# Patient Record
Sex: FEMALE | Race: WHITE | NOT HISPANIC OR LATINO | URBAN - METROPOLITAN AREA
[De-identification: names, ages, dates, MRNs, and addresses within clinical notes are randomized per-mention and may not be internally consistent; named-entity substitution may affect disease eponyms.]

---

## 2019-04-22 ENCOUNTER — EMERGENCY (EMERGENCY)
Facility: HOSPITAL | Age: 84
LOS: 1 days | Discharge: HOME | End: 2019-04-22
Attending: EMERGENCY MEDICINE
Payer: MEDICARE

## 2019-04-22 VITALS
TEMPERATURE: 98 F | HEART RATE: 70 BPM | RESPIRATION RATE: 18 BRPM | HEIGHT: 64 IN | SYSTOLIC BLOOD PRESSURE: 175 MMHG | OXYGEN SATURATION: 96 % | DIASTOLIC BLOOD PRESSURE: 74 MMHG | WEIGHT: 154.1 LBS

## 2019-04-22 VITALS
SYSTOLIC BLOOD PRESSURE: 189 MMHG | HEART RATE: 68 BPM | DIASTOLIC BLOOD PRESSURE: 75 MMHG | RESPIRATION RATE: 19 BRPM | TEMPERATURE: 98 F | OXYGEN SATURATION: 98 %

## 2019-04-22 LAB
ALBUMIN SERPL ELPH-MCNC: 4.1 G/DL — SIGNIFICANT CHANGE UP (ref 3.3–5)
ALP SERPL-CCNC: 171 U/L — HIGH (ref 40–120)
ALT FLD-CCNC: 25 U/L — SIGNIFICANT CHANGE UP (ref 10–45)
ANION GAP SERPL CALC-SCNC: 14 MMOL/L — SIGNIFICANT CHANGE UP (ref 5–17)
APTT BLD: 36.5 SEC — HIGH (ref 27.5–36.3)
AST SERPL-CCNC: 19 U/L — SIGNIFICANT CHANGE UP (ref 10–40)
BASOPHILS # BLD AUTO: 0 K/UL — SIGNIFICANT CHANGE UP (ref 0–0.2)
BASOPHILS NFR BLD AUTO: 0.5 % — SIGNIFICANT CHANGE UP (ref 0–2)
BILIRUB SERPL-MCNC: 0.3 MG/DL — SIGNIFICANT CHANGE UP (ref 0.2–1.2)
BUN SERPL-MCNC: 30 MG/DL — HIGH (ref 7–23)
CALCIUM SERPL-MCNC: 10.1 MG/DL — SIGNIFICANT CHANGE UP (ref 8.4–10.5)
CHLORIDE SERPL-SCNC: 102 MMOL/L — SIGNIFICANT CHANGE UP (ref 96–108)
CO2 SERPL-SCNC: 22 MMOL/L — SIGNIFICANT CHANGE UP (ref 22–31)
CREAT SERPL-MCNC: 1.17 MG/DL — SIGNIFICANT CHANGE UP (ref 0.5–1.3)
EOSINOPHIL # BLD AUTO: 0.4 K/UL — SIGNIFICANT CHANGE UP (ref 0–0.5)
EOSINOPHIL NFR BLD AUTO: 3.4 % — SIGNIFICANT CHANGE UP (ref 0–6)
FLU A RESULT: SIGNIFICANT CHANGE UP
FLU A RESULT: SIGNIFICANT CHANGE UP
FLUAV AG NPH QL: SIGNIFICANT CHANGE UP
FLUBV AG NPH QL: SIGNIFICANT CHANGE UP
GAS PNL BLDV: SIGNIFICANT CHANGE UP
GLUCOSE SERPL-MCNC: 111 MG/DL — HIGH (ref 70–99)
HCT VFR BLD CALC: 35.1 % — SIGNIFICANT CHANGE UP (ref 34.5–45)
HGB BLD-MCNC: 11.7 G/DL — SIGNIFICANT CHANGE UP (ref 11.5–15.5)
INR BLD: 1.19 RATIO — HIGH (ref 0.88–1.16)
LYMPHOCYTES # BLD AUTO: 1.2 K/UL — SIGNIFICANT CHANGE UP (ref 1–3.3)
LYMPHOCYTES # BLD AUTO: 10.9 % — LOW (ref 13–44)
MCHC RBC-ENTMCNC: 30.1 PG — SIGNIFICANT CHANGE UP (ref 27–34)
MCHC RBC-ENTMCNC: 33.2 GM/DL — SIGNIFICANT CHANGE UP (ref 32–36)
MCV RBC AUTO: 90.7 FL — SIGNIFICANT CHANGE UP (ref 80–100)
MONOCYTES # BLD AUTO: 1 K/UL — HIGH (ref 0–0.9)
MONOCYTES NFR BLD AUTO: 9.9 % — SIGNIFICANT CHANGE UP (ref 2–14)
NEUTROPHILS # BLD AUTO: 8 K/UL — HIGH (ref 1.8–7.4)
NEUTROPHILS NFR BLD AUTO: 75.4 % — SIGNIFICANT CHANGE UP (ref 43–77)
PLATELET # BLD AUTO: 293 K/UL — SIGNIFICANT CHANGE UP (ref 150–400)
POTASSIUM SERPL-MCNC: 4.8 MMOL/L — SIGNIFICANT CHANGE UP (ref 3.5–5.3)
POTASSIUM SERPL-SCNC: 4.8 MMOL/L — SIGNIFICANT CHANGE UP (ref 3.5–5.3)
PROT SERPL-MCNC: 7.6 G/DL — SIGNIFICANT CHANGE UP (ref 6–8.3)
PROTHROM AB SERPL-ACNC: 13.8 SEC — HIGH (ref 10–12.9)
RBC # BLD: 3.87 M/UL — SIGNIFICANT CHANGE UP (ref 3.8–5.2)
RBC # FLD: 13.2 % — SIGNIFICANT CHANGE UP (ref 10.3–14.5)
RSV RESULT: SIGNIFICANT CHANGE UP
RSV RNA RESP QL NAA+PROBE: SIGNIFICANT CHANGE UP
SODIUM SERPL-SCNC: 138 MMOL/L — SIGNIFICANT CHANGE UP (ref 135–145)
WBC # BLD: 10.6 K/UL — HIGH (ref 3.8–10.5)
WBC # FLD AUTO: 10.6 K/UL — HIGH (ref 3.8–10.5)

## 2019-04-22 PROCEDURE — 85014 HEMATOCRIT: CPT

## 2019-04-22 PROCEDURE — 93005 ELECTROCARDIOGRAM TRACING: CPT

## 2019-04-22 PROCEDURE — 85730 THROMBOPLASTIN TIME PARTIAL: CPT

## 2019-04-22 PROCEDURE — 82330 ASSAY OF CALCIUM: CPT

## 2019-04-22 PROCEDURE — 71046 X-RAY EXAM CHEST 2 VIEWS: CPT | Mod: 26

## 2019-04-22 PROCEDURE — 84132 ASSAY OF SERUM POTASSIUM: CPT

## 2019-04-22 PROCEDURE — 99283 EMERGENCY DEPT VISIT LOW MDM: CPT | Mod: 25

## 2019-04-22 PROCEDURE — 82947 ASSAY GLUCOSE BLOOD QUANT: CPT

## 2019-04-22 PROCEDURE — 84295 ASSAY OF SERUM SODIUM: CPT

## 2019-04-22 PROCEDURE — 83605 ASSAY OF LACTIC ACID: CPT

## 2019-04-22 PROCEDURE — 99284 EMERGENCY DEPT VISIT MOD MDM: CPT | Mod: 25

## 2019-04-22 PROCEDURE — 80053 COMPREHEN METABOLIC PANEL: CPT

## 2019-04-22 PROCEDURE — 87631 RESP VIRUS 3-5 TARGETS: CPT

## 2019-04-22 PROCEDURE — 93010 ELECTROCARDIOGRAM REPORT: CPT

## 2019-04-22 PROCEDURE — 71046 X-RAY EXAM CHEST 2 VIEWS: CPT

## 2019-04-22 PROCEDURE — 82435 ASSAY OF BLOOD CHLORIDE: CPT

## 2019-04-22 PROCEDURE — 82803 BLOOD GASES ANY COMBINATION: CPT

## 2019-04-22 PROCEDURE — 85027 COMPLETE CBC AUTOMATED: CPT

## 2019-04-22 PROCEDURE — 85610 PROTHROMBIN TIME: CPT

## 2019-04-22 NOTE — ED ADULT NURSE NOTE - PMH
CAD (coronary artery disease)    Diabetes    Essential hypertension, benign    Hypercholesteremia    Peripheral vascular disease

## 2019-04-22 NOTE — ED PROVIDER NOTE - PROGRESS NOTE DETAILS
labs, cxr non-actionable, without signs of pneumonia. most c/w viral URI. CHIKIS Maldonado discussed findings with Dr. Contreras, family friend. To d/c home with PMD f/u and strict return precautions. Pt and son comfortable with this plan. F/u instructions discussed with pt and son who express understanding, along with return precautions. An opportunity to ask questions was provided and all answered. Pt hypertensive, but per son pt always has elevated SBP and this is not unusual. - Singh Glover MD

## 2019-04-22 NOTE — ED ADULT NURSE NOTE - PSH
History of cholecystectomy    S/P carotid endarterectomy    S/P coronary artery by pass    S/P vascular surgery

## 2019-04-22 NOTE — ED PROVIDER NOTE - NSFOLLOWUPINSTRUCTIONS_ED_ALL_ED_FT
You were seen for a cough. Your labs were reassuring, and your X-ray did not show evidence of a pneumonia. Please follow-up closely with your primary care doctor within the next couple of days for re-evaluation, bring a copy of your results. Use cough drops for cough. Return immediately for any worsening or concerning symptoms including but not limited to worsening cough, fever >100.4 degrees F, shortness of breath, chest pain, swelling in legs.

## 2019-04-22 NOTE — ED PROVIDER NOTE - OBJECTIVE STATEMENT
89 yo female PMHx diabetes, HTN, HLD, CAD s/p quadruple bypass, PVD presents to the ED c/o productive cough, myalgias, chest and nasal congestion, and headache x 1 week. States symptoms initially started w/ productive cough w/ yellow sputum and chills, then progressed to include more body aches and headache. Symptoms have progressively worsened. Reports has been coughing so much that now has diffuse chest wall pain only when she coughs, none at rest. Denies hemoptysis, calf pain/swelling, LE swelling, shortness of breath, dyspnea on exertion, abdominal pain, n/v/d, numbness/tingling.

## 2019-04-22 NOTE — ED PROVIDER NOTE - PMH
CAD (coronary artery disease)    Essential hypertension, benign    Hypercholesteremia    Peripheral vascular disease

## 2019-04-22 NOTE — ED ADULT NURSE NOTE - OBJECTIVE STATEMENT
89 year old female c/o cough for one week with yellow sputum production.  Patient said cough is improving, but when she coughs she has left sided chest pain radiating to the left upper back.  Patient denies; fever, sick contacts, N/V, recent travel, swelling in the legs. 89 year old female c/o cough for one week with yellow sputum production.  Patient said cough is improving, but when she coughs she has left sided chest pain radiating to the left upper back.  Patient denies; fever, sick contacts, N/V, recent travel, swelling in the legs.  L/s diminished on right side.

## 2019-04-22 NOTE — ED PROVIDER NOTE - ATTENDING CONTRIBUTION TO CARE
89F, pmh DM, HTN, HLD, CAD s/p CABG, PVD, presents with productive cough, nasal congestion, headache x 1 week, sx improving over last few days. Pt initially developed rhinorrhea, sore throat, then productive cough, myalgias, headache. Cough productive yellowish sputum. Sx initially worsened, now improving. +Chest wall, back, and L-sided abd discomfort while coughing, pt states developed after coughing forcefully. Denies any pain or discomfort when not coughing. Denies SOB. Denies LE pain, swelling. Denies WEEKS, n/v/d, or other complaints. No recnet travel. No known sick contacts.    PE: Well appearing female in NAD, NCAT, MMM, Trachea midline, Normal conjunctiva, lungs CTAB, S1/S2 RRR, Normal perfusion, 2+ radial pulses bilat, Abdomen Soft, NTND, No rebound/guarding, No LE edema, No deformity of extremities, No rashes,  No focal motor or sensory deficits.     Pt appears well, hypertensive but not tachycardic, hypoxic, febrile. Ddx includes but not limited to viral URI, pneumonia. EKG unchanged from previous, carmine wall pain reproducible, only with cough, extremely low suspicion ACS. No calf pain/swelling, no hx PE/DVT, no sob, no pleuritic chest pain, extremely low suspicion of PE. Check CBC eval for anemia, leukocytosis, cmp eval for metabolic derangement. Flu swab. VBG. Re-eval. - Singh Glover MD

## 2020-01-20 ENCOUNTER — INPATIENT (INPATIENT)
Facility: HOSPITAL | Age: 85
LOS: 4 days | Discharge: HOME | DRG: 291 | End: 2020-01-25
Attending: INTERNAL MEDICINE | Admitting: HOSPITALIST
Payer: MEDICARE

## 2020-01-20 VITALS
OXYGEN SATURATION: 97 % | DIASTOLIC BLOOD PRESSURE: 66 MMHG | RESPIRATION RATE: 18 BRPM | HEART RATE: 73 BPM | SYSTOLIC BLOOD PRESSURE: 179 MMHG | HEIGHT: 62 IN | WEIGHT: 158.95 LBS | TEMPERATURE: 98 F

## 2020-01-20 DIAGNOSIS — I50.9 HEART FAILURE, UNSPECIFIED: ICD-10-CM

## 2020-01-20 LAB
ALBUMIN SERPL ELPH-MCNC: 4.2 G/DL — SIGNIFICANT CHANGE UP (ref 3.3–5)
ALP SERPL-CCNC: 188 U/L — HIGH (ref 40–120)
ALT FLD-CCNC: 19 U/L — SIGNIFICANT CHANGE UP (ref 10–45)
ANION GAP SERPL CALC-SCNC: 13 MMOL/L — SIGNIFICANT CHANGE UP (ref 5–17)
AST SERPL-CCNC: 15 U/L — SIGNIFICANT CHANGE UP (ref 10–40)
BASOPHILS # BLD AUTO: 0.05 K/UL — SIGNIFICANT CHANGE UP (ref 0–0.2)
BASOPHILS NFR BLD AUTO: 0.6 % — SIGNIFICANT CHANGE UP (ref 0–2)
BILIRUB SERPL-MCNC: 0.3 MG/DL — SIGNIFICANT CHANGE UP (ref 0.2–1.2)
BUN SERPL-MCNC: 28 MG/DL — HIGH (ref 7–23)
CALCIUM SERPL-MCNC: 9.5 MG/DL — SIGNIFICANT CHANGE UP (ref 8.4–10.5)
CHLORIDE SERPL-SCNC: 103 MMOL/L — SIGNIFICANT CHANGE UP (ref 96–108)
CO2 SERPL-SCNC: 22 MMOL/L — SIGNIFICANT CHANGE UP (ref 22–31)
CREAT SERPL-MCNC: 1.52 MG/DL — HIGH (ref 0.5–1.3)
EOSINOPHIL # BLD AUTO: 0.16 K/UL — SIGNIFICANT CHANGE UP (ref 0–0.5)
EOSINOPHIL NFR BLD AUTO: 1.9 % — SIGNIFICANT CHANGE UP (ref 0–6)
GLUCOSE SERPL-MCNC: 247 MG/DL — HIGH (ref 70–99)
HCT VFR BLD CALC: 34.4 % — LOW (ref 34.5–45)
HGB BLD-MCNC: 10.7 G/DL — LOW (ref 11.5–15.5)
IMM GRANULOCYTES NFR BLD AUTO: 0.5 % — SIGNIFICANT CHANGE UP (ref 0–1.5)
LYMPHOCYTES # BLD AUTO: 0.76 K/UL — LOW (ref 1–3.3)
LYMPHOCYTES # BLD AUTO: 8.8 % — LOW (ref 13–44)
MCHC RBC-ENTMCNC: 28.3 PG — SIGNIFICANT CHANGE UP (ref 27–34)
MCHC RBC-ENTMCNC: 31.1 GM/DL — LOW (ref 32–36)
MCV RBC AUTO: 91 FL — SIGNIFICANT CHANGE UP (ref 80–100)
MONOCYTES # BLD AUTO: 0.75 K/UL — SIGNIFICANT CHANGE UP (ref 0–0.9)
MONOCYTES NFR BLD AUTO: 8.7 % — SIGNIFICANT CHANGE UP (ref 2–14)
NEUTROPHILS # BLD AUTO: 6.85 K/UL — SIGNIFICANT CHANGE UP (ref 1.8–7.4)
NEUTROPHILS NFR BLD AUTO: 79.5 % — HIGH (ref 43–77)
NRBC # BLD: 0 /100 WBCS — SIGNIFICANT CHANGE UP (ref 0–0)
PLATELET # BLD AUTO: 272 K/UL — SIGNIFICANT CHANGE UP (ref 150–400)
POTASSIUM SERPL-MCNC: 4.3 MMOL/L — SIGNIFICANT CHANGE UP (ref 3.5–5.3)
POTASSIUM SERPL-SCNC: 4.3 MMOL/L — SIGNIFICANT CHANGE UP (ref 3.5–5.3)
PROT SERPL-MCNC: 7.3 G/DL — SIGNIFICANT CHANGE UP (ref 6–8.3)
RBC # BLD: 3.78 M/UL — LOW (ref 3.8–5.2)
RBC # FLD: 13.8 % — SIGNIFICANT CHANGE UP (ref 10.3–14.5)
SODIUM SERPL-SCNC: 138 MMOL/L — SIGNIFICANT CHANGE UP (ref 135–145)
WBC # BLD: 8.61 K/UL — SIGNIFICANT CHANGE UP (ref 3.8–10.5)
WBC # FLD AUTO: 8.61 K/UL — SIGNIFICANT CHANGE UP (ref 3.8–10.5)

## 2020-01-20 PROCEDURE — 99285 EMERGENCY DEPT VISIT HI MDM: CPT | Mod: GC

## 2020-01-20 PROCEDURE — 93970 EXTREMITY STUDY: CPT | Mod: 26

## 2020-01-20 NOTE — ED PROVIDER NOTE - ATTENDING CONTRIBUTION TO CARE
89yo female pmh DM, CAD s/p CABG, PVD with stents, HTN, HLD p/w bilateral LE swelling x 1 week, started after eating salty foods. No diuretics. Denies dyspnea, n/v/d, abd pain, chest pain, cough, fevers. LE swelling worsening, causing pain, limiting ambulation. Pt with 1+ bilateral pitting edema LE. Lungs clear, regular rhythm with occasional pause. Abd soft. Concenr for renal vs cardiogenic cause. Labs with BNP. DVT study ordered by qPA negative.

## 2020-01-20 NOTE — ED PROVIDER NOTE - PHYSICAL EXAMINATION
PHYSICAL EXAM:  GENERAL: non-toxic appearing; in no respiratory distress  HEAD: Atraumatic, Normocephalic;  NECK: No JVD; FROM  EYES: PERRL, EOMs intact b/l w/out deficits  CHEST/LUNG: CTAB no wheezes/rhonchi/rales  HEART: RRR no murmur/gallops/rubs  ABDOMEN: +BS, soft, NT, ND  EXTREMITIES: 1+ pitting edema; +2 radial, DP/PT pulses b/l  MUSCULOSKELETAL: FROM of all 4 extremities; tender to touch to b/l legs without focal tenderness;   NERVOUS SYSTEM:  A&Ox3, No motor deficits or sensory deficits; CNII-XII intact; no focal neurologic deficits  SKIN:  No new rashes

## 2020-01-20 NOTE — ED PROVIDER NOTE - CLINICAL SUMMARY MEDICAL DECISION MAKING FREE TEXT BOX
89 yo F with PAD, CAD, s/p cabg, carotid endarterectomy, on asa, plavix, not on diuretics, last echo about 6 months ago was told was normal, presents to ED c/o b/l leg swelling x1 week with pain yesterday and increased swelling. not sob, no cp. no orthopnea, pnd, or jaimes. 1+pitting edema w/ no rales heard. will check labs, bnp, cxr, ekg, reassess.

## 2020-01-20 NOTE — ED PROVIDER NOTE - NS ED ROS FT
Constitutional: no fevers or chills  HEENT: no visual changes, no sore throat, no rhinorrhea  CV: no cp or palpitations  Resp: no sob or cough  GI: no abd pain, no nausea, no vomiting, no diarrhea, no constipation  : no dysuria, no hematuria  MSK: b/l leg swelling; b/l leg pain;   skin: no rashes  neuro: no HA, no numbness; no weakness, no tingling  ROS statement: all other ROS negative except as per HPI

## 2020-01-20 NOTE — ED PROVIDER NOTE - CARE PLAN
Principal Discharge DX:	Congestive heart failure  Secondary Diagnosis:	COLETTE (acute kidney injury)

## 2020-01-20 NOTE — ED PROVIDER NOTE - OBJECTIVE STATEMENT
89 yo F PMHx PAD s/p stents, CAD, on ASA and plavix, HLD, HTN, s/p CABG, carotid endartectomy, cholecystectomy, presents to ED c/o b/l LE swelling x1 week. yesterday, pt develoepd b/l leg pain and noted that swelling was worse yesterday and today and thus presents to ED. pt denies CP, SOB, n/v, abd pain, urinary sx, fall, trauma, or injury to region, HA.

## 2020-01-20 NOTE — ED PROVIDER NOTE - PROGRESS NOTE DETAILS
+COLETTE and elevated BNP. Concern for combined etiology of edema. Will admit for further evaluation, echocardiogram. No immediate need for diuresis as pt is stable, breathing comfortably and there may be detrimental renal impact of diuresis at the moment. Jose Dean MD. abdifatah tiwari/ hospitalist, who accepts pt for admission.

## 2020-01-20 NOTE — ED PROVIDER NOTE - RAPID ASSESSMENT
Dax Wright rapid assessment documentation for Umm Muñoz (PA-C):    89 year old female with pmhx DM, peripheral vascular disease, CAD, HLD, essential HTN and pshx vascular surgery, cholecystectomy, CABG, carotid endarterectomy presents to the ED c/o LE swelling x1 week. Swelling began on 1/12 after eating salty foods during a party. Symptoms progressively worsened but yesterday, pt was unable to walk at all. Per pt, her lower extremities were extremely swollen yesterday and it was accompanied by pain. Symptoms have since resolved. Currently on 81mg and Plavix s/p bilateral femur stent placement. Fmhx blood clots but denies pmhx blood clots. Denies CP, SOB, abdominal distension. Denies smoking. Dax Wright rapid assessment documentation for Umm Muñoz (PA-C):    89 year old female with pmhx DM, peripheral vascular disease s/p stents (on asa/plavix), CAD, HLD, HTN and pshx vascular surgery, cholecystectomy, CABG, carotid endarterectomy presents to the ED c/o b/l LE swelling x1 week. Swelling began on 1/12 after eating salty foods during a party. Symptoms progressively worsened but yesterday, pt was unable to walk at all due to b/l calf pain. Per pt, her lower extremities were extremely swollen yesterday and it was accompanied by pain. Symptoms have since resolved. +Fmhx blood clots but denies pmhx blood clots. Denies CP, SOB, abdominal distension. Denies smoking, recent travel, hormone use.     CHIKIS Muñoz: Patient seen by myself in triage area with scribe for rapid assessment only. Full H&P to be performed in main emergency room by ER providers.   Patient denies any CP, SOB, orthopnea. Reports swelling began after family party on feet and eating saltier foods. Lungs clear b/l.

## 2020-01-21 DIAGNOSIS — Z29.9 ENCOUNTER FOR PROPHYLACTIC MEASURES, UNSPECIFIED: ICD-10-CM

## 2020-01-21 DIAGNOSIS — I25.10 ATHEROSCLEROTIC HEART DISEASE OF NATIVE CORONARY ARTERY WITHOUT ANGINA PECTORIS: ICD-10-CM

## 2020-01-21 DIAGNOSIS — I50.9 HEART FAILURE, UNSPECIFIED: ICD-10-CM

## 2020-01-21 DIAGNOSIS — E78.00 PURE HYPERCHOLESTEROLEMIA, UNSPECIFIED: ICD-10-CM

## 2020-01-21 DIAGNOSIS — N17.9 ACUTE KIDNEY FAILURE, UNSPECIFIED: ICD-10-CM

## 2020-01-21 DIAGNOSIS — E11.69 TYPE 2 DIABETES MELLITUS WITH OTHER SPECIFIED COMPLICATION: ICD-10-CM

## 2020-01-21 DIAGNOSIS — Z02.9 ENCOUNTER FOR ADMINISTRATIVE EXAMINATIONS, UNSPECIFIED: ICD-10-CM

## 2020-01-21 PROBLEM — E11.9 TYPE 2 DIABETES MELLITUS WITHOUT COMPLICATIONS: Chronic | Status: ACTIVE | Noted: 2019-04-22

## 2020-01-21 LAB
ANION GAP SERPL CALC-SCNC: 15 MMOL/L — SIGNIFICANT CHANGE UP (ref 5–17)
BASOPHILS # BLD AUTO: 0.05 K/UL — SIGNIFICANT CHANGE UP (ref 0–0.2)
BASOPHILS NFR BLD AUTO: 0.6 % — SIGNIFICANT CHANGE UP (ref 0–2)
BUN SERPL-MCNC: 26 MG/DL — HIGH (ref 7–23)
CALCIUM SERPL-MCNC: 9.7 MG/DL — SIGNIFICANT CHANGE UP (ref 8.4–10.5)
CHLORIDE SERPL-SCNC: 103 MMOL/L — SIGNIFICANT CHANGE UP (ref 96–108)
CO2 SERPL-SCNC: 23 MMOL/L — SIGNIFICANT CHANGE UP (ref 22–31)
CREAT SERPL-MCNC: 1.41 MG/DL — HIGH (ref 0.5–1.3)
EOSINOPHIL # BLD AUTO: 0.25 K/UL — SIGNIFICANT CHANGE UP (ref 0–0.5)
EOSINOPHIL NFR BLD AUTO: 3.1 % — SIGNIFICANT CHANGE UP (ref 0–6)
GLUCOSE BLDC GLUCOMTR-MCNC: 129 MG/DL — HIGH (ref 70–99)
GLUCOSE BLDC GLUCOMTR-MCNC: 163 MG/DL — HIGH (ref 70–99)
GLUCOSE BLDC GLUCOMTR-MCNC: 190 MG/DL — HIGH (ref 70–99)
GLUCOSE BLDC GLUCOMTR-MCNC: 237 MG/DL — HIGH (ref 70–99)
GLUCOSE BLDC GLUCOMTR-MCNC: 83 MG/DL — SIGNIFICANT CHANGE UP (ref 70–99)
GLUCOSE SERPL-MCNC: 124 MG/DL — HIGH (ref 70–99)
HBA1C BLD-MCNC: 5.9 % — HIGH (ref 4–5.6)
HCT VFR BLD CALC: 34 % — LOW (ref 34.5–45)
HGB BLD-MCNC: 10.6 G/DL — LOW (ref 11.5–15.5)
IMM GRANULOCYTES NFR BLD AUTO: 0.5 % — SIGNIFICANT CHANGE UP (ref 0–1.5)
LYMPHOCYTES # BLD AUTO: 0.98 K/UL — LOW (ref 1–3.3)
LYMPHOCYTES # BLD AUTO: 12.1 % — LOW (ref 13–44)
MAGNESIUM SERPL-MCNC: 1.7 MG/DL — SIGNIFICANT CHANGE UP (ref 1.6–2.6)
MCHC RBC-ENTMCNC: 27.7 PG — SIGNIFICANT CHANGE UP (ref 27–34)
MCHC RBC-ENTMCNC: 31.2 GM/DL — LOW (ref 32–36)
MCV RBC AUTO: 89 FL — SIGNIFICANT CHANGE UP (ref 80–100)
MONOCYTES # BLD AUTO: 0.87 K/UL — SIGNIFICANT CHANGE UP (ref 0–0.9)
MONOCYTES NFR BLD AUTO: 10.8 % — SIGNIFICANT CHANGE UP (ref 2–14)
NEUTROPHILS # BLD AUTO: 5.88 K/UL — SIGNIFICANT CHANGE UP (ref 1.8–7.4)
NEUTROPHILS NFR BLD AUTO: 72.9 % — SIGNIFICANT CHANGE UP (ref 43–77)
PHOSPHATE SERPL-MCNC: 3.7 MG/DL — SIGNIFICANT CHANGE UP (ref 2.5–4.5)
PLATELET # BLD AUTO: 287 K/UL — SIGNIFICANT CHANGE UP (ref 150–400)
POTASSIUM SERPL-MCNC: 4.1 MMOL/L — SIGNIFICANT CHANGE UP (ref 3.5–5.3)
POTASSIUM SERPL-SCNC: 4.1 MMOL/L — SIGNIFICANT CHANGE UP (ref 3.5–5.3)
RBC # BLD: 3.82 M/UL — SIGNIFICANT CHANGE UP (ref 3.8–5.2)
RBC # FLD: 13.9 % — SIGNIFICANT CHANGE UP (ref 10.3–14.5)
SODIUM SERPL-SCNC: 141 MMOL/L — SIGNIFICANT CHANGE UP (ref 135–145)
TROPONIN T, HIGH SENSITIVITY RESULT: 22 NG/L — SIGNIFICANT CHANGE UP (ref 0–51)
TSH SERPL-MCNC: 3.94 UIU/ML — SIGNIFICANT CHANGE UP (ref 0.27–4.2)
WBC # BLD: 8.07 K/UL — SIGNIFICANT CHANGE UP (ref 3.8–10.5)
WBC # FLD AUTO: 8.07 K/UL — SIGNIFICANT CHANGE UP (ref 3.8–10.5)

## 2020-01-21 PROCEDURE — 71046 X-RAY EXAM CHEST 2 VIEWS: CPT | Mod: 26

## 2020-01-21 PROCEDURE — 99223 1ST HOSP IP/OBS HIGH 75: CPT

## 2020-01-21 RX ORDER — ATORVASTATIN CALCIUM 80 MG/1
0 TABLET, FILM COATED ORAL
Qty: 90 | Refills: 0 | DISCHARGE

## 2020-01-21 RX ORDER — METOPROLOL TARTRATE 50 MG
0 TABLET ORAL
Qty: 90 | Refills: 0 | DISCHARGE

## 2020-01-21 RX ORDER — METFORMIN HYDROCHLORIDE 850 MG/1
0 TABLET ORAL
Qty: 180 | Refills: 0 | DISCHARGE

## 2020-01-21 RX ORDER — METOPROLOL TARTRATE 50 MG
100 TABLET ORAL DAILY
Refills: 0 | Status: DISCONTINUED | OUTPATIENT
Start: 2020-01-21 | End: 2020-01-25

## 2020-01-21 RX ORDER — SODIUM CHLORIDE 9 MG/ML
1000 INJECTION, SOLUTION INTRAVENOUS
Refills: 0 | Status: DISCONTINUED | OUTPATIENT
Start: 2020-01-21 | End: 2020-01-25

## 2020-01-21 RX ORDER — MELOXICAM 15 MG/1
0 TABLET ORAL
Qty: 30 | Refills: 0 | DISCHARGE

## 2020-01-21 RX ORDER — CLOPIDOGREL BISULFATE 75 MG/1
75 TABLET, FILM COATED ORAL DAILY
Refills: 0 | Status: DISCONTINUED | OUTPATIENT
Start: 2020-01-21 | End: 2020-01-25

## 2020-01-21 RX ORDER — AMLODIPINE BESYLATE 2.5 MG/1
10 TABLET ORAL DAILY
Refills: 0 | Status: DISCONTINUED | OUTPATIENT
Start: 2020-01-21 | End: 2020-01-25

## 2020-01-21 RX ORDER — PANTOPRAZOLE SODIUM 20 MG/1
0 TABLET, DELAYED RELEASE ORAL
Qty: 90 | Refills: 0 | DISCHARGE

## 2020-01-21 RX ORDER — LINAGLIPTIN 5 MG/1
0 TABLET, FILM COATED ORAL
Qty: 90 | Refills: 0 | DISCHARGE

## 2020-01-21 RX ORDER — INSULIN LISPRO 100/ML
VIAL (ML) SUBCUTANEOUS
Refills: 0 | Status: DISCONTINUED | OUTPATIENT
Start: 2020-01-21 | End: 2020-01-25

## 2020-01-21 RX ORDER — GLUCAGON INJECTION, SOLUTION 0.5 MG/.1ML
1 INJECTION, SOLUTION SUBCUTANEOUS ONCE
Refills: 0 | Status: DISCONTINUED | OUTPATIENT
Start: 2020-01-21 | End: 2020-01-25

## 2020-01-21 RX ORDER — DEXTROSE 50 % IN WATER 50 %
25 SYRINGE (ML) INTRAVENOUS ONCE
Refills: 0 | Status: DISCONTINUED | OUTPATIENT
Start: 2020-01-21 | End: 2020-01-25

## 2020-01-21 RX ORDER — TRAZODONE HCL 50 MG
0 TABLET ORAL
Qty: 90 | Refills: 0 | DISCHARGE

## 2020-01-21 RX ORDER — TRAZODONE HCL 50 MG
50 TABLET ORAL ONCE
Refills: 0 | Status: COMPLETED | OUTPATIENT
Start: 2020-01-21 | End: 2020-01-21

## 2020-01-21 RX ORDER — TRAZODONE HCL 50 MG
50 TABLET ORAL AT BEDTIME
Refills: 0 | Status: DISCONTINUED | OUTPATIENT
Start: 2020-01-21 | End: 2020-01-25

## 2020-01-21 RX ORDER — HEPARIN SODIUM 5000 [USP'U]/ML
5000 INJECTION INTRAVENOUS; SUBCUTANEOUS EVERY 8 HOURS
Refills: 0 | Status: DISCONTINUED | OUTPATIENT
Start: 2020-01-21 | End: 2020-01-25

## 2020-01-21 RX ORDER — FUROSEMIDE 40 MG
20 TABLET ORAL
Refills: 0 | Status: DISCONTINUED | OUTPATIENT
Start: 2020-01-21 | End: 2020-01-24

## 2020-01-21 RX ORDER — INSULIN GLARGINE 100 [IU]/ML
8 INJECTION, SOLUTION SUBCUTANEOUS AT BEDTIME
Refills: 0 | Status: DISCONTINUED | OUTPATIENT
Start: 2020-01-21 | End: 2020-01-25

## 2020-01-21 RX ORDER — INSULIN GLARGINE 100 [IU]/ML
7 INJECTION, SOLUTION SUBCUTANEOUS ONCE
Refills: 0 | Status: DISCONTINUED | OUTPATIENT
Start: 2020-01-21 | End: 2020-01-21

## 2020-01-21 RX ORDER — FUROSEMIDE 40 MG
40 TABLET ORAL ONCE
Refills: 0 | Status: COMPLETED | OUTPATIENT
Start: 2020-01-21 | End: 2020-01-21

## 2020-01-21 RX ORDER — AMLODIPINE BESYLATE 2.5 MG/1
0 TABLET ORAL
Qty: 90 | Refills: 0 | DISCHARGE

## 2020-01-21 RX ORDER — DEXTROSE 50 % IN WATER 50 %
12.5 SYRINGE (ML) INTRAVENOUS ONCE
Refills: 0 | Status: DISCONTINUED | OUTPATIENT
Start: 2020-01-21 | End: 2020-01-25

## 2020-01-21 RX ORDER — INSULIN LISPRO 100/ML
VIAL (ML) SUBCUTANEOUS AT BEDTIME
Refills: 0 | Status: DISCONTINUED | OUTPATIENT
Start: 2020-01-21 | End: 2020-01-25

## 2020-01-21 RX ORDER — INSULIN GLARGINE 100 [IU]/ML
12 INJECTION, SOLUTION SUBCUTANEOUS AT BEDTIME
Refills: 0 | Status: DISCONTINUED | OUTPATIENT
Start: 2020-01-21 | End: 2020-01-21

## 2020-01-21 RX ORDER — ASPIRIN/CALCIUM CARB/MAGNESIUM 324 MG
81 TABLET ORAL DAILY
Refills: 0 | Status: DISCONTINUED | OUTPATIENT
Start: 2020-01-21 | End: 2020-01-25

## 2020-01-21 RX ORDER — ATORVASTATIN CALCIUM 80 MG/1
10 TABLET, FILM COATED ORAL AT BEDTIME
Refills: 0 | Status: DISCONTINUED | OUTPATIENT
Start: 2020-01-21 | End: 2020-01-25

## 2020-01-21 RX ORDER — VALSARTAN 80 MG/1
0 TABLET ORAL
Qty: 90 | Refills: 0 | DISCHARGE

## 2020-01-21 RX ORDER — INSULIN DETEMIR 100/ML (3)
0 INSULIN PEN (ML) SUBCUTANEOUS
Qty: 15 | Refills: 0 | DISCHARGE

## 2020-01-21 RX ORDER — DEXTROSE 50 % IN WATER 50 %
15 SYRINGE (ML) INTRAVENOUS ONCE
Refills: 0 | Status: DISCONTINUED | OUTPATIENT
Start: 2020-01-21 | End: 2020-01-25

## 2020-01-21 RX ORDER — PANTOPRAZOLE SODIUM 20 MG/1
40 TABLET, DELAYED RELEASE ORAL
Refills: 0 | Status: DISCONTINUED | OUTPATIENT
Start: 2020-01-21 | End: 2020-01-25

## 2020-01-21 RX ADMIN — Medication 1: at 13:26

## 2020-01-21 RX ADMIN — CLOPIDOGREL BISULFATE 75 MILLIGRAM(S): 75 TABLET, FILM COATED ORAL at 12:23

## 2020-01-21 RX ADMIN — ATORVASTATIN CALCIUM 10 MILLIGRAM(S): 80 TABLET, FILM COATED ORAL at 21:57

## 2020-01-21 RX ADMIN — HEPARIN SODIUM 5000 UNIT(S): 5000 INJECTION INTRAVENOUS; SUBCUTANEOUS at 21:56

## 2020-01-21 RX ADMIN — INSULIN GLARGINE 8 UNIT(S): 100 INJECTION, SOLUTION SUBCUTANEOUS at 21:57

## 2020-01-21 RX ADMIN — Medication 100 MILLIGRAM(S): at 05:54

## 2020-01-21 RX ADMIN — HEPARIN SODIUM 5000 UNIT(S): 5000 INJECTION INTRAVENOUS; SUBCUTANEOUS at 13:28

## 2020-01-21 RX ADMIN — AMLODIPINE BESYLATE 10 MILLIGRAM(S): 2.5 TABLET ORAL at 05:54

## 2020-01-21 RX ADMIN — Medication 50 MILLIGRAM(S): at 21:57

## 2020-01-21 RX ADMIN — Medication 81 MILLIGRAM(S): at 12:23

## 2020-01-21 RX ADMIN — Medication 1: at 18:32

## 2020-01-21 RX ADMIN — Medication 40 MILLIGRAM(S): at 03:14

## 2020-01-21 RX ADMIN — Medication 50 MILLIGRAM(S): at 01:00

## 2020-01-21 RX ADMIN — Medication 20 MILLIGRAM(S): at 16:27

## 2020-01-21 RX ADMIN — PANTOPRAZOLE SODIUM 40 MILLIGRAM(S): 20 TABLET, DELAYED RELEASE ORAL at 12:23

## 2020-01-21 NOTE — H&P ADULT - PROBLEM SELECTOR PLAN 2
Per son baseline Cr ~1.2 Uptrend of Cr 1.5 (may be 2/2 to overload state vs worsening CKD)  Hold Valsartan

## 2020-01-21 NOTE — H&P ADULT - NSHPLABSRESULTS_GEN_ALL_CORE
Personally reviewed labs and noted in detail below: Elevated Creatinine 1.52 HsTrop 22 Elevated BNP    Reviewed imaging  < from: [facility] Duplex Lower Ext Vein Scan, Bilat (01.20.20 @ 21:52) >  IMPRESSION:   No evidence of deep venous thrombosis in either lower extremity.  < end of copied text >    < from: Xray Chest 2 Views PA/Lat (01.21.20 @ 00:10) >  ******PRELIMINARY REPORT******     INTERPRETATION:  Trace bilateral pleural effusions.  ******PRELIMINARY REPORT******    < end of copied text >

## 2020-01-21 NOTE — CONSULT NOTE ADULT - SUBJECTIVE AND OBJECTIVE BOX
CHIEF COMPLAINT:Patient is a 89y old  Female who presents with a chief complaint of LE edema (21 Jan 2020 08:53)      HPI:  89 yo woman with PMH of CAD s/p CABG, HTN, HLD, PVD s/p stent, CKD, (Baseline Cr reportedly 1.2), DMT2, presents from home in setting of worsening LE edema over the course of 1 week. Per son and patient she has not been compliant with her diet: Shes been having salty food lately. Symptoms progressively worsened and has been increasingly difficult to ambulate. Denies orthopnea, PND, CP, palpitations, SOB. Unclear if patient has a history of CHF. Patient has not been on a diuretic as an outpatient.  pt with gradual increasing sob and LE edema.  denies of any hx of chf last echo months ago does not knoew the results  pt with known hx of cad, s/p CABG.      PAST MEDICAL & SURGICAL HISTORY:  Diabetes  Peripheral vascular disease  CAD (coronary artery disease)  Hypercholesteremia  Essential hypertension, benign  S/P vascular surgery  History of cholecystectomy  S/P coronary artery by pass  S/P carotid endarterectomy      MEDICATIONS  (STANDING):  amLODIPine   Tablet 10 milliGRAM(s) Oral daily  aspirin enteric coated 81 milliGRAM(s) Oral daily  atorvastatin 10 milliGRAM(s) Oral at bedtime  clopidogrel Tablet 75 milliGRAM(s) Oral daily  dextrose 5%. 1000 milliLiter(s) (50 mL/Hr) IV Continuous <Continuous>  dextrose 50% Injectable 12.5 Gram(s) IV Push once  dextrose 50% Injectable 25 Gram(s) IV Push once  dextrose 50% Injectable 25 Gram(s) IV Push once  furosemide   Injectable 20 milliGRAM(s) IV Push two times a day  heparin  Injectable 5000 Unit(s) SubCutaneous every 8 hours  insulin glargine Injectable (LANTUS) 8 Unit(s) SubCutaneous at bedtime  insulin lispro (HumaLOG) corrective regimen sliding scale   SubCutaneous three times a day before meals  insulin lispro (HumaLOG) corrective regimen sliding scale   SubCutaneous at bedtime  metoprolol succinate  milliGRAM(s) Oral daily  pantoprazole    Tablet 40 milliGRAM(s) Oral before breakfast  traZODone 50 milliGRAM(s) Oral at bedtime    MEDICATIONS  (PRN):  dextrose 40% Gel 15 Gram(s) Oral once PRN Blood Glucose LESS THAN 70 milliGRAM(s)/deciliter  glucagon  Injectable 1 milliGRAM(s) IntraMuscular once PRN Glucose LESS THAN 70 milligrams/deciliter      FAMILY HISTORY:  No pertinent family history in first degree relatives      SOCIAL HISTORY:    [ ] Non-smoker  [ ] Smoker  [ ] Alcohol    Allergies    No Known Allergies    Intolerances    	    REVIEW OF SYSTEMS:  CONSTITUTIONAL: No fever, weight loss, or fatigue  EYES: No eye pain, visual disturbances, or discharge  ENT:  No difficulty hearing, tinnitus, vertigo; No sinus or throat pain  NECK: No pain or stiffness  RESPIRATORY: No cough, wheezing, chills or hemoptysis;+ Shortness of Breath  CARDIOVASCULAR: No chest pain, palpitations, passing out, dizziness, +leg swelling  GASTROINTESTINAL: No abdominal or epigastric pain. No nausea, vomiting, or hematemesis; No diarrhea or constipation. No melena or hematochezia.  GENITOURINARY: No dysuria, frequency, hematuria, or incontinence  NEUROLOGICAL: No headaches, memory loss, loss of strength, numbness, or tremors  SKIN: No itching, burning, rashes, or lesions   LYMPH Nodes: No enlarged glands  ENDOCRINE: No heat or cold intolerance; No hair loss  MUSCULOSKELETAL: No joint pain or swelling; No muscle, back, or extremity pain  PSYCHIATRIC: No depression, anxiety, mood swings, or difficulty sleeping  HEME/LYMPH: No easy bruising, or bleeding gums  ALLERGY AND IMMUNOLOGIC: No hives or eczema	    [ ] All others negative	  [ ] Unable to obtain    PHYSICAL EXAM:  T(C): 36.5 (01-21-20 @ 08:50), Max: 36.9 (01-20-20 @ 18:19)  HR: 71 (01-21-20 @ 08:50) (64 - 73)  BP: 178/70 (01-21-20 @ 08:50) (158/71 - 179/66)  RR: 16 (01-21-20 @ 08:50) (16 - 18)  SpO2: 96% (01-21-20 @ 08:50) (95% - 99%)  Wt(kg): --  I&O's Summary      Appearance: Normal	  HEENT:   Normal oral mucosa, PERRL, EOMI	  Lymphatic: No lymphadenopathy  Cardiovascular: Normal S1 S2, + JVD, + murmurs, + edema  Respiratory: rales  Psychiatry: A & O x 3, Mood & affect appropriate  Gastrointestinal:  Soft, Non-tender, + BS	  Skin: No rashes, No ecchymoses, No cyanosis	  Neurologic: Non-focal  Extremities: Normal range of motion, No clubbing, cyanosis . + edema  Vascular: Peripheral pulses palpable 2+ bilaterally    TELEMETRY: 	    ECG:  	  RADIOLOGY:  OTHER: 	  	  LABS:	 	    CARDIAC MARKERS:                              10.6   8.07  )-----------( 287      ( 21 Jan 2020 07:27 )             34.0     01-21    141  |  103  |  26<H>  ----------------------------<  124<H>  4.1   |  23  |  1.41<H>    Ca    9.7      21 Jan 2020 05:46  Phos  3.7     01-21  Mg     1.7     01-21    TPro  7.3  /  Alb  4.2  /  TBili  0.3  /  DBili  x   /  AST  15  /  ALT  19  /  AlkPhos  188<H>  01-20    proBNP: Serum Pro-Brain Natriuretic Peptide: 1183 pg/mL (01-20 @ 19:46)    Lipid Profile:   HgA1c: Hemoglobin A1C, Whole Blood: 5.9 % (01-21 @ 07:27)    TSH:       PREVIOUS DIAGNOSTIC TESTING:    < from: 12 Lead ECG (04.22.19 @ 15:15) >  Diagnosis Line SINUS RHYTHM WITH 1ST DEGREE A-V BLOCK  RIGHT BUNDLE BRANCH BLOCK  ABNORMAL ECG    < from: Xray Chest 2 Views PA/Lat (01.21.20 @ 00:10) >  INTERPRETATION:  Trace bilateral pleural effusions.    < end of copied text >

## 2020-01-21 NOTE — H&P ADULT - NSICDXPASTSURGICALHX_GEN_ALL_CORE_FT
PAST SURGICAL HISTORY:  History of cholecystectomy     S/P carotid endarterectomy     S/P coronary artery by pass     S/P vascular surgery

## 2020-01-21 NOTE — PROGRESS NOTE ADULT - ASSESSMENT
89 yo woman        h/o  CAD s/p CABG,   HTN,   HLD,  PVD s/p stent,   CKD, (Baseline Cr reportedly 1.2),  DMT2,     presents from home in setting of worsening LE edema over the course of 1 week.  acute  diastolic chf   iv lasix  COLETTE/    DM  2/ htn/ hld/ CAD/ PAD  anemia/  gi eval   on home meds   dvt ppx   echo pending   card eval 89 yo woman        h/o  CAD s/p CABG,   HTN,   HLD,  PVD s/p stent,   CKD, (Baseline Cr reportedly 1.2),  DMT2,     presents from home in setting of worsening LE edema over the course of 1 week.  acute  diastolic chf   iv lasix    COLETTE/    DM  2/ htn/ hld/ CAD/ PAD  anemia/  gi eval   on home meds/ asa/ plavix/ norvasc/ toprol    dvt ppx   echo pending   card eval

## 2020-01-21 NOTE — H&P ADULT - ASSESSMENT
89 yo woman with PMH of CAD s/p CABG, HTN, HLD, PVD s/p stent, CKD, (Baseline Cr reportedly 1.2), DMT2, presents from home in setting of worsening LE edema over the course of 1 week.

## 2020-01-21 NOTE — ED ADULT NURSE NOTE - OBJECTIVE STATEMENT
89 y/o female A&Ox3 with hx of CAD, HTN, peripheral vascular disease and DM presents to ED for b/l leg swelling x 1 week. As per pt, c/o "tightness" sensation and swelling in b/l legs. Upon assessment, skin intact, no noted swelling, wounds or bruising. Pt states swelling has gone down over the past week but experienced difficulty walking yesterday which prompted ED visit. No recent flights or falls. Pt denies SOB and CP, non labored respirations, no use of accessory muscles or cough. Denies dysuria, burning with urination, n/v/d, fevers or chills. Safety measures maintained with bed in low position and side rails up, family at bedside. 89 y/o female A&Ox3 with hx of CAD, HTN, peripheral vascular disease and DM presents to ED for b/l leg swelling x 1 week. As per pt, c/o "tightness" sensation and swelling in b/l legs. Upon assessment, skin intact with redness, no noted swelling, wounds or bruising. Pt states swelling has gone down over the past week but experienced difficulty walking yesterday which prompted ED visit. No recent flights or falls. Pt denies SOB and CP, non labored respirations, no use of accessory muscles or cough. Denies dysuria, burning with urination, n/v/d, fevers or chills. Safety measures maintained with bed in low position and side rails up, family at bedside.

## 2020-01-21 NOTE — H&P ADULT - PROBLEM/PLAN-6
[] : The components of the vaccine(s) to be administered today are listed in the plan of care. The disease(s) for which the vaccine(s) are intended to prevent and the risks have been discussed with the caretaker.  The risks are also included in the appropriate vaccination information statements which have been provided to the patient's caregiver.  The caregiver has given consent to vaccinate. [FreeTextEntry1] : Call for fever or problems\par  DISPLAY PLAN FREE TEXT

## 2020-01-21 NOTE — H&P ADULT - PROBLEM SELECTOR PLAN 3
Hold Metformin and other PO agents  Lantus 7 units Tonight. Resume 12 units on following night  Corrective SSI  Monitor FS  Hypoglycemia protocol

## 2020-01-21 NOTE — H&P ADULT - ATTENDING COMMENTS
Patient was previously unknown to me. Patient was assigned to me by hospitalist in charge. My involvement in this case consisted only of the initial history, physical and management plan. Primary medicine day team to assume care in AM and thereafter. Case discussed in detail with overnight medicine NP/PA Patient was previously unknown to me. Patient was assigned to me by hospitalist in charge. My involvement in this case consisted only of the initial history, physical and management plan. Primary medicine day team to assume care in AM and thereafter. Case discussed in detail with overnight medicine NP/PA Josefina [phone]

## 2020-01-21 NOTE — H&P ADULT - NSHPPHYSICALEXAM_GEN_ALL_CORE
Vital Signs Last 24 Hrs  T(C): 36.3 (20 Jan 2020 22:37), Max: 36.9 (20 Jan 2020 18:19)  T(F): 97.4 (20 Jan 2020 22:37), Max: 98.4 (20 Jan 2020 18:19)  HR: 72 (20 Jan 2020 22:37) (72 - 73)  BP: 164/63 (20 Jan 2020 22:37) (164/63 - 179/66)  BP(mean): --  RR: 16 (20 Jan 2020 22:37) (16 - 18)  SpO2: 99% (20 Jan 2020 22:37) (97% - 99%)    TELE: Sinus      PHYSICAL EXAM:  GENERAL: NAD, well-groomed, well-developed  HEAD:  Atraumatic, Normocephalic  EYES: EOMI, PERRLA, conjunctiva and sclera clear  ENMT: No tonsillar erythema, exudates, or enlargement; Moist mucous membranes, Good dentition, No lesions  NERVOUS SYSTEM:  Alert & Oriented X3, Good concentration; Motor Strength 5/5 B/L upper and lower extremities  CHEST/LUNG: Clear to percussion bilaterally; No rales, rhonchi, or wheezing  HEART: Regular rate and rhythm; No murmurs, rubs, or gallops  ABDOMEN: Soft, Nontender, Nondistended; Bowel sounds present  EXTREMITIES:  2+ Peripheral Pulses, No clubbing, cyanosis, or +1-2 pitting edema up to thighs  SKIN: Warm and dry. No rashes or lesions

## 2020-01-21 NOTE — CONSULT NOTE ADULT - ASSESSMENT
89 yo woman with PMH of CAD s/p CABG, HTN, HLD, PVD s/p stent, CKD, (Baseline Cr reportedly 1.2), DMT2, presents from home in setting of worsening LE edema over the course of 1 week. Per son and patient she has not been compliant with her diet: Shes been having salty food lately. Symptoms progressively worsened and has been increasingly difficult to ambulate. Denies orthopnea, PND, CP, palpitations, SOB. Unclear if patient has a history of CHF. Patient has not been on a diuretic as an outpatient.  pt with gradual increasing sob and LE edema.  denies of any hx of chf last echo months ago does not know the results  pt with known hx of cad, s/p CABG.  chf  new onset   awaiting echo  will add hydralazine and nitrate if increase bp and low ef  continue with lasix  echo today  needs ischemia work up /persantine nuclear stress test  dvt prophylaxis  continue beta blocker and Norvasc  lipid

## 2020-01-21 NOTE — H&P ADULT - NSHPREVIEWOFSYSTEMS_GEN_ALL_CORE
REVIEW OF SYSTEMS:  CONSTITUTIONAL: No fever, chills, sweats  EYES: No eye pain, no visual disturbances  ENMT:  No sinus or throat pain  RESPIRATORY: No cough, wheezing, or shortness of breath  CARDIOVASCULAR: No chest pain or palpitations. +LE swelling  GASTROINTESTINAL: No abdominal pain. No nausea, vomiting, diarrhea or constipation. No melena or hematochezia.  GENITOURINARY: No dysuria, or change of frequency  NEUROLOGICAL: No headaches, loss of strength, numbness, or tremors  SKIN: No rashes, or lesions   LYMPH NODES: No enlarged glands  MUSCULOSKELETAL: No joint pain or swelling; No muscle, back, or extremity pain  HEME/LYMPH: No easy bruising, or bleeding gums  ALLERGY AND IMMUNOLOGIC: No hives or eczema

## 2020-01-21 NOTE — PROGRESS NOTE ADULT - SUBJECTIVE AND OBJECTIVE BOX
less  sob    REVIEW OF SYSTEMS:  GEN: no fever,    no chills  RESP: no SOB,   no cough  CVS: no chest pain,   no palpitations  GI: no abdominal pain,   no nausea,   no vomiting,   no constipation,   no diarrhea  : no dysuria,   no frequency  NEURO: no headache,   no dizziness  PSYCH: no depression,   not anxious  Derm : no rash    MEDICATIONS  (STANDING):  amLODIPine   Tablet 10 milliGRAM(s) Oral daily  aspirin enteric coated 81 milliGRAM(s) Oral daily  atorvastatin 10 milliGRAM(s) Oral at bedtime  clopidogrel Tablet 75 milliGRAM(s) Oral daily  dextrose 5%. 1000 milliLiter(s) (50 mL/Hr) IV Continuous <Continuous>  dextrose 50% Injectable 12.5 Gram(s) IV Push once  dextrose 50% Injectable 25 Gram(s) IV Push once  dextrose 50% Injectable 25 Gram(s) IV Push once  heparin  Injectable 5000 Unit(s) SubCutaneous every 8 hours  insulin glargine Injectable (LANTUS) 12 Unit(s) SubCutaneous at bedtime  insulin lispro (HumaLOG) corrective regimen sliding scale   SubCutaneous three times a day before meals  insulin lispro (HumaLOG) corrective regimen sliding scale   SubCutaneous at bedtime  metoprolol succinate  milliGRAM(s) Oral daily  pantoprazole    Tablet 40 milliGRAM(s) Oral before breakfast  traZODone 50 milliGRAM(s) Oral at bedtime    MEDICATIONS  (PRN):  dextrose 40% Gel 15 Gram(s) Oral once PRN Blood Glucose LESS THAN 70 milliGRAM(s)/deciliter  glucagon  Injectable 1 milliGRAM(s) IntraMuscular once PRN Glucose LESS THAN 70 milligrams/deciliter      Vital Signs Last 24 Hrs  T(C): 36.7 (21 Jan 2020 05:05), Max: 36.9 (20 Jan 2020 18:19)  T(F): 98 (21 Jan 2020 05:05), Max: 98.4 (20 Jan 2020 18:19)  HR: 64 (21 Jan 2020 05:05) (64 - 73)  BP: 158/71 (21 Jan 2020 05:05) (158/71 - 179/66)  BP(mean): --  RR: 16 (21 Jan 2020 05:05) (16 - 18)  SpO2: 95% (21 Jan 2020 05:05) (95% - 99%)  CAPILLARY BLOOD GLUCOSE      POCT Blood Glucose.: 83 mg/dL (21 Jan 2020 02:42)    I&O's Summary      PHYSICAL EXAM:  HEAD:  Atraumatic, Normocephalic  NECK: Supple, No   JVD  CHEST/LUNG:   no     rales,     no,    rhonchi  HEART: Regular rate and rhythm;         murmur  ABDOMEN: Soft, Nontender, ;   EXTREMITIES:  pitting    edema  NEUROLOGY:  alert    LABS:                        10.6   8.07  )-----------( 287      ( 21 Jan 2020 07:27 )             34.0     01-21    141  |  103  |  26<H>  ----------------------------<  124<H>  4.1   |  23  |  1.41<H>    Ca    9.7      21 Jan 2020 05:46  Phos  3.7     01-21  Mg     1.7     01-21    TPro  7.3  /  Alb  4.2  /  TBili  0.3  /  DBili  x   /  AST  15  /  ALT  19  /  AlkPhos  188<H>  01-20                  Hemoglobin A1C, Whole Blood: 5.9 % (01-21 @ 07:27)            Consultant(s) Notes Reviewed:      Care Discussed with Consultants/Other Providers:

## 2020-01-21 NOTE — PROVIDER CONTACT NOTE (OTHER) - REASON
Bp 185/66 Spoke with patient she had vision changes (seeing black spots) eye doctor suggests she see cardiology.  They thought she may have had a huge floater

## 2020-01-21 NOTE — H&P ADULT - PROBLEM/PLAN-7
DISPLAY PLAN FREE TEXT full recovery F/U in office at end of week for staple removal, reg diet, pelvic rest

## 2020-01-21 NOTE — H&P ADULT - HISTORY OF PRESENT ILLNESS
89 yo woman with PMH of CAD s/p CABG, HTN, HLD, PVD s/p stent, CKD, (Baseline Cr reportedly 1.2), DMT2, presents from home in setting of worsening LE edema over the course of 1 week. Per son and patient she has not been compliant with her diet: Shes been having salty food lately. Symptoms progressively worsened and has been increasingly difficult to ambulate. Denies orthopnea, PND, CP, palpitations SOB. Unclear if patient has a history of CHF. Per son last TTE outpatient ~2 months ago and reported to be normal. Patient has not been on a diuretic 89 yo woman with PMH of CAD s/p CABG, HTN, HLD, PVD s/p stent, CKD, (Baseline Cr reportedly 1.2), DMT2, presents from home in setting of worsening LE edema over the course of 1 week. Per son and patient she has not been compliant with her diet: Shes been having salty food lately. Symptoms progressively worsened and has been increasingly difficult to ambulate. Denies orthopnea, PND, CP, palpitations, SOB. Unclear if patient has a history of CHF. Patient has not been on a diuretic as an outpatient.  Per son last TTE outpatient ~2 months ago and reported to be normal. No new additions or changes to medications.

## 2020-01-21 NOTE — H&P ADULT - NSICDXPASTMEDICALHX_GEN_ALL_CORE_FT
PAST MEDICAL HISTORY:  CAD (coronary artery disease)     Diabetes     Essential hypertension, benign     Hypercholesteremia     Peripheral vascular disease

## 2020-01-21 NOTE — H&P ADULT - PROBLEM SELECTOR PLAN 1
Patient with elevated BNP, Bilateral pitting edema and Trace pleural effusion on CXR. On exam no audible rales nor symptoms of orthopnea or PND to suggest pulmonary edema.  Diuresis with Lasix 40 IV x1 in setting of underlying CKD  Monitor I/O  Daily weights  TTE

## 2020-01-22 DIAGNOSIS — I50.9 HEART FAILURE, UNSPECIFIED: ICD-10-CM

## 2020-01-22 DIAGNOSIS — D64.9 ANEMIA, UNSPECIFIED: ICD-10-CM

## 2020-01-22 DIAGNOSIS — Z71.89 OTHER SPECIFIED COUNSELING: ICD-10-CM

## 2020-01-22 LAB
ANION GAP SERPL CALC-SCNC: 17 MMOL/L — SIGNIFICANT CHANGE UP (ref 5–17)
B PERT DNA SPEC QL NAA+PROBE: SIGNIFICANT CHANGE UP
BUN SERPL-MCNC: 40 MG/DL — HIGH (ref 7–23)
C PNEUM DNA SPEC QL NAA+PROBE: SIGNIFICANT CHANGE UP
CALCIUM SERPL-MCNC: 9.7 MG/DL — SIGNIFICANT CHANGE UP (ref 8.4–10.5)
CHLORIDE SERPL-SCNC: 99 MMOL/L — SIGNIFICANT CHANGE UP (ref 96–108)
CHOLEST SERPL-MCNC: 125 MG/DL — SIGNIFICANT CHANGE UP (ref 10–199)
CO2 SERPL-SCNC: 25 MMOL/L — SIGNIFICANT CHANGE UP (ref 22–31)
CREAT SERPL-MCNC: 1.81 MG/DL — HIGH (ref 0.5–1.3)
FLUAV H1 2009 PAND RNA SPEC QL NAA+PROBE: SIGNIFICANT CHANGE UP
FLUAV H1 RNA SPEC QL NAA+PROBE: SIGNIFICANT CHANGE UP
FLUAV H3 RNA SPEC QL NAA+PROBE: SIGNIFICANT CHANGE UP
FLUAV SUBTYP SPEC NAA+PROBE: SIGNIFICANT CHANGE UP
FLUBV RNA SPEC QL NAA+PROBE: SIGNIFICANT CHANGE UP
GLUCOSE BLDC GLUCOMTR-MCNC: 108 MG/DL — HIGH (ref 70–99)
GLUCOSE BLDC GLUCOMTR-MCNC: 142 MG/DL — HIGH (ref 70–99)
GLUCOSE BLDC GLUCOMTR-MCNC: 146 MG/DL — HIGH (ref 70–99)
GLUCOSE BLDC GLUCOMTR-MCNC: 165 MG/DL — HIGH (ref 70–99)
GLUCOSE SERPL-MCNC: 179 MG/DL — HIGH (ref 70–99)
HADV DNA SPEC QL NAA+PROBE: SIGNIFICANT CHANGE UP
HCOV PNL SPEC NAA+PROBE: SIGNIFICANT CHANGE UP
HCT VFR BLD CALC: 38.9 % — SIGNIFICANT CHANGE UP (ref 34.5–45)
HDLC SERPL-MCNC: 33 MG/DL — LOW
HGB BLD-MCNC: 12.1 G/DL — SIGNIFICANT CHANGE UP (ref 11.5–15.5)
HMPV RNA SPEC QL NAA+PROBE: SIGNIFICANT CHANGE UP
HPIV1 RNA SPEC QL NAA+PROBE: SIGNIFICANT CHANGE UP
HPIV2 RNA SPEC QL NAA+PROBE: SIGNIFICANT CHANGE UP
HPIV3 RNA SPEC QL NAA+PROBE: SIGNIFICANT CHANGE UP
HPIV4 RNA SPEC QL NAA+PROBE: SIGNIFICANT CHANGE UP
LIPID PNL WITH DIRECT LDL SERPL: 64 MG/DL — SIGNIFICANT CHANGE UP
MAGNESIUM SERPL-MCNC: 1.7 MG/DL — SIGNIFICANT CHANGE UP (ref 1.6–2.6)
MCHC RBC-ENTMCNC: 28.1 PG — SIGNIFICANT CHANGE UP (ref 27–34)
MCHC RBC-ENTMCNC: 31.1 GM/DL — LOW (ref 32–36)
MCV RBC AUTO: 90.5 FL — SIGNIFICANT CHANGE UP (ref 80–100)
NRBC # BLD: 0 /100 WBCS — SIGNIFICANT CHANGE UP (ref 0–0)
PLATELET # BLD AUTO: 331 K/UL — SIGNIFICANT CHANGE UP (ref 150–400)
POTASSIUM SERPL-MCNC: 4.9 MMOL/L — SIGNIFICANT CHANGE UP (ref 3.5–5.3)
POTASSIUM SERPL-SCNC: 4.9 MMOL/L — SIGNIFICANT CHANGE UP (ref 3.5–5.3)
RAPID RVP RESULT: SIGNIFICANT CHANGE UP
RBC # BLD: 4.3 M/UL — SIGNIFICANT CHANGE UP (ref 3.8–5.2)
RBC # FLD: 13.8 % — SIGNIFICANT CHANGE UP (ref 10.3–14.5)
RSV RNA SPEC QL NAA+PROBE: SIGNIFICANT CHANGE UP
RV+EV RNA SPEC QL NAA+PROBE: SIGNIFICANT CHANGE UP
SODIUM SERPL-SCNC: 141 MMOL/L — SIGNIFICANT CHANGE UP (ref 135–145)
TOTAL CHOLESTEROL/HDL RATIO MEASUREMENT: 3.8 RATIO — SIGNIFICANT CHANGE UP (ref 3.3–7.1)
TRIGL SERPL-MCNC: 141 MG/DL — SIGNIFICANT CHANGE UP (ref 10–149)
WBC # BLD: 9.11 K/UL — SIGNIFICANT CHANGE UP (ref 3.8–10.5)
WBC # FLD AUTO: 9.11 K/UL — SIGNIFICANT CHANGE UP (ref 3.8–10.5)

## 2020-01-22 PROCEDURE — 93010 ELECTROCARDIOGRAM REPORT: CPT

## 2020-01-22 PROCEDURE — 71250 CT THORAX DX C-: CPT | Mod: 26

## 2020-01-22 PROCEDURE — 99232 SBSQ HOSP IP/OBS MODERATE 35: CPT

## 2020-01-22 RX ORDER — POLYETHYLENE GLYCOL 3350 17 G/17G
17 POWDER, FOR SOLUTION ORAL DAILY
Refills: 0 | Status: DISCONTINUED | OUTPATIENT
Start: 2020-01-22 | End: 2020-01-25

## 2020-01-22 RX ORDER — SENNA PLUS 8.6 MG/1
2 TABLET ORAL AT BEDTIME
Refills: 0 | Status: DISCONTINUED | OUTPATIENT
Start: 2020-01-22 | End: 2020-01-25

## 2020-01-22 RX ADMIN — HEPARIN SODIUM 5000 UNIT(S): 5000 INJECTION INTRAVENOUS; SUBCUTANEOUS at 05:08

## 2020-01-22 RX ADMIN — AMLODIPINE BESYLATE 10 MILLIGRAM(S): 2.5 TABLET ORAL at 05:09

## 2020-01-22 RX ADMIN — HEPARIN SODIUM 5000 UNIT(S): 5000 INJECTION INTRAVENOUS; SUBCUTANEOUS at 14:07

## 2020-01-22 RX ADMIN — CLOPIDOGREL BISULFATE 75 MILLIGRAM(S): 75 TABLET, FILM COATED ORAL at 12:45

## 2020-01-22 RX ADMIN — ATORVASTATIN CALCIUM 10 MILLIGRAM(S): 80 TABLET, FILM COATED ORAL at 21:25

## 2020-01-22 RX ADMIN — INSULIN GLARGINE 8 UNIT(S): 100 INJECTION, SOLUTION SUBCUTANEOUS at 21:25

## 2020-01-22 RX ADMIN — Medication 100 MILLIGRAM(S): at 05:08

## 2020-01-22 RX ADMIN — PANTOPRAZOLE SODIUM 40 MILLIGRAM(S): 20 TABLET, DELAYED RELEASE ORAL at 05:08

## 2020-01-22 RX ADMIN — SENNA PLUS 2 TABLET(S): 8.6 TABLET ORAL at 21:25

## 2020-01-22 RX ADMIN — Medication 0: at 21:25

## 2020-01-22 RX ADMIN — Medication 81 MILLIGRAM(S): at 12:44

## 2020-01-22 RX ADMIN — HEPARIN SODIUM 5000 UNIT(S): 5000 INJECTION INTRAVENOUS; SUBCUTANEOUS at 21:25

## 2020-01-22 RX ADMIN — Medication 20 MILLIGRAM(S): at 05:08

## 2020-01-22 RX ADMIN — Medication 50 MILLIGRAM(S): at 21:25

## 2020-01-22 RX ADMIN — Medication 20 MILLIGRAM(S): at 17:51

## 2020-01-22 NOTE — PROGRESS NOTE ADULT - SUBJECTIVE AND OBJECTIVE BOX
CARDIOLOGY     PROGRESS  NOTE   ________________________________________________    CHIEF COMPLAINT:Patient is a 89y old  Female who presents with a chief complaint of LE edema (21 Jan 2020 10:12)  doing better.  	  REVIEW OF SYSTEMS:  CONSTITUTIONAL: No fever, weight loss, or fatigue  EYES: No eye pain, visual disturbances, or discharge  ENT:  No difficulty hearing, tinnitus, vertigo; No sinus or throat pain  NECK: No pain or stiffness  RESPIRATORY: No cough, wheezing, chills or hemoptysis; decrease  Shortness of Breath  CARDIOVASCULAR: No chest pain, palpitations, passing out, dizziness, or leg swelling  GASTROINTESTINAL: No abdominal or epigastric pain. No nausea, vomiting, or hematemesis; No diarrhea or constipation. No melena or hematochezia.  GENITOURINARY: No dysuria, frequency, hematuria, or incontinence  NEUROLOGICAL: No headaches, memory loss, loss of strength, numbness, or tremors  SKIN: No itching, burning, rashes, or lesions   LYMPH Nodes: No enlarged glands  ENDOCRINE: No heat or cold intolerance; No hair loss  MUSCULOSKELETAL: No joint pain or swelling; No muscle, back, or extremity pain  PSYCHIATRIC: No depression, anxiety, mood swings, or difficulty sleeping  HEME/LYMPH: No easy bruising, or bleeding gums  ALLERGY AND IMMUNOLOGIC: No hives or eczema	    [ ] All others negative	  [ ] Unable to obtain    PHYSICAL EXAM:  T(C): 36.4 (01-22-20 @ 04:29), Max: 36.9 (01-21-20 @ 21:20)  HR: 70 (01-22-20 @ 04:29) (70 - 76)  BP: 170/75 (01-22-20 @ 04:29) (161/73 - 186/78)  RR: 19 (01-22-20 @ 04:29) (16 - 19)  SpO2: 96% (01-22-20 @ 04:29) (94% - 98%)  Wt(kg): --  I&O's Summary      Appearance: Normal	  HEENT:   Normal oral mucosa, PERRL, EOMI	  Lymphatic: No lymphadenopathy  Cardiovascular: Normal S1 S2, No JVD, No murmurs, No edema  Respiratory: Lungs clear to auscultation	  Psychiatry: A & O x 3, Mood & affect appropriate  Gastrointestinal:  Soft, Non-tender, + BS	  Skin: No rashes, No ecchymoses, No cyanosis	  Neurologic: Non-focal  Extremities: Normal range of motion, No clubbing, cyanosis or edema  Vascular: Peripheral pulses palpable 2+ bilaterally    MEDICATIONS  (STANDING):  amLODIPine   Tablet 10 milliGRAM(s) Oral daily  aspirin enteric coated 81 milliGRAM(s) Oral daily  atorvastatin 10 milliGRAM(s) Oral at bedtime  clopidogrel Tablet 75 milliGRAM(s) Oral daily  dextrose 5%. 1000 milliLiter(s) (50 mL/Hr) IV Continuous <Continuous>  dextrose 50% Injectable 12.5 Gram(s) IV Push once  dextrose 50% Injectable 25 Gram(s) IV Push once  dextrose 50% Injectable 25 Gram(s) IV Push once  furosemide   Injectable 20 milliGRAM(s) IV Push two times a day  heparin  Injectable 5000 Unit(s) SubCutaneous every 8 hours  insulin glargine Injectable (LANTUS) 8 Unit(s) SubCutaneous at bedtime  insulin lispro (HumaLOG) corrective regimen sliding scale   SubCutaneous three times a day before meals  insulin lispro (HumaLOG) corrective regimen sliding scale   SubCutaneous at bedtime  metoprolol succinate  milliGRAM(s) Oral daily  pantoprazole    Tablet 40 milliGRAM(s) Oral before breakfast  traZODone 50 milliGRAM(s) Oral at bedtime      TELEMETRY: 	    ECG:  	  RADIOLOGY:  OTHER: 	  	  LABS:	 	    CARDIAC MARKERS:                                10.6   8.07  )-----------( 287      ( 21 Jan 2020 07:27 )             34.0     01-21    141  |  103  |  26<H>  ----------------------------<  124<H>  4.1   |  23  |  1.41<H>    Ca    9.7      21 Jan 2020 05:46  Phos  3.7     01-21  Mg     1.7     01-21    TPro  7.3  /  Alb  4.2  /  TBili  0.3  /  DBili  x   /  AST  15  /  ALT  19  /  AlkPhos  188<H>  01-20    proBNP: Serum Pro-Brain Natriuretic Peptide: 1183 pg/mL (01-20 @ 19:46)    Lipid Profile:   HgA1c: Hemoglobin A1C, Whole Blood: 5.9 % (01-21 @ 07:27)    TSH: Thyroid Stimulating Hormone, Serum: 3.94 uIU/mL (01-21 @ 17:05)  < from: Xray Chest 2 Views PA/Lat (01.21.20 @ 00:10) >  Trace bilateral pleural effusions.    < end of copied text >          Assessment and plan  ---------------------------  89 yo woman with PMH of CAD s/p CABG, HTN, HLD, PVD s/p stent, CKD, (Baseline Cr reportedly 1.2), DMT2, presents from home in setting of worsening LE edema over the course of 1 week. Per son and patient she has not been compliant with her diet: Shes been having salty food lately. Symptoms progressively worsened and has been increasingly difficult to ambulate. Denies orthopnea, PND, CP, palpitations, SOB. Unclear if patient has a history of CHF. Patient has not been on a diuretic as an outpatient.  pt with gradual increasing sob and LE edema.  denies of any hx of chf last echo months ago does not know the results  pt with known hx of cad, s/p CABG.  chf  new onset   awaiting echo  will add hydralazine and nitrate if increase bp and low ef  continue with lasix  needs ischemia work up /persantine nuclear stress test  dvt prophylaxis  continue beta blocker and Norvasc  lipid  ? ct chest no contrast

## 2020-01-22 NOTE — CONSULT NOTE ADULT - SUBJECTIVE AND OBJECTIVE BOX
Chief Complaint:  Patient is a 89y old  Female who presents with a chief complaint of LE edema (2020 09:25)      HPI:89 yo woman with PMH of CAD s/p CABG, HTN, HLD, PVD s/p stent, CKD, (Baseline Cr reportedly 1.2), DMT2, presents from home in setting of worsening LE edema over the course of 1 week. Per son and patient she has not been compliant with her diet: Shes been having salty food lately. Symptoms progressively worsened and has been increasingly difficult to ambulate. Denies orthopnea, PND, CP, palpitations, SOB. Unclear if patient has a history of CHF. Patient has not been on a diuretic as an outpatient.  Per son last TTE outpatient ~2 months ago and reported to be normal. No new additions or changes to medications.    GI consulted for anemia. At time of examination, pt denies abdominal pain, n/v/d/c, brbpr/melena. Last colonoscopy was >10 years ago which was reportedly unremarkable. Of note, pt was found to have an ugib requiring prbc 2 years ago, she was told she had a bleeding ulcer.     Allergies:  No Known Allergies      Medications:  amLODIPine   Tablet 10 milliGRAM(s) Oral daily  aspirin enteric coated 81 milliGRAM(s) Oral daily  atorvastatin 10 milliGRAM(s) Oral at bedtime  clopidogrel Tablet 75 milliGRAM(s) Oral daily  dextrose 40% Gel 15 Gram(s) Oral once PRN  dextrose 5%. 1000 milliLiter(s) IV Continuous <Continuous>  dextrose 50% Injectable 12.5 Gram(s) IV Push once  dextrose 50% Injectable 25 Gram(s) IV Push once  dextrose 50% Injectable 25 Gram(s) IV Push once  furosemide   Injectable 20 milliGRAM(s) IV Push two times a day  glucagon  Injectable 1 milliGRAM(s) IntraMuscular once PRN  heparin  Injectable 5000 Unit(s) SubCutaneous every 8 hours  insulin glargine Injectable (LANTUS) 8 Unit(s) SubCutaneous at bedtime  insulin lispro (HumaLOG) corrective regimen sliding scale   SubCutaneous three times a day before meals  insulin lispro (HumaLOG) corrective regimen sliding scale   SubCutaneous at bedtime  metoprolol succinate  milliGRAM(s) Oral daily  pantoprazole    Tablet 40 milliGRAM(s) Oral before breakfast  traZODone 50 milliGRAM(s) Oral at bedtime      PMHX/PSHX:  Diabetes  Peripheral vascular disease  CAD (coronary artery disease)  Hypercholesteremia  Essential hypertension, benign  S/P vascular surgery  History of cholecystectomy  S/P coronary artery by pass  S/P carotid endarterectomy      Family history:  No pertinent family history in first degree relatives      Social History: non- toxic habits     ROS:     General:  No wt loss, fevers, chills, night sweats, fatigue,   Eyes:  Good vision, no reported pain  ENT:  No sore throat, pain, runny nose, dysphagia  CV:  No pain, palpitations, hypo/hypertension  Resp:  No dyspnea, cough, tachypnea, wheezing  GI:  No pain, No nausea, No vomiting, No diarrhea, No constipation, No weight loss, No fever, No pruritis, No rectal bleeding, No tarry stools, No dysphagia,  :  No pain, bleeding, incontinence, nocturia  Muscle:  No pain, weakness  Neuro:  No weakness, tingling, memory problems  Psych:  No fatigue, insomnia, mood problems, depression  Endocrine:  No polyuria, polydipsia, cold/heat intolerance  Heme:  No petechiae, ecchymosis, easy bruisability  Skin:  No rash, tattoos, scars, edema      PHYSICAL EXAM:   Vital Signs:  Vital Signs Last 24 Hrs  T(C): 36.8 (2020 14:35), Max: 36.9 (2020 21:20)  T(F): 98.2 (2020 14:35), Max: 98.5 (2020 21:20)  HR: 73 (2020 14:35) (58 - 76)  BP: 172/67 (2020 14:35) (138/55 - 186/78)  BP(mean): --  RR: 18 (2020 14:35) (18 - 19)  SpO2: 99% (2020 14:35) (94% - 99%)  Daily Height in cm: 165.1 (2020 23:28)    Daily Weight in k (2020 21:20)    GENERAL:  Appears stated age, well-groomed, well-nourished, no distress  HEENT:  NC/AT,  conjunctivae clear and pink, no thyromegaly, nodules, adenopathy, no JVD, sclera -anicteric  CHEST:  breathing comfortably on RA   HEART:  Regular rhythm, S1, S2, no murmur/rub/S3/S4, no abdominal bruit, no edema  ABDOMEN:  Soft, non-tender, non-distended, normoactive bowel sounds  EXTEREMITIES:  no cyanosis,clubbing or edema  SKIN:  No rash/erythema/ecchymoses/petechiae/wounds/abscess/warm/dry  NEURO:  Alert, oriented, no asterixis, no tremor, no encephalopathy    LABS:                        12.1   9.11  )-----------( 331      ( 2020 14:32 )             38.9     -    141  |  103  |  26<H>  ----------------------------<  124<H>  4.1   |  23  |  1.41<H>    Ca    9.7      2020 05:46  Phos  3.7       Mg     1.7         TPro  7.3  /  Alb  4.2  /  TBili  0.3  /  DBili  x   /  AST  15  /  ALT  19  /  AlkPhos  188<H>  -20    LIVER FUNCTIONS - ( 2020 19:46 )  Alb: 4.2 g/dL / Pro: 7.3 g/dL / ALK PHOS: 188 U/L / ALT: 19 U/L / AST: 15 U/L / GGT: x                   Imaging:

## 2020-01-22 NOTE — PROVIDER CONTACT NOTE (OTHER) - ASSESSMENT
pt was asleep. When awaken asymptomatic, denies CP, Dizziness, SOB, Palpitations. T98.0, P76, B/P 186/78, R 18, SpO2 97% RA.

## 2020-01-22 NOTE — CONSULT NOTE ADULT - PROBLEM SELECTOR RECOMMENDATION 9
- hgb 10 on admission, now 12  - no overt GIB noted  - no further inpatient work up necessary  - ppi daily for gi ppx  - no GI contraindication to anti- platelet agents   - no plans for endoscopic evaluation unless emergent given CHF exacerbation

## 2020-01-22 NOTE — CHART NOTE - NSCHARTNOTEFT_GEN_A_CORE
RD CHF education chart note    Patient was visited by RD for CHF education. Heart failure education provided to the patient and patient's son in detail. Discussed heart failure nutrition therapy, sodium and fluid intake, importance of diet adherence, daily weights monitoring with the patient. Reinforced importance of weight gain parameters and importance of contacting MD’s about weight changes. Provided handouts on heart failure nutrition therapy, reading heart healthy nutrition labels, heart healthy shopping tips and low sodium food list. Patient verbalized understanding demonstrated by teach back method. RD contact information left with patient and patient's son for any future questioning.    RD remains available. Suzi Spears RD, #[phone]

## 2020-01-22 NOTE — PROGRESS NOTE ADULT - SUBJECTIVE AND OBJECTIVE BOX
less  sob/ no  cp    REVIEW OF SYSTEMS:  GEN: no fever,    no chills  RESP: no SOB,   no cough  CVS: no chest pain,   no palpitations  GI: no abdominal pain,   no nausea,   no vomiting,   no constipation,   no diarrhea  : no dysuria,   no frequency  NEURO: no headache,   no dizziness  PSYCH: no depression,   not anxious  Derm : no rash    MEDICATIONS  (STANDING):  amLODIPine   Tablet 10 milliGRAM(s) Oral daily  aspirin enteric coated 81 milliGRAM(s) Oral daily  atorvastatin 10 milliGRAM(s) Oral at bedtime  clopidogrel Tablet 75 milliGRAM(s) Oral daily  dextrose 5%. 1000 milliLiter(s) (50 mL/Hr) IV Continuous <Continuous>  dextrose 50% Injectable 12.5 Gram(s) IV Push once  dextrose 50% Injectable 25 Gram(s) IV Push once  dextrose 50% Injectable 25 Gram(s) IV Push once  furosemide   Injectable 20 milliGRAM(s) IV Push two times a day  heparin  Injectable 5000 Unit(s) SubCutaneous every 8 hours  insulin glargine Injectable (LANTUS) 8 Unit(s) SubCutaneous at bedtime  insulin lispro (HumaLOG) corrective regimen sliding scale   SubCutaneous three times a day before meals  insulin lispro (HumaLOG) corrective regimen sliding scale   SubCutaneous at bedtime  metoprolol succinate  milliGRAM(s) Oral daily  pantoprazole    Tablet 40 milliGRAM(s) Oral before breakfast  traZODone 50 milliGRAM(s) Oral at bedtime    MEDICATIONS  (PRN):  dextrose 40% Gel 15 Gram(s) Oral once PRN Blood Glucose LESS THAN 70 milliGRAM(s)/deciliter  glucagon  Injectable 1 milliGRAM(s) IntraMuscular once PRN Glucose LESS THAN 70 milligrams/deciliter      Vital Signs Last 24 Hrs  T(C): 36.4 (22 Jan 2020 04:29), Max: 36.9 (21 Jan 2020 21:20)  T(F): 97.5 (22 Jan 2020 04:29), Max: 98.5 (21 Jan 2020 21:20)  HR: 70 (22 Jan 2020 04:29) (70 - 76)  BP: 170/75 (22 Jan 2020 04:29) (161/73 - 186/78)  BP(mean): --  RR: 19 (22 Jan 2020 04:29) (17 - 19)  SpO2: 96% (22 Jan 2020 04:29) (94% - 98%)  CAPILLARY BLOOD GLUCOSE      POCT Blood Glucose.: 108 mg/dL (22 Jan 2020 08:05)  POCT Blood Glucose.: 237 mg/dL (21 Jan 2020 21:28)  POCT Blood Glucose.: 163 mg/dL (21 Jan 2020 18:25)  POCT Blood Glucose.: 190 mg/dL (21 Jan 2020 13:14)  POCT Blood Glucose.: 129 mg/dL (21 Jan 2020 09:28)    I&O's Summary      PHYSICAL EXAM:  HEAD:  Atraumatic, Normocephalic  NECK: Supple, No   JVD  CHEST/LUNG:   no     rales,     no,    rhonchi  HEART: Regular rate and rhythm;         murmur  ABDOMEN: Soft, Nontender, ;   EXTREMITIES:     no   edema  NEUROLOGY:  alert    LABS:                        10.6   8.07  )-----------( 287      ( 21 Jan 2020 07:27 )             34.0     01-21    141  |  103  |  26<H>  ----------------------------<  124<H>  4.1   |  23  |  1.41<H>    Ca    9.7      21 Jan 2020 05:46  Phos  3.7     01-21  Mg     1.7     01-21    TPro  7.3  /  Alb  4.2  /  TBili  0.3  /  DBili  x   /  AST  15  /  ALT  19  /  AlkPhos  188<H>  01-20                  Hemoglobin A1C, Whole Blood: 5.9 % (01-21 @ 07:27)    Thyroid Stimulating Hormone, Serum: 3.94 uIU/mL (01-21 @ 17:05)          Consultant(s) Notes Reviewed:      Care Discussed with Consultants/Other Providers:

## 2020-01-22 NOTE — PROGRESS NOTE ADULT - ASSESSMENT
89 yo woman        h/o  CAD s/p CABG,   HTN,   HLD,  PVD s/p stent,   CKD, (Baseline Cr reportedly 1.2),  DMT2,     presents from home in setting of worsening LE edema over the course of 1 week.  acute  diastolic chf, on   iv lasix  COLETTE/    DM  2, on lantus ,   HTN, HLD,  CAD/ PAD  anemia/  gi eval   on home meds/ asa/ plavix/ norvasc/ toprol    echo pending   ct chest, pending  pt has improved

## 2020-01-23 LAB
ANION GAP SERPL CALC-SCNC: 16 MMOL/L — SIGNIFICANT CHANGE UP (ref 5–17)
BUN SERPL-MCNC: 45 MG/DL — HIGH (ref 7–23)
CALCIUM SERPL-MCNC: 9.1 MG/DL — SIGNIFICANT CHANGE UP (ref 8.4–10.5)
CHLORIDE SERPL-SCNC: 96 MMOL/L — SIGNIFICANT CHANGE UP (ref 96–108)
CO2 SERPL-SCNC: 24 MMOL/L — SIGNIFICANT CHANGE UP (ref 22–31)
CREAT SERPL-MCNC: 1.67 MG/DL — HIGH (ref 0.5–1.3)
GLUCOSE BLDC GLUCOMTR-MCNC: 114 MG/DL — HIGH (ref 70–99)
GLUCOSE BLDC GLUCOMTR-MCNC: 128 MG/DL — HIGH (ref 70–99)
GLUCOSE BLDC GLUCOMTR-MCNC: 179 MG/DL — HIGH (ref 70–99)
GLUCOSE BLDC GLUCOMTR-MCNC: 201 MG/DL — HIGH (ref 70–99)
GLUCOSE SERPL-MCNC: 166 MG/DL — HIGH (ref 70–99)
HCT VFR BLD CALC: 33.6 % — LOW (ref 34.5–45)
HGB BLD-MCNC: 10.7 G/DL — LOW (ref 11.5–15.5)
MAGNESIUM SERPL-MCNC: 1.8 MG/DL — SIGNIFICANT CHANGE UP (ref 1.6–2.6)
MCHC RBC-ENTMCNC: 28.4 PG — SIGNIFICANT CHANGE UP (ref 27–34)
MCHC RBC-ENTMCNC: 31.8 GM/DL — LOW (ref 32–36)
MCV RBC AUTO: 89.1 FL — SIGNIFICANT CHANGE UP (ref 80–100)
NRBC # BLD: 0 /100 WBCS — SIGNIFICANT CHANGE UP (ref 0–0)
PLATELET # BLD AUTO: 277 K/UL — SIGNIFICANT CHANGE UP (ref 150–400)
POTASSIUM SERPL-MCNC: 4.3 MMOL/L — SIGNIFICANT CHANGE UP (ref 3.5–5.3)
POTASSIUM SERPL-SCNC: 4.3 MMOL/L — SIGNIFICANT CHANGE UP (ref 3.5–5.3)
RBC # BLD: 3.77 M/UL — LOW (ref 3.8–5.2)
RBC # FLD: 13.7 % — SIGNIFICANT CHANGE UP (ref 10.3–14.5)
SODIUM SERPL-SCNC: 136 MMOL/L — SIGNIFICANT CHANGE UP (ref 135–145)
WBC # BLD: 8.96 K/UL — SIGNIFICANT CHANGE UP (ref 3.8–10.5)
WBC # FLD AUTO: 8.96 K/UL — SIGNIFICANT CHANGE UP (ref 3.8–10.5)

## 2020-01-23 PROCEDURE — 93306 TTE W/DOPPLER COMPLETE: CPT | Mod: 26

## 2020-01-23 PROCEDURE — 99232 SBSQ HOSP IP/OBS MODERATE 35: CPT

## 2020-01-23 RX ADMIN — PANTOPRAZOLE SODIUM 40 MILLIGRAM(S): 20 TABLET, DELAYED RELEASE ORAL at 05:15

## 2020-01-23 RX ADMIN — Medication 2: at 18:36

## 2020-01-23 RX ADMIN — CLOPIDOGREL BISULFATE 75 MILLIGRAM(S): 75 TABLET, FILM COATED ORAL at 13:13

## 2020-01-23 RX ADMIN — POLYETHYLENE GLYCOL 3350 17 GRAM(S): 17 POWDER, FOR SOLUTION ORAL at 13:14

## 2020-01-23 RX ADMIN — SENNA PLUS 2 TABLET(S): 8.6 TABLET ORAL at 21:59

## 2020-01-23 RX ADMIN — Medication 20 MILLIGRAM(S): at 05:15

## 2020-01-23 RX ADMIN — HEPARIN SODIUM 5000 UNIT(S): 5000 INJECTION INTRAVENOUS; SUBCUTANEOUS at 05:15

## 2020-01-23 RX ADMIN — INSULIN GLARGINE 8 UNIT(S): 100 INJECTION, SOLUTION SUBCUTANEOUS at 21:59

## 2020-01-23 RX ADMIN — HEPARIN SODIUM 5000 UNIT(S): 5000 INJECTION INTRAVENOUS; SUBCUTANEOUS at 13:50

## 2020-01-23 RX ADMIN — Medication 50 MILLIGRAM(S): at 21:59

## 2020-01-23 RX ADMIN — ATORVASTATIN CALCIUM 10 MILLIGRAM(S): 80 TABLET, FILM COATED ORAL at 21:59

## 2020-01-23 RX ADMIN — Medication 81 MILLIGRAM(S): at 13:13

## 2020-01-23 RX ADMIN — Medication 20 MILLIGRAM(S): at 18:36

## 2020-01-23 RX ADMIN — Medication 100 MILLIGRAM(S): at 05:15

## 2020-01-23 RX ADMIN — AMLODIPINE BESYLATE 10 MILLIGRAM(S): 2.5 TABLET ORAL at 05:15

## 2020-01-23 RX ADMIN — HEPARIN SODIUM 5000 UNIT(S): 5000 INJECTION INTRAVENOUS; SUBCUTANEOUS at 21:59

## 2020-01-23 NOTE — PROGRESS NOTE ADULT - PROBLEM SELECTOR PLAN 1
- monitor h/h daily, transfuse prn   - no overt GIB noted  - no further inpatient work up necessary  - ppi daily for gi ppx  - no GI contraindication to anti- platelet agents   - no plans for endoscopic evaluation unless emergent given CHF exacerbation

## 2020-01-23 NOTE — PHYSICAL THERAPY INITIAL EVALUATION ADULT - ADDITIONAL COMMENTS
Pt lives alone in a house with 10-12 steps to enter, +HR. Pt was independent with all ADLs and ambulation PTA. Pt did not use a AD for indoors and used a cane outdoors for ambulation PTA. Pt owns RW. Pt's family is available to assist pt when needed. +reading eyeglasses.

## 2020-01-23 NOTE — PHYSICAL THERAPY INITIAL EVALUATION ADULT - PLANNED THERAPY INTERVENTIONS, PT EVAL
gait training/GOAL: Stair Negotiation Training: Patient will be able to negotiate up & down 1 flight of stairs with unilateral rail, step to gait pattern, in 4 weeks./balance training/bed mobility training/strengthening/transfer training

## 2020-01-23 NOTE — PHYSICAL THERAPY INITIAL EVALUATION ADULT - PERTINENT HX OF CURRENT PROBLEM, REHAB EVAL
Pt is a 89 y.o. female with PMH of CAD s/p CABG, HTN, HLD, PVD s/p stent, CKD, (Baseline Cr reportedly 1.2), DMT2, presents from home in setting of worsening LE edema over the course of 1 week. Per son and patient she has not been compliant with her diet: Shes been having salty food lately. Symptoms progressively worsened and has been increasingly difficult to ambulate. Pt admitted with CHF exacerbation. (-) Chest CT 1/22/2020. (-) [facility] Duplex BLE Vein Scan 1/20/2020. Continued below.

## 2020-01-23 NOTE — PROGRESS NOTE ADULT - SUBJECTIVE AND OBJECTIVE BOX
less  sob  REVIEW OF SYSTEMS:  GEN: no fever,    no chills  RESP: no SOB,   no cough  CVS: no chest pain,   no palpitations  GI: no abdominal pain,   no nausea,   no vomiting,   no constipation,   no diarrhea  : no dysuria,   no frequency  NEURO: no headache,   no dizziness  PSYCH: no depression,   not anxious  Derm : no rash    MEDICATIONS  (STANDING):  amLODIPine   Tablet 10 milliGRAM(s) Oral daily  aspirin enteric coated 81 milliGRAM(s) Oral daily  atorvastatin 10 milliGRAM(s) Oral at bedtime  clopidogrel Tablet 75 milliGRAM(s) Oral daily  dextrose 5%. 1000 milliLiter(s) (50 mL/Hr) IV Continuous <Continuous>  dextrose 50% Injectable 12.5 Gram(s) IV Push once  dextrose 50% Injectable 25 Gram(s) IV Push once  dextrose 50% Injectable 25 Gram(s) IV Push once  furosemide   Injectable 20 milliGRAM(s) IV Push two times a day  heparin  Injectable 5000 Unit(s) SubCutaneous every 8 hours  insulin glargine Injectable (LANTUS) 8 Unit(s) SubCutaneous at bedtime  insulin lispro (HumaLOG) corrective regimen sliding scale   SubCutaneous three times a day before meals  insulin lispro (HumaLOG) corrective regimen sliding scale   SubCutaneous at bedtime  metoprolol succinate  milliGRAM(s) Oral daily  pantoprazole    Tablet 40 milliGRAM(s) Oral before breakfast  polyethylene glycol 3350 17 Gram(s) Oral daily  senna 2 Tablet(s) Oral at bedtime  traZODone 50 milliGRAM(s) Oral at bedtime    MEDICATIONS  (PRN):  dextrose 40% Gel 15 Gram(s) Oral once PRN Blood Glucose LESS THAN 70 milliGRAM(s)/deciliter  glucagon  Injectable 1 milliGRAM(s) IntraMuscular once PRN Glucose LESS THAN 70 milligrams/deciliter      Vital Signs Last 24 Hrs  T(C): 36.7 (23 Jan 2020 08:18), Max: 36.8 (22 Jan 2020 14:35)  T(F): 98.1 (23 Jan 2020 08:18), Max: 98.2 (22 Jan 2020 14:35)  HR: 72 (23 Jan 2020 08:18) (58 - 78)  BP: 149/70 (23 Jan 2020 08:18) (138/55 - 172/67)  BP(mean): --  RR: 18 (23 Jan 2020 08:18) (18 - 18)  SpO2: 94% (23 Jan 2020 08:18) (94% - 99%)  CAPILLARY BLOOD GLUCOSE      POCT Blood Glucose.: 128 mg/dL (23 Jan 2020 07:45)  POCT Blood Glucose.: 165 mg/dL (22 Jan 2020 21:22)  POCT Blood Glucose.: 146 mg/dL (22 Jan 2020 16:56)  POCT Blood Glucose.: 142 mg/dL (22 Jan 2020 12:26)    I&O's Summary    22 Jan 2020 07:01  -  23 Jan 2020 07:00  --------------------------------------------------------  IN: 240 mL / OUT: 0 mL / NET: 240 mL    23 Jan 2020 07:01  -  23 Jan 2020 09:24  --------------------------------------------------------  IN: 240 mL / OUT: 0 mL / NET: 240 mL        PHYSICAL EXAM:  HEAD:  Atraumatic, Normocephalic  NECK: Supple, No   JVD  CHEST/LUNG:   no     rales,     no,    rhonchi  HEART: Regular rate and rhythm;         murmur  ABDOMEN: Soft, Nontender, ;   EXTREMITIES:  no      edema  NEUROLOGY:  alert    LABS:                        10.7   8.96  )-----------( 277      ( 23 Jan 2020 06:06 )             33.6     01-23    136  |  96  |  45<H>  ----------------------------<  166<H>  4.3   |  24  |  1.67<H>    Ca    9.1      23 Jan 2020 06:06  Mg     1.8     01-23                    Hemoglobin A1C, Whole Blood: 5.9 % (01-21 @ 07:27)    Thyroid Stimulating Hormone, Serum: 3.94 uIU/mL (01-21 @ 17:05)          Consultant(s) Notes Reviewed:      Care Discussed with Consultants/Other Providers:

## 2020-01-23 NOTE — PROGRESS NOTE ADULT - ASSESSMENT
89 yo woman        h/o  CAD s/p CABG,   HTN,   HLD,  PVD s/p stent,     CKD, (Baseline Cr reportedly 1.2),  DMT2,     presents from home in setting of worsening LE edema over the course of 1 week.  acute  diastolic chf, on   iv lasix  COLETTE/    DM  2, on lantus ,   HTN, HLD,  CAD/ PAD   chronic  anemia,  hb stable   on home meds/ asa/ plavix/ norvasc/ toprol    ct chest,  no pna  pt has improved/  echo  still pending

## 2020-01-23 NOTE — PROGRESS NOTE ADULT - SUBJECTIVE AND OBJECTIVE BOX
INTERVAL HPI/OVERNIGHT EVENTS:    No new overnight event.  No N/V/D.  Tolerating diet.     MEDICATIONS  (STANDING):  amLODIPine   Tablet 10 milliGRAM(s) Oral daily  aspirin enteric coated 81 milliGRAM(s) Oral daily  atorvastatin 10 milliGRAM(s) Oral at bedtime  clopidogrel Tablet 75 milliGRAM(s) Oral daily  dextrose 5%. 1000 milliLiter(s) (50 mL/Hr) IV Continuous <Continuous>  dextrose 50% Injectable 12.5 Gram(s) IV Push once  dextrose 50% Injectable 25 Gram(s) IV Push once  dextrose 50% Injectable 25 Gram(s) IV Push once  furosemide   Injectable 20 milliGRAM(s) IV Push two times a day  heparin  Injectable 5000 Unit(s) SubCutaneous every 8 hours  insulin glargine Injectable (LANTUS) 8 Unit(s) SubCutaneous at bedtime  insulin lispro (HumaLOG) corrective regimen sliding scale   SubCutaneous three times a day before meals  insulin lispro (HumaLOG) corrective regimen sliding scale   SubCutaneous at bedtime  metoprolol succinate  milliGRAM(s) Oral daily  pantoprazole    Tablet 40 milliGRAM(s) Oral before breakfast  polyethylene glycol 3350 17 Gram(s) Oral daily  senna 2 Tablet(s) Oral at bedtime  traZODone 50 milliGRAM(s) Oral at bedtime    MEDICATIONS  (PRN):  dextrose 40% Gel 15 Gram(s) Oral once PRN Blood Glucose LESS THAN 70 milliGRAM(s)/deciliter  glucagon  Injectable 1 milliGRAM(s) IntraMuscular once PRN Glucose LESS THAN 70 milligrams/deciliter      Allergies    No Known Allergies    Intolerances        Review of Systems:    General:  No wt loss, fevers, chills, night sweats,fatigue,   Eyes:  Good vision, no reported pain  ENT:  No sore throat, pain, runny nose, dysphagia  CV:  No pain, palpitations, hypo/hypertension  Resp:  No dyspnea, cough, tachypnea, wheezing  GI:  No pain, No nausea, No vomiting, No diarrhea, No constipation, No weight loss, No fever, No pruritis, No rectal bleeding, No melena, No dysphagia  :  No pain, bleeding, incontinence, nocturia  Muscle:  No pain, weakness  Neuro:  No weakness, tingling, memory problems  Psych:  No fatigue, insomnia, mood problems, depression  Endocrine:  No polyuria, polydypsia, cold/heat intolerance  Heme:  No petechiae, ecchymosis, easy bruisability  Skin:  No rash, tattoos, scars, edema      Vital Signs Last 24 Hrs  T(C): 36.8 (23 Jan 2020 11:52), Max: 36.8 (22 Jan 2020 14:35)  T(F): 98.2 (23 Jan 2020 11:52), Max: 98.2 (22 Jan 2020 14:35)  HR: 67 (23 Jan 2020 11:52) (65 - 78)  BP: 177/60 (23 Jan 2020 11:52) (140/54 - 177/60)  BP(mean): --  RR: 18 (23 Jan 2020 11:52) (18 - 18)  SpO2: 99% (23 Jan 2020 11:52) (94% - 99%)    PHYSICAL EXAM:    Constitutional: NAD, well-developed  HEENT: EOMI, throat clear  Neck: No LAD, supple  Respiratory: CTA and P  Cardiovascular: S1 and S2, RRR, no M  Gastrointestinal: BS+, soft, NT/ND, neg HSM,  Extremities: No peripheral edema, neg clubing, cyanosis  Vascular: 2+ peripheral pulses  Neurological: A/O x 3, no focal deficits  Psychiatric: Normal mood, normal affect  Skin: No rashes      LABS:                        10.7   8.96  )-----------( 277      ( 23 Jan 2020 06:06 )             33.6     01-23    136  |  96  |  45<H>  ----------------------------<  166<H>  4.3   |  24  |  1.67<H>    Ca    9.1      23 Jan 2020 06:06  Mg     1.8     01-23            RADIOLOGY & ADDITIONAL TESTS:

## 2020-01-24 LAB
ANION GAP SERPL CALC-SCNC: 15 MMOL/L — SIGNIFICANT CHANGE UP (ref 5–17)
BUN SERPL-MCNC: 56 MG/DL — HIGH (ref 7–23)
CALCIUM SERPL-MCNC: 9.5 MG/DL — SIGNIFICANT CHANGE UP (ref 8.4–10.5)
CHLORIDE SERPL-SCNC: 95 MMOL/L — LOW (ref 96–108)
CO2 SERPL-SCNC: 27 MMOL/L — SIGNIFICANT CHANGE UP (ref 22–31)
CREAT SERPL-MCNC: 2.04 MG/DL — HIGH (ref 0.5–1.3)
GLUCOSE BLDC GLUCOMTR-MCNC: 143 MG/DL — HIGH (ref 70–99)
GLUCOSE BLDC GLUCOMTR-MCNC: 170 MG/DL — HIGH (ref 70–99)
GLUCOSE BLDC GLUCOMTR-MCNC: 204 MG/DL — HIGH (ref 70–99)
GLUCOSE BLDC GLUCOMTR-MCNC: 232 MG/DL — HIGH (ref 70–99)
GLUCOSE SERPL-MCNC: 128 MG/DL — HIGH (ref 70–99)
HCT VFR BLD CALC: 36.4 % — SIGNIFICANT CHANGE UP (ref 34.5–45)
HGB BLD-MCNC: 11.4 G/DL — LOW (ref 11.5–15.5)
MCHC RBC-ENTMCNC: 27.7 PG — SIGNIFICANT CHANGE UP (ref 27–34)
MCHC RBC-ENTMCNC: 31.3 GM/DL — LOW (ref 32–36)
MCV RBC AUTO: 88.3 FL — SIGNIFICANT CHANGE UP (ref 80–100)
NRBC # BLD: 0 /100 WBCS — SIGNIFICANT CHANGE UP (ref 0–0)
PLATELET # BLD AUTO: 300 K/UL — SIGNIFICANT CHANGE UP (ref 150–400)
POTASSIUM SERPL-MCNC: 4.4 MMOL/L — SIGNIFICANT CHANGE UP (ref 3.5–5.3)
POTASSIUM SERPL-SCNC: 4.4 MMOL/L — SIGNIFICANT CHANGE UP (ref 3.5–5.3)
RBC # BLD: 4.12 M/UL — SIGNIFICANT CHANGE UP (ref 3.8–5.2)
RBC # FLD: 13.7 % — SIGNIFICANT CHANGE UP (ref 10.3–14.5)
SODIUM SERPL-SCNC: 137 MMOL/L — SIGNIFICANT CHANGE UP (ref 135–145)
WBC # BLD: 7.52 K/UL — SIGNIFICANT CHANGE UP (ref 3.8–10.5)
WBC # FLD AUTO: 7.52 K/UL — SIGNIFICANT CHANGE UP (ref 3.8–10.5)

## 2020-01-24 PROCEDURE — 99232 SBSQ HOSP IP/OBS MODERATE 35: CPT

## 2020-01-24 RX ORDER — FUROSEMIDE 40 MG
20 TABLET ORAL DAILY
Refills: 0 | Status: DISCONTINUED | OUTPATIENT
Start: 2020-01-24 | End: 2020-01-25

## 2020-01-24 RX ADMIN — INSULIN GLARGINE 8 UNIT(S): 100 INJECTION, SOLUTION SUBCUTANEOUS at 21:42

## 2020-01-24 RX ADMIN — Medication 2: at 12:35

## 2020-01-24 RX ADMIN — Medication 20 MILLIGRAM(S): at 12:35

## 2020-01-24 RX ADMIN — HEPARIN SODIUM 5000 UNIT(S): 5000 INJECTION INTRAVENOUS; SUBCUTANEOUS at 06:12

## 2020-01-24 RX ADMIN — AMLODIPINE BESYLATE 10 MILLIGRAM(S): 2.5 TABLET ORAL at 06:11

## 2020-01-24 RX ADMIN — HEPARIN SODIUM 5000 UNIT(S): 5000 INJECTION INTRAVENOUS; SUBCUTANEOUS at 21:42

## 2020-01-24 RX ADMIN — ATORVASTATIN CALCIUM 10 MILLIGRAM(S): 80 TABLET, FILM COATED ORAL at 21:41

## 2020-01-24 RX ADMIN — CLOPIDOGREL BISULFATE 75 MILLIGRAM(S): 75 TABLET, FILM COATED ORAL at 12:36

## 2020-01-24 RX ADMIN — HEPARIN SODIUM 5000 UNIT(S): 5000 INJECTION INTRAVENOUS; SUBCUTANEOUS at 14:24

## 2020-01-24 RX ADMIN — Medication 81 MILLIGRAM(S): at 12:36

## 2020-01-24 RX ADMIN — PANTOPRAZOLE SODIUM 40 MILLIGRAM(S): 20 TABLET, DELAYED RELEASE ORAL at 06:11

## 2020-01-24 RX ADMIN — Medication 100 MILLIGRAM(S): at 06:11

## 2020-01-24 RX ADMIN — Medication 20 MILLIGRAM(S): at 06:11

## 2020-01-24 RX ADMIN — POLYETHYLENE GLYCOL 3350 17 GRAM(S): 17 POWDER, FOR SOLUTION ORAL at 12:36

## 2020-01-24 RX ADMIN — Medication 50 MILLIGRAM(S): at 21:42

## 2020-01-24 RX ADMIN — Medication 2: at 18:24

## 2020-01-24 NOTE — PROGRESS NOTE ADULT - SUBJECTIVE AND OBJECTIVE BOX
INTERVAL HPI/OVERNIGHT EVENTS:    No new overnight event.  No N/V/D.  Tolerating diet.     MEDICATIONS  (STANDING):  amLODIPine   Tablet 10 milliGRAM(s) Oral daily  aspirin enteric coated 81 milliGRAM(s) Oral daily  atorvastatin 10 milliGRAM(s) Oral at bedtime  clopidogrel Tablet 75 milliGRAM(s) Oral daily  dextrose 5%. 1000 milliLiter(s) (50 mL/Hr) IV Continuous <Continuous>  dextrose 50% Injectable 12.5 Gram(s) IV Push once  dextrose 50% Injectable 25 Gram(s) IV Push once  dextrose 50% Injectable 25 Gram(s) IV Push once  furosemide   Injectable 20 milliGRAM(s) IV Push daily  heparin  Injectable 5000 Unit(s) SubCutaneous every 8 hours  insulin glargine Injectable (LANTUS) 8 Unit(s) SubCutaneous at bedtime  insulin lispro (HumaLOG) corrective regimen sliding scale   SubCutaneous three times a day before meals  insulin lispro (HumaLOG) corrective regimen sliding scale   SubCutaneous at bedtime  metoprolol succinate  milliGRAM(s) Oral daily  pantoprazole    Tablet 40 milliGRAM(s) Oral before breakfast  polyethylene glycol 3350 17 Gram(s) Oral daily  senna 2 Tablet(s) Oral at bedtime  traZODone 50 milliGRAM(s) Oral at bedtime    MEDICATIONS  (PRN):  dextrose 40% Gel 15 Gram(s) Oral once PRN Blood Glucose LESS THAN 70 milliGRAM(s)/deciliter  glucagon  Injectable 1 milliGRAM(s) IntraMuscular once PRN Glucose LESS THAN 70 milligrams/deciliter      Allergies    No Known Allergies    Intolerances        Review of Systems:    General:  No wt loss, fevers, chills, night sweats,fatigue,   Eyes:  Good vision, no reported pain  ENT:  No sore throat, pain, runny nose, dysphagia  CV:  No pain, palpitations, hypo/hypertension  Resp:  No dyspnea, cough, tachypnea, wheezing  GI:  No pain, No nausea, No vomiting, No diarrhea, No constipation, No weight loss, No fever, No pruritis, No rectal bleeding, No melena, No dysphagia  :  No pain, bleeding, incontinence, nocturia  Muscle:  No pain, weakness  Neuro:  No weakness, tingling, memory problems  Psych:  No fatigue, insomnia, mood problems, depression  Endocrine:  No polyuria, polydypsia, cold/heat intolerance  Heme:  No petechiae, ecchymosis, easy bruisability  Skin:  No rash, tattoos, scars, edema      Vital Signs Last 24 Hrs  T(C): 36.3 (24 Jan 2020 12:27), Max: 36.6 (23 Jan 2020 20:30)  T(F): 97.4 (24 Jan 2020 12:27), Max: 97.9 (24 Jan 2020 02:51)  HR: 65 (24 Jan 2020 12:27) (65 - 73)  BP: 153/71 (24 Jan 2020 12:27) (128/56 - 157/80)  BP(mean): --  RR: 18 (24 Jan 2020 12:27) (18 - 18)  SpO2: 99% (24 Jan 2020 12:27) (94% - 99%)    PHYSICAL EXAM:    Constitutional: NAD, well-developed  HEENT: EOMI, throat clear  Neck: No LAD, supple  Respiratory: CTA and P  Cardiovascular: S1 and S2, RRR, no M  Gastrointestinal: BS+, soft, NT/ND, neg HSM,  Extremities: No peripheral edema, neg clubing, cyanosis  Vascular: 2+ peripheral pulses  Neurological: A/O x 3, no focal deficits  Psychiatric: Normal mood, normal affect  Skin: No rashes      LABS:                        11.4   7.52  )-----------( 300      ( 24 Jan 2020 06:46 )             36.4     01-24    137  |  95<L>  |  56<H>  ----------------------------<  128<H>  4.4   |  27  |  2.04<H>    Ca    9.5      24 Jan 2020 06:46  Mg     1.8     01-23            RADIOLOGY & ADDITIONAL TESTS:

## 2020-01-24 NOTE — PROGRESS NOTE ADULT - ASSESSMENT
89 yo woman        h/o  CAD s/p CABG,   HTN,   HLD,  PVD s/p stent,     CKD, (Baseline Cr reportedly 1.2),  DMT2,     presents from home in setting of worsening LE edema over the course of 1 week.  acute  diastolic chf, on   iv lasix  COLETTE/    DM  2, on lantus ,   HTN, HLD,  CAD/ PAD   chronic  anemia,  hb stable   on home meds/ asa/ plavix/ norvasc/ toprol    ct chest,  no pna  pt has improved/   echo,  normal ef/  COLETTE   bmp in am 89 yo woman        h/o  CAD s/p CABG,   HTN,   HLD,  PVD s/p stent,     CKD, (Baseline Cr reportedly 1.2),  DMT2,     presents from home in setting of worsening LE edema over the course of 1 week.  acute  diastolic chf, on   iv lasix  COLETTE/    DM  2, on lantus ,   HTN, HLD,  CAD/ PAD   chronic  anemia,  hb stable   on home meds/ asa/ plavix/ norvasc/ toprol    ct chest,  no pna  pt has improved/   echo,  normal ef/  COLETTE/  lasix lowered   bmp in am        < from: Transthoracic Echocardiogram (01.23.20 @ 07:47) >  1. Mitral annular calcification, otherwise normal mitral  valve. Moderate mitral regurgitation.  2. Increasedrelative wall thickness with normal left  ventricular mass index, consistent with concentric left  ventricular remodeling.  3. Normal left ventricular systolic function. No segmental  wall motion abnormalities.  4. Normal right ventricular size with mildly decreased  right ventricular systolic function.  5. Estimated right ventricular systolic pressure equals 34  mm Hg, assuming right atrial pressure equals 8 mm Hg,  consistent with normal pulmonary pressures.  6. Estimated pulmonary artery systolic pressure equals 34  mm Hg, assuming right atrial pressure equals 8  mm Hg,  consistent with normal pulmonary pressures.  *** No previous Echo exam.  ------------------------------------------------------------------------    < end of copied text >

## 2020-01-24 NOTE — PROGRESS NOTE ADULT - SUBJECTIVE AND OBJECTIVE BOX
no cp/sob    REVIEW OF SYSTEMS:  GEN: no fever,    no chills  RESP: no SOB,   no cough  CVS: no chest pain,   no palpitations  GI: no abdominal pain,   no nausea,   no vomiting,   no constipation,   no diarrhea  : no dysuria,   no frequency  NEURO: no headache,   no dizziness  PSYCH: no depression,   not anxious  Derm : no rash    MEDICATIONS  (STANDING):  amLODIPine   Tablet 10 milliGRAM(s) Oral daily  aspirin enteric coated 81 milliGRAM(s) Oral daily  atorvastatin 10 milliGRAM(s) Oral at bedtime  clopidogrel Tablet 75 milliGRAM(s) Oral daily  dextrose 5%. 1000 milliLiter(s) (50 mL/Hr) IV Continuous <Continuous>  dextrose 50% Injectable 12.5 Gram(s) IV Push once  dextrose 50% Injectable 25 Gram(s) IV Push once  dextrose 50% Injectable 25 Gram(s) IV Push once  furosemide   Injectable 20 milliGRAM(s) IV Push two times a day  heparin  Injectable 5000 Unit(s) SubCutaneous every 8 hours  insulin glargine Injectable (LANTUS) 8 Unit(s) SubCutaneous at bedtime  insulin lispro (HumaLOG) corrective regimen sliding scale   SubCutaneous three times a day before meals  insulin lispro (HumaLOG) corrective regimen sliding scale   SubCutaneous at bedtime  metoprolol succinate  milliGRAM(s) Oral daily  pantoprazole    Tablet 40 milliGRAM(s) Oral before breakfast  polyethylene glycol 3350 17 Gram(s) Oral daily  senna 2 Tablet(s) Oral at bedtime  traZODone 50 milliGRAM(s) Oral at bedtime    MEDICATIONS  (PRN):  dextrose 40% Gel 15 Gram(s) Oral once PRN Blood Glucose LESS THAN 70 milliGRAM(s)/deciliter  glucagon  Injectable 1 milliGRAM(s) IntraMuscular once PRN Glucose LESS THAN 70 milligrams/deciliter      Vital Signs Last 24 Hrs  T(C): 36.6 (24 Jan 2020 02:51), Max: 36.8 (23 Jan 2020 11:52)  T(F): 97.9 (24 Jan 2020 02:51), Max: 98.2 (23 Jan 2020 11:52)  HR: 73 (24 Jan 2020 06:09) (65 - 73)  BP: 157/80 (24 Jan 2020 06:09) (128/56 - 177/60)  BP(mean): --  RR: 18 (24 Jan 2020 02:51) (18 - 18)  SpO2: 94% (24 Jan 2020 02:51) (94% - 99%)  CAPILLARY BLOOD GLUCOSE      POCT Blood Glucose.: 179 mg/dL (23 Jan 2020 21:45)  POCT Blood Glucose.: 201 mg/dL (23 Jan 2020 18:10)  POCT Blood Glucose.: 114 mg/dL (23 Jan 2020 13:49)    I&O's Summary    23 Jan 2020 07:01  -  24 Jan 2020 07:00  --------------------------------------------------------  IN: 480 mL / OUT: 0 mL / NET: 480 mL        PHYSICAL EXAM:  HEAD:  Atraumatic, Normocephalic  NECK: Supple, No   JVD  CHEST/LUNG:   no     rales,     no,    rhonchi  HEART: Regular rate and rhythm;         murmur  ABDOMEN: Soft, Nontender, ;   EXTREMITIES:   no    edema  NEUROLOGY:  alert    LABS:                        11.4   7.52  )-----------( 300      ( 24 Jan 2020 06:46 )             36.4     01-24    137  |  95<L>  |  56<H>  ----------------------------<  128<H>  4.4   |  27  |  2.04<H>    Ca    9.5      24 Jan 2020 06:46  Mg     1.8     01-23                    Hemoglobin A1C, Whole Blood: 5.9 % (01-21 @ 07:27)    Thyroid Stimulating Hormone, Serum: 3.94 uIU/mL (01-21 @ 17:05)          Consultant(s) Notes Reviewed:      Care Discussed with Consultants/Other Providers:

## 2020-01-25 ENCOUNTER — TRANSCRIPTION ENCOUNTER (OUTPATIENT)
Age: 85
End: 2020-01-25

## 2020-01-25 VITALS
HEART RATE: 67 BPM | RESPIRATION RATE: 18 BRPM | DIASTOLIC BLOOD PRESSURE: 74 MMHG | SYSTOLIC BLOOD PRESSURE: 157 MMHG | OXYGEN SATURATION: 98 % | TEMPERATURE: 98 F

## 2020-01-25 LAB
ANION GAP SERPL CALC-SCNC: 15 MMOL/L — SIGNIFICANT CHANGE UP (ref 5–17)
BUN SERPL-MCNC: 58 MG/DL — HIGH (ref 7–23)
CALCIUM SERPL-MCNC: 9.4 MG/DL — SIGNIFICANT CHANGE UP (ref 8.4–10.5)
CHLORIDE SERPL-SCNC: 92 MMOL/L — LOW (ref 96–108)
CO2 SERPL-SCNC: 26 MMOL/L — SIGNIFICANT CHANGE UP (ref 22–31)
CREAT SERPL-MCNC: 1.9 MG/DL — HIGH (ref 0.5–1.3)
GLUCOSE BLDC GLUCOMTR-MCNC: 151 MG/DL — HIGH (ref 70–99)
GLUCOSE SERPL-MCNC: 141 MG/DL — HIGH (ref 70–99)
POTASSIUM SERPL-MCNC: 4.2 MMOL/L — SIGNIFICANT CHANGE UP (ref 3.5–5.3)
POTASSIUM SERPL-SCNC: 4.2 MMOL/L — SIGNIFICANT CHANGE UP (ref 3.5–5.3)
SODIUM SERPL-SCNC: 133 MMOL/L — LOW (ref 135–145)

## 2020-01-25 PROCEDURE — 83735 ASSAY OF MAGNESIUM: CPT

## 2020-01-25 PROCEDURE — 80061 LIPID PANEL: CPT

## 2020-01-25 PROCEDURE — 71250 CT THORAX DX C-: CPT

## 2020-01-25 PROCEDURE — 71046 X-RAY EXAM CHEST 2 VIEWS: CPT

## 2020-01-25 PROCEDURE — 87486 CHLMYD PNEUM DNA AMP PROBE: CPT

## 2020-01-25 PROCEDURE — 83036 HEMOGLOBIN GLYCOSYLATED A1C: CPT

## 2020-01-25 PROCEDURE — 87633 RESP VIRUS 12-25 TARGETS: CPT

## 2020-01-25 PROCEDURE — 93970 EXTREMITY STUDY: CPT

## 2020-01-25 PROCEDURE — 83880 ASSAY OF NATRIURETIC PEPTIDE: CPT

## 2020-01-25 PROCEDURE — 84484 ASSAY OF TROPONIN QUANT: CPT

## 2020-01-25 PROCEDURE — 87798 DETECT AGENT NOS DNA AMP: CPT

## 2020-01-25 PROCEDURE — 80053 COMPREHEN METABOLIC PANEL: CPT

## 2020-01-25 PROCEDURE — 84443 ASSAY THYROID STIM HORMONE: CPT

## 2020-01-25 PROCEDURE — 80048 BASIC METABOLIC PNL TOTAL CA: CPT

## 2020-01-25 PROCEDURE — 99285 EMERGENCY DEPT VISIT HI MDM: CPT | Mod: 25

## 2020-01-25 PROCEDURE — 93005 ELECTROCARDIOGRAM TRACING: CPT

## 2020-01-25 PROCEDURE — 99232 SBSQ HOSP IP/OBS MODERATE 35: CPT

## 2020-01-25 PROCEDURE — 93306 TTE W/DOPPLER COMPLETE: CPT

## 2020-01-25 PROCEDURE — 82962 GLUCOSE BLOOD TEST: CPT

## 2020-01-25 PROCEDURE — 97161 PT EVAL LOW COMPLEX 20 MIN: CPT

## 2020-01-25 PROCEDURE — 87581 M.PNEUMON DNA AMP PROBE: CPT

## 2020-01-25 PROCEDURE — 84100 ASSAY OF PHOSPHORUS: CPT

## 2020-01-25 PROCEDURE — 85027 COMPLETE CBC AUTOMATED: CPT

## 2020-01-25 RX ORDER — FUROSEMIDE 40 MG
1 TABLET ORAL
Qty: 30 | Refills: 0
Start: 2020-01-25 | End: 2020-02-23

## 2020-01-25 RX ORDER — INSULIN GLARGINE 100 [IU]/ML
12 INJECTION, SOLUTION SUBCUTANEOUS AT BEDTIME
Refills: 0 | Status: DISCONTINUED | OUTPATIENT
Start: 2020-01-25 | End: 2020-01-25

## 2020-01-25 RX ORDER — METFORMIN HYDROCHLORIDE 850 MG/1
1 TABLET ORAL
Qty: 0 | Refills: 0 | DISCHARGE

## 2020-01-25 RX ORDER — MELOXICAM 15 MG/1
1 TABLET ORAL
Qty: 0 | Refills: 0 | DISCHARGE

## 2020-01-25 RX ORDER — VALSARTAN 80 MG/1
1 TABLET ORAL
Qty: 0 | Refills: 0 | DISCHARGE

## 2020-01-25 RX ORDER — POLYETHYLENE GLYCOL 3350 17 G/17G
17 POWDER, FOR SOLUTION ORAL
Qty: 0 | Refills: 0 | DISCHARGE
Start: 2020-01-25

## 2020-01-25 RX ORDER — INSULIN GLARGINE 100 [IU]/ML
10 INJECTION, SOLUTION SUBCUTANEOUS AT BEDTIME
Refills: 0 | Status: DISCONTINUED | OUTPATIENT
Start: 2020-01-25 | End: 2020-01-25

## 2020-01-25 RX ORDER — SENNA PLUS 8.6 MG/1
2 TABLET ORAL
Qty: 0 | Refills: 0 | DISCHARGE
Start: 2020-01-25

## 2020-01-25 RX ADMIN — HEPARIN SODIUM 5000 UNIT(S): 5000 INJECTION INTRAVENOUS; SUBCUTANEOUS at 05:37

## 2020-01-25 RX ADMIN — AMLODIPINE BESYLATE 10 MILLIGRAM(S): 2.5 TABLET ORAL at 05:37

## 2020-01-25 RX ADMIN — Medication 81 MILLIGRAM(S): at 12:16

## 2020-01-25 RX ADMIN — CLOPIDOGREL BISULFATE 75 MILLIGRAM(S): 75 TABLET, FILM COATED ORAL at 12:16

## 2020-01-25 RX ADMIN — Medication 100 MILLIGRAM(S): at 05:37

## 2020-01-25 RX ADMIN — Medication 20 MILLIGRAM(S): at 05:37

## 2020-01-25 RX ADMIN — PANTOPRAZOLE SODIUM 40 MILLIGRAM(S): 20 TABLET, DELAYED RELEASE ORAL at 05:37

## 2020-01-25 NOTE — DISCHARGE NOTE PROVIDER - HOSPITAL COURSE
89 yo woman h/o  CAD s/p CABG,   HTN,   HLD,  PVD s/p stent, CKD, (Baseline Cr reportedly 1.2),  DMT2, presents from home in setting of worsening LE edema over the course of 1 week.    acute  diastolic chf, on  iv lasix, improved d/c on po lasix    chronic  anemia, hb stable, seen by GI, no endoscopic w/u    ct chest,  no pna    echo, normal     COLETTE/  lasix lowered and  creatinine  is  decreasing/ crt is  1.9    doing well     f/p  pmd, next week

## 2020-01-25 NOTE — PROGRESS NOTE ADULT - ASSESSMENT
89 yo woman        h/o  CAD s/p CABG,   HTN,   HLD,  PVD s/p stent,     CKD, (Baseline Cr reportedly 1.2),  DMT2,     presents from home in setting of worsening LE edema over the course of 1 week.  acute  diastolic chf, on   iv lasix  COLETTE/    DM  2, on lantus ,   HTN, HLD,  CAD/ PAD   chronic  anemia,  hb stable   on home meds/ asa/ plavix/ norvasc/ toprol    ct chest,  no pna  pt has improved/   echo,  normal ef/  COLETTE/  lasix lowered     and  creatinine  is  decreasing/ crt is  1.9    doing well    f/p  pmd, next week        < from: Transthoracic Echocardiogram (01.23.20 @ 07:47) >  1. Mitral annular calcification, otherwise normal mitral  valve. Moderate mitral regurgitation.  2. Increasedrelative wall thickness with normal left  ventricular mass index, consistent with concentric left  ventricular remodeling.  3. Normal left ventricular systolic function. No segmental  wall motion abnormalities.  4. Normal right ventricular size with mildly decreased  right ventricular systolic function.  5. Estimated right ventricular systolic pressure equals 34  mm Hg, assuming right atrial pressure equals 8 mm Hg,  consistent with normal pulmonary pressures.  6. Estimated pulmonary artery systolic pressure equals 34  mm Hg, assuming right atrial pressure equals 8  mm Hg,  consistent with normal pulmonary pressures.  *** No previous Echo exam.  ------------------------------------------------------------------------    < end of copied text > 89 yo woman        h/o  CAD s/p CABG,   HTN,   HLD,  PVD s/p stent,     CKD, (Baseline Cr reportedly 1.2),  DMT2,     presents from home in setting of worsening LE edema over the course of 1 week.  acute  diastolic chf, on   iv lasix  COLETTE/    DM  2, on lantus ,   HTN, HLD,  CAD/ PAD   chronic  anemia,  hb stable   on home meds/ asa/ plavix/ norvasc/ toprol    ct chest,  no pna  pt has improved/   echo,  normal ef/  COLETTE/  lasix lowered     and  creatinine  is  decreasing/ crt is  1.9    doing well/  ambulating  well     f/p  pmd, next week        < from: Transthoracic Echocardiogram (01.23.20 @ 07:47) >  1. Mitral annular calcification, otherwise normal mitral  valve. Moderate mitral regurgitation.  2. Increasedrelative wall thickness with normal left  ventricular mass index, consistent with concentric left  ventricular remodeling.  3. Normal left ventricular systolic function. No segmental  wall motion abnormalities.  4. Normal right ventricular size with mildly decreased  right ventricular systolic function.  5. Estimated right ventricular systolic pressure equals 34  mm Hg, assuming right atrial pressure equals 8 mm Hg,  consistent with normal pulmonary pressures.  6. Estimated pulmonary artery systolic pressure equals 34  mm Hg, assuming right atrial pressure equals 8  mm Hg,  consistent with normal pulmonary pressures.  *** No previous Echo exam.  ------------------------------------------------------------------------    < end of copied text >

## 2020-01-25 NOTE — DISCHARGE NOTE NURSING/CASE MANAGEMENT/SOCIAL WORK - NSDCPETBCESMAN_GEN_ALL_CORE
declines If you are a smoker, it is important for your health to stop smoking. Please be aware that second hand smoke is also harmful.

## 2020-01-25 NOTE — DISCHARGE NOTE PROVIDER - PROVIDER TOKENS
FREE:[LAST:[Dory],FIRST:[Tai],PHONE:[phone]:[(   )    -],FOLLOWUP:[1 week],ESTABLISHEDPATIENT:[T]]

## 2020-01-25 NOTE — DISCHARGE NOTE PROVIDER - NSDCCPCAREPLAN_GEN_ALL_CORE_FT
PRINCIPAL DISCHARGE DIAGNOSIS  Diagnosis: Congestive heart failure  Assessment and Plan of Treatment: Weigh yourself daily.  If you gain 3lbs in 3 days, or 5lbs in a week call your Health Care Provider.  Do not eat or drink foods containing more than 2000mg of salt (sodium) in your diet every day.  Call your Health Care Provider if you have any swelling or increased swelling in your feet, ankles, and/or stomach.  Take all of your medication as directed.  If you become dizzy call your Health Care Provider.      SECONDARY DISCHARGE DIAGNOSES  Diagnosis: Anemia  Assessment and Plan of Treatment: Follow up with PMD for repeat blood work to monitor    Diagnosis: HTN (hypertension)  Assessment and Plan of Treatment: Take medications for your blood pressure as recommended.  Eat a heart healthy diet that is low in saturated fats and salt, and includes whole grains, fruits, vegetables and lean protein   Exercise regularly (consult with your physician or cardiologist first); maintain a heart healthy weight.   If you smoke - quit (A resource to help you stop smoking is the [facility] – phone number [phone].). Continue to follow with your primary physician or cardiologist.   Seek medical help for dizziness, Lightheadedness, Blurry vision, Headache, Chest pain, Shortness of breath  Follow up with your medical doctor to establish long term blood pressure treatment goals.    Diagnosis: COLETTE (acute kidney injury)  Assessment and Plan of Treatment: Improving   Follow up with PMD for repeat blood work to monitor kidney function    Diagnosis: Type 2 diabetes mellitus with other specified complication, unspecified whether long term insulin use  Assessment and Plan of Treatment: Make sure you get your HgA1c checked every three months.  If you take oral diabetes medications, check your blood glucose two times a day.  If you take insulin, check your blood glucose before meals and at bedtime.  It's important not to skip any meals.  Keep a log of your blood glucose results and always take it with you to your doctor appointments.  Keep a list of your current medications including injectables and over the counter medications and bring this medication list with you to all your doctor appointments.  If you have not seen your ophthalmologist this year call for appointment.  Check your feet daily for redness, sores, or openings. Do not self treat. If no improvement in two days call your primary care physician for an appointment.  Low blood sugar (hypoglycemia) is a blood sugar below 70mg/dl. Check your blood sugar if you feel signs/symptoms of hypoglycemia. If your blood sugar is below 70 take 15 grams of carbohydrates (ex 4 oz of apple juice, 3-4 glucose tablets, or 4-6 oz of regular soda) wait 15 minutes and repeat blood sugar to make sure it comes up above 70.  If your blood sugar is above 70 and you are due for a meal, have a meal.  If you are not due for a meal have a snack.  This snack helps keeps your blood sugar at a safe range.

## 2020-01-25 NOTE — PROGRESS NOTE ADULT - SUBJECTIVE AND OBJECTIVE BOX
no  cp/sob    REVIEW OF SYSTEMS:  GEN: no fever,    no chills  RESP: no SOB,   no cough  CVS: no chest pain,   no palpitations  GI: no abdominal pain,   no nausea,   no vomiting,   no constipation,   no diarrhea  : no dysuria,   no frequency  NEURO: no headache,   no dizziness  PSYCH: no depression,   not anxious  Derm : no rash    MEDICATIONS  (STANDING):  amLODIPine   Tablet 10 milliGRAM(s) Oral daily  aspirin enteric coated 81 milliGRAM(s) Oral daily  atorvastatin 10 milliGRAM(s) Oral at bedtime  clopidogrel Tablet 75 milliGRAM(s) Oral daily  dextrose 5%. 1000 milliLiter(s) (50 mL/Hr) IV Continuous <Continuous>  dextrose 50% Injectable 12.5 Gram(s) IV Push once  dextrose 50% Injectable 25 Gram(s) IV Push once  dextrose 50% Injectable 25 Gram(s) IV Push once  furosemide   Injectable 20 milliGRAM(s) IV Push daily  heparin  Injectable 5000 Unit(s) SubCutaneous every 8 hours  insulin glargine Injectable (LANTUS) 8 Unit(s) SubCutaneous at bedtime  insulin lispro (HumaLOG) corrective regimen sliding scale   SubCutaneous three times a day before meals  insulin lispro (HumaLOG) corrective regimen sliding scale   SubCutaneous at bedtime  metoprolol succinate  milliGRAM(s) Oral daily  pantoprazole    Tablet 40 milliGRAM(s) Oral before breakfast  polyethylene glycol 3350 17 Gram(s) Oral daily  senna 2 Tablet(s) Oral at bedtime  traZODone 50 milliGRAM(s) Oral at bedtime    MEDICATIONS  (PRN):  dextrose 40% Gel 15 Gram(s) Oral once PRN Blood Glucose LESS THAN 70 milliGRAM(s)/deciliter  glucagon  Injectable 1 milliGRAM(s) IntraMuscular once PRN Glucose LESS THAN 70 milligrams/deciliter      Vital Signs Last 24 Hrs  T(C): 36.6 (25 Jan 2020 05:28), Max: 36.6 (25 Jan 2020 05:28)  T(F): 97.8 (25 Jan 2020 05:28), Max: 97.8 (25 Jan 2020 05:28)  HR: 67 (25 Jan 2020 05:28) (65 - 69)  BP: 157/74 (25 Jan 2020 05:28) (138/70 - 157/74)  BP(mean): --  RR: 18 (25 Jan 2020 05:28) (18 - 18)  SpO2: 98% (25 Jan 2020 05:28) (97% - 99%)  CAPILLARY BLOOD GLUCOSE      POCT Blood Glucose.: 170 mg/dL (24 Jan 2020 21:36)  POCT Blood Glucose.: 204 mg/dL (24 Jan 2020 18:04)  POCT Blood Glucose.: 232 mg/dL (24 Jan 2020 12:25)  POCT Blood Glucose.: 143 mg/dL (24 Jan 2020 09:11)    I&O's Summary    24 Jan 2020 07:01  -  25 Jan 2020 07:00  --------------------------------------------------------  IN: 480 mL / OUT: 0 mL / NET: 480 mL        PHYSICAL EXAM:  HEAD:  Atraumatic, Normocephalic  NECK: Supple, No   JVD  CHEST/LUNG:   no     rales,     no,    rhonchi  HEART: Regular rate and rhythm;         murmur  ABDOMEN: Soft, Nontender, ;   EXTREMITIES:   no     edema  NEUROLOGY:  alert    LABS:                        11.4   7.52  )-----------( 300      ( 24 Jan 2020 06:46 )             36.4     01-25    133<L>  |  92<L>  |  58<H>  ----------------------------<  141<H>  4.2   |  26  |  1.90<H>    Ca    9.4      25 Jan 2020 07:01                    Hemoglobin A1C, Whole Blood: 5.9 % (01-21 @ 07:27)    Thyroid Stimulating Hormone, Serum: 3.94 uIU/mL (01-21 @ 17:05)          Consultant(s) Notes Reviewed:      Care Discussed with Consultants/Other Providers:

## 2020-01-25 NOTE — DISCHARGE NOTE PROVIDER - NSDCMRMEDTOKEN_GEN_ALL_CORE_FT
AMLODIPINE BESYLATE 10 MG TAB: 1 tab(s) orally once a day  aspirin 81 mg oral tablet: 1 tab(s) orally once a day  ATORVASTATIN 10 MG TABLET: 1 tab(s) orally once a day  GLIPIZIDE ER 5 MG TABLET: 1 tab(s) orally once a day  LEVEMIR FLEXTOUCH 100 UNIT/ML: 12 unit(s) subcutaneous once a day (at bedtime)  MELOXICAM 7.5 MG TABLET: 1 tab(s) orally once a day, As Needed  METFORMIN HCL  MG TABLET: 1 tab(s) orally 2 times a day  METOPROLOL SUCC  MG TAB: 1 tab(s) orally once a day  PANTOPRAZOLE SOD DR 40 MG TAB: 1 tab(s) orally once a day  Plavix 75 mg oral tablet: 1 tab(s) orally once a day  TRADJENTA 5 MG TABLET: 1 tab(s) orally once a day  TRAZODONE 50 MG TABLET: 1 tab(s) orally once a day (at bedtime)  VALSARTAN 160 MG TABLET: 1 tab(s) orally once a day AMLODIPINE BESYLATE 10 MG TAB: 1 tab(s) orally once a day  aspirin 81 mg oral tablet: 1 tab(s) orally once a day  ATORVASTATIN 10 MG TABLET: 1 tab(s) orally once a day  Lasix 20 mg oral tablet: 1 tab(s) orally once a day   LEVEMIR FLEXTOUCH 100 UNIT/ML: 12 unit(s) subcutaneous once a day (at bedtime)  METOPROLOL SUCC  MG TAB: 1 tab(s) orally once a day  PANTOPRAZOLE SOD DR 40 MG TAB: 1 tab(s) orally once a day  Plavix 75 mg oral tablet: 1 tab(s) orally once a day  polyethylene glycol 3350 oral powder for reconstitution: 17 gram(s) orally once a day  senna oral tablet: 2 tab(s) orally once a day (at bedtime)  TRADJENTA 5 MG TABLET: 1 tab(s) orally once a day  TRAZODONE 50 MG TABLET: 1 tab(s) orally once a day (at bedtime)

## 2020-01-25 NOTE — DISCHARGE NOTE NURSING/CASE MANAGEMENT/SOCIAL WORK - PATIENT PORTAL LINK FT
You can access the FollowMyHealth Patient Portal offered by [facility] by registering at the following website: [email or web address]. By joining [employer]’s FollowMyHealth portal, you will also be able to view your health information using other applications (apps) compatible with our system.

## 2020-08-20 ENCOUNTER — APPOINTMENT (OUTPATIENT)
Dept: ULTRASOUND IMAGING | Facility: CLINIC | Age: 85
End: 2020-08-20

## 2020-08-20 ENCOUNTER — APPOINTMENT (OUTPATIENT)
Dept: ULTRASOUND IMAGING | Facility: CLINIC | Age: 85
End: 2020-08-20
Payer: MEDICARE

## 2020-08-20 ENCOUNTER — NON-APPOINTMENT (OUTPATIENT)
Age: 85
End: 2020-08-20

## 2020-08-20 ENCOUNTER — OUTPATIENT (OUTPATIENT)
Dept: OUTPATIENT SERVICES | Facility: HOSPITAL | Age: 85
LOS: 1 days | End: 2020-08-20
Payer: MEDICARE

## 2020-08-20 ENCOUNTER — APPOINTMENT (OUTPATIENT)
Dept: CARDIOLOGY | Facility: CLINIC | Age: 85
End: 2020-08-20
Payer: MEDICARE

## 2020-08-20 VITALS
WEIGHT: 151 LBS | HEART RATE: 59 BPM | BODY MASS INDEX: 25.16 KG/M2 | TEMPERATURE: 97.8 F | RESPIRATION RATE: 12 BRPM | HEIGHT: 65 IN | DIASTOLIC BLOOD PRESSURE: 65 MMHG | SYSTOLIC BLOOD PRESSURE: 166 MMHG | OXYGEN SATURATION: 96 %

## 2020-08-20 VITALS — SYSTOLIC BLOOD PRESSURE: 170 MMHG | DIASTOLIC BLOOD PRESSURE: 70 MMHG

## 2020-08-20 DIAGNOSIS — M25.50 PAIN IN UNSPECIFIED JOINT: ICD-10-CM

## 2020-08-20 DIAGNOSIS — E78.49 OTHER HYPERLIPIDEMIA: ICD-10-CM

## 2020-08-20 DIAGNOSIS — Z00.8 ENCOUNTER FOR OTHER GENERAL EXAMINATION: ICD-10-CM

## 2020-08-20 DIAGNOSIS — Z87.448 PERSONAL HISTORY OF OTHER DISEASES OF URINARY SYSTEM: ICD-10-CM

## 2020-08-20 DIAGNOSIS — Z86.79 PERSONAL HISTORY OF OTHER DISEASES OF THE CIRCULATORY SYSTEM: ICD-10-CM

## 2020-08-20 PROCEDURE — 93000 ELECTROCARDIOGRAM COMPLETE: CPT

## 2020-08-20 PROCEDURE — 93925 LOWER EXTREMITY STUDY: CPT | Mod: 26

## 2020-08-20 PROCEDURE — 93880 EXTRACRANIAL BILAT STUDY: CPT | Mod: 26

## 2020-08-20 PROCEDURE — 93925 LOWER EXTREMITY STUDY: CPT

## 2020-08-20 PROCEDURE — 99205 OFFICE O/P NEW HI 60 MIN: CPT | Mod: 25

## 2020-08-20 PROCEDURE — 93880 EXTRACRANIAL BILAT STUDY: CPT

## 2020-12-15 ENCOUNTER — RX RENEWAL (OUTPATIENT)
Age: 85
End: 2020-12-15

## 2021-03-08 ENCOUNTER — RX RENEWAL (OUTPATIENT)
Age: 86
End: 2021-03-08

## 2021-05-10 ENCOUNTER — APPOINTMENT (OUTPATIENT)
Dept: CARDIOLOGY | Facility: CLINIC | Age: 86
End: 2021-05-10
Payer: MEDICARE

## 2021-05-10 ENCOUNTER — NON-APPOINTMENT (OUTPATIENT)
Age: 86
End: 2021-05-10

## 2021-05-10 VITALS
HEIGHT: 65 IN | SYSTOLIC BLOOD PRESSURE: 180 MMHG | OXYGEN SATURATION: 96 % | DIASTOLIC BLOOD PRESSURE: 65 MMHG | TEMPERATURE: 98.2 F | BODY MASS INDEX: 23.49 KG/M2 | HEART RATE: 66 BPM | RESPIRATION RATE: 12 BRPM | WEIGHT: 141 LBS

## 2021-05-10 VITALS — SYSTOLIC BLOOD PRESSURE: 136 MMHG | DIASTOLIC BLOOD PRESSURE: 74 MMHG

## 2021-05-10 DIAGNOSIS — I34.0 NONRHEUMATIC MITRAL (VALVE) INSUFFICIENCY: ICD-10-CM

## 2021-05-10 PROCEDURE — 99215 OFFICE O/P EST HI 40 MIN: CPT | Mod: 25

## 2021-05-10 PROCEDURE — 93000 ELECTROCARDIOGRAM COMPLETE: CPT

## 2021-05-11 LAB
ALBUMIN SERPL ELPH-MCNC: 4.6 G/DL
ALP BLD-CCNC: 219 U/L
ALT SERPL-CCNC: 15 U/L
ANION GAP SERPL CALC-SCNC: 13 MMOL/L
AST SERPL-CCNC: 20 U/L
BASOPHILS # BLD AUTO: 0.06 K/UL
BASOPHILS NFR BLD AUTO: 0.7 %
BILIRUB SERPL-MCNC: 0.2 MG/DL
BUN SERPL-MCNC: 46 MG/DL
CALCIUM SERPL-MCNC: 10.1 MG/DL
CHLORIDE SERPL-SCNC: 101 MMOL/L
CO2 SERPL-SCNC: 23 MMOL/L
CREAT SERPL-MCNC: 1.43 MG/DL
EOSINOPHIL # BLD AUTO: 0.23 K/UL
EOSINOPHIL NFR BLD AUTO: 2.6 %
GLUCOSE SERPL-MCNC: 141 MG/DL
HCT VFR BLD CALC: 38.8 %
HGB BLD-MCNC: 12.4 G/DL
IMM GRANULOCYTES NFR BLD AUTO: 0.4 %
LYMPHOCYTES # BLD AUTO: 1.38 K/UL
LYMPHOCYTES NFR BLD AUTO: 15.4 %
MAN DIFF?: NORMAL
MCHC RBC-ENTMCNC: 29.4 PG
MCHC RBC-ENTMCNC: 32 GM/DL
MCV RBC AUTO: 91.9 FL
MONOCYTES # BLD AUTO: 0.71 K/UL
MONOCYTES NFR BLD AUTO: 7.9 %
NEUTROPHILS # BLD AUTO: 6.53 K/UL
NEUTROPHILS NFR BLD AUTO: 73 %
PLATELET # BLD AUTO: 329 K/UL
POTASSIUM SERPL-SCNC: 4.8 MMOL/L
PROT SERPL-MCNC: 7.6 G/DL
RBC # BLD: 4.22 M/UL
RBC # FLD: 14.1 %
SODIUM SERPL-SCNC: 137 MMOL/L
WBC # FLD AUTO: 8.95 K/UL

## 2021-05-16 ENCOUNTER — NON-APPOINTMENT (OUTPATIENT)
Age: 86
End: 2021-05-16

## 2021-05-16 NOTE — DISCUSSION/SUMMARY
[FreeTextEntry1] : IMPRESSION: Ms. MUHAMMAD is a 89 year old woman with a history of coronary artery disease status post CABG 5/9/2002 (LIMA to LAD, SVG to the OM, and SVG to the PDA, peripheral vascular disease status post stents to the left leg complicated by compartment syndrome in 2010, carotid artery stenosis status post left carotid endarterectomy, HTN, chronic renal insufficiency, diabetes mellitus, and dyslipidemia who presents today for follow up of coronary artery disease and abnormal ECG. \par \par PLAN:\par 1. She will continue on ASA 81 mg daily and Plavix 75 mg daily given her coronary artery disease, peripheral vascular disease and carotid disease. She never had a nuclear stress test given her rhythm disorder.\par 2. Her ECG today reveals sinus rhythm with a first degree AV block and a right bundle branch block. Her ZIO patch revealed sinus bradycardia with episodes of second degree AV block (Type 1). This is one reason why she did not have a pharmacologic nuclear stress test\par 3. I have asked her to schedule an echocardiogram to follow up her mitral regurgitation. \par 4. Her blood pressure is acceptable at this time, thus she will continue on Amlodipine 10 mg daily, Lasix 20 mg daily, and Toprol  mg daily.  \par 5. Her son is concerned about his mom's pruritus. I have referred her to Dermatology for her itchyness. If her pain persists, she will consider starting on Gabapentin 100 mg twice daily, which she tolerated before. \par 6. She will follow up with me in 3 months or sooner should she experience any new or worsening symptoms in the interim. I do not think that her rash is secondary to Metoprolol.\par 7. I spent approximately 45 minutes with the patient and her son discussing her abnormal ECG, including documentation.

## 2021-05-16 NOTE — HISTORY OF PRESENT ILLNESS
[FreeTextEntry1] : Patient is a 89 year old woman with a history of coronary artery disease status post CABG 5/9/2002 (LIMA to LAD, SVG to the OM, and SVG to the PDA, peripheral vascular disease status post stents to the left leg complicated by compartment syndrome in 2010, carotid artery stenosis status post left carotid endarterectomy, HTN, chronic renal insufficiency, diabetes mellitus, and dyslipidemia who presents today for follow up of coronary artery disease and her abnormal ECG. She never had hip surgery given her abnormal ECG. She states that she reacted to Cymbalta as she started to not feel well. She was started on Cymbalta given her hip pain. She states that she experienced shortness of breath, abdominal pain, constipation, and diaphoresis shortly after starting on Cymbalta. She states that has also been itchy since starting on Cymbalta with associated chills and shakes. She states that she stopped taking all of her pain medications on 4/26 and was told to follow up with Cardiology. She otherwise denies any chest pain, dyspnea at rest, palpitations, headaches, dizziness, and syncope. She continues to experience hip pain.

## 2021-05-16 NOTE — CARDIOLOGY SUMMARY
[LVEF ___%] : LVEF [unfilled]% [___] : [unfilled] [None] : no pulmonary hypertension [Moderate] : moderate mitral regurgitation [___] : [unfilled] [de-identified] : 5/10/2021: Sinus Rhythm at 67 beats per minute with a first degree AV block, right bundle branch block, and nonspecific T wave abnormality.

## 2021-05-16 NOTE — PHYSICAL EXAM
[Normal Rate] : normal [Rhythm Regular] : regular [Normal S1] : normal S1 [Normal S2] : normal S2 [II] : a grade 2 [No Pitting Edema] : no pitting edema present [Well Developed] : well developed [Well Nourished] : well nourished [No Acute Distress] : no acute distress [Normal Conjunctiva] : normal conjunctiva [No Carotid Bruit] : no carotid bruit [Clear Lung Fields] : clear lung fields [Good Air Entry] : good air entry [No Respiratory Distress] : no respiratory distress  [Soft] : abdomen soft [Non Tender] : non-tender [Normal Bowel Sounds] : normal bowel sounds [No Edema] : no edema [Moves all extremities] : moves all extremities [Normal Speech] : normal speech [Alert and Oriented] : alert and oriented [Normal memory] : normal memory [S3] : no S3 [Right Carotid Bruit] : no bruit heard over the right carotid [Left Carotid Bruit] : no bruit heard over the left carotid [Bruit] : no bruit heard [de-identified] : Extraocular muscles intact. Anicteric sclerae. [de-identified] : She was wearing a face mask during the examination.  [de-identified] : Her gait was slow and she was in a wheelchair during the examination. [de-identified] : No visible ulcers. No significant rash was appreciated on exam.

## 2021-05-16 NOTE — CARDIOLOGY SUMMARY
[LVEF ___%] : LVEF [unfilled]% [___] : [unfilled] [None] : no pulmonary hypertension [Moderate] : moderate mitral regurgitation

## 2021-05-16 NOTE — DISCUSSION/SUMMARY
[FreeTextEntry1] : IMPRESSION: Ms. MUHAMMAD is a 89 year old woman with a history of coronary artery disease status post CABG 5/9/2002 (LIMA to LAD, SVG to the OM, and SVG to the PDA, peripheral vascular disease status post stents to the left leg complicated by compartment syndrome in 2010, carotid artery stenosis status post left carotid endarterectomy, HTN, chronic renal insufficiency, diabetes mellitus, and dyslipidemia who presents today for evaluation of coronary artery disease and risk stratification prior to possible hip surgery. \par \par PLAN:\par 1. She will continue on ASA 81 mg daily and Plavix 75 mg daily given her coronary artery disease, peripheral vascular disease and carotid disease.\par 2. Her ECG is abnormal and her heart rhythm appears to be junctional. I have placed a 2 day ZIO patch to evaluate her rhythm disorder and to evaluate for any episodes of profound bradycardia, paroxysmal atrial fibrillation, and for recurrent junctional rhythm. She will need a nuclear stress test given her coronary artery disease and since she is contemplating hip surgery. She will not be able to exercise, however, I am hesitant to schedule her for a pharmacologic nuclear stress test given her rhythm disorder. Will re-evaluate after I have the results of her ZIO patch.\par 3. She should have a repeat echocardiogram in about 6 months to follow up her mitral regurgitation.\par 4. Her blood pressure is elevated, however, she has not taken all of her medications yet today and she is nervous. I have asked her son to follow her blood pressure at home and to notify me should her blood pressure be elevated at home. For now, she will continue on Amlodipine 10 mg daily, Lasix 20 mg daily, and Toprol  mg daily. Depending on the results of her ZIO patch, we may need to adjust the dose of Metoprolol. \par 5. She will follow up with me after her cardiac tests have been performed so that I can further risk stratify her prior to hip surgery.\par 6. I spent approximately 65 minutes with the patient and her son and more than 50% of the time was spent discussing her coronary artery disease and rhythm disorder.

## 2021-05-16 NOTE — HISTORY OF PRESENT ILLNESS
[FreeTextEntry1] : Patient is a 89 year old woman with a history of coronary artery disease status post CABG 5/9/2002 (LIMA to LAD, SVG to the OM, and SVG to the PDA, peripheral vascular disease status post stents to the left leg complicated by compartment syndrome in 2010, carotid artery stenosis status post left carotid endarterectomy, HTN, chronic renal insufficiency, diabetes mellitus, and dyslipidemia who presents today for evaluation of coronary artery disease and risk stratification prior to possible hip surgery. She states that she had swelling of her legs in January with associated dyspnea and was diagnosed with diastolic heart failure. She denies any chest pain, dyspnea at rest, palpitations, headaches, dizziness, and syncope. She states that her major limitation at this time is right hip pain which is why she is planning possible surgery.

## 2021-05-16 NOTE — PHYSICAL EXAM
[General Appearance - Well Developed] : well developed [Normal Appearance] : normal appearance [Well Groomed] : well groomed [General Appearance - Well Nourished] : well nourished [No Deformities] : no deformities [General Appearance - In No Acute Distress] : no acute distress [Normal Conjunctiva] : the conjunctiva exhibited no abnormalities [Normal Oral Mucosa] : normal oral mucosa [No Oral Pallor] : no oral pallor [No Oral Cyanosis] : no oral cyanosis [Normal Jugular Venous A Waves Present] : normal jugular venous A waves present [Normal Jugular Venous V Waves Present] : normal jugular venous V waves present [No Jugular Venous John A Waves] : no jugular venous john A waves [Bradycardia] : bradycardic [Premature Beats] : regular with premature beats [Normal S1] : normal S1 [Normal S2] : normal S2 [I] : a grade 1 [No Pitting Edema] : no pitting edema present [Respiration, Rhythm And Depth] : normal respiratory rhythm and effort [Exaggerated Use Of Accessory Muscles For Inspiration] : no accessory muscle use [Auscultation Breath Sounds / Voice Sounds] : lungs were clear to auscultation bilaterally [Bowel Sounds] : normal bowel sounds [Abdomen Soft] : soft [Abdomen Tenderness] : non-tender [Nail Clubbing] : no clubbing of the fingernails [Cyanosis, Localized] : no localized cyanosis [Petechial Hemorrhages (___cm)] : no petechial hemorrhages [Skin Color & Pigmentation] : normal skin color and pigmentation [] : no rash [No Skin Ulcers] : no skin ulcer [Oriented To Time, Place, And Person] : oriented to person, place, and time [Affect] : the affect was normal [Mood] : the mood was normal [No Anxiety] : not feeling anxious [Bruit] : no bruit heard [FreeTextEntry1] : Her gait is slow and hindered by her hip pain.

## 2021-06-07 ENCOUNTER — RX RENEWAL (OUTPATIENT)
Age: 86
End: 2021-06-07

## 2021-10-15 NOTE — PHYSICAL THERAPY INITIAL EVALUATION ADULT - BED MOBILITY TRAINING, PT EVAL
-- DO NOT REPLY / DO NOT REPLY ALL --  -- Message is from the [facility]--    General Patient Message      Reason for Call: Patient called acc because she injured her knee that she is scheduled to have surgery on in three weeks. Patient has reached out to heard surgeon and is waiting to hear back. Patient is unable to ambulate because of the pain and is wondering if Dr. Hays can write her a note excusing her from work. Please call the patient to advise further. Thanks [facility] RN VITOR    Caller Information       Type Contact Phone    10/11/2021 08:29 AM CDT Phone (Incoming) Katherine Duran (Self) [phone] (M)          Alternative phone number: [phone]     Turnaround time given to caller:   \"This message will be sent to [state Provider's name]. The clinical team will fulfill your request as soon as they review your message.\"    
-- DO NOT REPLY / DO NOT REPLY ALL --  -- Message is from the [facility]--    Transfer to RN      Chief Complaint:  Patient thinks she pulled a muscle on her left leg, made appt for 10/13/21, however she wants to speak with a RN    Caller Information       Type Contact Phone    10/11/2021 08:29 AM CDT Phone (Incoming) Katherine Duran (Self) [phone] (T)          Provider Name:  Dr. Juan ALMANZAR Practice Site Name:  [facility]    Alternative Phone Number:      
Patient seen by Dr. Gabriel 10/13/21.  
GOAL: Patient will perform bed mobility independently, in 4 weeks.

## 2021-11-24 ENCOUNTER — RX RENEWAL (OUTPATIENT)
Age: 86
End: 2021-11-24

## 2021-12-07 ENCOUNTER — INPATIENT (INPATIENT)
Facility: HOSPITAL | Age: 86
LOS: 5 days | Discharge: HOME HEALTH | DRG: 378 | End: 2021-12-13
Attending: INTERNAL MEDICINE | Admitting: INTERNAL MEDICINE
Payer: MEDICARE

## 2021-12-07 VITALS
RESPIRATION RATE: 16 BRPM | OXYGEN SATURATION: 97 % | HEIGHT: 65 IN | DIASTOLIC BLOOD PRESSURE: 61 MMHG | HEART RATE: 69 BPM | WEIGHT: 145.06 LBS | SYSTOLIC BLOOD PRESSURE: 124 MMHG | TEMPERATURE: 98 F

## 2021-12-07 DIAGNOSIS — K92.2 GASTROINTESTINAL HEMORRHAGE, UNSPECIFIED: ICD-10-CM

## 2021-12-07 LAB
ALBUMIN SERPL ELPH-MCNC: 3.3 G/DL — SIGNIFICANT CHANGE UP (ref 3.3–5)
ALP SERPL-CCNC: 195 U/L — HIGH (ref 40–120)
ALT FLD-CCNC: 10 U/L — SIGNIFICANT CHANGE UP (ref 10–45)
ANION GAP SERPL CALC-SCNC: 13 MMOL/L — SIGNIFICANT CHANGE UP (ref 5–17)
APTT BLD: 34.3 SEC — SIGNIFICANT CHANGE UP (ref 27.5–35.5)
AST SERPL-CCNC: 15 U/L — SIGNIFICANT CHANGE UP (ref 10–40)
BASOPHILS # BLD AUTO: 0.07 K/UL — SIGNIFICANT CHANGE UP (ref 0–0.2)
BASOPHILS NFR BLD AUTO: 0.6 % — SIGNIFICANT CHANGE UP (ref 0–2)
BILIRUB SERPL-MCNC: 0.2 MG/DL — SIGNIFICANT CHANGE UP (ref 0.2–1.2)
BLD GP AB SCN SERPL QL: NEGATIVE — SIGNIFICANT CHANGE UP
BUN SERPL-MCNC: 40 MG/DL — HIGH (ref 7–23)
CALCIUM SERPL-MCNC: 8.6 MG/DL — SIGNIFICANT CHANGE UP (ref 8.4–10.5)
CHLORIDE SERPL-SCNC: 105 MMOL/L — SIGNIFICANT CHANGE UP (ref 96–108)
CO2 SERPL-SCNC: 18 MMOL/L — LOW (ref 22–31)
CREAT SERPL-MCNC: 1.64 MG/DL — HIGH (ref 0.5–1.3)
EOSINOPHIL # BLD AUTO: 0.17 K/UL — SIGNIFICANT CHANGE UP (ref 0–0.5)
EOSINOPHIL NFR BLD AUTO: 1.5 % — SIGNIFICANT CHANGE UP (ref 0–6)
GLUCOSE BLDC GLUCOMTR-MCNC: 266 MG/DL — HIGH (ref 70–99)
GLUCOSE SERPL-MCNC: 304 MG/DL — HIGH (ref 70–99)
HCT VFR BLD CALC: 24.7 % — LOW (ref 34.5–45)
HGB BLD-MCNC: 7.4 G/DL — LOW (ref 11.5–15.5)
IMM GRANULOCYTES NFR BLD AUTO: 0.5 % — SIGNIFICANT CHANGE UP (ref 0–1.5)
INR BLD: 1.37 RATIO — HIGH (ref 0.88–1.16)
LYMPHOCYTES # BLD AUTO: 1.44 K/UL — SIGNIFICANT CHANGE UP (ref 1–3.3)
LYMPHOCYTES # BLD AUTO: 12.7 % — LOW (ref 13–44)
MCHC RBC-ENTMCNC: 26.6 PG — LOW (ref 27–34)
MCHC RBC-ENTMCNC: 30 GM/DL — LOW (ref 32–36)
MCV RBC AUTO: 88.8 FL — SIGNIFICANT CHANGE UP (ref 80–100)
MONOCYTES # BLD AUTO: 0.88 K/UL — SIGNIFICANT CHANGE UP (ref 0–0.9)
MONOCYTES NFR BLD AUTO: 7.8 % — SIGNIFICANT CHANGE UP (ref 2–14)
NEUTROPHILS # BLD AUTO: 8.71 K/UL — HIGH (ref 1.8–7.4)
NEUTROPHILS NFR BLD AUTO: 76.9 % — SIGNIFICANT CHANGE UP (ref 43–77)
NRBC # BLD: 0 /100 WBCS — SIGNIFICANT CHANGE UP (ref 0–0)
PLATELET # BLD AUTO: 308 K/UL — SIGNIFICANT CHANGE UP (ref 150–400)
POTASSIUM SERPL-MCNC: 4.3 MMOL/L — SIGNIFICANT CHANGE UP (ref 3.5–5.3)
POTASSIUM SERPL-SCNC: 4.3 MMOL/L — SIGNIFICANT CHANGE UP (ref 3.5–5.3)
PROT SERPL-MCNC: 6 G/DL — SIGNIFICANT CHANGE UP (ref 6–8.3)
PROTHROM AB SERPL-ACNC: 16.2 SEC — HIGH (ref 10.6–13.6)
RBC # BLD: 2.78 M/UL — LOW (ref 3.8–5.2)
RBC # FLD: 15.3 % — HIGH (ref 10.3–14.5)
RH IG SCN BLD-IMP: POSITIVE — SIGNIFICANT CHANGE UP
SODIUM SERPL-SCNC: 136 MMOL/L — SIGNIFICANT CHANGE UP (ref 135–145)
WBC # BLD: 11.33 K/UL — HIGH (ref 3.8–10.5)
WBC # FLD AUTO: 11.33 K/UL — HIGH (ref 3.8–10.5)

## 2021-12-07 PROCEDURE — G1004: CPT

## 2021-12-07 PROCEDURE — 74176 CT ABD & PELVIS W/O CONTRAST: CPT | Mod: 26,MG

## 2021-12-07 PROCEDURE — 71045 X-RAY EXAM CHEST 1 VIEW: CPT | Mod: 26

## 2021-12-07 PROCEDURE — 93010 ELECTROCARDIOGRAM REPORT: CPT | Mod: GC

## 2021-12-07 PROCEDURE — 99291 CRITICAL CARE FIRST HOUR: CPT | Mod: 25,GC

## 2021-12-07 RX ORDER — PANTOPRAZOLE SODIUM 20 MG/1
80 TABLET, DELAYED RELEASE ORAL ONCE
Refills: 0 | Status: COMPLETED | OUTPATIENT
Start: 2021-12-07 | End: 2021-12-07

## 2021-12-07 RX ADMIN — PANTOPRAZOLE SODIUM 80 MILLIGRAM(S): 20 TABLET, DELAYED RELEASE ORAL at 22:07

## 2021-12-07 NOTE — ED PROVIDER NOTE - ATTENDING CONTRIBUTION TO CARE
89 F w/ pmh of htn, hld, DM, PVD on plavix and ASA, presents to the   hx of transfusion at [facility] at 2018, hx of h pylori, gastroenterologist DR Rosales   pts son states that he was having  [phone]    rectal exam w/ BRBPR 89 F w/ pmh of htn, hld, DM, PVD on plavix and ASA, presents to the er w/ GI bleed, pt had normal brown stool on monday, today she had bright red blood per rectum x4 times. 5th bowel movement today was witnessed by us in the ER w/ multiple golfball sized clots. Pt eating and drinking, she has mild lightheadedness, denies falls, no fevers, no chills, no nausea no vomiting. She reports that she has had anemia in the past. Pt is accompanied by her son eugene who reports pt w/ a hx of transfusion at [facility] at 2018, hx of h pylori, gastroenterologist DR Rosales. Dr was called today by the son when pt had multiple bloody bowel movements.  referred pt to the ER. On exam, Const: no acute distress, Well-developed, Eyes: no conjunctival injection and no scleral icterus pale conjunctiva ENMT: dry mucus membranes, CVS: +S1/S2, radial/DP pulse 2+ bilaterally RESP: Unlabored respiratory effort, Clear to auscultation bilaterally GI: Nontender/Nondistended soft abdomen, no CVA tenderness MSK: Extremities w/o deformity or ttp, Psych: Awake, Alert, & Orientedx3;  Appropriate mood and affect, cooperative  PLan for labs offered a CTA to the pt and son, however, they declined given reduced GFR and concern that pt could be at greater risk for dialysis. Pt to get noncontrast abdomen given pain in the abdomen. Plan for admission to medicine, consented for 2U of prbc, pt is not hypotensive nor tachycardic, baseline hgb in the 12s, pt is currently 7s. last plavix was this AM will hold medication, ppi given     EF 1/2020- normal LV funciton,

## 2021-12-07 NOTE — ED PROVIDER NOTE - NSICDXPASTMEDICALHX_GEN_ALL_CORE_FT
PAST MEDICAL HISTORY:  Diabetes     Essential hypertension, benign     Hypercholesteremia     Peripheral vascular disease

## 2021-12-07 NOTE — ED ADULT NURSE NOTE - NSIMPLEMENTINTERV_GEN_ALL_ED
Implemented All Fall with Harm Risk Interventions:  [place] to call system. Call bell, personal items and telephone within reach. Instruct patient to call for assistance. Room bathroom lighting operational. Non-slip footwear when patient is off stretcher. Physically safe environment: no spills, clutter or unnecessary equipment. Stretcher in lowest position, wheels locked, appropriate side rails in place. Provide visual cue, wrist band, yellow gown, etc. Monitor gait and stability. Monitor for mental status changes and reorient to person, place, and time. Review medications for side effects contributing to fall risk. Reinforce activity limits and safety measures with patient and family. Provide visual clues: red socks.

## 2021-12-07 NOTE — ED PROVIDER NOTE - PHYSICAL EXAMINATION
Gen: AAOx3, non-toxic  Head: NCAT  HEENT: EOMI, oral mucosa moist, normal conjunctiva  Lung: CTAB, no respiratory distress, no wheezes/rhonchi/rales B/L, speaking in full sentences  CV: RRR, no murmurs, rubs or gallops  Abd: LLQ ttp,  no guarding, no CVA tenderness  Rectal: Blood clots and christopher blood in rectum.   MSK: no visible deformities  Neuro: No focal sensory or motor deficits, normal CN exam   Skin: Warm, well perfused, no rash  Psych: normal affect.

## 2021-12-07 NOTE — CONSULT NOTE ADULT - ASSESSMENT
Patient is a 89 year old female with h/o CAD s/p CABG, HTN, HLD, PVD s/p stents in bilateral legs, CKD (Baseline Cr reportedly 1.2), DMT2 (A1c 5.9) presenting with 4 episodes of BRBPR this morning likely 2/2 diverticular bleed.    # GIB likely 2/2 diverticulitis  - f/u official read of CT a/p  - recommend obtaining CT angiogram and GI consult for colonoscopy/endoscopy  - monitor H/H and keep active T&S  - Transfuse if actively bleeding and Hgb < 8    # Infection  - Patient is hypothermic requiring bear hugger  - Monitor fever curve  - Consider antibiotics  - f/u blood cultures Patient is a 89 year old female with h/o CAD s/p CABG, HTN, HLD, PVD s/p stents in bilateral legs, CKD (Baseline Cr reportedly 1.2), DMT2 (A1c 5.9) presenting with 4 episodes of BRBPR this morning likely 2/2 diverticular bleed.    # GIB likely 2/2 diverticulitis  - f/u official read of CT a/p  - recommend obtaining CT angiogram and GI consult for colonoscopy/endoscopy  - monitor H/H and keep active T&S  - pantoprazole 40 mg BID IV  - Transfuse if actively bleeding and Hgb < 8      Patient is not a MICU candidate at this time. Please feel free to reconsult as needed.    ***************************************************************  Paige Pelaez, PGY2  Internal Medicine   pager: NS: [phone] [facility]: [phone]  ***************************************************************   Patient is a 89 year old female with h/o CAD s/p CABG, HTN, HLD, PVD s/p stents in bilateral legs, CKD (Baseline Cr reportedly 1.2), DMT2 (A1c 5.9) presenting with 4 episodes of BRBPR this morning likely 2/2 diverticular bleed.    # GIB likely 2/2 diverticulitis  - s/p 1U pRBC in the ED  - f/u official read of CT a/p  - recommend obtaining CT angiogram and GI consult for colonoscopy/endoscopy  - monitor H/H and keep active T&S  - pantoprazole 40 mg BID IV  - Transfuse if actively bleeding and Hgb < 8      Patient is not a MICU candidate at this time. Please feel free to reconsult as needed.    ***************************************************************  Paige Pelaez, PGY2  Internal Medicine   pager: NS: [phone] [facility]: [phone]  ***************************************************************

## 2021-12-07 NOTE — ED PROVIDER NOTE - NOTES
spoke w/ icu regarding pt near syncopal episode s/p CT scan, blood currently at bedside, pt to get 2 U on repeat eval pt w/ small bloody bowel movement.

## 2021-12-07 NOTE — CONSULT NOTE ADULT - SUBJECTIVE AND OBJECTIVE BOX
***************************************************************  Paige Pelaez, PGY2  Internal Medicine   pager: NS: [phone] [facility]: [phone]  ***************************************************************    CHIEF COMPLAINT:    HPI:  Patient is a 89 year old female with h/o CAD s/p CABG, HTN, HLD, PVD s/p stents in bilateral legs, CKD (Baseline Cr reportedly 1.2), DMT2 (A1c 5.9) presenting with 4 episodes of BRBPR this morning before coming to the hospital, per her son. Her last blood transfusion was in 2018 when she had a gastric ulcer 2/2 H pylori. She also has known diverticulosis. Since arriving in the hospital, she has had minimal bleeding. Patient felt dizzy and weak, and denied abdominal pain, chest pain, SOB, urinary symptoms.    In the ED, patient was hypothermic with temp 96.4F and was placed on a bear hugger. She became hypotensive with MAP 60s, and was in atrial fibrillation HR 60s. She also required 2L NC and is on no home O2. Hgb 7.4 with baseline 13s. She was admitted to Dr. Andrade, who requested a MICU consult, and was getting 2 U pRBC with improvement in her dizziness and weakness and had no active signs of bleeding. CT a/p was taken pending official read.     PAST MEDICAL & SURGICAL HISTORY:  Essential hypertension, benign    Hypercholesteremia    Peripheral vascular disease    Diabetes    S/P carotid endarterectomy    S/P coronary artery by pass    History of cholecystectomy    S/P vascular surgery        FAMILY HISTORY:  No pertinent family history in first degree relatives        SOCIAL HISTORY:  Smoking: __ packs x ___ years  EtOH Use:  Marital Status:  Occupation:  Recent Travel:  Country of Birth:  Advance Directives:    Allergies    No Known Allergies    Intolerances        HOME MEDICATIONS:    REVIEW OF SYSTEMS:  CONSTITUTIONAL: No fever, weight loss, or fatigue  EYES: No eye pain, visual disturbances, or discharge  ENT:  No difficulty hearing, tinnitus, vertigo; No sinus or throat pain  NECK: No pain or stiffness  BREASTS: No pain, masses, or nipple discharge  RESPIRATORY: No cough, wheezing, chills or hemoptysis; No shortness of breath  CARDIOVASCULAR: No chest pain, palpitations, dizziness, or leg swelling  GASTROINTESTINAL: No abdominal or epigastric pain. No nausea, vomiting, or hematemesis; No diarrhea or constipation. No melena or hematochezia.  GENITOURINARY: No dysuria, frequency, hematuria, or incontinence  NEUROLOGICAL: No headaches, memory loss, loss of strength, numbness, or tremors  SKIN: No itching, burning, rashes, or lesions   LYMPH NODES: No enlarged glands  ENDOCRINE: No heat or cold intolerance; No hair loss  MUSCULOSKELETAL: No joint pain or swelling; No muscle, back, or extremity pain  PSYCHIATRIC: No depression, anxiety, mood swings, or difficulty sleeping  HEME/LYMPH: No easy bruising, or bleeding gums  ALLERGY AND IMMUNOLOGIC: No hives or eczema    OBJECTIVE:  ICU Vital Signs Last 24 Hrs  T(C): 35.8 (07 Dec 2021 23:40), Max: 36.7 (07 Dec 2021 20:51)  T(F): 96.4 (07 Dec 2021 23:40), Max: 98 (07 Dec 2021 20:51)  HR: 57 (07 Dec 2021 23:40) (56 - 70)  BP: 151/57 (07 Dec 2021 23:40) (124/61 - 155/49)  BP(mean): 83 (07 Dec 2021 23:40) (73 - 83)  ABP: --  ABP(mean): --  RR: 20 (07 Dec 2021 23:40) (15 - 22)  SpO2: 100% (07 Dec 2021 23:40) (92% - 100%)        CAPILLARY BLOOD GLUCOSE      POCT Blood Glucose.: 266 mg/dL (07 Dec 2021 23:28)      PHYSICAL EXAM:  GENERAL: NAD, well-groomed, well-developed  HEAD:  Atraumatic, Normocephalic  EYES: EOMI, PERRLA, conjunctiva and sclera clear  ENMT: No tonsillar erythema, exudates, or enlargement; Moist mucous membranes, Good dentition, No lesions  NECK: Supple, No JVD, Normal thyroid  NERVOUS SYSTEM:  Alert & Oriented X3, Good concentration; Motor Strength 5/5 B/L upper and lower extremities; DTRs 2+ intact and symmetric  CHEST/LUNG: Clear to percussion bilaterally; No rales, rhonchi, wheezing, or rubs  HEART: Regular rate and rhythm; No murmurs, rubs, or gallops  ABDOMEN: Soft, Nontender, Nondistended; Bowel sounds present  EXTREMITIES:  2+ Peripheral Pulses, No clubbing, cyanosis, or edema  LYMPH: No lymphadenopathy noted  SKIN: No rashes or lesions    HOSPITAL MEDICATIONS:  MEDICATIONS  (STANDING):    MEDICATIONS  (PRN):      LABS:                        7.4    11.33 )-----------( 308      ( 07 Dec 2021 21:19 )             24.7     12-07    136  |  105  |  40<H>  ----------------------------<  304<H>  4.3   |  18<L>  |  1.64<H>    Ca    8.6      07 Dec 2021 21:19    TPro  6.0  /  Alb  3.3  /  TBili  0.2  /  DBili  x   /  AST  15  /  ALT  10  /  AlkPhos  195<H>  12-07    PT/INR - ( 07 Dec 2021 21:19 )   PT: 16.2 sec;   INR: 1.37 ratio         PTT - ( 07 Dec 2021 21:19 )  PTT:34.3 sec          MICROBIOLOGY:     RADIOLOGY:  [ ] Reviewed and interpreted by me    EKG:       ***************************************************************  Paige Pelaez, PGY2  Internal Medicine   pager: NS: [phone] [facility]: [phone]  ***************************************************************    CHIEF COMPLAINT:    HPI:  Patient is a 89 year old female with h/o CAD s/p CABG, HTN, HLD, PVD s/p stents in bilateral legs, CKD (Baseline Cr reportedly 1.2), DMT2 (A1c 5.9) presenting with 4 episodes of BRBPR this morning before coming to the hospital, per her son. Her last blood transfusion was in 2018 when she had a gastric ulcer 2/2 H pylori. She also has known diverticulosis. Since arriving in the hospital, she has had minimal bleeding. Patient felt dizzy and weak, and denied abdominal pain, chest pain, SOB, urinary symptoms.    In the ED, patient was hypothermic with temp 96.4F and was placed on a bear hugger. She became hypotensive with MAP 60s, and was in atrial fibrillation HR 60s. She also required 2L NC and is on no home O2. Hgb 7.4 with baseline 13s. She was admitted to Dr. Andrade, who requested a MICU consult, and was getting 2 U pRBC with improvement in her dizziness and weakness and had no active signs of bleeding. CT a/p was taken pending official read.     PAST MEDICAL & SURGICAL HISTORY:  Essential hypertension, benign    Hypercholesteremia    Peripheral vascular disease    Diabetes    S/P carotid endarterectomy    S/P coronary artery by pass    History of cholecystectomy    S/P vascular surgery        FAMILY HISTORY:  No pertinent family history in first degree relatives        SOCIAL HISTORY:  Smoking: __ packs x ___ years  EtOH Use:  Marital Status:  Occupation:  Recent Travel:  Country of Birth:  Advance Directives:    Allergies    No Known Allergies    Intolerances        HOME MEDICATIONS:    REVIEW OF SYSTEMS:  CONSTITUTIONAL: No fever, weight loss, (+) fatigue, (+) weakness, (+) dizziness  EYES: No eye pain, visual disturbances, or discharge  ENT:  No difficulty hearing, tinnitus, vertigo; No sinus or throat pain  NECK: No pain or stiffness  BREASTS: No pain, masses, or nipple discharge  RESPIRATORY: No cough, wheezing, chills or hemoptysis; No shortness of breath  CARDIOVASCULAR: No chest pain, palpitations, dizziness, or leg swelling  GASTROINTESTINAL: No abdominal or epigastric pain. No nausea, vomiting, or hematemesis; No diarrhea or constipation. No melena (+) hematochezia.  GENITOURINARY: No dysuria, frequency, hematuria, or incontinence  NEUROLOGICAL: No headaches, memory loss, loss of strength, numbness, or tremors  SKIN: No itching, burning, rashes, or lesions   LYMPH NODES: No enlarged glands  ENDOCRINE: No heat or cold intolerance; No hair loss  MUSCULOSKELETAL: No joint pain or swelling; No muscle, back, or extremity pain  PSYCHIATRIC: No depression, anxiety, mood swings, or difficulty sleeping  HEME/LYMPH: No easy bruising, or bleeding gums  ALLERGY AND IMMUNOLOGIC: No hives or eczema    OBJECTIVE:  ICU Vital Signs Last 24 Hrs  T(C): 35.8 (07 Dec 2021 23:40), Max: 36.7 (07 Dec 2021 20:51)  T(F): 96.4 (07 Dec 2021 23:40), Max: 98 (07 Dec 2021 20:51)  HR: 57 (07 Dec 2021 23:40) (56 - 70)  BP: 151/57 (07 Dec 2021 23:40) (124/61 - 155/49)  BP(mean): 83 (07 Dec 2021 23:40) (73 - 83)  ABP: --  ABP(mean): --  RR: 20 (07 Dec 2021 23:40) (15 - 22)  SpO2: 100% (07 Dec 2021 23:40) (92% - 100%)        CAPILLARY BLOOD GLUCOSE      POCT Blood Glucose.: 266 mg/dL (07 Dec 2021 23:28)      PHYSICAL EXAM:  GENERAL: NAD, well-groomed, well-developed  HEAD:  Atraumatic, Normocephalic  EYES: EOMI, PERRLA, conjunctiva and sclera clear  ENMT: No tonsillar erythema, exudates, or enlargement; Moist mucous membranes, Good dentition, No lesions  NECK: Supple, No JVD, Normal thyroid  NERVOUS SYSTEM:  Alert & Oriented X3, Good concentration; Motor Strength 5/5 B/L upper and lower extremities; DTRs 2+ intact and symmetric  CHEST/LUNG: Clear to percussion bilaterally; No rales, rhonchi, wheezing, or rubs  HEART: Regular rate and rhythm; No murmurs, rubs, or gallops  ABDOMEN: Soft, (+) distended, not TTP; Bowel sounds present  EXTREMITIES:  2+ Peripheral Pulses, No clubbing, cyanosis, or edema  LYMPH: No lymphadenopathy noted  SKIN: No rashes or lesions    HOSPITAL MEDICATIONS:  MEDICATIONS  (STANDING):    MEDICATIONS  (PRN):      LABS:                        7.4    11.33 )-----------( 308      ( 07 Dec 2021 21:19 )             24.7     12-07    136  |  105  |  40<H>  ----------------------------<  304<H>  4.3   |  18<L>  |  1.64<H>    Ca    8.6      07 Dec 2021 21:19    TPro  6.0  /  Alb  3.3  /  TBili  0.2  /  DBili  x   /  AST  15  /  ALT  10  /  AlkPhos  195<H>  12-07    PT/INR - ( 07 Dec 2021 21:19 )   PT: 16.2 sec;   INR: 1.37 ratio         PTT - ( 07 Dec 2021 21:19 )  PTT:34.3 sec          MICROBIOLOGY:     RADIOLOGY:  [ ] Reviewed and interpreted by me    EKG:       ***************************************************************  Paige Pelaez, PGY2  Internal Medicine   pager: NS: [phone] [facility]: [phone]  ***************************************************************    CHIEF COMPLAINT:    HPI:  Patient is a 89 year old female with h/o CAD s/p CABG, HTN, HLD, PVD s/p stents in bilateral legs, CKD (Baseline Cr reportedly 1.2), DMT2 (A1c 5.9) presenting with 4 episodes of BRBPR this morning before coming to the hospital, per her son. Her last blood transfusion was in 2018 when she had a gastric ulcer 2/2 H pylori. She also has known diverticulosis. Since arriving in the hospital, she has had minimal bleeding. Patient felt dizzy and weak, and denied abdominal pain, chest pain, SOB, urinary symptoms.    In the ED, patient was hypothermic with temp 96.4F and was placed on a bear hugger. She became hypotensive with MAP 60s, and was in atrial fibrillation HR 60s. She also required 2L NC and is on no home O2. Hgb 7.4 with baseline 13s. She was admitted to Dr. Andrade, who requested a MICU consult, and was getting 2 U pRBC with improvement in her dizziness and weakness and had no active signs of bleeding. CT a/p was taken and is pending the official read.     PAST MEDICAL & SURGICAL HISTORY:  Essential hypertension, benign    Hypercholesteremia    Peripheral vascular disease    Diabetes    S/P carotid endarterectomy    S/P coronary artery by pass    History of cholecystectomy    S/P vascular surgery        FAMILY HISTORY:  No pertinent family history in first degree relatives        SOCIAL HISTORY:  Smoking: __ packs x ___ years  EtOH Use:  Marital Status:  Occupation:  Recent Travel:  Country of Birth:  Advance Directives:    Allergies    No Known Allergies    Intolerances        HOME MEDICATIONS:    REVIEW OF SYSTEMS:  CONSTITUTIONAL: No fever, weight loss, (+) fatigue, (+) weakness, (+) dizziness  EYES: No eye pain, visual disturbances, or discharge  ENT:  No difficulty hearing, tinnitus, vertigo; No sinus or throat pain  NECK: No pain or stiffness  BREASTS: No pain, masses, or nipple discharge  RESPIRATORY: No cough, wheezing, chills or hemoptysis; No shortness of breath  CARDIOVASCULAR: No chest pain, palpitations, dizziness, or leg swelling  GASTROINTESTINAL: No abdominal or epigastric pain. No nausea, vomiting, or hematemesis; No diarrhea or constipation. No melena (+) hematochezia.  GENITOURINARY: No dysuria, frequency, hematuria, or incontinence  NEUROLOGICAL: No headaches, memory loss, loss of strength, numbness, or tremors  SKIN: No itching, burning, rashes, or lesions   LYMPH NODES: No enlarged glands  ENDOCRINE: No heat or cold intolerance; No hair loss  MUSCULOSKELETAL: No joint pain or swelling; No muscle, back, or extremity pain  PSYCHIATRIC: No depression, anxiety, mood swings, or difficulty sleeping  HEME/LYMPH: No easy bruising, or bleeding gums  ALLERGY AND IMMUNOLOGIC: No hives or eczema    OBJECTIVE:  ICU Vital Signs Last 24 Hrs  T(C): 35.8 (07 Dec 2021 23:40), Max: 36.7 (07 Dec 2021 20:51)  T(F): 96.4 (07 Dec 2021 23:40), Max: 98 (07 Dec 2021 20:51)  HR: 57 (07 Dec 2021 23:40) (56 - 70)  BP: 151/57 (07 Dec 2021 23:40) (124/61 - 155/49)  BP(mean): 83 (07 Dec 2021 23:40) (73 - 83)  ABP: --  ABP(mean): --  RR: 20 (07 Dec 2021 23:40) (15 - 22)  SpO2: 100% (07 Dec 2021 23:40) (92% - 100%)        CAPILLARY BLOOD GLUCOSE      POCT Blood Glucose.: 266 mg/dL (07 Dec 2021 23:28)      PHYSICAL EXAM:  GENERAL: NAD, well-groomed, well-developed  HEAD:  Atraumatic, Normocephalic  EYES: EOMI, PERRLA, conjunctiva and sclera clear  ENMT: No tonsillar erythema, exudates, or enlargement; Moist mucous membranes, Good dentition, No lesions  NECK: Supple, No JVD, Normal thyroid  NERVOUS SYSTEM:  Alert & Oriented X3, Good concentration; Motor Strength 5/5 B/L upper and lower extremities; DTRs 2+ intact and symmetric  CHEST/LUNG: Clear to percussion bilaterally; No rales, rhonchi, wheezing, or rubs  HEART: Regular rate and rhythm; No murmurs, rubs, or gallops  ABDOMEN: Soft, (+) distended, not TTP; Bowel sounds present  EXTREMITIES:  2+ Peripheral Pulses, No clubbing, cyanosis, or edema  LYMPH: No lymphadenopathy noted  SKIN: No rashes or lesions    HOSPITAL MEDICATIONS:  MEDICATIONS  (STANDING):    MEDICATIONS  (PRN):      LABS:                        7.4    11.33 )-----------( 308      ( 07 Dec 2021 21:19 )             24.7     12-07    136  |  105  |  40<H>  ----------------------------<  304<H>  4.3   |  18<L>  |  1.64<H>    Ca    8.6      07 Dec 2021 21:19    TPro  6.0  /  Alb  3.3  /  TBili  0.2  /  DBili  x   /  AST  15  /  ALT  10  /  AlkPhos  195<H>  12-07    PT/INR - ( 07 Dec 2021 21:19 )   PT: 16.2 sec;   INR: 1.37 ratio         PTT - ( 07 Dec 2021 21:19 )  PTT:34.3 sec          MICROBIOLOGY:     RADIOLOGY:  [ ] Reviewed and interpreted by me    EKG:

## 2021-12-07 NOTE — ED ADULT NURSE REASSESSMENT NOTE - NS ED NURSE REASSESS COMMENT FT1
Unable to obtain an oral temperature at this time. Obtained a rectal temperature of 96.4F. MD Patel aware. Patient placed on a isidro hugger. Other VSS. Second unit of PRBC's initiated.

## 2021-12-07 NOTE — ED ADULT NURSE NOTE - OBJECTIVE STATEMENT
89 y/o female presents to ED c/o 4 episodes bright red bloody stools, on aspirin and Plavix. +dizziness, fatigue/lethargy, paleness. Has never had GI bleed like this in past but has had blood transfusion before for anemia with no transfusion reactions. PMH of HTN, PVD, DM, HLD. Denies chest pain, SOB, falls or trauma, n/v/d, fever, chills, sweats, palpitations, confusion, urinary symptoms. A&Ox4, lethargic, skin pale but dry and intact, abd soft and tender to LLQ, bright red blood and clot noted in diaper. Diaper changed and clean chucks placed under pt.

## 2021-12-07 NOTE — ED PROVIDER NOTE - PROGRESS NOTE DETAILS
case endorsed to Dr. Andrade pt anemic had 4-5 golfball sized blood clots w/ bright red blood from rectum, on repeat exam approx 1 hour later, no active bleeding discussion w/ Dr. Rosales, recommending possible CTA- informed family refused given known renal insufficiency, plan for admission to Dr. Andrade pt PCP w/ [facility]. Patient with near syncopal episode. Now, hypotensive in the 90s/60s. Blood transfusion started. Patient with small amount of blood per rectum. Dr. Andrade informed of this and requesting MICU consult.

## 2021-12-07 NOTE — ED ADULT TRIAGE NOTE - CHIEF COMPLAINT QUOTE
4 bright red bloody bowel movements today. patient is on Plavix. patient has been feeling lightheaded and dizzy.

## 2021-12-07 NOTE — ED PROVIDER NOTE - OBJECTIVE STATEMENT
89y F with pmhx of CAD s/p CABG, HTN, HLD, DM, PVD and pshx of cholecystectomy presents with BRBPR x4 times today. Reports weakness and lightheadedness. No chest pain or sob. Takes aspirin and plavix. Denies abdominal or vomiting. Unknown last colonoscopy. No hx of GI bleed..

## 2021-12-07 NOTE — ED PROVIDER NOTE - CLINICAL SUMMARY MEDICAL DECISION MAKING FREE TEXT BOX
89y F with pmhx of CAD s/p CABG, HTN, HLD, DM, PVD and pshx of cholecystectomy presents with BRBPR x4 times today. VS WNL. LLQ ttp. Red blood in rectum. Blood clots. Concerning for LGIB, likely diverticular bleed vs less likely massive upper GI bleed. Basics, T and S, transfuse as needed, and admit.

## 2021-12-07 NOTE — ED PROVIDER NOTE - NS ED ROS FT
GENERAL: No fever or chills  EYES: no change in vision  HEENT: no trouble swallowing or speaking  CARDIAC: no chest pain or palpitations   PULMONARY: no cough or SOB  GI: no abdominal pain, nausea, vomiting, diarrhea, or constipation. See hpi  : No changes in urination  SKIN: no rashes  NEURO: no headache, numbness, or weakness.  MSK: No joint pain

## 2021-12-07 NOTE — ED PROVIDER NOTE - RAPID ASSESSMENT
89y F with pmhx of HTN, HLD, DM, PVD and pshx of cholecystectomy p/w rectal bleeding. Pt reports x4 episodes BRBPR not a/w BMs.  Pt also endorses lethargy and generalized weakness. Pt on Plavix.    Patient was seen as a tele QDOC patient. The patient will be seen and further worked up in the main emergency department and their care will be completed by the main emergency department team along with a thorough physical exam. Receiving team will follow up on labs, analgesia, any clinical imaging, reassess and disposition as clinically indicated, all decisions regarding the progression of care will be made at their discretion.    Scribe Statement: Luis Enrique DUFFY, attest that this documentation has been prepared under the direction and in the presence of Jesse Suarez) 89y F with pmhx of HTN, HLD, DM, PVD and pshx of cholecystectomy p/w rectal bleeding. Pt reports x4 episodes BRBPR not a/w BMs.  Pt also endorses lethargy and generalized weakness. Pt on Plavix.    Patient was seen as a tele QDOC patient. The patient will be seen and further worked up in the main emergency department and their care will be completed by the main emergency department team along with a thorough physical exam. Receiving team will follow up on labs, analgesia, any clinical imaging, reassess and disposition as clinically indicated, all decisions regarding the progression of care will be made at their discretion.    Scribe Statement: I, Luis Enrique Byers, attest that this documentation has been prepared under the direction and in the presence of Jesse Suarez)    I, Dr. Suarez, personally performed the service described in the documentation recorded by the scribe in my presence, and it accurately and completely records my words and actions.

## 2021-12-07 NOTE — CONSULT NOTE ADULT - ATTENDING COMMENTS
89F Hx T2DM, CABG, PVD s/p Bilateral L.Ext Stents, CEA, CKD p/w BRBPR x 4 episodes with stable hemodynamic and Hb level   - A&O x 4 and answering questions appropriately   - NCO2 2Li  SpO2 99%   - Stable Vitals 88HR, /79mmHg   - Hb 7.1 with minimal symptom   - T&C s/p 1 PRBC empiric transfusion   - NPO and CTA pending GI Consult     Patient seen and examined with ICU Resident/Fellow at bedside after lab data, medical records and radiology reports reviewed. I have read and agreeable in general with resident's Documented Note, Assessment and Management Plan which reflected my opinions from bedside round and discussion.

## 2021-12-08 DIAGNOSIS — N18.9 CHRONIC KIDNEY DISEASE, UNSPECIFIED: ICD-10-CM

## 2021-12-08 DIAGNOSIS — K92.2 GASTROINTESTINAL HEMORRHAGE, UNSPECIFIED: ICD-10-CM

## 2021-12-08 DIAGNOSIS — I25.10 ATHEROSCLEROTIC HEART DISEASE OF NATIVE CORONARY ARTERY WITHOUT ANGINA PECTORIS: ICD-10-CM

## 2021-12-08 DIAGNOSIS — E11.9 TYPE 2 DIABETES MELLITUS WITHOUT COMPLICATIONS: ICD-10-CM

## 2021-12-08 DIAGNOSIS — I48.91 UNSPECIFIED ATRIAL FIBRILLATION: ICD-10-CM

## 2021-12-08 DIAGNOSIS — I10 ESSENTIAL (PRIMARY) HYPERTENSION: ICD-10-CM

## 2021-12-08 LAB
A1C WITH ESTIMATED AVERAGE GLUCOSE RESULT: 6.4 % — HIGH (ref 4–5.6)
BASOPHILS # BLD AUTO: 0.05 K/UL — SIGNIFICANT CHANGE UP (ref 0–0.2)
BASOPHILS NFR BLD AUTO: 0.6 % — SIGNIFICANT CHANGE UP (ref 0–2)
EOSINOPHIL # BLD AUTO: 0.05 K/UL — SIGNIFICANT CHANGE UP (ref 0–0.5)
EOSINOPHIL NFR BLD AUTO: 0.6 % — SIGNIFICANT CHANGE UP (ref 0–6)
ESTIMATED AVERAGE GLUCOSE: 137 MG/DL — HIGH (ref 68–114)
GLUCOSE BLDC GLUCOMTR-MCNC: 167 MG/DL — HIGH (ref 70–99)
GLUCOSE BLDC GLUCOMTR-MCNC: 249 MG/DL — HIGH (ref 70–99)
GLUCOSE BLDC GLUCOMTR-MCNC: 288 MG/DL — HIGH (ref 70–99)
HCT VFR BLD CALC: 26.4 % — LOW (ref 34.5–45)
HCT VFR BLD CALC: 28.5 % — LOW (ref 34.5–45)
HGB BLD-MCNC: 8.3 G/DL — LOW (ref 11.5–15.5)
HGB BLD-MCNC: 9.1 G/DL — LOW (ref 11.5–15.5)
IMM GRANULOCYTES NFR BLD AUTO: 0.7 % — SIGNIFICANT CHANGE UP (ref 0–1.5)
LYMPHOCYTES # BLD AUTO: 1.2 K/UL — SIGNIFICANT CHANGE UP (ref 1–3.3)
LYMPHOCYTES # BLD AUTO: 13.2 % — SIGNIFICANT CHANGE UP (ref 13–44)
MCHC RBC-ENTMCNC: 27.6 PG — SIGNIFICANT CHANGE UP (ref 27–34)
MCHC RBC-ENTMCNC: 27.7 PG — SIGNIFICANT CHANGE UP (ref 27–34)
MCHC RBC-ENTMCNC: 31.4 GM/DL — LOW (ref 32–36)
MCHC RBC-ENTMCNC: 31.9 GM/DL — LOW (ref 32–36)
MCV RBC AUTO: 86.6 FL — SIGNIFICANT CHANGE UP (ref 80–100)
MCV RBC AUTO: 87.7 FL — SIGNIFICANT CHANGE UP (ref 80–100)
MONOCYTES # BLD AUTO: 0.7 K/UL — SIGNIFICANT CHANGE UP (ref 0–0.9)
MONOCYTES NFR BLD AUTO: 7.7 % — SIGNIFICANT CHANGE UP (ref 2–14)
NEUTROPHILS # BLD AUTO: 7.03 K/UL — SIGNIFICANT CHANGE UP (ref 1.8–7.4)
NEUTROPHILS NFR BLD AUTO: 77.2 % — HIGH (ref 43–77)
NRBC # BLD: 0 /100 WBCS — SIGNIFICANT CHANGE UP (ref 0–0)
NRBC # BLD: 0 /100 WBCS — SIGNIFICANT CHANGE UP (ref 0–0)
PLATELET # BLD AUTO: 220 K/UL — SIGNIFICANT CHANGE UP (ref 150–400)
PLATELET # BLD AUTO: 228 K/UL — SIGNIFICANT CHANGE UP (ref 150–400)
RBC # BLD: 3.01 M/UL — LOW (ref 3.8–5.2)
RBC # BLD: 3.29 M/UL — LOW (ref 3.8–5.2)
RBC # FLD: 15.5 % — HIGH (ref 10.3–14.5)
RBC # FLD: 15.7 % — HIGH (ref 10.3–14.5)
SARS-COV-2 RNA SPEC QL NAA+PROBE: SIGNIFICANT CHANGE UP
WBC # BLD: 11.56 K/UL — HIGH (ref 3.8–10.5)
WBC # BLD: 9.09 K/UL — SIGNIFICANT CHANGE UP (ref 3.8–10.5)
WBC # FLD AUTO: 11.56 K/UL — HIGH (ref 3.8–10.5)
WBC # FLD AUTO: 9.09 K/UL — SIGNIFICANT CHANGE UP (ref 3.8–10.5)

## 2021-12-08 PROCEDURE — 78278 ACUTE GI BLOOD LOSS IMAGING: CPT | Mod: 26

## 2021-12-08 PROCEDURE — 99223 1ST HOSP IP/OBS HIGH 75: CPT

## 2021-12-08 PROCEDURE — 99221 1ST HOSP IP/OBS SF/LOW 40: CPT | Mod: GC

## 2021-12-08 RX ORDER — LANOLIN ALCOHOL/MO/W.PET/CERES
3 CREAM (GRAM) TOPICAL AT BEDTIME
Refills: 0 | Status: DISCONTINUED | OUTPATIENT
Start: 2021-12-08 | End: 2021-12-13

## 2021-12-08 RX ORDER — TRAZODONE HCL 50 MG
50 TABLET ORAL AT BEDTIME
Refills: 0 | Status: DISCONTINUED | OUTPATIENT
Start: 2021-12-08 | End: 2021-12-13

## 2021-12-08 RX ORDER — SODIUM CHLORIDE 9 MG/ML
1000 INJECTION, SOLUTION INTRAVENOUS
Refills: 0 | Status: DISCONTINUED | OUTPATIENT
Start: 2021-12-08 | End: 2021-12-13

## 2021-12-08 RX ORDER — LINAGLIPTIN 5 MG/1
1 TABLET, FILM COATED ORAL
Qty: 0 | Refills: 0 | DISCHARGE

## 2021-12-08 RX ORDER — GLUCAGON INJECTION, SOLUTION 0.5 MG/.1ML
1 INJECTION, SOLUTION SUBCUTANEOUS ONCE
Refills: 0 | Status: DISCONTINUED | OUTPATIENT
Start: 2021-12-08 | End: 2021-12-13

## 2021-12-08 RX ORDER — ASPIRIN/CALCIUM CARB/MAGNESIUM 324 MG
81 TABLET ORAL DAILY
Refills: 0 | Status: DISCONTINUED | OUTPATIENT
Start: 2021-12-08 | End: 2021-12-10

## 2021-12-08 RX ORDER — INSULIN LISPRO 100/ML
VIAL (ML) SUBCUTANEOUS
Refills: 0 | Status: DISCONTINUED | OUTPATIENT
Start: 2021-12-08 | End: 2021-12-13

## 2021-12-08 RX ORDER — SUCRALFATE 1 G
1 TABLET ORAL EVERY 6 HOURS
Refills: 0 | Status: DISCONTINUED | OUTPATIENT
Start: 2021-12-08 | End: 2021-12-09

## 2021-12-08 RX ORDER — DEXTROSE 50 % IN WATER 50 %
25 SYRINGE (ML) INTRAVENOUS ONCE
Refills: 0 | Status: DISCONTINUED | OUTPATIENT
Start: 2021-12-08 | End: 2021-12-13

## 2021-12-08 RX ORDER — ATORVASTATIN CALCIUM 80 MG/1
10 TABLET, FILM COATED ORAL AT BEDTIME
Refills: 0 | Status: DISCONTINUED | OUTPATIENT
Start: 2021-12-08 | End: 2021-12-13

## 2021-12-08 RX ORDER — INSULIN LISPRO 100/ML
VIAL (ML) SUBCUTANEOUS AT BEDTIME
Refills: 0 | Status: DISCONTINUED | OUTPATIENT
Start: 2021-12-08 | End: 2021-12-13

## 2021-12-08 RX ORDER — ACETAMINOPHEN 500 MG
650 TABLET ORAL EVERY 6 HOURS
Refills: 0 | Status: DISCONTINUED | OUTPATIENT
Start: 2021-12-08 | End: 2021-12-13

## 2021-12-08 RX ORDER — INSULIN GLARGINE 100 [IU]/ML
15 INJECTION, SOLUTION SUBCUTANEOUS AT BEDTIME
Refills: 0 | Status: DISCONTINUED | OUTPATIENT
Start: 2021-12-08 | End: 2021-12-13

## 2021-12-08 RX ORDER — PANTOPRAZOLE SODIUM 20 MG/1
8 TABLET, DELAYED RELEASE ORAL
Qty: 80 | Refills: 0 | Status: DISCONTINUED | OUTPATIENT
Start: 2021-12-08 | End: 2021-12-09

## 2021-12-08 RX ORDER — INSULIN DETEMIR 100/ML (3)
12 INSULIN PEN (ML) SUBCUTANEOUS
Qty: 0 | Refills: 0 | DISCHARGE

## 2021-12-08 RX ORDER — PANTOPRAZOLE SODIUM 20 MG/1
40 TABLET, DELAYED RELEASE ORAL EVERY 12 HOURS
Refills: 0 | Status: DISCONTINUED | OUTPATIENT
Start: 2021-12-08 | End: 2021-12-08

## 2021-12-08 RX ORDER — DEXTROSE 50 % IN WATER 50 %
15 SYRINGE (ML) INTRAVENOUS ONCE
Refills: 0 | Status: DISCONTINUED | OUTPATIENT
Start: 2021-12-08 | End: 2021-12-13

## 2021-12-08 RX ORDER — DEXTROSE 50 % IN WATER 50 %
12.5 SYRINGE (ML) INTRAVENOUS ONCE
Refills: 0 | Status: DISCONTINUED | OUTPATIENT
Start: 2021-12-08 | End: 2021-12-13

## 2021-12-08 RX ADMIN — Medication 1: at 09:28

## 2021-12-08 RX ADMIN — INSULIN GLARGINE 15 UNIT(S): 100 INJECTION, SOLUTION SUBCUTANEOUS at 22:01

## 2021-12-08 RX ADMIN — Medication 1 GRAM(S): at 23:22

## 2021-12-08 RX ADMIN — Medication 3: at 18:42

## 2021-12-08 RX ADMIN — PANTOPRAZOLE SODIUM 40 MILLIGRAM(S): 20 TABLET, DELAYED RELEASE ORAL at 05:04

## 2021-12-08 RX ADMIN — PANTOPRAZOLE SODIUM 40 MILLIGRAM(S): 20 TABLET, DELAYED RELEASE ORAL at 17:42

## 2021-12-08 RX ADMIN — ATORVASTATIN CALCIUM 10 MILLIGRAM(S): 80 TABLET, FILM COATED ORAL at 22:01

## 2021-12-08 RX ADMIN — Medication 50 MILLIGRAM(S): at 22:01

## 2021-12-08 RX ADMIN — PANTOPRAZOLE SODIUM 10 MG/HR: 20 TABLET, DELAYED RELEASE ORAL at 22:01

## 2021-12-08 RX ADMIN — Medication 81 MILLIGRAM(S): at 17:41

## 2021-12-08 NOTE — H&P ADULT - NSHPPHYSICALEXAM_GEN_ALL_CORE
Vital Signs Last 24 Hrs  T(C): 35.6 (08 Dec 2021 00:01), Max: 36.7 (07 Dec 2021 20:51)  T(F): 96 (08 Dec 2021 00:01), Max: 98 (07 Dec 2021 20:51)  HR: 59 (08 Dec 2021 00:51) (55 - 70)  BP: 117/47 (08 Dec 2021 00:51) (117/47 - 155/49)  BP(mean): 68 (08 Dec 2021 00:51) (68 - 96)  RR: 20 (08 Dec 2021 00:51) (15 - 22)  SpO2: 99% (08 Dec 2021 00:51) (92% - 100%)    GENERAL: No acute distress, well-developed , bear hugger on , breathing regular non labored   HEAD:  Atraumatic, Normocephalic  ENT: EOMI, PERRLA, conjunctiva and sclera clear,  moist mucosa no pharyngeal erythema or exudates   NECK: supple , no JVD   CHEST/LUNG: Clear to auscultation bilaterally; No wheeze, equal breath sounds bilaterally   BACK: No spinal tenderness,  No CVA tenderness   HEART: Regular rate and rhythm; No murmurs, rubs, or gallops  ABDOMEN: Soft, Nontender, Nondistended; Bowel sounds present  EXTREMITIES:  No clubbing, cyanosis, or edema  MSK: No joint swelling or effusions, ROM intact   PSYCH: Normal behavior/affect  NEUROLOGY: AAOx3, non-focal, cranial nerves intact  SKIN: Normal color, No rashes or lesions

## 2021-12-08 NOTE — H&P ADULT - ASSESSMENT
89F w/ h/o CAD s/p CABG, HTN, HLD, PVD s/p stents in bilateral legs, CKD (Baseline Cr reportedly 1.2), DMT2 (A1c 5.9) presenting  for BRBPR on day of admission.

## 2021-12-08 NOTE — H&P ADULT - HISTORY OF PRESENT ILLNESS
Patient is a 89 year old female with h/o CAD s/p CABG, HTN, HLD, PVD s/p stents in bilateral legs, CKD (Baseline Cr reportedly 1.2), DMT2 (A1c 5.9) presenting  for BRBPR on day of admission.     per her son, patient had four episodes of  bloody bowel movements with clots . Patient then complained of lightheaded and fatigued and became lethargic. She reported no chest pain or shortness of breath. She reported mild left lower abdominal pain, no epigastric pain , no melanotic stool. She takes aspirin and Plavix , but no other blood thinners , no NSaid use.  Patient has a history of gastric ulcer in 2018 which was  2/2 H pylori. She also has known diverticulosis.     ED course:  transiently hypotensive , hypothermic with temp 96.4F and was placed on a bear hugger. Patient transfused 2U prbcs , MICU consulted

## 2021-12-08 NOTE — H&P ADULT - PROBLEM SELECTOR PLAN 5
afib on EKG , no previously documented diagnosis , not a candidate for anticoagulation at this time  currently rate controlled

## 2021-12-08 NOTE — H&P ADULT - NSHPLABSRESULTS_GEN_ALL_CORE
Labs personally reviewed:                          7.4    11.33 )-----------( 308      ( 07 Dec 2021 21:19 )             24.7     12-07    136  |  105  |  40<H>  ----------------------------<  304<H>  4.3   |  18<L>  |  1.64<H>    Ca    8.6      07 Dec 2021 21:19    TPro  6.0  /  Alb  3.3  /  TBili  0.2  /  DBili  x   /  AST  15  /  ALT  10  /  AlkPhos  195<H>  12-07        LIVER FUNCTIONS - ( 07 Dec 2021 21:19 )  Alb: 3.3 g/dL / Pro: 6.0 g/dL / ALK PHOS: 195 U/L / ALT: 10 U/L / AST: 15 U/L / GGT: x           PT/INR - ( 07 Dec 2021 21:19 )   PT: 16.2 sec;   INR: 1.37 ratio         PTT - ( 07 Dec 2021 21:19 )  PTT:34.3 sec    CAPILLARY BLOOD GLUCOSE      POCT Blood Glucose.: 266 mg/dL (07 Dec 2021 23:28)      Imaging:  CT abd pelvis pending     EKG personally reviewed: Afib at 60 bpm  w/ pvc

## 2021-12-08 NOTE — CONSULT NOTE ADULT - SUBJECTIVE AND OBJECTIVE BOX
CHIEF COMPLAINT:Patient is a 89y old  Female who presents with a chief complaint of GI bleeding x 1 day (08 Dec 2021 12:22)      HISTORY OF PRESENT ILLNESS:    89 female with history as below, CAD s/p CABG, DM II, HTN, carotid disease s/p CEA, PAD presents with GIB , BRBPR  denies any chest pain, sob, palpitation, dizziness or syncope.        PAST MEDICAL & SURGICAL HISTORY:  Essential hypertension, benign    Hypercholesteremia    Peripheral vascular disease    Diabetes    S/P carotid endarterectomy    S/P coronary artery by pass    History of cholecystectomy    S/P vascular surgery            MEDICATIONS:  aspirin enteric coated 81 milliGRAM(s) Oral daily        acetaminophen     Tablet .. 650 milliGRAM(s) Oral every 6 hours PRN  melatonin 3 milliGRAM(s) Oral at bedtime PRN  traZODone 50 milliGRAM(s) Oral at bedtime    pantoprazole  Injectable 40 milliGRAM(s) IV Push every 12 hours    atorvastatin 10 milliGRAM(s) Oral at bedtime  dextrose 40% Gel 15 Gram(s) Oral once  dextrose 50% Injectable 25 Gram(s) IV Push once  dextrose 50% Injectable 12.5 Gram(s) IV Push once  glucagon  Injectable 1 milliGRAM(s) IntraMuscular once  insulin glargine Injectable (LANTUS) 15 Unit(s) SubCutaneous at bedtime  insulin lispro (ADMELOG) corrective regimen sliding scale   SubCutaneous three times a day before meals  insulin lispro (ADMELOG) corrective regimen sliding scale   SubCutaneous at bedtime    dextrose 5%. 1000 milliLiter(s) IV Continuous <Continuous>  dextrose 5%. 1000 milliLiter(s) IV Continuous <Continuous>      FAMILY HISTORY:  No pertinent family history in first degree relatives        Non-contributory    SOCIAL HISTORY:    No tobacco, drugs or etoh    Allergies    No Known Allergies    Intolerances    	    REVIEW OF SYSTEMS:  as above  The rest of the 14 points ROS reviewed and except above they are unremarkable.        PHYSICAL EXAM:  T(C): 37 (12-08-21 @ 08:30), Max: 37 (12-08-21 @ 04:47)  HR: 79 (12-08-21 @ 08:30) (55 - 79)  BP: 112/64 (12-08-21 @ 08:30) (109/67 - 155/49)  RR: 18 (12-08-21 @ 08:30) (15 - 22)  SpO2: 100% (12-08-21 @ 08:30) (92% - 100%)  Wt(kg): --  I&O's Summary      JVP: Normal  Neck: supple  Lung: clear   CV: S1 S2 , Murmur:  Abd: soft  Ext: No edema  neuro: Awake / alert  Psych: flat affect  Skin: normal      LABS/DATA:    TELEMETRY: 	    ECG:  	ATRIAL FIBRILLATION WITH PREMATURE VENTRICULAR OR ABERRANTLY CONDUCTED  COMPLEXES  RIGHT BUNDLE BRANCH BLOCK  T WAVE ABNORMALITY, CONSIDER INFERIOR ISCHEMIA  ABNORMAL ECG  WHEN COMPARED WITH ECG OF 22-JAN-2020 01:34,  SIGNIFICANT CHANGES HAVE OCCURRED RHYTHM HAS RAMIREZ   	  CARDIAC MARKERS:    < from: Transthoracic Echocardiogram (01.23.20 @ 07:47) >  Conclusions:  1. Mitral annular calcification, otherwise normal mitral  valve. Moderate mitral regurgitation.  2. Increasedrelative wall thickness with normal left  ventricular mass index, consistent with concentric left  ventricular remodeling.  3. Normal left ventricular systolic function. No segmental  wall motion abnormalities.  4. Normal right ventricular size with mildly decreased  right ventricular systolic function.  5. Estimated right ventricular systolic pressure equals 34  mm Hg, assuming right atrial pressure equals 8 mm Hg,  consistent with normal pulmonary pressures.  6. Estimated pulmonary artery systolic pressure equals 34  mm Hg, assuming right atrial pressure equals 8  mm Hg,  consistent with normal pulmonary pressures.  *** No previous Echo exam.  ------------------------------------------------------------------------  Confirmed on  1/23/2020 - 16:22:01 by Afia Mariscal M.D.  ------------------------------------------------------------------------    < end of copied text >                      36 <<== 12-07-21 @ 21:19                              8.3    9.09  )-----------( 228      ( 08 Dec 2021 05:48 )             26.4     12-07    136  |  105  |  40<H>  ----------------------------<  304<H>  4.3   |  18<L>  |  1.64<H>    Ca    8.6      07 Dec 2021 21:19    TPro  6.0  /  Alb  3.3  /  TBili  0.2  /  DBili  x   /  AST  15  /  ALT  10  /  AlkPhos  195<H>  12-07    proBNP:   Lipid Profile:   HgA1c:   TSH:

## 2021-12-08 NOTE — CONSULT NOTE ADULT - SUBJECTIVE AND OBJECTIVE BOX
[facility]  Rodriguez Goldberg PA-C  [place] [address]  [phone]      Chief Complaint:  Patient is a 89y old  Female who presents with a chief complaint of GI bleeding x 1 day (08 Dec 2021 00:51)      HPI: Patient is a 89 year old female with h/o CAD s/p CABG, HTN, HLD, PVD s/p stents in bilateral legs, CKD (Baseline Cr reportedly 1.2), DMT2 (A1c 5.9) presenting  for BRBPR on day of admission. Per her son, patient had four episodes of  bloody bowel movements with clots . Patient then complained of lightheaded and fatigued and became lethargic. She reported no chest pain or shortness of breath. She reported mild left lower abdominal pain, no epigastric pain , no melanotic stool. She takes aspirin and Plavix , but no other blood thinners , no NSaid use.  Patient has a history of gastric ulcer in 2018 which was  2/2 H pylori. She also has known diverticulosis.     ED course:  transiently hypotensive , hypothermic with temp 96.4F and was placed on a bear hugger. Patient transfused 2U prbcs , MICU consulted. Pt also had episode of melena in the ED.     Allergies:  No Known Allergies      Medications:  acetaminophen     Tablet .. 650 milliGRAM(s) Oral every 6 hours PRN  aspirin enteric coated 81 milliGRAM(s) Oral daily  atorvastatin 10 milliGRAM(s) Oral at bedtime  dextrose 40% Gel 15 Gram(s) Oral once  dextrose 5%. 1000 milliLiter(s) IV Continuous <Continuous>  dextrose 5%. 1000 milliLiter(s) IV Continuous <Continuous>  dextrose 50% Injectable 25 Gram(s) IV Push once  dextrose 50% Injectable 12.5 Gram(s) IV Push once  glucagon  Injectable 1 milliGRAM(s) IntraMuscular once  insulin glargine Injectable (LANTUS) 15 Unit(s) SubCutaneous at bedtime  insulin lispro (ADMELOG) corrective regimen sliding scale   SubCutaneous three times a day before meals  insulin lispro (ADMELOG) corrective regimen sliding scale   SubCutaneous at bedtime  melatonin 3 milliGRAM(s) Oral at bedtime PRN  pantoprazole  Injectable 40 milliGRAM(s) IV Push every 12 hours  traZODone 50 milliGRAM(s) Oral at bedtime      PMHX/PSHX:  Essential hypertension, benign    Hypercholesteremia    CAD (coronary artery disease)    Peripheral vascular disease    Diabetes    S/P carotid endarterectomy    S/P coronary artery by pass    History of cholecystectomy    S/P vascular surgery        Family history:  No pertinent family history in first degree relatives        Social History:     ROS:     General:  No wt loss, fevers, chills, night sweats, fatigue,   Eyes:  Good vision, no reported pain  ENT:  No sore throat, pain, runny nose, dysphagia  CV:  No pain, palpitations, hypo/hypertension  Resp:  No dyspnea, cough, tachypnea, wheezing  GI:  No pain, No nausea, No vomiting, No diarrhea, No constipation, No weight loss, No fever, No pruritis, + rectal bleeding, No tarry stools, No dysphagia,  :  No pain, bleeding, incontinence, nocturia  Muscle:  No pain, weakness  Neuro:  No weakness, tingling, memory problems  Psych:  No fatigue, insomnia, mood problems, depression  Endocrine:  No polyuria, polydipsia, cold/heat intolerance  Heme:  No petechiae, ecchymosis, easy bruisability  Skin:  No rash, tattoos, scars, edema      PHYSICAL EXAM:   Vital Signs:  Vital Signs Last 24 Hrs  T(C): 37 (08 Dec 2021 08:30), Max: 37 (08 Dec 2021 04:47)  T(F): 98.6 (08 Dec 2021 08:30), Max: 98.6 (08 Dec 2021 04:47)  HR: 79 (08 Dec 2021 08:30) (55 - 79)  BP: 112/64 (08 Dec 2021 08:30) (109/67 - 155/49)  BP(mean): 68 (08 Dec 2021 04:20) (67 - 96)  RR: 18 (08 Dec 2021 08:30) (15 - 22)  SpO2: 100% (08 Dec 2021 08:30) (92% - 100%)  Daily Height in cm: 165.1 (07 Dec 2021 20:51)    Daily     GENERAL:  Appears stated age,   HEENT:  NC/AT,    CHEST:  Full & symmetric excursion,   HEART:  Regular rhythm  ABDOMEN:  Soft, non-tender, non-distended,   EXTEREMITIES:  no cyanosis,clubbing or edema  SKIN:  No rash  NEURO:  Alert,    LABS:                        8.3    9.09  )-----------( 228      ( 08 Dec 2021 05:48 )             26.4     12-07    136  |  105  |  40<H>  ----------------------------<  304<H>  4.3   |  18<L>  |  1.64<H>    Ca    8.6      07 Dec 2021 21:19    TPro  6.0  /  Alb  3.3  /  TBili  0.2  /  DBili  x   /  AST  15  /  ALT  10  /  AlkPhos  195<H>  12-07    LIVER FUNCTIONS - ( 07 Dec 2021 21:19 )  Alb: 3.3 g/dL / Pro: 6.0 g/dL / ALK PHOS: 195 U/L / ALT: 10 U/L / AST: 15 U/L / GGT: x           PT/INR - ( 07 Dec 2021 21:19 )   PT: 16.2 sec;   INR: 1.37 ratio         PTT - ( 07 Dec 2021 21:19 )  PTT:34.3 sec        Imaging:

## 2021-12-08 NOTE — ED ADULT NURSE REASSESSMENT NOTE - NS ED NURSE REASSESS COMMENT FT1
Patient had a large melena BM. Patient mentating well. Vital signs maintaining. Patient endorses no dizziness, headache, n/v at this time. Bedding changed and patient placed in a clean diaper.

## 2021-12-08 NOTE — H&P ADULT - PROBLEM SELECTOR PLAN 1
bleeding subsided , family declined CT w/ contrast given poor renal function ; GI notified by ED , patient transfused 2U prbcs ; patient treated with IV pantoprazole ; MICU consulted and assessed to not require ICU level care at this time   - check post transfusion CBC   - transfuse PRBC as needed for goal > 7-8  OR symptomatic   - Hold plavix   - clear liquid diet   - continue IV PPI   - GI consult- to be followed up by day team

## 2021-12-08 NOTE — CONSULT NOTE ADULT - ASSESSMENT
Patient is a 89 year old female with h/o CAD s/p CABG, HTN, HLD, PVD s/p stents in bilateral legs, CKD (Baseline Cr reportedly 1.2), DMT2 (A1c 5.9) presenting  for BRBPR on day of admission.     rectal bleed  acute blood loss anemia  diverticulosis   likely diverticular bleed  family declined CT angio due to kidney function  will order bleeding scan  cbc daily  monitor hgb  monitor stool color  transfuse prn hgb>7  hold ac  CLD  attempted to call son twice with no answer   will follow   aleida Toscano   Patient is a 89 year old female with h/o CAD s/p CABG, HTN, HLD, PVD s/p stents in bilateral legs, CKD (Baseline Cr reportedly 1.2), DMT2 (A1c 5.9) presenting  for BRBPR on day of admission.     rectal bleed  acute blood loss anemia  diverticulosis   likely diverticular bleed  unknown last colonscopy  family declined CT angio due to kidney function  will order bleeding scan  cbc daily  monitor hgb  monitor stool color  transfuse prn hgb>7  hold ac  CLD  attempted to call son twice with no answer   will follow   aleida Toscano   Patient is a 89 year old female with h/o CAD s/p CABG, HTN, HLD, PVD s/p stents in bilateral legs, CKD (Baseline Cr reportedly 1.2), DMT2 (A1c 5.9) presenting  for BRBPR on day of admission.     rectal bleed  acute blood loss anemia  diverticulosis   likely diverticular bleed  unknown last colonscopy  family declined CT angio due to kidney function  will order bleeding scan  cbc daily  monitor hgb  monitor stool color  transfuse prn hgb>7  hold ac  PPI  CLD  attempted to call son twice with no answer   will follow   aleida Toscano

## 2021-12-08 NOTE — CONSULT NOTE ADULT - ASSESSMENT
GIB  Anemia    transfusion recommended  Bleeding scan pos for active distal gastric bleed   consider MICU eval   fur with GI    CAD history of cabg  can hold asa if needed  cont statin     PAD  plan as above    HTN   stable     Advanced care planning was discussed with patient and family.  Risks, benefits and alternatives of the cardiac treatments and medical therapy including procedures were discussed in detail and all questions were answered. Importance of compliance with medical therapy and lifestyle modification to improve cardiovascular health were addressed. Appropriate forms and patient educational materials were reviewed. 30 minutes face to face spent.

## 2021-12-08 NOTE — PROGRESS NOTE ADULT - ASSESSMENT
89F w/ h/o CAD s/p CABG, HTN, HLD, PVD s/p stents in bilateral legs, CKD (Baseline Cr reportedly 1.2), DMT2 (A1c 5.9) presenting  for BRBPR on day of admission.        Problem/Plan - 1:  ·  Problem: GI bleeding.    family declined CT w/ contrast given poor renal function ;   GI notified , patient transfused 2U prbcs ;   ; MICU consulted and assessed to not require ICU level care at this time   - transfuse PRBC as needed for goal > 8  - Hold plavix   - clear liquid diet   - continue IV PPI   bleeding scan today     Problem/Plan - 2:  ·  Problem: CAD (coronary artery disease).   ·  Plan: - continue asa , hold plavix   - continue statin  - holding metoprolol in setting of hypotension.     Problem/Plan - 3:  ·  Problem: HTN (hypertension).   ·  Plan: Holding antihypertensives given transient hypotension , once clinically improved can restart slowly.     Problem/Plan - 4:  ·  Problem: Diabetes.   - Lantus  hs   - insulin correction scale  - check fsg qac/qhs    - consistent carb diet.     Problem/Plan - 5:  ·  Problem: Unspecified atrial fibrillation.   ·  Plan: afib on EKG ,     Problem/Plan - 6:  ·  Problem: CKD (chronic kidney disease).   ·  Plan: at baseline   -renal dose medications  - monitor creatinine   - avoid nephrotoxins.

## 2021-12-08 NOTE — H&P ADULT - NSHPREVIEWOFSYSTEMS_GEN_ALL_CORE
CONSTITUTIONAL: + weakness, no fevers +chills  EYES/ENT: No visual changes;  No dysphagia  NECK: No pain or stiffness  RESPIRATORY: No cough, wheezing, hemoptysis; No shortness of breath  CARDIOVASCULAR: No chest pain or palpitations; No lower extremity edema  EXTREMITIES: no le edema, cyanosis, clubbing  GASTROINTESTINAL: No abdominal or epigastric pain. No nausea, vomiting, or hematemesis; No diarrhea or constipation. No melena + hematochezia.  BACK: No back pain  GENITOURINARY: No dysuria, frequency or hematuria  NEUROLOGICAL: No numbness or weakness  MSK: no joint swelling or pain  SKIN: No itching, burning, rashes, or lesions   PSYCH: no agitation  All other review of systems is negative unless indicated above.

## 2021-12-08 NOTE — CHART NOTE - NSCHARTNOTEFT_GEN_A_CORE
GI called after findings of active bleed in distal stomach on NM scan. Per GI, start Pt on protonix gtt, carafate q6 hours and to keep pt NPO at MN for endoscopy in the AM. If Pt becomes HD unstable, acute drop in Hgb will consider RRT/MICU c/s. 1 UPRBC transfusing now. Will f/u CBC and transfuse to Hgb >8. VS hemodynamically stable. Pt seen at bedside with no complaints.     Britta Hanson PA-C  Department of Medicine

## 2021-12-08 NOTE — H&P ADULT - PROBLEM SELECTOR PLAN 4
Reduced home dose insulin   - Lantus  hs   - insulin correction scale  - check fsg qac/qhs  - check a1c in am  - consistent carb diet

## 2021-12-09 LAB
ALBUMIN SERPL ELPH-MCNC: 3.5 G/DL — SIGNIFICANT CHANGE UP (ref 3.3–5)
ALP SERPL-CCNC: 170 U/L — HIGH (ref 40–120)
ALT FLD-CCNC: 14 U/L — SIGNIFICANT CHANGE UP (ref 10–45)
ANION GAP SERPL CALC-SCNC: 11 MMOL/L — SIGNIFICANT CHANGE UP (ref 5–17)
ANION GAP SERPL CALC-SCNC: 13 MMOL/L — SIGNIFICANT CHANGE UP (ref 5–17)
AST SERPL-CCNC: 19 U/L — SIGNIFICANT CHANGE UP (ref 10–40)
BASE EXCESS BLDV CALC-SCNC: -3.3 MMOL/L — LOW (ref -2–2)
BILIRUB SERPL-MCNC: 0.4 MG/DL — SIGNIFICANT CHANGE UP (ref 0.2–1.2)
BLD GP AB SCN SERPL QL: NEGATIVE — SIGNIFICANT CHANGE UP
BUN SERPL-MCNC: 28 MG/DL — HIGH (ref 7–23)
BUN SERPL-MCNC: 34 MG/DL — HIGH (ref 7–23)
CA-I SERPL-SCNC: 1.22 MMOL/L — SIGNIFICANT CHANGE UP (ref 1.15–1.33)
CALCIUM SERPL-MCNC: 8.2 MG/DL — LOW (ref 8.4–10.5)
CALCIUM SERPL-MCNC: 8.4 MG/DL — SIGNIFICANT CHANGE UP (ref 8.4–10.5)
CHLORIDE BLDV-SCNC: 113 MMOL/L — HIGH (ref 96–108)
CHLORIDE SERPL-SCNC: 106 MMOL/L — SIGNIFICANT CHANGE UP (ref 96–108)
CHLORIDE SERPL-SCNC: 113 MMOL/L — HIGH (ref 96–108)
CO2 BLDV-SCNC: 22 MMOL/L — SIGNIFICANT CHANGE UP (ref 22–26)
CO2 SERPL-SCNC: 18 MMOL/L — LOW (ref 22–31)
CO2 SERPL-SCNC: 20 MMOL/L — LOW (ref 22–31)
CREAT SERPL-MCNC: 1.21 MG/DL — SIGNIFICANT CHANGE UP (ref 0.5–1.3)
CREAT SERPL-MCNC: 1.42 MG/DL — HIGH (ref 0.5–1.3)
GAS PNL BLDV: 143 MMOL/L — SIGNIFICANT CHANGE UP (ref 136–145)
GAS PNL BLDV: SIGNIFICANT CHANGE UP
GAS PNL BLDV: SIGNIFICANT CHANGE UP
GLUCOSE BLDC GLUCOMTR-MCNC: 113 MG/DL — HIGH (ref 70–99)
GLUCOSE BLDC GLUCOMTR-MCNC: 115 MG/DL — HIGH (ref 70–99)
GLUCOSE BLDC GLUCOMTR-MCNC: 131 MG/DL — HIGH (ref 70–99)
GLUCOSE BLDC GLUCOMTR-MCNC: 138 MG/DL — HIGH (ref 70–99)
GLUCOSE BLDC GLUCOMTR-MCNC: 191 MG/DL — HIGH (ref 70–99)
GLUCOSE BLDV-MCNC: 116 MG/DL — HIGH (ref 70–99)
GLUCOSE SERPL-MCNC: 116 MG/DL — HIGH (ref 70–99)
GLUCOSE SERPL-MCNC: 220 MG/DL — HIGH (ref 70–99)
HCO3 BLDV-SCNC: 21 MMOL/L — LOW (ref 22–29)
HCT VFR BLD CALC: 25.3 % — LOW (ref 34.5–45)
HCT VFR BLD CALC: 30.3 % — LOW (ref 34.5–45)
HCT VFR BLD CALC: 30.8 % — LOW (ref 34.5–45)
HCT VFR BLD CALC: 32 % — LOW (ref 34.5–45)
HCT VFR BLDA CALC: 32 % — LOW (ref 34.5–46.5)
HGB BLD CALC-MCNC: 10.6 G/DL — LOW (ref 11.7–16.1)
HGB BLD-MCNC: 10.3 G/DL — LOW (ref 11.5–15.5)
HGB BLD-MCNC: 8.3 G/DL — LOW (ref 11.5–15.5)
HGB BLD-MCNC: 9.7 G/DL — LOW (ref 11.5–15.5)
HGB BLD-MCNC: 9.8 G/DL — LOW (ref 11.5–15.5)
LACTATE BLDV-MCNC: 1.5 MMOL/L — SIGNIFICANT CHANGE UP (ref 0.7–2)
MCHC RBC-ENTMCNC: 27.9 PG — SIGNIFICANT CHANGE UP (ref 27–34)
MCHC RBC-ENTMCNC: 27.9 PG — SIGNIFICANT CHANGE UP (ref 27–34)
MCHC RBC-ENTMCNC: 28.1 PG — SIGNIFICANT CHANGE UP (ref 27–34)
MCHC RBC-ENTMCNC: 28.4 PG — SIGNIFICANT CHANGE UP (ref 27–34)
MCHC RBC-ENTMCNC: 31.8 GM/DL — LOW (ref 32–36)
MCHC RBC-ENTMCNC: 32 GM/DL — SIGNIFICANT CHANGE UP (ref 32–36)
MCHC RBC-ENTMCNC: 32.2 GM/DL — SIGNIFICANT CHANGE UP (ref 32–36)
MCHC RBC-ENTMCNC: 32.8 GM/DL — SIGNIFICANT CHANGE UP (ref 32–36)
MCV RBC AUTO: 86.6 FL — SIGNIFICANT CHANGE UP (ref 80–100)
MCV RBC AUTO: 86.7 FL — SIGNIFICANT CHANGE UP (ref 80–100)
MCV RBC AUTO: 87.7 FL — SIGNIFICANT CHANGE UP (ref 80–100)
MCV RBC AUTO: 87.8 FL — SIGNIFICANT CHANGE UP (ref 80–100)
NRBC # BLD: 0 /100 WBCS — SIGNIFICANT CHANGE UP (ref 0–0)
PCO2 BLDV: 35 MMHG — LOW (ref 39–42)
PH BLDV: 7.39 — SIGNIFICANT CHANGE UP (ref 7.32–7.43)
PLATELET # BLD AUTO: 167 K/UL — SIGNIFICANT CHANGE UP (ref 150–400)
PLATELET # BLD AUTO: 173 K/UL — SIGNIFICANT CHANGE UP (ref 150–400)
PLATELET # BLD AUTO: 201 K/UL — SIGNIFICANT CHANGE UP (ref 150–400)
PLATELET # BLD AUTO: 206 K/UL — SIGNIFICANT CHANGE UP (ref 150–400)
PO2 BLDV: 46 MMHG — HIGH (ref 25–45)
POTASSIUM BLDV-SCNC: 3.9 MMOL/L — SIGNIFICANT CHANGE UP (ref 3.5–5.1)
POTASSIUM SERPL-MCNC: 4 MMOL/L — SIGNIFICANT CHANGE UP (ref 3.5–5.3)
POTASSIUM SERPL-MCNC: 4.1 MMOL/L — SIGNIFICANT CHANGE UP (ref 3.5–5.3)
POTASSIUM SERPL-SCNC: 4 MMOL/L — SIGNIFICANT CHANGE UP (ref 3.5–5.3)
POTASSIUM SERPL-SCNC: 4.1 MMOL/L — SIGNIFICANT CHANGE UP (ref 3.5–5.3)
PROT SERPL-MCNC: 6.2 G/DL — SIGNIFICANT CHANGE UP (ref 6–8.3)
RBC # BLD: 2.92 M/UL — LOW (ref 3.8–5.2)
RBC # BLD: 3.45 M/UL — LOW (ref 3.8–5.2)
RBC # BLD: 3.51 M/UL — LOW (ref 3.8–5.2)
RBC # BLD: 3.69 M/UL — LOW (ref 3.8–5.2)
RBC # FLD: 15.3 % — HIGH (ref 10.3–14.5)
RBC # FLD: 15.8 % — HIGH (ref 10.3–14.5)
RBC # FLD: 15.9 % — HIGH (ref 10.3–14.5)
RBC # FLD: 15.9 % — HIGH (ref 10.3–14.5)
RH IG SCN BLD-IMP: POSITIVE — SIGNIFICANT CHANGE UP
SAO2 % BLDV: 82.8 % — SIGNIFICANT CHANGE UP (ref 67–88)
SODIUM SERPL-SCNC: 137 MMOL/L — SIGNIFICANT CHANGE UP (ref 135–145)
SODIUM SERPL-SCNC: 144 MMOL/L — SIGNIFICANT CHANGE UP (ref 135–145)
WBC # BLD: 11.63 K/UL — HIGH (ref 3.8–10.5)
WBC # BLD: 13.61 K/UL — HIGH (ref 3.8–10.5)
WBC # BLD: 14.07 K/UL — HIGH (ref 3.8–10.5)
WBC # BLD: 14.82 K/UL — HIGH (ref 3.8–10.5)
WBC # FLD AUTO: 11.63 K/UL — HIGH (ref 3.8–10.5)
WBC # FLD AUTO: 13.61 K/UL — HIGH (ref 3.8–10.5)
WBC # FLD AUTO: 14.07 K/UL — HIGH (ref 3.8–10.5)
WBC # FLD AUTO: 14.82 K/UL — HIGH (ref 3.8–10.5)

## 2021-12-09 RX ORDER — PANTOPRAZOLE SODIUM 20 MG/1
40 TABLET, DELAYED RELEASE ORAL
Refills: 0 | Status: DISCONTINUED | OUTPATIENT
Start: 2021-12-09 | End: 2021-12-11

## 2021-12-09 RX ORDER — INFLUENZA VIRUS VACCINE 15; 15; 15; 15 UG/.5ML; UG/.5ML; UG/.5ML; UG/.5ML
0.7 SUSPENSION INTRAMUSCULAR ONCE
Refills: 0 | Status: DISCONTINUED | OUTPATIENT
Start: 2021-12-09 | End: 2021-12-13

## 2021-12-09 RX ORDER — SODIUM CHLORIDE 9 MG/ML
500 INJECTION INTRAMUSCULAR; INTRAVENOUS; SUBCUTANEOUS
Refills: 0 | Status: DISCONTINUED | OUTPATIENT
Start: 2021-12-09 | End: 2021-12-09

## 2021-12-09 RX ORDER — PANTOPRAZOLE SODIUM 20 MG/1
40 TABLET, DELAYED RELEASE ORAL
Refills: 0 | Status: DISCONTINUED | OUTPATIENT
Start: 2021-12-09 | End: 2021-12-09

## 2021-12-09 RX ORDER — SOD SULF/SODIUM/NAHCO3/KCL/PEG
1000 SOLUTION, RECONSTITUTED, ORAL ORAL EVERY 4 HOURS
Refills: 0 | Status: COMPLETED | OUTPATIENT
Start: 2021-12-09 | End: 2021-12-09

## 2021-12-09 RX ADMIN — Medication 1000 MILLILITER(S): at 18:24

## 2021-12-09 RX ADMIN — Medication 81 MILLIGRAM(S): at 11:45

## 2021-12-09 RX ADMIN — Medication 5 MILLIGRAM(S): at 21:31

## 2021-12-09 RX ADMIN — Medication 1 GRAM(S): at 06:39

## 2021-12-09 RX ADMIN — Medication 50 MILLIGRAM(S): at 21:31

## 2021-12-09 RX ADMIN — Medication 1000 MILLILITER(S): at 14:22

## 2021-12-09 RX ADMIN — ATORVASTATIN CALCIUM 10 MILLIGRAM(S): 80 TABLET, FILM COATED ORAL at 21:31

## 2021-12-09 RX ADMIN — Medication 1: at 16:26

## 2021-12-09 RX ADMIN — INSULIN GLARGINE 15 UNIT(S): 100 INJECTION, SOLUTION SUBCUTANEOUS at 21:31

## 2021-12-09 RX ADMIN — PANTOPRAZOLE SODIUM 40 MILLIGRAM(S): 20 TABLET, DELAYED RELEASE ORAL at 11:41

## 2021-12-09 NOTE — PROGRESS NOTE ADULT - ASSESSMENT
GIB  Anemia  Monitor hemoglobin, transfuse as needed.   Bleeding scan pos for active distal gastric bleed   fur with GI    CAD history of cabg  can hold asa if needed  cont statin     PAD  plan as above    HTN   stable     Advanced care planning was discussed with patient and family.  Risks, benefits and alternatives of the cardiac treatments and medical therapy including procedures were discussed in detail and all questions were answered. Importance of compliance with medical therapy and lifestyle modification to improve cardiovascular health were addressed. Appropriate forms and patient educational materials were reviewed. 30 minutes face to face spent.

## 2021-12-09 NOTE — RAPID RESPONSE TEAM SUMMARY - NSADDTLFINDINGSRRT_GEN_ALL_CORE
Upon arrival the patient noted to be hemodynamically stable 's - 180's Hrt rate 90's rr 12-18 spot  on %. 1 Unit of PRBC ordered due to potential acute GI Bleed MTP was called, however was cancelled due to Information that the patient has been being prepped for colonoscopy in the am. Patient denies feeling of being light headed or dizzy.

## 2021-12-09 NOTE — PROGRESS NOTE ADULT - ASSESSMENT
89F w/ h/o CAD s/p CABG, HTN, HLD, PVD s/p stents in bilateral legs, CKD (Baseline Cr reportedly 1.2), DMT2 (A1c 5.9) presenting  for BRBPR on day of admission.        Problem/Plan - 1:  ·  Problem: GI bleeding.     - Hold plavix   - continue IV PPI   bleeding scan results noted  egd done  no bleeding found  colon in am      Problem/Plan - 2:  ·  Problem: CAD (coronary artery disease).   ·  Plan: - continue asa , hold plavix   - continue statin  - holding metoprolol in setting of hypotension.     Problem/Plan - 3:  ·  Problem: HTN (hypertension).   ·  Plan: Holding antihypertensives given transient hypotension , once clinically improved can restart slowly.     Problem/Plan - 4:  ·  Problem: Diabetes.   - Lantus  hs   - insulin correction scale  - check fsg qac/qhs    - consistent carb diet.     Problem/Plan - 5:  ·  Problem: Unspecified atrial fibrillation.   ·  Plan: afib on EKG ,     Problem/Plan - 6:  ·  Problem: CKD (chronic kidney disease).   ·  Plan: at baseline   -renal dose medications  - monitor creatinine   - avoid nephrotoxins.

## 2021-12-09 NOTE — PATIENT PROFILE ADULT - PATIENT'S GENDER IDENTITY
Refill request received from [facility] pharmacy in [facility] for Tikosyn 500 MCG, one capsule twice a day. Patient was last seen in follow up by Dr. Pack on 3/31/16. Last EKG was on 3/31/16.  I phoned the patient and told him that he needs to make a follow up appointment before this can be filled. I also told him that it needs to be scheduled within the next 30 days. I told him that I can only fill it for # 30. He is currently in [place] and will be back to [place] in a couple of weeks.  The patient was in agreance to the plan. EVARISTO Chau, made him a follow up appointment. Refill was completed for 30 days to [facility] in [place], [place], per patient request.   Female

## 2021-12-09 NOTE — PHYSICAL THERAPY INITIAL EVALUATION ADULT - PERTINENT HX OF CURRENT PROBLEM, REHAB EVAL
89F w/ h/o CAD s/p CABG, HTN, HLD, PVD s/p stents in bilateral legs, CKD (Baseline Cr reportedly 1.2), DMT2 (A1c 5.9) presenting  for BRBPR on day of admission, s/p 3u PRBC

## 2021-12-09 NOTE — PHYSICAL THERAPY INITIAL EVALUATION ADULT - ADDITIONAL COMMENTS
PTA pt was independent with functional mobility with RW/SC, WC for longer distances, family assisted with ADLs as needed. Pt lives in a  alone however pt's family frequently comes to assist patient.

## 2021-12-09 NOTE — PATIENT PROFILE ADULT - FALL HARM RISK - HARM RISK INTERVENTIONS

## 2021-12-09 NOTE — RAPID RESPONSE TEAM SUMMARY - NSSITUATIONBACKGROUNDRRT_GEN_ALL_CORE
89 year old female with h/o CAD s/p CABG, HTN, HLD, PVD s/p stents in bilateral legs, CKD (Baseline Cr reportedly 1.2), DMT2 (A1c 5.9) presenting  for BRBPR on day of admission. Per her son, patient had four episodes of  bloody bowel movements with clots . Rhe patient also has significant Hx of history of gastric ulcer in 2018 which was  2/2 H pylor and is known to have  diverticulosis. The patient received a total of 3 units of PRBC during this hospitalization and is now S/P EGD which revealed chronic gastritis. Planned colonoscopy in the AM. Today, RRT was called for large Bloody BM.

## 2021-12-09 NOTE — PHYSICAL THERAPY INITIAL EVALUATION ADULT - DISCHARGE DISPOSITION, PT EVAL
subacute rehab, pt/family declining at this time stating family will be available to assist as needed upon D/C (son stating he can stay with patient), If pt d/c home would require home PT, assist with functional mobility/ADLs

## 2021-12-09 NOTE — PRE PROCEDURE NOTE - PRE PROCEDURE EVALUATION
Attending Physician:     Dr. Toscano                       Procedure:   EGD     Indication for Procedure:   BRBPR  ________________________________________________________  PAST MEDICAL & SURGICAL HISTORY:    Essential hypertension, benign  Hypercholesteremia  Peripheral vascular disease  Diabetes    S/P carotid endarterectomy  S/P coronary artery by pass  History of cholecystectomy  S/P vascular surgery      ALLERGIES:  No Known Allergies    HOME MEDICATIONS:  AMLODIPINE BESYLATE 10 MG TAB: 1 tab(s) orally once a day  aspirin 81 mg oral tablet: 1 tab(s) orally once a day  ATORVASTATIN 10 MG TABLET: 1 tab(s) orally once a day  Diovan:   LEVEMIR FLEXTOUCH 100 UNIT/ML: 32 unit(s) subcutaneous once a day (at bedtime)  METOPROLOL SUCC  MG TAB: 1 tab(s) orally once a day  PANTOPRAZOLE SOD DR 40 MG TAB: 1 tab(s) orally once a day  Plavix 75 mg oral tablet: 1 tab(s) orally once a day  polyethylene glycol 3350 oral powder for reconstitution: 17 gram(s) orally once a day  senna oral tablet: 2 tab(s) orally once a day (at bedtime)  TRAZODONE 50 MG TABLET: 1 tab(s) orally once a day (at bedtime)    AICD/PPM: [ ] yes   [ ] no    PERTINENT LAB DATA:                        8.3    11.63 )-----------( 201      ( 09 Dec 2021 02:34 )             25.3     12-07    136  |  105  |  40<H>  ----------------------------<  304<H>  4.3   |  18<L>  |  1.64<H>    Ca    8.6      07 Dec 2021 21:19  TPro  6.0  /  Alb  3.3  /  TBili  0.2  /  DBili  x   /  AST  15  /  ALT  10  /  AlkPhos  195<H>  12-07  PT/INR - ( 07 Dec 2021 21:19 )   PT: 16.2 sec;   INR: 1.37 ratio    PTT - ( 07 Dec 2021 21:19 )  PTT:34.3 sec    PHYSICAL EXAMINATION:    T(C): 36.6  HR: 81  BP: 172/68  RR: 18  SpO2: 95%    Constitutional: NAD    Neck:  No JVD  Respiratory: CTAB/L  Cardiovascular: S1 and S2  Gastrointestinal: BS+, soft, NT/ND  Extremities: No peripheral edema  Neurological: A/O x 4      COMMENTS:    The patient is a suitable candidate for the planned procedure unless box checked [ ]  No, explain:

## 2021-12-09 NOTE — RAPID RESPONSE TEAM SUMMARY - NSOTHERINTERVENTIONSRRT_GEN_ALL_CORE
MTP cancelled at this time appreciate sx team support   Unsure if this is an active bleed 1 unit PRBC initiated during the RRT   Please follow up with serial CBC q 4  and VS   Please maintain telemetry   Please continue with Bowel prep for Colonoscopy in the AM   If hemodynamic instability Please call RRT and notify attending/ GI/ Sx team

## 2021-12-10 LAB
ANION GAP SERPL CALC-SCNC: 12 MMOL/L — SIGNIFICANT CHANGE UP (ref 5–17)
BUN SERPL-MCNC: 25 MG/DL — HIGH (ref 7–23)
CALCIUM SERPL-MCNC: 8.3 MG/DL — LOW (ref 8.4–10.5)
CHLORIDE SERPL-SCNC: 110 MMOL/L — HIGH (ref 96–108)
CO2 SERPL-SCNC: 21 MMOL/L — LOW (ref 22–31)
CREAT SERPL-MCNC: 1.23 MG/DL — SIGNIFICANT CHANGE UP (ref 0.5–1.3)
GLUCOSE BLDC GLUCOMTR-MCNC: 143 MG/DL — HIGH (ref 70–99)
GLUCOSE BLDC GLUCOMTR-MCNC: 179 MG/DL — HIGH (ref 70–99)
GLUCOSE BLDC GLUCOMTR-MCNC: 86 MG/DL — SIGNIFICANT CHANGE UP (ref 70–99)
GLUCOSE BLDC GLUCOMTR-MCNC: 89 MG/DL — SIGNIFICANT CHANGE UP (ref 70–99)
GLUCOSE SERPL-MCNC: 69 MG/DL — LOW (ref 70–99)
HCT VFR BLD CALC: 31.1 % — LOW (ref 34.5–45)
HCT VFR BLD CALC: 31.5 % — LOW (ref 34.5–45)
HCT VFR BLD CALC: 34.8 % — SIGNIFICANT CHANGE UP (ref 34.5–45)
HGB BLD-MCNC: 10 G/DL — LOW (ref 11.5–15.5)
HGB BLD-MCNC: 10.3 G/DL — LOW (ref 11.5–15.5)
HGB BLD-MCNC: 11.1 G/DL — LOW (ref 11.5–15.5)
MCHC RBC-ENTMCNC: 28 PG — SIGNIFICANT CHANGE UP (ref 27–34)
MCHC RBC-ENTMCNC: 28.3 PG — SIGNIFICANT CHANGE UP (ref 27–34)
MCHC RBC-ENTMCNC: 28.5 PG — SIGNIFICANT CHANGE UP (ref 27–34)
MCHC RBC-ENTMCNC: 31.9 GM/DL — LOW (ref 32–36)
MCHC RBC-ENTMCNC: 32.2 GM/DL — SIGNIFICANT CHANGE UP (ref 32–36)
MCHC RBC-ENTMCNC: 32.7 GM/DL — SIGNIFICANT CHANGE UP (ref 32–36)
MCV RBC AUTO: 87.3 FL — SIGNIFICANT CHANGE UP (ref 80–100)
MCV RBC AUTO: 87.7 FL — SIGNIFICANT CHANGE UP (ref 80–100)
MCV RBC AUTO: 88.1 FL — SIGNIFICANT CHANGE UP (ref 80–100)
NRBC # BLD: 0 /100 WBCS — SIGNIFICANT CHANGE UP (ref 0–0)
PLATELET # BLD AUTO: 185 K/UL — SIGNIFICANT CHANGE UP (ref 150–400)
PLATELET # BLD AUTO: 191 K/UL — SIGNIFICANT CHANGE UP (ref 150–400)
PLATELET # BLD AUTO: 199 K/UL — SIGNIFICANT CHANGE UP (ref 150–400)
POTASSIUM SERPL-MCNC: 3.5 MMOL/L — SIGNIFICANT CHANGE UP (ref 3.5–5.3)
POTASSIUM SERPL-SCNC: 3.5 MMOL/L — SIGNIFICANT CHANGE UP (ref 3.5–5.3)
RBC # BLD: 3.53 M/UL — LOW (ref 3.8–5.2)
RBC # BLD: 3.61 M/UL — LOW (ref 3.8–5.2)
RBC # BLD: 3.97 M/UL — SIGNIFICANT CHANGE UP (ref 3.8–5.2)
RBC # FLD: 15.6 % — HIGH (ref 10.3–14.5)
RBC # FLD: 15.8 % — HIGH (ref 10.3–14.5)
RBC # FLD: 15.8 % — HIGH (ref 10.3–14.5)
SODIUM SERPL-SCNC: 143 MMOL/L — SIGNIFICANT CHANGE UP (ref 135–145)
WBC # BLD: 10.04 K/UL — SIGNIFICANT CHANGE UP (ref 3.8–10.5)
WBC # BLD: 11.04 K/UL — HIGH (ref 3.8–10.5)
WBC # BLD: 11.56 K/UL — HIGH (ref 3.8–10.5)
WBC # FLD AUTO: 10.04 K/UL — SIGNIFICANT CHANGE UP (ref 3.8–10.5)
WBC # FLD AUTO: 11.04 K/UL — HIGH (ref 3.8–10.5)
WBC # FLD AUTO: 11.56 K/UL — HIGH (ref 3.8–10.5)

## 2021-12-10 RX ADMIN — PANTOPRAZOLE SODIUM 40 MILLIGRAM(S): 20 TABLET, DELAYED RELEASE ORAL at 05:14

## 2021-12-10 RX ADMIN — PANTOPRAZOLE SODIUM 40 MILLIGRAM(S): 20 TABLET, DELAYED RELEASE ORAL at 17:22

## 2021-12-10 RX ADMIN — INSULIN GLARGINE 15 UNIT(S): 100 INJECTION, SOLUTION SUBCUTANEOUS at 21:25

## 2021-12-10 RX ADMIN — Medication 81 MILLIGRAM(S): at 13:21

## 2021-12-10 RX ADMIN — ATORVASTATIN CALCIUM 10 MILLIGRAM(S): 80 TABLET, FILM COATED ORAL at 21:25

## 2021-12-10 RX ADMIN — Medication 50 MILLIGRAM(S): at 21:25

## 2021-12-10 NOTE — CHART NOTE - NSCHARTNOTEFT_GEN_A_CORE
Medicine NP Episodic Note    CC: GIB    Vital Signs Last 24 Hrs  T(C): 36.8 (10 Dec 2021 00:16), Max: 36.8 (09 Dec 2021 19:54)  T(F): 98.3 (10 Dec 2021 00:16), Max: 98.3 (09 Dec 2021 19:54)  HR: 84 (10 Dec 2021 00:16) (73 - 84)  BP: 173/64 (10 Dec 2021 00:16) (169/73 - 207/56)  BP(mean): --  RR: 16 (10 Dec 2021 00:16) (15 - 22)  SpO2: 97% (10 Dec 2021 00:16) (95% - 100%)      Labs:                          10.3   11.56 )-----------( 185      ( 10 Dec 2021 03:29 )             31.5     12-09    144  |  113<H>  |  28<H>  ----------------------------<  116<H>  4.0   |  18<L>  |  1.21    Ca    8.4      09 Dec 2021 22:20    TPro  6.2  /  Alb  3.5  /  TBili  0.4  /  DBili  x   /  AST  19  /  ALT  14  /  AlkPhos  170<H>  12-09        Radiology:  < from: NM GI Bleed Localization (NM GI Bleed Localization .) (12.08.21 @ 13:59) >  IMPRESSION: Abnormal GI bleeding scan.  Findings suspicious for active bleeding in the distal stomach.  < end of copied text >      < from: Upper Endoscopy (12.09.21 @ 08:01) >  Impression:     - Z-line regular, 38 cm from the incisors.                       - 2 cm hiatal hernia.                       - Chronic gastritis.                       - Normal first portion of the duodenum, second portion of the duodenum, third                        portion of the duodenum and fourth portion of the duodenum.                       - Normal examined jejunum.                       - No specimens collected.  < end of copied text >      MEDICATIONS  (STANDING):  aspirin enteric coated 81 milliGRAM(s) Oral daily  atorvastatin 10 milliGRAM(s) Oral at bedtime  bisacodyl 5 milliGRAM(s) Oral at bedtime  dextrose 40% Gel 15 Gram(s) Oral once  dextrose 5%. 1000 milliLiter(s) (50 mL/Hr) IV Continuous <Continuous>  dextrose 5%. 1000 milliLiter(s) (100 mL/Hr) IV Continuous <Continuous>  dextrose 50% Injectable 25 Gram(s) IV Push once  dextrose 50% Injectable 12.5 Gram(s) IV Push once  glucagon  Injectable 1 milliGRAM(s) IntraMuscular once  influenza  Vaccine (HIGH DOSE) 0.7 milliLiter(s) IntraMuscular once  insulin glargine Injectable (LANTUS) 15 Unit(s) SubCutaneous at bedtime  insulin lispro (ADMELOG) corrective regimen sliding scale   SubCutaneous three times a day before meals  insulin lispro (ADMELOG) corrective regimen sliding scale   SubCutaneous at bedtime  pantoprazole  Injectable 40 milliGRAM(s) IV Push two times a day  traZODone 50 milliGRAM(s) Oral at bedtime    MEDICATIONS  (PRN):  acetaminophen     Tablet .. 650 milliGRAM(s) Oral every 6 hours PRN Temp greater or equal to 38C (100.4F), Mild Pain (1 - 3)  melatonin 3 milliGRAM(s) Oral at bedtime PRN Insomnia      Physical Exam:  General: WN/WD NAD  Neurology: A&Ox3, nonfocal, DAVID x 4  Head: Normocephalic, atraumatic  Respiratory: CTAB, resp. even and non-labored bilat   CV: RRR, S1S2  Abd: Soft, NT, ND no palpable mass  MSK: No edema, + peripheral pulses, FROM all 4 extremity    Assessment & Plan:  HPI:  89F w/ h/o CAD s/p CABG, HTN, HLD, PVD s/p stents in bilat legs, CKD (baseline Cr reportedly 1.2), DMT2 (A1c 5.9) presenting for BRBPR on day of admission.  Per her son, pt. had four episodes of bloody BMs w/ clots.  Pt. also has significant h/o gastric ulcer in 2018 which was  2/2 H pylor and is known to have diverticulosis.  Pt. received a total of 4U PRBC during this hospitalization; pt. s/p EGD on 12/9 which revealed chronic gastritis and hiatal hernia; scheduled for colonoscopy in AM.  Now s/p RRT for large BM in setting of bowel prep for procedure.      # GIB  > CBC stable at time of RRT - H/H 10.3/32; transfused 1U PRBC w/ post transfusion H/H 10.3/31.5  > Will continue monitoring CBC q 6hrs   > Monitor vital signs; pt. hemodynamically stable at present time  > Protonix 40 mg IV BID  > Continue bowel prep for scheduled colonoscopy   > Dr. Andrade attending notified of RRT status; agrees w/ above plan     Will endorse to primary team in am.  Attending to follow.    Paige Solis, [facility]-BC  [phone] Medicine NP Episodic Note    CC: GIB    Pt. s/p RRT for large bloody BM.  Please refer to RRT sheet for full details.  Pt. presently stable in bed, A+Ox3, in NAD.     Vital Signs Last 24 Hrs  T(C): 36.8 (10 Dec 2021 00:16), Max: 36.8 (09 Dec 2021 19:54)  T(F): 98.3 (10 Dec 2021 00:16), Max: 98.3 (09 Dec 2021 19:54)  HR: 84 (10 Dec 2021 00:16) (73 - 84)  BP: 173/64 (10 Dec 2021 00:16) (169/73 - 207/56)  BP(mean): --  RR: 16 (10 Dec 2021 00:16) (15 - 22)  SpO2: 97% (10 Dec 2021 00:16) (95% - 100%)      Labs:                          10.3   11.56 )-----------( 185      ( 10 Dec 2021 03:29 )             31.5     12-09    144  |  113<H>  |  28<H>  ----------------------------<  116<H>  4.0   |  18<L>  |  1.21    Ca    8.4      09 Dec 2021 22:20    TPro  6.2  /  Alb  3.5  /  TBili  0.4  /  DBili  x   /  AST  19  /  ALT  14  /  AlkPhos  170<H>  12-09        Radiology:  < from: NM GI Bleed Localization (NM GI Bleed Localization .) (12.08.21 @ 13:59) >  IMPRESSION: Abnormal GI bleeding scan.  Findings suspicious for active bleeding in the distal stomach.  < end of copied text >      < from: Upper Endoscopy (12.09.21 @ 08:01) >  Impression:     - Z-line regular, 38 cm from the incisors.                       - 2 cm hiatal hernia.                       - Chronic gastritis.                       - Normal first portion of the duodenum, second portion of the duodenum, third                        portion of the duodenum and fourth portion of the duodenum.                       - Normal examined jejunum.                       - No specimens collected.  < end of copied text >      MEDICATIONS  (STANDING):  aspirin enteric coated 81 milliGRAM(s) Oral daily  atorvastatin 10 milliGRAM(s) Oral at bedtime  bisacodyl 5 milliGRAM(s) Oral at bedtime  dextrose 40% Gel 15 Gram(s) Oral once  dextrose 5%. 1000 milliLiter(s) (50 mL/Hr) IV Continuous <Continuous>  dextrose 5%. 1000 milliLiter(s) (100 mL/Hr) IV Continuous <Continuous>  dextrose 50% Injectable 25 Gram(s) IV Push once  dextrose 50% Injectable 12.5 Gram(s) IV Push once  glucagon  Injectable 1 milliGRAM(s) IntraMuscular once  influenza  Vaccine (HIGH DOSE) 0.7 milliLiter(s) IntraMuscular once  insulin glargine Injectable (LANTUS) 15 Unit(s) SubCutaneous at bedtime  insulin lispro (ADMELOG) corrective regimen sliding scale   SubCutaneous three times a day before meals  insulin lispro (ADMELOG) corrective regimen sliding scale   SubCutaneous at bedtime  pantoprazole  Injectable 40 milliGRAM(s) IV Push two times a day  traZODone 50 milliGRAM(s) Oral at bedtime    MEDICATIONS  (PRN):  acetaminophen     Tablet .. 650 milliGRAM(s) Oral every 6 hours PRN Temp greater or equal to 38C (100.4F), Mild Pain (1 - 3)  melatonin 3 milliGRAM(s) Oral at bedtime PRN Insomnia      Physical Exam:  General: WN/WD NAD  Neurology: A&Ox3, nonfocal, DAVID x 4  Head: Normocephalic, atraumatic  Respiratory: CTAB, resp. even and non-labored bilat   CV: RRR, S1S2  Abd: Soft, NT, ND no palpable mass  MSK: No edema, + peripheral pulses, FROM all 4 extremity    Assessment & Plan:  HPI:  89F w/ h/o CAD s/p CABG, HTN, HLD, PVD s/p stents in bilat legs, CKD (baseline Cr reportedly 1.2), DMT2 (A1c 5.9) presenting for BRBPR on day of admission.  Per her son, pt. had four episodes of bloody BMs w/ clots.  Pt. also has significant h/o gastric ulcer in 2018 which was  2/2 H pylor and is known to have diverticulosis.  Pt. received a total of 4U PRBC during this hospitalization; pt. s/p EGD on 12/9 which revealed chronic gastritis and hiatal hernia; scheduled for colonoscopy in AM.  Now s/p RRT for large BM in setting of bowel prep for procedure.      # GIB  > CBC stable at time of RRT - H/H 10.3/32; transfused 1U PRBC w/ post transfusion H/H 10.3/31.5  > Will continue monitoring CBC q 6hrs   > Transfuse PRN as indicated   > Monitor vital signs; pt. hemodynamically stable at present time  > Protonix 40 mg IV BID  > Continue bowel prep for scheduled colonoscopy   > Dr. Andrade attending notified of RRT status; agrees w/ above plan     Will endorse to primary team in am.  Attending to follow.    Paige Solis, [facility]-BC  [phone]

## 2021-12-10 NOTE — PROVIDER CONTACT NOTE (OTHER) - ASSESSMENT
pt aox4, pt hypertensive 170s. no complaints of chest pain, sob, headache.
Patient was asleep at time of event  Patient in Afib
pt aox4. no complaints of chest pain, sob, or distress/discomfort.

## 2021-12-10 NOTE — PROGRESS NOTE ADULT - ASSESSMENT
89F w/ h/o CAD s/p CABG, HTN, HLD, PVD s/p stents in bilateral legs, CKD (Baseline Cr reportedly 1.2), DMT2 (A1c 5.9) presenting  for BRBPR on day of admission.        Problem/Plan - 1:  ·  Problem: GI bleeding.   s/p rrt  - Hold plavix   - continue IV PPI   bleeding scan results noted  egd done  no bleeding found  colon this am      Problem/Plan - 2:  ·  Problem: CAD (coronary artery disease).   ·  Plan: - continue asa , hold plavix   - continue statin  - holding metoprolol in setting of hypotension.     Problem/Plan - 3:  ·  Problem: HTN (hypertension).   ·  Plan: Holding antihypertensives given transient hypotension , once clinically improved can restart slowly.     Problem/Plan - 4:  ·  Problem: Diabetes.   - Lantus  hs   - insulin correction scale  - check fsg qac/qhs    - consistent carb diet.     Problem/Plan - 5:  ·  Problem: Unspecified atrial fibrillation.   ·  Plan: afib on EKG ,     Problem/Plan - 6:  ·  Problem: CKD (chronic kidney disease).   ·  Plan: at baseline   -renal dose medications  - monitor creatinine   - avoid nephrotoxins.    dr alicia lopez to cover me over weekend

## 2021-12-10 NOTE — PROVIDER CONTACT NOTE (OTHER) - BACKGROUND
pt is 89 y/o F admitted for GI bleed. pmh DM, HTN, PVD.
Patient admitted for GI bleed. Patient was hypotensive in ED
Patient admitted for GIB
pt 89 y/o F admitted for for GI bleed. pmh: DM, HTN.

## 2021-12-10 NOTE — PROGRESS NOTE ADULT - ASSESSMENT
GIB  Anemia  Monitor hemoglobin, transfuse as needed.   colonoscopy showed diverticulosis   fu with GI    CAD history of cabg  asa on hold   cont statin     PAD  plan as above    HTN   stable

## 2021-12-10 NOTE — PRE PROCEDURE NOTE - PRE PROCEDURE EVALUATION
Attending Physician:                            Procedure: colonoscopy    Indication for Procedure: gi bleed  ________________________________________________________  PAST MEDICAL & SURGICAL HISTORY:  Essential hypertension, benign    Hypercholesteremia    Peripheral vascular disease    Diabetes    S/P carotid endarterectomy    S/P coronary artery by pass    History of cholecystectomy    S/P vascular surgery      ALLERGIES:  No Known Allergies    HOME MEDICATIONS:  AMLODIPINE BESYLATE 10 MG TAB: 1 tab(s) orally once a day  aspirin 81 mg oral tablet: 1 tab(s) orally once a day  ATORVASTATIN 10 MG TABLET: 1 tab(s) orally once a day  Diovan:   LEVEMIR FLEXTOUCH 100 UNIT/ML: 32 unit(s) subcutaneous once a day (at bedtime)  METOPROLOL SUCC  MG TAB: 1 tab(s) orally once a day  PANTOPRAZOLE SOD DR 40 MG TAB: 1 tab(s) orally once a day  Plavix 75 mg oral tablet: 1 tab(s) orally once a day  polyethylene glycol 3350 oral powder for reconstitution: 17 gram(s) orally once a day  senna oral tablet: 2 tab(s) orally once a day (at bedtime)  TRAZODONE 50 MG TABLET: 1 tab(s) orally once a day (at bedtime)    AICD/PPM: [ ] yes   [x ] no    PERTINENT LAB DATA:                        10.3   11.56 )-----------( 185      ( 10 Dec 2021 03:29 )             31.5     12-10    143  |  110<H>  |  25<H>  ----------------------------<  69<L>  3.5   |  21<L>  |  1.23    Ca    8.3<L>      10 Dec 2021 07:03    TPro  6.2  /  Alb  3.5  /  TBili  0.4  /  DBili  x   /  AST  19  /  ALT  14  /  AlkPhos  170<H>  12-09                PHYSICAL EXAMINATION:    T(C): 36.3  HR: 76  BP: 197/64  RR: 16  SpO2: 98%    Constitutional: NAD  HEENT: PERRLA, EOMI,    Neck:  No JVD  Respiratory: CTAB/L  Cardiovascular: S1 and S2  Gastrointestinal: BS+, soft, NT/ND  Extremities: No peripheral edema  Neurological: A/O x 3, no focal deficits  Psychiatric: Normal mood, normal affect  Skin: No rashes    ASA Class: I [ ]  II [ ]  III [x]  IV [ ]    COMMENTS:    The patient is a suitable candidate for the planned procedure unless box checked [ ]  No, explain:

## 2021-12-10 NOTE — PRE-ANESTHESIA EVALUATION ADULT - NSANTHOSAYNRD_GEN_A_CORE
No. LORENZO screening performed.  STOP BANG Legend: 0-2 = LOW Risk; 3-4 = INTERMEDIATE Risk; 5-8 = HIGH Risk
No. LORENZO screening performed.  STOP BANG Legend: 0-2 = LOW Risk; 3-4 = INTERMEDIATE Risk; 5-8 = HIGH Risk

## 2021-12-10 NOTE — PROVIDER CONTACT NOTE (OTHER) - SITUATION
pt had 8 beats WCT.
pt hypertensive on arrival on unit. pressure on 178/70.
2.2 second pause .
Patient with elevated BP since this morning. No on any of her home medications

## 2021-12-10 NOTE — PROVIDER CONTACT NOTE (OTHER) - ACTION/TREATMENT ORDERED:
NP will assess need for BP medications
continue to monitor on tele.
continue to monitor.
continue to monitor tele

## 2021-12-11 LAB
ANION GAP SERPL CALC-SCNC: 13 MMOL/L — SIGNIFICANT CHANGE UP (ref 5–17)
BUN SERPL-MCNC: 24 MG/DL — HIGH (ref 7–23)
CALCIUM SERPL-MCNC: 8.6 MG/DL — SIGNIFICANT CHANGE UP (ref 8.4–10.5)
CHLORIDE SERPL-SCNC: 108 MMOL/L — SIGNIFICANT CHANGE UP (ref 96–108)
CO2 SERPL-SCNC: 21 MMOL/L — LOW (ref 22–31)
CREAT SERPL-MCNC: 1.37 MG/DL — HIGH (ref 0.5–1.3)
GLUCOSE BLDC GLUCOMTR-MCNC: 181 MG/DL — HIGH (ref 70–99)
GLUCOSE BLDC GLUCOMTR-MCNC: 197 MG/DL — HIGH (ref 70–99)
GLUCOSE BLDC GLUCOMTR-MCNC: 218 MG/DL — HIGH (ref 70–99)
GLUCOSE BLDC GLUCOMTR-MCNC: 97 MG/DL — SIGNIFICANT CHANGE UP (ref 70–99)
GLUCOSE SERPL-MCNC: 127 MG/DL — HIGH (ref 70–99)
HCT VFR BLD CALC: 31.9 % — LOW (ref 34.5–45)
HCT VFR BLD CALC: 32.4 % — LOW (ref 34.5–45)
HGB BLD-MCNC: 10.4 G/DL — LOW (ref 11.5–15.5)
HGB BLD-MCNC: 10.4 G/DL — LOW (ref 11.5–15.5)
MCHC RBC-ENTMCNC: 28.3 PG — SIGNIFICANT CHANGE UP (ref 27–34)
MCHC RBC-ENTMCNC: 28.5 PG — SIGNIFICANT CHANGE UP (ref 27–34)
MCHC RBC-ENTMCNC: 32.1 GM/DL — SIGNIFICANT CHANGE UP (ref 32–36)
MCHC RBC-ENTMCNC: 32.6 GM/DL — SIGNIFICANT CHANGE UP (ref 32–36)
MCV RBC AUTO: 87.4 FL — SIGNIFICANT CHANGE UP (ref 80–100)
MCV RBC AUTO: 88 FL — SIGNIFICANT CHANGE UP (ref 80–100)
NRBC # BLD: 0 /100 WBCS — SIGNIFICANT CHANGE UP (ref 0–0)
NRBC # BLD: 0 /100 WBCS — SIGNIFICANT CHANGE UP (ref 0–0)
PLATELET # BLD AUTO: 212 K/UL — SIGNIFICANT CHANGE UP (ref 150–400)
PLATELET # BLD AUTO: 218 K/UL — SIGNIFICANT CHANGE UP (ref 150–400)
POTASSIUM SERPL-MCNC: 3.7 MMOL/L — SIGNIFICANT CHANGE UP (ref 3.5–5.3)
POTASSIUM SERPL-SCNC: 3.7 MMOL/L — SIGNIFICANT CHANGE UP (ref 3.5–5.3)
RBC # BLD: 3.65 M/UL — LOW (ref 3.8–5.2)
RBC # BLD: 3.68 M/UL — LOW (ref 3.8–5.2)
RBC # FLD: 15.7 % — HIGH (ref 10.3–14.5)
RBC # FLD: 15.9 % — HIGH (ref 10.3–14.5)
SODIUM SERPL-SCNC: 142 MMOL/L — SIGNIFICANT CHANGE UP (ref 135–145)
WBC # BLD: 11.02 K/UL — HIGH (ref 3.8–10.5)
WBC # BLD: 11.36 K/UL — HIGH (ref 3.8–10.5)
WBC # FLD AUTO: 11.02 K/UL — HIGH (ref 3.8–10.5)
WBC # FLD AUTO: 11.36 K/UL — HIGH (ref 3.8–10.5)

## 2021-12-11 RX ORDER — METOPROLOL TARTRATE 50 MG
100 TABLET ORAL DAILY
Refills: 0 | Status: DISCONTINUED | OUTPATIENT
Start: 2021-12-11 | End: 2021-12-13

## 2021-12-11 RX ORDER — PANTOPRAZOLE SODIUM 20 MG/1
40 TABLET, DELAYED RELEASE ORAL
Refills: 0 | Status: DISCONTINUED | OUTPATIENT
Start: 2021-12-11 | End: 2021-12-13

## 2021-12-11 RX ADMIN — Medication 3 MILLIGRAM(S): at 21:13

## 2021-12-11 RX ADMIN — Medication 1: at 17:04

## 2021-12-11 RX ADMIN — PANTOPRAZOLE SODIUM 40 MILLIGRAM(S): 20 TABLET, DELAYED RELEASE ORAL at 05:30

## 2021-12-11 RX ADMIN — ATORVASTATIN CALCIUM 10 MILLIGRAM(S): 80 TABLET, FILM COATED ORAL at 21:13

## 2021-12-11 RX ADMIN — INSULIN GLARGINE 15 UNIT(S): 100 INJECTION, SOLUTION SUBCUTANEOUS at 21:15

## 2021-12-11 RX ADMIN — Medication 100 MILLIGRAM(S): at 17:04

## 2021-12-11 RX ADMIN — Medication 50 MILLIGRAM(S): at 21:13

## 2021-12-11 RX ADMIN — Medication 1: at 11:39

## 2021-12-11 NOTE — CHART NOTE - NSCHARTNOTEFT_GEN_A_CORE
Informed by RN that pt had blood pressure of 196/88. Blood pressure was repeated three times, including manually. Pt evaluated at bedside, in no acute distress. Discussed with pt's son her home medications for blood pressure management. Pt was on Metoprolol succinate 100 mg PO daily while at home. Denies CP, palpitations, visual changes, SOB, dizziness, weakness, HA, N/V, and abd pain.     Vital Signs Last 24 Hrs  T(C): 37 (11 Dec 2021 16:10), Max: 37.4 (11 Dec 2021 00:12)  T(F): 98.6 (11 Dec 2021 16:10), Max: 99.4 (11 Dec 2021 00:12)  HR: 87 (11 Dec 2021 16:10) (61 - 87)  BP: 159/57 (11 Dec 2021 19:01) (152/63 - 196/88)  BP(mean): --  RR: 18 (11 Dec 2021 16:10) (18 - 18)  SpO2: 96% (11 Dec 2021 16:10) (92% - 96%)    Physical Exam:  General: WN/WD NAD  Neurology: A&Ox4  Head:  Normocephalic, atraumatic  Respiratory: CTA B/L  CV: RRR, S1S2  Abdominal: Soft, NT, ND    Labs:                          10.4   11.02 )-----------( 218      ( 11 Dec 2021 20:18 )             31.9     12-11    142  |  108  |  24<H>  ----------------------------<  127<H>  3.7   |  21<L>  |  1.37<H>    Ca    8.6      11 Dec 2021 06:44    TPro  6.2  /  Alb  3.5  /  TBili  0.4  /  DBili  x   /  AST  19  /  ALT  14  /  AlkPhos  170<H>  12-09        Assessment & Plan:  89 year old female with h/o CAD s/p CABG, HTN, HLD, PVD s/p stents in bilateral legs, CKD (Baseline Cr reportedly 1.2), DMT2 (A1c 5.9) admitted with BRBPR. Now hypertensive to 196/88.   > Given dose of Metoprolol succinate 100 mg PO. Repeat blood pressure was 159/57.   > VSS at this time and pt is asymptomatic  > Discussed with medical attending  > Will continue to monitor    Alice Landa PA-C  [id number]

## 2021-12-11 NOTE — CHART NOTE - NSCHARTNOTEFT_GEN_A_CORE
Medicine NP note    patient Evaluated for the need to continue tele  Patient afib On tele with Hr 60's to 70's, no tele events   Hb/hct stable  S/P GIB work up  DCP pending  Will D/C tele for now  Please  reinstate tele if patient status changes  Will sign out to day team    Pilo Pierre P BC  [id number]

## 2021-12-11 NOTE — PROGRESS NOTE ADULT - ASSESSMENT
anemia  gi bleed  cad    plan  daily cbc   transfuse prn   consider holding a/c   ? when to restart as diverticular bleed will stop on its own  d/w son  to follow up clinical status  d/c planning  check iron studies   ppi once a day    further recommendations pending above    Advanced care planning was discussed with patient and family.  Advanced care planning forms were reviewed and discussed.  Risks, benefits and alternatives of gastroenterologic procedures were discussed in detail and all questions were answered.    30 minutes spent.

## 2021-12-11 NOTE — PROGRESS NOTE ADULT - ASSESSMENT
89F w/ h/o CAD s/p CABG, HTN, HLD, PVD s/p stents in bilateral legs, CKD (Baseline Cr reportedly 1.2), DMT2 (A1c 5.9) presenting  for BRBPR on day of admission.        Problem/Plan - 1:  ·  Problem: GI bleeding.   s/p rrt  - Hold plavix   - continue IV PPI   bleeding scan results noted  egd done  no bleeding found  colon this am      Problem/Plan - 2:  ·  Problem: CAD (coronary artery disease).   ·  Plan: - continue asa , hold plavix   - continue statin       Problem/Plan - 3:  ·  Problem: HTN (hypertension).   ·  Plan: Holding antihypertensives given transient hypotension , once clinically improved can restart slowly.     Problem/Plan - 4:  ·  Problem: Diabetes.   - Lantus  hs   - insulin correction scale  - check fsg qac/qhs    - consistent carb diet.     Problem/Plan - 5:  ·  Problem: Unspecified atrial fibrillation.   ·  Plan: afib on EKG ,     Problem/Plan - 6:  ·  Problem: CKD (chronic kidney disease).   ·  Plan: at baseline   -renal dose medications  - monitor creatinine   - avoid nephrotoxins.

## 2021-12-12 ENCOUNTER — TRANSCRIPTION ENCOUNTER (OUTPATIENT)
Age: 86
End: 2021-12-12

## 2021-12-12 LAB
ANION GAP SERPL CALC-SCNC: 14 MMOL/L — SIGNIFICANT CHANGE UP (ref 5–17)
BUN SERPL-MCNC: 29 MG/DL — HIGH (ref 7–23)
CALCIUM SERPL-MCNC: 8.6 MG/DL — SIGNIFICANT CHANGE UP (ref 8.4–10.5)
CHLORIDE SERPL-SCNC: 108 MMOL/L — SIGNIFICANT CHANGE UP (ref 96–108)
CO2 SERPL-SCNC: 20 MMOL/L — LOW (ref 22–31)
CREAT SERPL-MCNC: 1.47 MG/DL — HIGH (ref 0.5–1.3)
GLUCOSE BLDC GLUCOMTR-MCNC: 120 MG/DL — HIGH (ref 70–99)
GLUCOSE BLDC GLUCOMTR-MCNC: 147 MG/DL — HIGH (ref 70–99)
GLUCOSE BLDC GLUCOMTR-MCNC: 166 MG/DL — HIGH (ref 70–99)
GLUCOSE BLDC GLUCOMTR-MCNC: 208 MG/DL — HIGH (ref 70–99)
GLUCOSE SERPL-MCNC: 139 MG/DL — HIGH (ref 70–99)
HCT VFR BLD CALC: 31.3 % — LOW (ref 34.5–45)
HGB BLD-MCNC: 9.9 G/DL — LOW (ref 11.5–15.5)
MCHC RBC-ENTMCNC: 28.1 PG — SIGNIFICANT CHANGE UP (ref 27–34)
MCHC RBC-ENTMCNC: 31.6 GM/DL — LOW (ref 32–36)
MCV RBC AUTO: 88.9 FL — SIGNIFICANT CHANGE UP (ref 80–100)
NRBC # BLD: 0 /100 WBCS — SIGNIFICANT CHANGE UP (ref 0–0)
PLATELET # BLD AUTO: 204 K/UL — SIGNIFICANT CHANGE UP (ref 150–400)
POTASSIUM SERPL-MCNC: 3.8 MMOL/L — SIGNIFICANT CHANGE UP (ref 3.5–5.3)
POTASSIUM SERPL-SCNC: 3.8 MMOL/L — SIGNIFICANT CHANGE UP (ref 3.5–5.3)
RBC # BLD: 3.52 M/UL — LOW (ref 3.8–5.2)
RBC # FLD: 15.8 % — HIGH (ref 10.3–14.5)
SARS-COV-2 RNA SPEC QL NAA+PROBE: SIGNIFICANT CHANGE UP
SODIUM SERPL-SCNC: 142 MMOL/L — SIGNIFICANT CHANGE UP (ref 135–145)
WBC # BLD: 9.42 K/UL — SIGNIFICANT CHANGE UP (ref 3.8–10.5)
WBC # FLD AUTO: 9.42 K/UL — SIGNIFICANT CHANGE UP (ref 3.8–10.5)

## 2021-12-12 RX ORDER — AMLODIPINE BESYLATE 2.5 MG/1
5 TABLET ORAL DAILY
Refills: 0 | Status: DISCONTINUED | OUTPATIENT
Start: 2021-12-12 | End: 2021-12-12

## 2021-12-12 RX ORDER — HYDRALAZINE HCL 50 MG
10 TABLET ORAL ONCE
Refills: 0 | Status: COMPLETED | OUTPATIENT
Start: 2021-12-12 | End: 2021-12-12

## 2021-12-12 RX ORDER — AMLODIPINE BESYLATE 2.5 MG/1
5 TABLET ORAL ONCE
Refills: 0 | Status: COMPLETED | OUTPATIENT
Start: 2021-12-12 | End: 2021-12-12

## 2021-12-12 RX ORDER — HYDRALAZINE HCL 50 MG
5 TABLET ORAL ONCE
Refills: 0 | Status: DISCONTINUED | OUTPATIENT
Start: 2021-12-12 | End: 2021-12-12

## 2021-12-12 RX ORDER — AMLODIPINE BESYLATE 2.5 MG/1
10 TABLET ORAL DAILY
Refills: 0 | Status: DISCONTINUED | OUTPATIENT
Start: 2021-12-12 | End: 2021-12-13

## 2021-12-12 RX ORDER — CLOPIDOGREL BISULFATE 75 MG/1
1 TABLET, FILM COATED ORAL
Qty: 0 | Refills: 0 | DISCHARGE

## 2021-12-12 RX ADMIN — INSULIN GLARGINE 15 UNIT(S): 100 INJECTION, SOLUTION SUBCUTANEOUS at 21:23

## 2021-12-12 RX ADMIN — Medication 3 MILLIGRAM(S): at 21:23

## 2021-12-12 RX ADMIN — AMLODIPINE BESYLATE 5 MILLIGRAM(S): 2.5 TABLET ORAL at 11:48

## 2021-12-12 RX ADMIN — PANTOPRAZOLE SODIUM 40 MILLIGRAM(S): 20 TABLET, DELAYED RELEASE ORAL at 05:20

## 2021-12-12 RX ADMIN — ATORVASTATIN CALCIUM 10 MILLIGRAM(S): 80 TABLET, FILM COATED ORAL at 21:23

## 2021-12-12 RX ADMIN — Medication 2: at 11:48

## 2021-12-12 RX ADMIN — Medication 5 MILLIGRAM(S): at 21:23

## 2021-12-12 RX ADMIN — AMLODIPINE BESYLATE 5 MILLIGRAM(S): 2.5 TABLET ORAL at 15:34

## 2021-12-12 RX ADMIN — Medication 50 MILLIGRAM(S): at 21:23

## 2021-12-12 RX ADMIN — Medication 10 MILLIGRAM(S): at 17:20

## 2021-12-12 RX ADMIN — Medication 100 MILLIGRAM(S): at 05:19

## 2021-12-12 NOTE — DISCHARGE NOTE PROVIDER - CARE PROVIDER_API CALL
Ced Rodney)  Cardiovascular Disease; Nuclear Cardiology  [address]  [place] [address]  Phone: [phone]  Fax: [phone]  Follow Up Time: 1 week    Rony Trujillo  Primary care doctor  [address] [place] [address]  Phone: [phone]  Fax: (   )    -  Established Patient  Follow Up Time: 1 week    Dani Toscano ()  Internal Medicine  [address] [place] [address]  Phone: [phone]  Fax: [phone]  Follow Up Time: 1 week

## 2021-12-12 NOTE — PROGRESS NOTE ADULT - ASSESSMENT
GIB  Anemia  Monitor hemoglobin, transfuse as needed.   colonoscopy showed diverticulosis   fu with GI    CAD history of cabg  asa on hold   cont statin     PAD  plan as above    HTN   add amlodipine     "PAF"  Hr stable  at present time she is not a candidate for a/c given significant GIB  though no evidence of afib appreciated on tele

## 2021-12-12 NOTE — DISCHARGE NOTE PROVIDER - NSDCMRMEDTOKEN_GEN_ALL_CORE_FT
AMLODIPINE BESYLATE 10 MG TAB: 1 tab(s) orally once a day  aspirin 81 mg oral tablet: 1 tab(s) orally once a day  ATORVASTATIN 10 MG TABLET: 1 tab(s) orally once a day  Diovan:   Lasix 20 mg oral tablet: 1 tab(s) orally once a day   LEVEMIR FLEXTOUCH 100 UNIT/ML: 32 unit(s) subcutaneous once a day (at bedtime)  METOPROLOL SUCC  MG TAB: 1 tab(s) orally once a day  PANTOPRAZOLE SOD DR 40 MG TAB: 1 tab(s) orally once a day  Plavix 75 mg oral tablet: 1 tab(s) orally once a day  polyethylene glycol 3350 oral powder for reconstitution: 17 gram(s) orally once a day  senna oral tablet: 2 tab(s) orally once a day (at bedtime)  TRAZODONE 50 MG TABLET: 1 tab(s) orally once a day (at bedtime)   AMLODIPINE BESYLATE 10 MG TAB: 1 tab(s) orally once a day  aspirin 81 mg oral tablet: 1 tab(s) orally once a day  ATORVASTATIN 10 MG TABLET: 1 tab(s) orally once a day  Diovan:   Lasix 20 mg oral tablet: 1 tab(s) orally once a day   LEVEMIR FLEXTOUCH 100 UNIT/ML: 32 unit(s) subcutaneous once a day (at bedtime)  METOPROLOL SUCC  MG TAB: 1 tab(s) orally once a day  PANTOPRAZOLE SOD DR 40 MG TAB: 1 tab(s) orally once a day  polyethylene glycol 3350 oral powder for reconstitution: 17 gram(s) orally once a day  senna oral tablet: 2 tab(s) orally once a day (at bedtime)  TRAZODONE 50 MG TABLET: 1 tab(s) orally once a day (at bedtime)   AMLODIPINE BESYLATE 10 MG TAB: 1 tab(s) orally once a day  ATORVASTATIN 10 MG TABLET: 1 tab(s) orally once a day  Lasix 20 mg oral tablet: 1 tab(s) orally once a day   LEVEMIR FLEXTOUCH 100 UNIT/ML: 32 unit(s) subcutaneous once a day (at bedtime)  METOPROLOL SUCC  MG TAB: 1 tab(s) orally once a day  PANTOPRAZOLE SOD DR 40 MG TAB: 1 tab(s) orally once a day  polyethylene glycol 3350 oral powder for reconstitution: 17 gram(s) orally once a day  senna oral tablet: 2 tab(s) orally once a day (at bedtime)  TRAZODONE 50 MG TABLET: 1 tab(s) orally once a day (at bedtime)

## 2021-12-12 NOTE — DISCHARGE NOTE NURSING/CASE MANAGEMENT/SOCIAL WORK - NSDCPEFALRISK_GEN_ALL_CORE
For information on Fall & Injury Prevention, visit: [email or web address] OR  [email or web address] OR  https://www.cdc.gov/steadi/patient.html

## 2021-12-12 NOTE — DISCHARGE NOTE PROVIDER - CARE PROVIDERS DIRECT ADDRESSES
,[email or web address],DirectAddress_Unknown,DirectAddress_Unknown

## 2021-12-12 NOTE — DISCHARGE NOTE PROVIDER - HOSPITAL COURSE
Patient is a 89 year old female with h/o CAD s/p CABG, HTN, HLD, PVD s/p stents in bilateral legs, CKD (Baseline Cr reportedly 1.2), DMT2 (A1c 5.9) presenting  for BRBPR on day of admission.     per her son, patient had four episodes of  bloody bowel movements with clots . Patient then complained of lightheaded and fatigued and became lethargic. She reported no chest pain or shortness of breath. She reported mild left lower abdominal pain, no epigastric pain , no melanotic stool. She takes aspirin and Plavix , but no other blood thinners , no NSaid use.  Patient has a history of gastric ulcer in 2018 which was  2/2 H pylori. She also has known diverticulosis.     ED course:  transiently hypotensive , hypothermic with temp 96.4F and was placed on a bear hugger. Patient transfused 2U prbcs     89F w/ h/o CAD s/p CABG, HTN, HLD, PVD s/p stents in bilateral legs, CKD (Baseline Cr reportedly 1.2), DMT2 (A1c 5.9) presenting  for BRBPR on day of admission.        Problem/Plan - 1:  ·  Problem: GI bleeding.   - Hold plavix   - continue PO PPI   bleeding scan results noted  egd done  no bleeding found, diverticulosis       Problem/Plan - 2:  ·  Problem: CAD (coronary artery disease).   ·  Plan: - continue asa , hold plavix   - continue statin       Problem/Plan - 3:  ·  Problem: HTN (hypertension).   ·  Plan: Holding antihypertensives given transient hypotension , once clinically improved can restart slowly.     Problem/Plan - 4:  ·  Problem: Diabetes.   - Lantus  hs   - insulin correction scale  - check fsg qac/qhs    - consistent carb diet.     Problem/Plan - 5:  ·  Problem: Unspecified atrial fibrillation.   ·  Plan: afib on EKG ,     Problem/Plan - 6:  ·  Problem: CKD (chronic kidney disease).   ·  Plan: at baseline   -renal dose medications  - monitor creatinine   - avoid nephrotoxins.     Patient is a 89 year old female with h/o CAD s/p CABG, HTN, HLD, PVD s/p stents in bilateral legs, CKD (Baseline Cr reportedly 1.2), DMT2 (A1c 5.9) presenting  for BRBPR on day of admission.     per her son, patient had four episodes of  bloody bowel movements with clots . Patient then complained of lightheaded and fatigued and became lethargic. She reported no chest pain or shortness of breath. She reported mild left lower abdominal pain, no epigastric pain , no melanotic stool. She takes aspirin and Plavix , but no other blood thinners , no NSaid use.  Patient has a history of gastric ulcer in 2018 which was  2/2 H pylori. She also has known diverticulosis.     ED course:  transiently hypotensive , hypothermic with temp 96.4F and was placed on a bear hugger. Patient transfused 2U prbcs     89F w/ h/o CAD s/p CABG, HTN, HLD, PVD s/p stents in bilateral legs, CKD (Baseline Cr reportedly 1.2), DMT2 (A1c 5.9) presenting  for BRBPR on day of admission.        Problem/Plan - 1:  ·  Problem: GI bleeding.   - Hold plavix   - continue PO PPI   bleeding scan results noted  egd done  no bleeding found, diverticulosis       Problem/Plan - 2:  ·  Problem: CAD (coronary artery disease).   ·  Plan: - continue asa , hold plavix   - continue statin       Problem/Plan - 3:  ·  Problem: HTN (hypertension).   ·  Plan: metoprolol and amlodipine       Problem/Plan - 5:  ·  Problem: Unspecified atrial fibrillation.   ·  Plan: afib on EKG ,     Problem/Plan - 6:  ·  Problem: CKD (chronic kidney disease).   ·  Plan: at baseline   -renal dose medications   - avoid nephrotoxins.     Patient is a 89 year old female with h/o CAD s/p CABG, HTN, HLD, PVD s/p stents in bilateral legs, CKD (Baseline Cr reportedly 1.2), DMT2 (A1c 5.9) presenting  for BRBPR on day of admission.     per her son, patient had four episodes of  bloody bowel movements with clots . Patient then complained of lightheaded and fatigued and became lethargic. She reported no chest pain or shortness of breath. She reported mild left lower abdominal pain, no epigastric pain , no melanotic stool. She takes aspirin and Plavix , but no other blood thinners , no NSaid use.  Patient has a history of gastric ulcer in 2018 which was  2/2 H pylori. She also has known diverticulosis.     ED course:  transiently hypotensive , hypothermic with temp 96.4F and was placed on a bear hugger. Patient transfused 2U prbcs     89F w/ h/o CAD s/p CABG, HTN, HLD, PVD s/p stents in bilateral legs, CKD (Baseline Cr reportedly 1.2), DMT2 (A1c 5.9) presenting  for BRBPR on day of admission.        Problem/Plan - 1:  ·  Problem: GI bleeding.   - Hold plavix   - continue PO PPI   bleeding scan results noted  egd done  no bleeding found, diverticulosis       Problem/Plan - 2:  ·  Problem: CAD (coronary artery disease).   ·  Plan: - continue asa , hold plavix   - continue statin       Problem/Plan - 3:  ·  Problem: HTN (hypertension).   ·  Plan: metoprolol and amlodipine       Problem/Plan - 5:  ·  Problem: Unspecified atrial fibrillation.   ·  Plan: afib on EKG ,     Problem/Plan - 6:  ·  Problem: CKD (chronic kidney disease).   ·  Plan: at baseline   -renal dose medications   - avoid nephrotoxins.    Pt /family refusing PETAR, Discharge home.

## 2021-12-12 NOTE — DISCHARGE NOTE PROVIDER - NSDCCPCAREPLAN_GEN_ALL_CORE_FT
PRINCIPAL DISCHARGE DIAGNOSIS  Diagnosis: Lower GI bleeding  Assessment and Plan of Treatment: You were transfused 3 unnits of blood on this admission, your hemoglobin has been stable the last several days. Please make sure to follow up with your gastroenterologist, Dr. Toscano.   The Plavix is currently on hold due to risk of bleeding, follow up with gastroenterologist when to restart. Please continue to take the pantoprazole.   If you experience blood per rectum or by mouth, hypotension, dizziness, SOB or chest pain go to the emergency room      SECONDARY DISCHARGE DIAGNOSES  Diagnosis: HTN (hypertension)  Assessment and Plan of Treatment: Continue taking Metroprolol, Amlodipine, and Valsartan. Make sure to follow up wiht your cardiologist, Dr. wagner and your primary care docotor, Dr. Trujillo    Diagnosis: Diabetes  Assessment and Plan of Treatment: Continue your usual home regimen.    Diagnosis: CAD (coronary artery disease)  Assessment and Plan of Treatment:     Diagnosis: CKD (chronic kidney disease)  Assessment and Plan of Treatment:      PRINCIPAL DISCHARGE DIAGNOSIS  Diagnosis: Lower GI bleeding  Assessment and Plan of Treatment: You were transfused 3 units of blood on this admission, your hemoglobin has been stable the last several days. Please make sure to follow up with your gastroenterologist, Dr. Toscano.   The Plavix/ASA is currently on hold for at least one week due to risk of bleeding Follow up with gastroenterologist and cardiology next week to restart. Please continue to take the pantoprazole.   If you experience blood per rectum or by mouth, hypotension, dizziness, SOB or chest pain go to the emergency room      SECONDARY DISCHARGE DIAGNOSES  Diagnosis: CAD (coronary artery disease)  Assessment and Plan of Treatment: Coronary artery disease is a condition where the arteries the supply the heart muscle get clogged with fatty deposits & puts you at risk for a heart attack  Call your doctor if you have any new pain, pressure, or discomfort in the center of your chest, pain, tingling or discomfort in arms, back, neck, jaw, or stomach, shortness of breath, nausea, vomiting, burping or heartburn, sweating, cold and clammy skin, racing or abnormal heartbeat for more than 10 minutes or if they keep coming & going.  Call 911 and do not tr to get to hospital by care  You can help yourself with lefestyle changes (quitting smoking if you smoke), eat lots of fruits & vegetables & low fat dairy products, not a lot of meat & fatty foods, walk or some form of physical activity most days of the week, lose weight if you are overweight  Take your cardiac medication as prescribed to lower cholesterol, to lower blood pressure, aspirin to prevent blood clots, and diabetes control  Make sure to keep appointments with doctor for cardiac follow up care      Diagnosis: HTN (hypertension)  Assessment and Plan of Treatment: Continue taking Metroprolol and Amlodipine. Make sure to follow up wiht your cardiologist, Dr. wagner and your primary care docotor, Dr. Trujillo    Diagnosis: CKD (chronic kidney disease)  Assessment and Plan of Treatment: Avoid taking (NSAIDs) - (ex: Ibuprofen, Advil, Celebrex, Naprosyn)  Avoid taking any nephrotoxic agents (can harm kidneys) - Intravenous contrast for diagnostic testing, combination cold medications.  Have all medications adjusted for your renal function by your Health Care Provider.  Blood pressure control is important.  Take all medication as prescribed.      Diagnosis: Diabetes  Assessment and Plan of Treatment: HgA1C this admission was 6.4  Make sure you get your HgA1c checked every three months.  If you take oral diabetes medications, check your blood glucose two times a day.  If you take insulin, check your blood glucose before meals and at bedtime.  It's important not to skip any meals.  Keep a log of your blood glucose results and always take it with you to your doctor appointments.  Keep a list of your current medications including injectables and over the counter medications and bring this medication list with you to all your doctor appointments.  If you have not seen your ophthalmologist this year call for appointment.  Check your feet daily for redness, sores, or openings. Do not self treat. If no improvement in two days call your primary care physician for an appointment.  Low blood sugar (hypoglycemia) is a blood sugar below 70mg/dl. Check your blood sugar if you feel signs/symptoms of hypoglycemia. If your blood sugar is below 70 take 15 grams of carbohydrates (ex 4 oz of apple juice, 3-4 glucose tablets, or 4-6 oz of regular soda) wait 15 minutes and repeat blood sugar to make sure it comes up above 70.  If your blood sugar is above 70 and you are due for a meal, have a meal.  If you are not due for a meal have a snack.  This snack helps keeps your blood sugar at a safe range.       PRINCIPAL DISCHARGE DIAGNOSIS  Diagnosis: Lower GI bleeding  Assessment and Plan of Treatment: You were transfused 3 units of blood on this admission, your hemoglobin has been stable the last several days. Please make sure to follow up with your gastroenterologist, Dr. Toscano.   The Plavix/ASA is currently on hold for at least one week due to risk of bleeding Follow up with gastroenterologist and cardiology next week to restart. Please continue to take the pantoprazole.   If you experience blood per rectum or by mouth, hypotension, dizziness, SOB or chest pain go to the emergency room      SECONDARY DISCHARGE DIAGNOSES  Diagnosis: CAD (coronary artery disease)  Assessment and Plan of Treatment: Coronary artery disease is a condition where the arteries the supply the heart muscle get clogged with fatty deposits & puts you at risk for a heart attack  Call your doctor if you have any new pain, pressure, or discomfort in the center of your chest, pain, tingling or discomfort in arms, back, neck, jaw, or stomach, shortness of breath, nausea, vomiting, burping or heartburn, sweating, cold and clammy skin, racing or abnormal heartbeat for more than 10 minutes or if they keep coming & going.  Call 911 and do not tr to get to hospital by care  You can help yourself with lefestyle changes (quitting smoking if you smoke), eat lots of fruits & vegetables & low fat dairy products, not a lot of meat & fatty foods, walk or some form of physical activity most days of the week, lose weight if you are overweight  Take your cardiac medication as prescribed to lower cholesterol, to lower blood pressure, aspirin to prevent blood clots, and diabetes control  Make sure to keep appointments with doctor for cardiac follow up care      Diagnosis: HTN (hypertension)  Assessment and Plan of Treatment: Continue taking Metroprolol and Amlodipine. Make sure to follow up wiht your cardiologist, Dr. wagner and your primary care docotor, Dr. Trujillo    Diagnosis: CKD (chronic kidney disease)  Assessment and Plan of Treatment: Avoid taking (NSAIDs) - (ex: Ibuprofen, Advil, Celebrex, Naprosyn)  Avoid taking any nephrotoxic agents (can harm kidneys) - Intravenous contrast for diagnostic testing, combination cold medications.  Have all medications adjusted for your renal function by your Health Care Provider.  Blood pressure control is important.  Take all medication as prescribed.      Diagnosis: Diabetes  Assessment and Plan of Treatment: HgA1C this admission was 6.4  Make sure you get your HgA1c checked every three months.  If you take oral diabetes medications, check your blood glucose two times a day.  If you take insulin, check your blood glucose before meals and at bedtime.  It's important not to skip any meals.  Keep a log of your blood glucose results and always take it with you to your doctor appointments.  Keep a list of your current medications including injectables and over the counter medications and bring this medication list with you to all your doctor appointments.  If you have not seen your ophthalmologist this year call for appointment.  Check your feet daily for redness, sores, or openings. Do not self treat. If no improvement in two days call your primary care physician for an appointment.  Low blood sugar (hypoglycemia) is a blood sugar below 70mg/dl. Check your blood sugar if you feel signs/symptoms of hypoglycemia. If your blood sugar is below 70 take 15 grams of carbohydrates (ex 4 oz of apple juice, 3-4 glucose tablets, or 4-6 oz of regular soda) wait 15 minutes and repeat blood sugar to make sure it comes up above 70.  If your blood sugar is above 70 and you are due for a meal, have a meal.  If you are not due for a meal have a snack.  This snack helps keeps your blood sugar at a safe range.      Diagnosis: Diastolic CHF, chronic  Assessment and Plan of Treatment: Weigh yourself daily.  If you gain 3lbs in 3 days, or 5lbs in a week call your Health Care Provider.  Do not eat or drink foods containing more than 2000mg of salt (sodium) in your diet every day.  Call your Health Care Provider if you have any swelling or increased swelling in your feet, ankles, and/or stomach.  Take all of your medication as directed.  If you become dizzy call your Health Care Provider.

## 2021-12-12 NOTE — DISCHARGE NOTE NURSING/CASE MANAGEMENT/SOCIAL WORK - PATIENT PORTAL LINK FT
You can access the FollowMyHealth Patient Portal offered by [facility] by registering at the following website: [email or web address]. By joining [facility]’s FollowMyHealth portal, you will also be able to view your health information using other applications (apps) compatible with our system.

## 2021-12-12 NOTE — CHART NOTE - NSCHARTNOTEFT_GEN_A_CORE
Pt was due to be discharged today, prior to DC rechecked vitals. Called by nurse for a BP of 188/90 electronically, manually 180/90. Pt received blood pressure medications earlier today.   Pt seen at bedside, asymptomatic, denies chest pain or SOB. Given IV hydralazine 10 mg. Discussed with Dr. Hackett from cardiology.   We will observe overnight and f/u in the morning with cardiology regarding Blood pressure management

## 2021-12-12 NOTE — DISCHARGE NOTE PROVIDER - NSDCFUADDAPPT_GEN_ALL_CORE_FT
APPTS ARE READY TO BE MADE: [x ] YES    Best Family or Patient Contact (if needed): Marcial Javier [phone]    Additional Information about above appointments (if needed):    1:   2:   3:     Other comments or requests:    APPTS ARE READY TO BE MADE: [x ] YES    Best Family or Patient Contact (if needed): Marcial Javier [phone]    Additional Information about above appointments (if needed):    1: Repeat lab work, patient will need to restart ASA/plavix next week   2:   3:     Other comments or requests:    APPTS ARE READY TO BE MADE: [x ] YES    Best Family or Patient Contact (if needed): Marcial Javier [phone]    Additional Information about above appointments (if needed):    1: Repeat lab work, patient will need to restart ASA/plavix next week   2: 	Patient was provided with (doctors name and information) and advised to call to schedule follow up within specified time frame. At this time patient declined scheduling assistance.  3: Prior Appointment scheduled 	  	Patient was previously scheduled with Ced Null  on (12/21/21) (4:30pm) at ([phone])    Other comments or requests:

## 2021-12-12 NOTE — DISCHARGE NOTE PROVIDER - PROVIDER TOKENS
PROVIDER:[TOKEN:[[id number]],FOLLOWUP:[1 week]],FREE:[LAST:[Rany],FIRST:[Condos],PHONE:[phone]],FAX:[(   )    -],ADDRESS:[Primary care doctor  [address] [place] [address]],FOLLOWUP:[1 week],ESTABLISHEDPATIENT:[T]],PROVIDER:[TOKEN:[[id number]],FOLLOWUP:[1 week]]

## 2021-12-12 NOTE — DISCHARGE NOTE NURSING/CASE MANAGEMENT/SOCIAL WORK - NSDCFUADDAPPT_GEN_ALL_CORE_FT
APPTS ARE READY TO BE MADE: [x ] YES    Best Family or Patient Contact (if needed): Marcial Javier [phone]    Additional Information about above appointments (if needed):    1:   2:   3:     Other comments or requests:

## 2021-12-13 VITALS
SYSTOLIC BLOOD PRESSURE: 160 MMHG | RESPIRATION RATE: 18 BRPM | HEART RATE: 80 BPM | OXYGEN SATURATION: 95 % | DIASTOLIC BLOOD PRESSURE: 55 MMHG | TEMPERATURE: 98 F

## 2021-12-13 LAB
ANION GAP SERPL CALC-SCNC: 15 MMOL/L — SIGNIFICANT CHANGE UP (ref 5–17)
BUN SERPL-MCNC: 32 MG/DL — HIGH (ref 7–23)
CALCIUM SERPL-MCNC: 8.3 MG/DL — LOW (ref 8.4–10.5)
CHLORIDE SERPL-SCNC: 103 MMOL/L — SIGNIFICANT CHANGE UP (ref 96–108)
CO2 SERPL-SCNC: 20 MMOL/L — LOW (ref 22–31)
CREAT SERPL-MCNC: 1.41 MG/DL — HIGH (ref 0.5–1.3)
GLUCOSE BLDC GLUCOMTR-MCNC: 113 MG/DL — HIGH (ref 70–99)
GLUCOSE BLDC GLUCOMTR-MCNC: 159 MG/DL — HIGH (ref 70–99)
GLUCOSE BLDC GLUCOMTR-MCNC: 210 MG/DL — HIGH (ref 70–99)
GLUCOSE SERPL-MCNC: 236 MG/DL — HIGH (ref 70–99)
HCT VFR BLD CALC: 30.5 % — LOW (ref 34.5–45)
HGB BLD-MCNC: 9.8 G/DL — LOW (ref 11.5–15.5)
MCHC RBC-ENTMCNC: 28.8 PG — SIGNIFICANT CHANGE UP (ref 27–34)
MCHC RBC-ENTMCNC: 32.1 GM/DL — SIGNIFICANT CHANGE UP (ref 32–36)
MCV RBC AUTO: 89.7 FL — SIGNIFICANT CHANGE UP (ref 80–100)
NRBC # BLD: 0 /100 WBCS — SIGNIFICANT CHANGE UP (ref 0–0)
PLATELET # BLD AUTO: 234 K/UL — SIGNIFICANT CHANGE UP (ref 150–400)
POTASSIUM SERPL-MCNC: 3.8 MMOL/L — SIGNIFICANT CHANGE UP (ref 3.5–5.3)
POTASSIUM SERPL-SCNC: 3.8 MMOL/L — SIGNIFICANT CHANGE UP (ref 3.5–5.3)
RBC # BLD: 3.4 M/UL — LOW (ref 3.8–5.2)
RBC # FLD: 15.7 % — HIGH (ref 10.3–14.5)
SODIUM SERPL-SCNC: 138 MMOL/L — SIGNIFICANT CHANGE UP (ref 135–145)
WBC # BLD: 9.81 K/UL — SIGNIFICANT CHANGE UP (ref 3.8–10.5)
WBC # FLD AUTO: 9.81 K/UL — SIGNIFICANT CHANGE UP (ref 3.8–10.5)

## 2021-12-13 RX ORDER — CX-024414 0.2 MG/ML
0.25 INJECTION, SUSPENSION INTRAMUSCULAR ONCE
Refills: 0 | Status: COMPLETED | OUTPATIENT
Start: 2021-12-13 | End: 2021-12-13

## 2021-12-13 RX ORDER — ASPIRIN/CALCIUM CARB/MAGNESIUM 324 MG
1 TABLET ORAL
Qty: 0 | Refills: 0 | DISCHARGE

## 2021-12-13 RX ORDER — VALSARTAN 80 MG/1
0 TABLET ORAL
Qty: 0 | Refills: 0 | DISCHARGE

## 2021-12-13 RX ADMIN — PANTOPRAZOLE SODIUM 40 MILLIGRAM(S): 20 TABLET, DELAYED RELEASE ORAL at 05:53

## 2021-12-13 RX ADMIN — Medication 100 MILLIGRAM(S): at 05:52

## 2021-12-13 RX ADMIN — CX-024414 0.25 MILLILITER(S): 0.2 INJECTION, SUSPENSION INTRAMUSCULAR at 18:56

## 2021-12-13 RX ADMIN — Medication 2: at 11:47

## 2021-12-13 RX ADMIN — Medication 1: at 16:51

## 2021-12-13 RX ADMIN — AMLODIPINE BESYLATE 10 MILLIGRAM(S): 2.5 TABLET ORAL at 05:52

## 2021-12-13 NOTE — PROGRESS NOTE ADULT - REASON FOR ADMISSION
GI bleeding x 1 day

## 2021-12-13 NOTE — CHART NOTE - NSCHARTNOTESELECT_GEN_ALL_CORE
Event Note
Post RRT Note/Event Note
hypertensive/Event Note
Covid booster/Event Note
Event Note
Event Note

## 2021-12-13 NOTE — PROGRESS NOTE ADULT - ASSESSMENT
anemia  gi bleed  cad    plan  s/p colonoscopy likely gi bleed from divertciuli  daily cbc   transfuse prn   PPI once a day  holding a/c   would hold asa for 1 week   d/w son  to follow up clinical status  d/c planning        Advanced care planning was discussed with patient and family.  Advanced care planning forms were reviewed and discussed.  Risks, benefits and alternatives of gastroenterologic procedures were discussed in detail and all questions were answered.    30 minutes spent.

## 2021-12-13 NOTE — PROGRESS NOTE ADULT - ASSESSMENT
89F w/ h/o CAD s/p CABG, HTN, HLD, PVD s/p stents in bilateral legs, CKD (Baseline Cr reportedly 1.2), DMT2 (A1c 5.9) presenting  for BRBPR on day of admission.        Problem/Plan - 1:  ·  Problem: GI bleeding.   - Hold plavix /asa  - continue IV PPI   bleeding scan results noted  egd done  no bleeding found  colon noted  diverticular disease     Problem/Plan - 2:  ·  Problem: CAD (coronary artery disease).   ·  Plan: - continue to hold asa for 1 week  - continue statin       Problem/Plan - 3:  ·  Problem: HTN (hypertension).   c. meds     Problem/Plan - 4:  ·  Problem: Diabetes.   - Lantus  hs   - insulin correction scale  - check fsg qac/qhs    - consistent carb diet.     Problem/Plan - 5:  ·  Problem: Unspecified atrial fibrillation.   ·  Plan: afib on EKG ,     Problem/Plan - 6:  ·  Problem: CKD (chronic kidney disease).   ·  Plan: at baseline   -renal dose medications  - monitor creatinine   - avoid nephrotoxins.    d/c planning

## 2021-12-13 NOTE — PROGRESS NOTE ADULT - SUBJECTIVE AND OBJECTIVE BOX
DATE OF SERVICE: 12-11-21 @ 11:29    Subjective: Patient seen and examined. No new events except as noted.     SUBJECTIVE/ROS:  nad       MEDICATIONS:  MEDICATIONS  (STANDING):  atorvastatin 10 milliGRAM(s) Oral at bedtime  bisacodyl 5 milliGRAM(s) Oral at bedtime  dextrose 40% Gel 15 Gram(s) Oral once  dextrose 5%. 1000 milliLiter(s) (50 mL/Hr) IV Continuous <Continuous>  dextrose 5%. 1000 milliLiter(s) (100 mL/Hr) IV Continuous <Continuous>  dextrose 50% Injectable 25 Gram(s) IV Push once  dextrose 50% Injectable 12.5 Gram(s) IV Push once  glucagon  Injectable 1 milliGRAM(s) IntraMuscular once  influenza  Vaccine (HIGH DOSE) 0.7 milliLiter(s) IntraMuscular once  insulin glargine Injectable (LANTUS) 15 Unit(s) SubCutaneous at bedtime  insulin lispro (ADMELOG) corrective regimen sliding scale   SubCutaneous three times a day before meals  insulin lispro (ADMELOG) corrective regimen sliding scale   SubCutaneous at bedtime  pantoprazole    Tablet 40 milliGRAM(s) Oral before breakfast  traZODone 50 milliGRAM(s) Oral at bedtime      PHYSICAL EXAM:  T(C): 37 (12-11-21 @ 08:35), Max: 37.4 (12-11-21 @ 00:12)  HR: 70 (12-11-21 @ 08:35) (61 - 83)  BP: 159/73 (12-11-21 @ 08:35) (150/66 - 178/68)  RR: 18 (12-11-21 @ 08:35) (18 - 20)  SpO2: 95% (12-11-21 @ 08:35) (92% - 99%)  Wt(kg): --  I&O's Summary    10 Dec 2021 07:01  -  11 Dec 2021 07:00  --------------------------------------------------------  IN: 420 mL / OUT: 1100 mL / NET: -680 mL            JVP: Normal  Neck: supple  Lung: clear   CV: S1 S2 , Murmur:  Abd: soft  Ext: No edema  neuro: Awake / alert  Psych: flat affect  Skin: normal``    LABS/DATA:    CARDIAC MARKERS:                                10.4   11.36 )-----------( 212      ( 11 Dec 2021 06:45 )             32.4     12-11    142  |  108  |  24<H>  ----------------------------<  127<H>  3.7   |  21<L>  |  1.37<H>    Ca    8.6      11 Dec 2021 06:44    TPro  6.2  /  Alb  3.5  /  TBili  0.4  /  DBili  x   /  AST  19  /  ALT  14  /  AlkPhos  170<H>  12-09    proBNP:   Lipid Profile:   HgA1c:   TSH:     TELE:  EKG:        
Patient is a 89y old  Female who presents with a chief complaint of GI bleeding x 1 day (11 Dec 2021 11:29)      SUBJECTIVE / OVERNIGHT EVENTS:    Events noted.  CONSTITUTIONAL: No fever,  or fatigue  RESPIRATORY: No cough, wheezing,  No shortness of breath  CARDIOVASCULAR: No chest pain, palpitations, dizziness, or leg swelling  GASTROINTESTINAL: No abdominal or epigastric pain. No nausea, vomiting.      MEDICATIONS  (STANDING):  atorvastatin 10 milliGRAM(s) Oral at bedtime  bisacodyl 5 milliGRAM(s) Oral at bedtime  dextrose 40% Gel 15 Gram(s) Oral once  dextrose 5%. 1000 milliLiter(s) (50 mL/Hr) IV Continuous <Continuous>  dextrose 5%. 1000 milliLiter(s) (100 mL/Hr) IV Continuous <Continuous>  dextrose 50% Injectable 25 Gram(s) IV Push once  dextrose 50% Injectable 12.5 Gram(s) IV Push once  glucagon  Injectable 1 milliGRAM(s) IntraMuscular once  influenza  Vaccine (HIGH DOSE) 0.7 milliLiter(s) IntraMuscular once  insulin glargine Injectable (LANTUS) 15 Unit(s) SubCutaneous at bedtime  insulin lispro (ADMELOG) corrective regimen sliding scale   SubCutaneous three times a day before meals  insulin lispro (ADMELOG) corrective regimen sliding scale   SubCutaneous at bedtime  metoprolol succinate  milliGRAM(s) Oral daily  pantoprazole    Tablet 40 milliGRAM(s) Oral before breakfast  traZODone 50 milliGRAM(s) Oral at bedtime    MEDICATIONS  (PRN):  acetaminophen     Tablet .. 650 milliGRAM(s) Oral every 6 hours PRN Temp greater or equal to 38C (100.4F), Mild Pain (1 - 3)  melatonin 3 milliGRAM(s) Oral at bedtime PRN Insomnia        CAPILLARY BLOOD GLUCOSE      POCT Blood Glucose.: 197 mg/dL (11 Dec 2021 16:27)  POCT Blood Glucose.: 181 mg/dL (11 Dec 2021 11:26)  POCT Blood Glucose.: 97 mg/dL (11 Dec 2021 07:32)    I&O's Summary    10 Dec 2021 07:01  -  11 Dec 2021 07:00  --------------------------------------------------------  IN: 420 mL / OUT: 1100 mL / NET: -680 mL    11 Dec 2021 07:01  -  11 Dec 2021 21:08  --------------------------------------------------------  IN: 280 mL / OUT: 300 mL / NET: -20 mL        T(C): 36.7 (12-11-21 @ 20:20), Max: 37.4 (12-11-21 @ 00:12)  HR: 73 (12-11-21 @ 20:20) (61 - 87)  BP: 157/66 (12-11-21 @ 20:20) (152/63 - 196/88)  RR: 18 (12-11-21 @ 20:20) (18 - 18)  SpO2: 95% (12-11-21 @ 20:20) (92% - 96%)    PHYSICAL EXAM:    NECK: Supple, No JVD  CHEST/LUNG: Clear to auscultation bilaterally; No wheezing.  HEART: Regular rate and rhythm; No murmurs, rubs, or gallops  ABDOMEN: Soft, Nontender, Nondistended; Bowel sounds present  EXTREMITIES:   No edema  NEUROLOGY: AAO X 3      LABS:                        10.4   11.02 )-----------( 218      ( 11 Dec 2021 20:18 )             31.9     12-11    142  |  108  |  24<H>  ----------------------------<  127<H>  3.7   |  21<L>  |  1.37<H>    Ca    8.6      11 Dec 2021 06:44    TPro  6.2  /  Alb  3.5  /  TBili  0.4  /  DBili  x   /  AST  19  /  ALT  14  /  AlkPhos  170<H>  12-09            CAPILLARY BLOOD GLUCOSE      POCT Blood Glucose.: 197 mg/dL (11 Dec 2021 16:27)  POCT Blood Glucose.: 181 mg/dL (11 Dec 2021 11:26)  POCT Blood Glucose.: 97 mg/dL (11 Dec 2021 07:32)        RADIOLOGY & ADDITIONAL TESTS:    Imaging Personally Reviewed:    Consultant(s) Notes Reviewed:      Care Discussed with Consultants/Other Providers:    Elmo Ac MD, CMD, FACP    [address] [place] [address]  Office Tel: [phone]  Cell: [phone]  
DATE OF SERVICE: 12-09-21 @ 10:50    Subjective: Patient seen and examined. No new events except as noted.     SUBJECTIVE/ROS:  Pt seen and examined early this am   nad       MEDICATIONS:  MEDICATIONS  (STANDING):  aspirin enteric coated 81 milliGRAM(s) Oral daily  atorvastatin 10 milliGRAM(s) Oral at bedtime  bisacodyl 5 milliGRAM(s) Oral at bedtime  dextrose 40% Gel 15 Gram(s) Oral once  dextrose 5%. 1000 milliLiter(s) (50 mL/Hr) IV Continuous <Continuous>  dextrose 5%. 1000 milliLiter(s) (100 mL/Hr) IV Continuous <Continuous>  dextrose 50% Injectable 25 Gram(s) IV Push once  dextrose 50% Injectable 12.5 Gram(s) IV Push once  glucagon  Injectable 1 milliGRAM(s) IntraMuscular once  influenza  Vaccine (HIGH DOSE) 0.7 milliLiter(s) IntraMuscular once  insulin glargine Injectable (LANTUS) 15 Unit(s) SubCutaneous at bedtime  insulin lispro (ADMELOG) corrective regimen sliding scale   SubCutaneous three times a day before meals  insulin lispro (ADMELOG) corrective regimen sliding scale   SubCutaneous at bedtime  pantoprazole    Tablet 40 milliGRAM(s) Oral before breakfast  polyethylene glycol/electrolyte Solution 1000 milliLiter(s) Oral every 4 hours  sodium chloride 0.9%. 500 milliLiter(s) (30 mL/Hr) IV Continuous <Continuous>  traZODone 50 milliGRAM(s) Oral at bedtime      PHYSICAL EXAM:  T(C): 36.6 (12-09-21 @ 09:34), Max: 36.7 (12-08-21 @ 17:34)  HR: 79 (12-09-21 @ 09:55) (73 - 84)  BP: 179/71 (12-09-21 @ 09:55) (118/61 - 207/56)  RR: 22 (12-09-21 @ 09:55) (15 - 22)  SpO2: 95% (12-09-21 @ 09:55) (95% - 100%)  Wt(kg): --  I&O's Summary    08 Dec 2021 07:01  -  09 Dec 2021 07:00  --------------------------------------------------------  IN: 0 mL / OUT: 1375 mL / NET: -1375 mL    09 Dec 2021 07:01  -  09 Dec 2021 10:50  --------------------------------------------------------  IN: 0 mL / OUT: 0 mL / NET: 0 mL      Height (cm): 165.1 (12-09 @ 09:34)  Weight (kg): 65.8 (12-09 @ 09:34)  BMI (kg/m2): 24.1 (12-09 @ 09:34)  BSA (m2): 1.73 (12-09 @ 09:34)      JVP: Normal  Neck: supple  Lung: clear   CV: S1 S2 , Murmur:  Abd: soft  Ext: No edema  neuro: Awake / alert  Psych: flat affect  Skin: normal``    LABS/DATA:    CARDIAC MARKERS:                                8.3    11.63 )-----------( 201      ( 09 Dec 2021 02:34 )             25.3     12-07    136  |  105  |  40<H>  ----------------------------<  304<H>  4.3   |  18<L>  |  1.64<H>    Ca    8.6      07 Dec 2021 21:19    TPro  6.0  /  Alb  3.3  /  TBili  0.2  /  DBili  x   /  AST  15  /  ALT  10  /  AlkPhos  195<H>  12-07    proBNP:   Lipid Profile:   HgA1c:   TSH:     TELE:  EKG:        
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 1000 milliLiter(s) (50 mL/Hr) IV Continuous <Continuous>  dextrose 5%. 1000 milliLiter(s) (100 mL/Hr) IV Continuous <Continuous>  dextrose 50% Injectable 25 Gram(s) IV Push once  dextrose 50% Injectable 12.5 Gram(s) IV Push once  glucagon  Injectable 1 milliGRAM(s) IntraMuscular once  influenza  Vaccine (HIGH DOSE) 0.7 milliLiter(s) IntraMuscular once  insulin glargine Injectable (LANTUS) 15 Unit(s) SubCutaneous at bedtime  insulin lispro (ADMELOG) corrective regimen sliding scale   SubCutaneous three times a day before meals  insulin lispro (ADMELOG) corrective regimen sliding scale   SubCutaneous at bedtime  pantoprazole  Injectable 40 milliGRAM(s) IV Push two times a day  traZODone 50 milliGRAM(s) Oral at bedtime    MEDICATIONS  (PRN):  acetaminophen     Tablet .. 650 milliGRAM(s) Oral every 6 hours PRN Temp greater or equal to 38C (100.4F), Mild Pain (1 - 3)  melatonin 3 milliGRAM(s) Oral at bedtime PRN Insomnia    	    PHYSICAL EXAM:  T(C): 36.3 (12-10-21 @ 10:41), Max: 36.9 (12-10-21 @ 04:23)  HR: 76 (12-10-21 @ 10:41) (70 - 84)  BP: 197/64 (12-10-21 @ 10:41) (169/73 - 197/64)  RR: 16 (12-10-21 @ 10:41) (16 - 18)  SpO2: 98% (12-10-21 @ 10:41) (95% - 98%)  Wt(kg): --  I&O's Summary    09 Dec 2021 07:01  -  10 Dec 2021 07:00  --------------------------------------------------------  IN: 360 mL / OUT: 600 mL / NET: -240 mL        Appearance: Normal	  HEENT:   Normal oral mucosa, PERRL, EOMI	  Lymphatic: No lymphadenopathy  Cardiovascular: Normal S1 S2, No JVD, No murmurs, No edema  Respiratory: Lungs clear to auscultation	  Psychiatry: A & O   Gastrointestinal:  Soft, Non-tender, + BS	  Skin: No rashes, No ecchymoses, No cyanosis	  Neurologic: Non-focal  Extremities: Normal range of motion, No clubbing, cyanosis or edema  Vascular: Peripheral pulses palpable 2+ bilaterally    TELEMETRY: 	    ECG:  	  RADIOLOGY:  OTHER: 	  	  LABS:	 	    CARDIAC MARKERS:                                10.3   11.56 )-----------( 185      ( 10 Dec 2021 03:29 )             31.5     12-10    143  |  110<H>  |  25<H>  ----------------------------<  69<L>  3.5   |  21<L>  |  1.23    Ca    8.3<L>      10 Dec 2021 07:03    TPro  6.2  /  Alb  3.5  /  TBili  0.4  /  DBili  x   /  AST  19  /  ALT  14  /  AlkPhos  170<H>  12-09    proBNP:   Lipid Profile:   HgA1c:   TSH:     	        
INTERVAL HPI/OVERNIGHT EVENTS:  s/p colonosopcy likely gi bleed from divertciuli  No new overnight event.  No N/V/D.  Tolerating diet.  no bleeding now off of a/c    Allergies    No Known Allergies    Intolerances          General:  No wt loss, fevers, chills, night sweats, fatigue,   Eyes:  Good vision, no reported pain  ENT:  No sore throat, pain, runny nose, dysphagia  CV:  No pain, palpitations, hypo/hypertension  Resp:  No dyspnea, cough, tachypnea, wheezing  GI:  No pain, No nausea, No vomiting, No diarrhea, No constipation, No weight loss, No fever, No pruritis, No rectal bleeding, No tarry stools, No dysphagia,  :  No pain, bleeding, incontinence, nocturia  Muscle:  No pain, weakness  Neuro:  No weakness, tingling, memory problems  Psych:  No fatigue, insomnia, mood problems, depression  Endocrine:  No polyuria, polydipsia, cold/heat intolerance  Heme:  No petechiae, ecchymosis, easy bruisability  Skin:  No rash, tattoos, scars, edema      PHYSICAL EXAM:   Vital Signs:  Vital Signs Last 24 Hrs  T(C): 37.4 (11 Dec 2021 04:12), Max: 37.4 (11 Dec 2021 00:12)  T(F): 99.4 (11 Dec 2021 04:12), Max: 99.4 (11 Dec 2021 00:12)  HR: 79 (11 Dec 2021 04:12) (61 - 89)  BP: 177/71 (11 Dec 2021 04:12) (150/66 - 197/64)  BP(mean): --  RR: 18 (11 Dec 2021 04:12) (16 - 20)  SpO2: 92% (11 Dec 2021 04:12) (92% - 100%)  Daily Height in cm: 165.1 (10 Dec 2021 10:41)    Daily I&O's Summary    10 Dec 2021 07:01  -  11 Dec 2021 07:00  --------------------------------------------------------  IN: 420 mL / OUT: 1100 mL / NET: -680 mL        GENERAL:  Appears stated age, well-groomed, well-nourished, no distress  HEENT:  NC/AT,  conjunctivae clear and pink, no thyromegaly, nodules, adenopathy, no JVD, sclera -anicteric  CHEST:  Full & symmetric excursion, no increased effort, breath sounds clear  HEART:  Regular rhythm, S1, S2, no murmur/rub/S3/S4, no abdominal bruit, no edema  ABDOMEN:  Soft, non-tender, non-distended, normoactive bowel sounds,  no masses ,no hepato-splenomegaly, no signs of chronic liver disease  EXTEREMITIES:  no cyanosis,clubbing or edema  SKIN:  No rash/erythema/ecchymoses/petechiae/wounds/abscess/warm/dry  NEURO:  Alert, oriented, no asterixis, no tremor, no encephalopathy      LABS:                        10.4   11.36 )-----------( 212      ( 11 Dec 2021 06:45 )             32.4     12-11    142  |  108  |  24<H>  ----------------------------<  127<H>  3.7   |  21<L>  |  1.37<H>    Ca    8.6      11 Dec 2021 06:44    TPro  6.2  /  Alb  3.5  /  TBili  0.4  /  DBili  x   /  AST  19  /  ALT  14  /  AlkPhos  170<H>  12-09        amylase   lipase  RADIOLOGY & ADDITIONAL TESTS:  
DATE OF SERVICE: 12-10-21 @ 12:21    Subjective: Patient seen and examined. No new events except as noted.     SUBJECTIVE/ROS:        MEDICATIONS:  MEDICATIONS  (STANDING):  aspirin enteric coated 81 milliGRAM(s) Oral daily  atorvastatin 10 milliGRAM(s) Oral at bedtime  bisacodyl 5 milliGRAM(s) Oral at bedtime  dextrose 40% Gel 15 Gram(s) Oral once  dextrose 5%. 1000 milliLiter(s) (50 mL/Hr) IV Continuous <Continuous>  dextrose 5%. 1000 milliLiter(s) (100 mL/Hr) IV Continuous <Continuous>  dextrose 50% Injectable 25 Gram(s) IV Push once  dextrose 50% Injectable 12.5 Gram(s) IV Push once  glucagon  Injectable 1 milliGRAM(s) IntraMuscular once  influenza  Vaccine (HIGH DOSE) 0.7 milliLiter(s) IntraMuscular once  insulin glargine Injectable (LANTUS) 15 Unit(s) SubCutaneous at bedtime  insulin lispro (ADMELOG) corrective regimen sliding scale   SubCutaneous three times a day before meals  insulin lispro (ADMELOG) corrective regimen sliding scale   SubCutaneous at bedtime  pantoprazole  Injectable 40 milliGRAM(s) IV Push two times a day  traZODone 50 milliGRAM(s) Oral at bedtime      PHYSICAL EXAM:  T(C): 36.7 (12-10-21 @ 12:12), Max: 36.9 (12-10-21 @ 04:23)  HR: 83 (12-10-21 @ 12:12) (69 - 89)  BP: 169/71 (12-10-21 @ 12:12) (156/60 - 197/64)  RR: 18 (12-10-21 @ 12:12) (16 - 20)  SpO2: 94% (12-10-21 @ 12:12) (94% - 100%)  Wt(kg): --  I&O's Summary    09 Dec 2021 07:01  -  10 Dec 2021 07:00  --------------------------------------------------------  IN: 360 mL / OUT: 600 mL / NET: -240 mL    10 Dec 2021 07:01  -  10 Dec 2021 12:21  --------------------------------------------------------  IN: 0 mL / OUT: 0 mL / NET: 0 mL      Height (cm): 165.1 (12-10 @ 10:41)  Weight (kg): 65.8 (12-10 @ 10:41)  BMI (kg/m2): 24.1 (12-10 @ 10:41)  BSA (m2): 1.73 (12-10 @ 10:41)      JVP: Normal  Neck: supple  Lung: clear   CV: S1 S2 , Murmur:  Abd: soft  Ext: No edema  neuro: Awake / alert  Psych: flat affect  Skin: normal``    LABS/DATA:    CARDIAC MARKERS:                                10.3   11.56 )-----------( 185      ( 10 Dec 2021 03:29 )             31.5     12-10    143  |  110<H>  |  25<H>  ----------------------------<  69<L>  3.5   |  21<L>  |  1.23    Ca    8.3<L>      10 Dec 2021 07:03    TPro  6.2  /  Alb  3.5  /  TBili  0.4  /  DBili  x   /  AST  19  /  ALT  14  /  AlkPhos  170<H>  12-09    proBNP:   Lipid Profile:   HgA1c:   TSH:     TELE:  EKG:        
INTERVAL HPI/OVERNIGHT EVENTS:  pt seen and examined feeling well, No new overnight event.   s/p colonosopcy likely gi bleed from divertciuli  No N/V/D.  Tolerating diet. No bleeding now off of a/c    Allergies    No Known Allergies    Intolerances          General:  No wt loss, fevers, chills, night sweats, fatigue,   Eyes:  Good vision, no reported pain  ENT:  No sore throat, pain, runny nose, dysphagia  CV:  No pain, palpitations, hypo/hypertension  Resp:  No dyspnea, cough, tachypnea, wheezing  GI:  No pain, No nausea, No vomiting, No diarrhea, No constipation, No weight loss, No fever, No pruritis, No rectal bleeding, No tarry stools, No dysphagia,  :  No pain, bleeding, incontinence, nocturia  Muscle:  No pain, weakness  Neuro:  No weakness, tingling, memory problems  Psych:  No fatigue, insomnia, mood problems, depression  Endocrine:  No polyuria, polydipsia, cold/heat intolerance  Heme:  No petechiae, ecchymosis, easy bruisability  Skin:  No rash, tattoos, scars, edema      PHYSICAL EXAM:   Vital Signs:  Vital Signs Last 24 Hrs  T(C): 37.4 (11 Dec 2021 04:12), Max: 37.4 (11 Dec 2021 00:12)  T(F): 99.4 (11 Dec 2021 04:12), Max: 99.4 (11 Dec 2021 00:12)  HR: 79 (11 Dec 2021 04:12) (61 - 89)  BP: 177/71 (11 Dec 2021 04:12) (150/66 - 197/64)  BP(mean): --  RR: 18 (11 Dec 2021 04:12) (16 - 20)  SpO2: 92% (11 Dec 2021 04:12) (92% - 100%)  Daily Height in cm: 165.1 (10 Dec 2021 10:41)    Daily I&O's Summary    10 Dec 2021 07:01  -  11 Dec 2021 07:00  --------------------------------------------------------  IN: 420 mL / OUT: 1100 mL / NET: -680 mL        GENERAL:  Appears stated age, well-groomed, well-nourished, no distress  HEENT:  NC/AT,  conjunctivae clear and pink, no thyromegaly, nodules, adenopathy, no JVD, sclera -anicteric  CHEST:  Full & symmetric excursion, no increased effort, breath sounds clear  HEART:  Regular rhythm, S1, S2, no murmur/rub/S3/S4, no abdominal bruit, no edema  ABDOMEN:  Soft, non-tender, non-distended, normoactive bowel sounds,  no masses ,no hepato-splenomegaly, no signs of chronic liver disease  EXTEREMITIES:  no cyanosis,clubbing or edema  SKIN:  No rash/erythema/ecchymoses/petechiae/wounds/abscess/warm/dry  NEURO:  Alert, oriented, no asterixis, no tremor, no encephalopathy      LABS:                        10.4   11.36 )-----------( 212      ( 11 Dec 2021 06:45 )             32.4     12-11    142  |  108  |  24<H>  ----------------------------<  127<H>  3.7   |  21<L>  |  1.37<H>    Ca    8.6      11 Dec 2021 06:44    TPro  6.2  /  Alb  3.5  /  TBili  0.4  /  DBili  x   /  AST  19  /  ALT  14  /  AlkPhos  170<H>  12-09        amylase   lipase  RADIOLOGY & ADDITIONAL TESTS:  
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    History of cholecystectomy    S/P vascular surgery            REVIEW OF SYSTEMS:  CONSTITUTIONAL: No fever, weight loss, or fatigue  EYES: No eye pain, visual disturbances, or discharge  NECK: No pain or stiffness  RESPIRATORY: No cough, wheezing, chills or hemoptysis; No Shortness of Breath  CARDIOVASCULAR: No chest pain, palpitations, passing out, dizziness, or leg swelling  GASTROINTESTINAL:rectal bleeding  GENITOURINARY: No dysuria, frequency, hematuria, or incontinence  NEUROLOGICAL: No headaches, memory loss, loss of strength, numbness, or tremors    MUSCULOSKELETAL: No joint pain or swelling; No muscle, back, or extremity pain    Medications:  MEDICATIONS  (STANDING):  aspirin enteric coated 81 milliGRAM(s) Oral daily  atorvastatin 10 milliGRAM(s) Oral at bedtime  dextrose 40% Gel 15 Gram(s) Oral once  dextrose 5%. 1000 milliLiter(s) (50 mL/Hr) IV Continuous <Continuous>  dextrose 5%. 1000 milliLiter(s) (100 mL/Hr) IV Continuous <Continuous>  dextrose 50% Injectable 25 Gram(s) IV Push once  dextrose 50% Injectable 12.5 Gram(s) IV Push once  glucagon  Injectable 1 milliGRAM(s) IntraMuscular once  insulin glargine Injectable (LANTUS) 15 Unit(s) SubCutaneous at bedtime  insulin lispro (ADMELOG) corrective regimen sliding scale   SubCutaneous three times a day before meals  insulin lispro (ADMELOG) corrective regimen sliding scale   SubCutaneous at bedtime  pantoprazole  Injectable 40 milliGRAM(s) IV Push every 12 hours  traZODone 50 milliGRAM(s) Oral at bedtime    MEDICATIONS  (PRN):  acetaminophen     Tablet .. 650 milliGRAM(s) Oral every 6 hours PRN Temp greater or equal to 38C (100.4F), Mild Pain (1 - 3)  melatonin 3 milliGRAM(s) Oral at bedtime PRN Insomnia    	    PHYSICAL EXAM:  T(C): 37 (12-08-21 @ 08:30), Max: 37 (12-08-21 @ 04:47)  HR: 79 (12-08-21 @ 08:30) (55 - 79)  BP: 112/64 (12-08-21 @ 08:30) (109/67 - 155/49)  RR: 18 (12-08-21 @ 08:30) (15 - 22)  SpO2: 100% (12-08-21 @ 08:30) (92% - 100%)  Wt(kg): --  I&O's Summary      Appearance: Normal	  HEENT:   Normal oral mucosa, PERRL, EOMI	  Lymphatic: No lymphadenopathy  Cardiovascular: Normal S1 S2, No JVD, No murmurs, No edema  Respiratory: Lungs clear to auscultation	  Psychiatry: A & O x 3, Mood & affect appropriate  Gastrointestinal:  Soft, Non-tender, + BS	    Neurologic: Non-focal  Extremities: Normal range of motion, No clubbing, cyanosis or edema  Vascular: Peripheral pulses palpable 2+ bilaterally    TELEMETRY: 	    ECG:  	  RADIOLOGY:  OTHER: 	  	  LABS:	 	    CARDIAC MARKERS:                                8.3    9.09  )-----------( 228      ( 08 Dec 2021 05:48 )             26.4     12-07    136  |  105  |  40<H>  ----------------------------<  304<H>  4.3   |  18<L>  |  1.64<H>    Ca    8.6      07 Dec 2021 21:19    TPro  6.0  /  Alb  3.3  /  TBili  0.2  /  DBili  x   /  AST  15  /  ALT  10  /  AlkPhos  195<H>  12-07    proBNP:   Lipid Profile:   HgA1c:   TSH:     	        
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 No nausea, vomiting, or hematemesis;   GENITOURINARY: No dysuria, frequency, hematuria, or incontinence  NEUROLOGICAL: No headaches, memory loss, loss of strength, numbness, or tremors    MUSCULOSKELETAL: No joint pain or swelling; No muscle, back, or extremity pain    Medications:  MEDICATIONS  (STANDING):  aspirin enteric coated 81 milliGRAM(s) Oral daily  atorvastatin 10 milliGRAM(s) Oral at bedtime  bisacodyl 5 milliGRAM(s) Oral at bedtime  dextrose 40% Gel 15 Gram(s) Oral once  dextrose 5%. 1000 milliLiter(s) (50 mL/Hr) IV Continuous <Continuous>  dextrose 5%. 1000 milliLiter(s) (100 mL/Hr) IV Continuous <Continuous>  dextrose 50% Injectable 25 Gram(s) IV Push once  dextrose 50% Injectable 12.5 Gram(s) IV Push once  glucagon  Injectable 1 milliGRAM(s) IntraMuscular once  influenza  Vaccine (HIGH DOSE) 0.7 milliLiter(s) IntraMuscular once  insulin glargine Injectable (LANTUS) 15 Unit(s) SubCutaneous at bedtime  insulin lispro (ADMELOG) corrective regimen sliding scale   SubCutaneous three times a day before meals  insulin lispro (ADMELOG) corrective regimen sliding scale   SubCutaneous at bedtime  pantoprazole    Tablet 40 milliGRAM(s) Oral before breakfast  polyethylene glycol/electrolyte Solution 1000 milliLiter(s) Oral every 4 hours  traZODone 50 milliGRAM(s) Oral at bedtime    MEDICATIONS  (PRN):  acetaminophen     Tablet .. 650 milliGRAM(s) Oral every 6 hours PRN Temp greater or equal to 38C (100.4F), Mild Pain (1 - 3)  melatonin 3 milliGRAM(s) Oral at bedtime PRN Insomnia    	    PHYSICAL EXAM:  T(C): 36.7 (12-09-21 @ 11:15), Max: 36.7 (12-08-21 @ 17:34)  HR: 73 (12-09-21 @ 11:15) (73 - 84)  BP: 181/63 (12-09-21 @ 11:15) (118/61 - 207/56)  RR: 18 (12-09-21 @ 11:15) (15 - 22)  SpO2: 97% (12-09-21 @ 11:15) (95% - 100%)  Wt(kg): --  I&O's Summary    08 Dec 2021 07:01  -  09 Dec 2021 07:00  --------------------------------------------------------  IN: 0 mL / OUT: 1375 mL / NET: -1375 mL    09 Dec 2021 07:01  -  09 Dec 2021 13:52  --------------------------------------------------------  IN: 360 mL / OUT: 400 mL / NET: -40 mL        Appearance: Normal	  HEENT:   Normal oral mucosa, PERRL, EOMI	  Lymphatic: No lymphadenopathy  Cardiovascular: Normal S1 S2, No JVD, No murmurs, No edema  Respiratory: Lungs clear to auscultation	  Psychiatry: A & O x 3, Mood & affect appropriate  Gastrointestinal:  Soft, Non-tender, + BS	  Skin: No rashes, No ecchymoses, No cyanosis	  Neurologic: Non-focal  Extremities: Normal range of motion, No clubbing, cyanosis or edema  Vascular: Peripheral pulses palpable 2+ bilaterally    TELEMETRY: 	    ECG:  	  RADIOLOGY:  OTHER: 	  	  LABS:	 	    CARDIAC MARKERS:                                8.3    11.63 )-----------( 201      ( 09 Dec 2021 02:34 )             25.3     12-07    136  |  105  |  40<H>  ----------------------------<  304<H>  4.3   |  18<L>  |  1.64<H>    Ca    8.6      07 Dec 2021 21:19    TPro  6.0  /  Alb  3.3  /  TBili  0.2  /  DBili  x   /  AST  15  /  ALT  10  /  AlkPhos  195<H>  12-07    proBNP:   Lipid Profile:   HgA1c:   TSH:     	        
DATE OF SERVICE: 12-12-21 @ 10:18    Subjective: Patient seen and examined. No new events except as noted.     SUBJECTIVE/ROS:  Pt seen and examined early this am  nad       MEDICATIONS:  MEDICATIONS  (STANDING):  amLODIPine   Tablet 5 milliGRAM(s) Oral daily  atorvastatin 10 milliGRAM(s) Oral at bedtime  bisacodyl 5 milliGRAM(s) Oral at bedtime  dextrose 40% Gel 15 Gram(s) Oral once  dextrose 5%. 1000 milliLiter(s) (50 mL/Hr) IV Continuous <Continuous>  dextrose 5%. 1000 milliLiter(s) (100 mL/Hr) IV Continuous <Continuous>  dextrose 50% Injectable 25 Gram(s) IV Push once  dextrose 50% Injectable 12.5 Gram(s) IV Push once  glucagon  Injectable 1 milliGRAM(s) IntraMuscular once  influenza  Vaccine (HIGH DOSE) 0.7 milliLiter(s) IntraMuscular once  insulin glargine Injectable (LANTUS) 15 Unit(s) SubCutaneous at bedtime  insulin lispro (ADMELOG) corrective regimen sliding scale   SubCutaneous three times a day before meals  insulin lispro (ADMELOG) corrective regimen sliding scale   SubCutaneous at bedtime  metoprolol succinate  milliGRAM(s) Oral daily  pantoprazole    Tablet 40 milliGRAM(s) Oral before breakfast  traZODone 50 milliGRAM(s) Oral at bedtime      PHYSICAL EXAM:  T(C): 36.7 (12-12-21 @ 08:29), Max: 37.2 (12-11-21 @ 12:15)  HR: 56 (12-12-21 @ 08:29) (56 - 87)  BP: 179/65 (12-12-21 @ 08:29) (152/63 - 196/88)  RR: 18 (12-12-21 @ 08:29) (18 - 18)  SpO2: 96% (12-12-21 @ 08:29) (95% - 96%)  Wt(kg): --  I&O's Summary    11 Dec 2021 07:01  -  12 Dec 2021 07:00  --------------------------------------------------------  IN: 580 mL / OUT: 300 mL / NET: 280 mL            JVP: Normal  Neck: supple  Lung: clear   CV: S1 S2 , Murmur:  Abd: soft  Ext: No edema  neuro: Awake / alert  Psych: flat affect  Skin: normal``    LABS/DATA:    CARDIAC MARKERS:                                9.9    9.42  )-----------( 204      ( 12 Dec 2021 06:46 )             31.3     12-12    142  |  108  |  29<H>  ----------------------------<  139<H>  3.8   |  20<L>  |  1.47<H>    Ca    8.6      12 Dec 2021 06:45      proBNP:   Lipid Profile:   HgA1c:   TSH:     TELE:  EKG:        
DATE OF SERVICE: 12-13-21 @ 10:48    Subjective: Patient seen and examined. No new events except as noted.     SUBJECTIVE/ROS:        MEDICATIONS:  MEDICATIONS  (STANDING):  amLODIPine   Tablet 10 milliGRAM(s) Oral daily  atorvastatin 10 milliGRAM(s) Oral at bedtime  bisacodyl 5 milliGRAM(s) Oral at bedtime  dextrose 40% Gel 15 Gram(s) Oral once  dextrose 5%. 1000 milliLiter(s) (50 mL/Hr) IV Continuous <Continuous>  dextrose 5%. 1000 milliLiter(s) (100 mL/Hr) IV Continuous <Continuous>  dextrose 50% Injectable 25 Gram(s) IV Push once  dextrose 50% Injectable 12.5 Gram(s) IV Push once  glucagon  Injectable 1 milliGRAM(s) IntraMuscular once  influenza  Vaccine (HIGH DOSE) 0.7 milliLiter(s) IntraMuscular once  insulin glargine Injectable (LANTUS) 15 Unit(s) SubCutaneous at bedtime  insulin lispro (ADMELOG) corrective regimen sliding scale   SubCutaneous three times a day before meals  insulin lispro (ADMELOG) corrective regimen sliding scale   SubCutaneous at bedtime  metoprolol succinate  milliGRAM(s) Oral daily  pantoprazole    Tablet 40 milliGRAM(s) Oral before breakfast  traZODone 50 milliGRAM(s) Oral at bedtime      PHYSICAL EXAM:  T(C): 36.8 (12-12-21 @ 19:35), Max: 36.8 (12-12-21 @ 19:35)  HR: 77 (12-13-21 @ 04:03) (59 - 82)  BP: 155/65 (12-13-21 @ 04:03) (142/52 - 180/90)  RR: 18 (12-13-21 @ 04:03) (18 - 18)  SpO2: 94% (12-13-21 @ 04:03) (94% - 98%)  Wt(kg): --  I&O's Summary    12 Dec 2021 07:01  -  13 Dec 2021 07:00  --------------------------------------------------------  IN: 410 mL / OUT: 1201 mL / NET: -791 mL            JVP: Normal  Neck: supple  Lung: clear   CV: S1 S2 , Murmur:  Abd: soft  Ext: No edema  neuro: Awake / alert  Psych: flat affect  Skin: normal``    LABS/DATA:    CARDIAC MARKERS:                                9.8    9.81  )-----------( 234      ( 13 Dec 2021 06:57 )             30.5     12-13    138  |  103  |  32<H>  ----------------------------<  236<H>  3.8   |  20<L>  |  1.41<H>    Ca    8.3<L>      13 Dec 2021 08:45      proBNP:   Lipid Profile:   HgA1c:   TSH:     TELE:  EKG:        
DATE OF SERVICE: 12-13-21 @ 12:54  CHIEF COMPLAINT:Patient is a 89y old  Female who presents with a chief complaint of GI bleeding x 1 day (13 Dec 2021 11:33)    	        PAST MEDICAL & SURGICAL HISTORY:  Essential hypertension, benign    Hypercholesteremia    Peripheral vascular disease    Diabetes    S/P carotid endarterectomy    S/P coronary artery by pass    History of cholecystectomy    S/P vascular surgery            REVIEW OF SYSTEMS:  CONSTITUTIONAL: No fever, weight loss, or fatigue  EYES: No eye pain, visual disturbances, or discharge  NECK: No pain or stiffness  RESPIRATORY: No cough, wheezing, chills or hemoptysis; No Shortness of Breath  CARDIOVASCULAR: No chest pain, palpitations, passing out, dizziness, or leg swelling  GASTROINTESTINAL: No abdominal or epigastric pain. No nausea, vomiting, or hematemesis; No diarrhea or constipation. No melena or hematochezia.  GENITOURINARY: No dysuria, frequency, hematuria, or incontinence  NEUROLOGICAL: No headaches, memory loss, loss of strength, numbness, or tremors    MUSCULOSKELETAL: No joint pain or swelling; No muscle, back, or extremity pain    Medications:  MEDICATIONS  (STANDING):  amLODIPine   Tablet 10 milliGRAM(s) Oral daily  atorvastatin 10 milliGRAM(s) Oral at bedtime  bisacodyl 5 milliGRAM(s) Oral at bedtime  dextrose 40% Gel 15 Gram(s) Oral once  dextrose 5%. 1000 milliLiter(s) (50 mL/Hr) IV Continuous <Continuous>  dextrose 5%. 1000 milliLiter(s) (100 mL/Hr) IV Continuous <Continuous>  dextrose 50% Injectable 25 Gram(s) IV Push once  dextrose 50% Injectable 12.5 Gram(s) IV Push once  glucagon  Injectable 1 milliGRAM(s) IntraMuscular once  influenza  Vaccine (HIGH DOSE) 0.7 milliLiter(s) IntraMuscular once  insulin glargine Injectable (LANTUS) 15 Unit(s) SubCutaneous at bedtime  insulin lispro (ADMELOG) corrective regimen sliding scale   SubCutaneous three times a day before meals  insulin lispro (ADMELOG) corrective regimen sliding scale   SubCutaneous at bedtime  metoprolol succinate  milliGRAM(s) Oral daily  pantoprazole    Tablet 40 milliGRAM(s) Oral before breakfast  traZODone 50 milliGRAM(s) Oral at bedtime    MEDICATIONS  (PRN):  acetaminophen     Tablet .. 650 milliGRAM(s) Oral every 6 hours PRN Temp greater or equal to 38C (100.4F), Mild Pain (1 - 3)  melatonin 3 milliGRAM(s) Oral at bedtime PRN Insomnia    	    PHYSICAL EXAM:  T(C): 36.8 (12-13-21 @ 11:57), Max: 36.8 (12-12-21 @ 19:35)  HR: 83 (12-13-21 @ 11:57) (67 - 83)  BP: 150/67 (12-13-21 @ 11:57) (142/52 - 180/90)  RR: 18 (12-13-21 @ 11:57) (18 - 18)  SpO2: 97% (12-13-21 @ 11:57) (94% - 98%)  Wt(kg): --  I&O's Summary    12 Dec 2021 07:01  -  13 Dec 2021 07:00  --------------------------------------------------------  IN: 410 mL / OUT: 1201 mL / NET: -791 mL        Appearance: Normal	  HEENT:   Normal oral mucosa, PERRL, EOMI	  Lymphatic: No lymphadenopathy  Cardiovascular: Normal S1 S2, No JVD, No murmurs, No edema  Respiratory: Lungs clear to auscultation	  Psychiatry: A & O x 3, Mood & affect appropriate  Gastrointestinal:  Soft, Non-tender, + BS	  Skin: No rashes, No ecchymoses, No cyanosis	  Neurologic: Non-focal  Extremities: Normal range of motion, No clubbing, cyanosis or edema  Vascular: Peripheral pulses palpable 2+ bilaterally    TELEMETRY: 	    ECG:  	  RADIOLOGY:  OTHER: 	  	  LABS:	 	    CARDIAC MARKERS:                                9.8    9.81  )-----------( 234      ( 13 Dec 2021 06:57 )             30.5     12-13    138  |  103  |  32<H>  ----------------------------<  236<H>  3.8   |  20<L>  |  1.41<H>    Ca    8.3<L>      13 Dec 2021 08:45      proBNP:   Lipid Profile:   HgA1c:   TSH:     	        
INTERVAL HPI/OVERNIGHT EVENTS:  pt seen and examined feeling well, No new overnight event.   No N/V/D.  Tolerating diet. No bleeding now off of a/c    Allergies    No Known Allergies    Intolerances          General:  No wt loss, fevers, chills, night sweats, fatigue,   Eyes:  Good vision, no reported pain  ENT:  No sore throat, pain, runny nose, dysphagia  CV:  No pain, palpitations, hypo/hypertension  Resp:  No dyspnea, cough, tachypnea, wheezing  GI:  No pain, No nausea, No vomiting, No diarrhea, No constipation, No weight loss, No fever, No pruritis, No rectal bleeding, No tarry stools, No dysphagia,  :  No pain, bleeding, incontinence, nocturia  Muscle:  No pain, weakness  Neuro:  No weakness, tingling, memory problems  Psych:  No fatigue, insomnia, mood problems, depression  Endocrine:  No polyuria, polydipsia, cold/heat intolerance  Heme:  No petechiae, ecchymosis, easy bruisability  Skin:  No rash, tattoos, scars, edema      PHYSICAL EXAM:   Vital Signs:  Vital Signs Last 24 Hrs  T(C): 37.4 (11 Dec 2021 04:12), Max: 37.4 (11 Dec 2021 00:12)  T(F): 99.4 (11 Dec 2021 04:12), Max: 99.4 (11 Dec 2021 00:12)  HR: 79 (11 Dec 2021 04:12) (61 - 89)  BP: 177/71 (11 Dec 2021 04:12) (150/66 - 197/64)  BP(mean): --  RR: 18 (11 Dec 2021 04:12) (16 - 20)  SpO2: 92% (11 Dec 2021 04:12) (92% - 100%)  Daily Height in cm: 165.1 (10 Dec 2021 10:41)    Daily I&O's Summary    10 Dec 2021 07:01  -  11 Dec 2021 07:00  --------------------------------------------------------  IN: 420 mL / OUT: 1100 mL / NET: -680 mL        GENERAL:  Appears stated age, well-groomed, well-nourished, no distress  HEENT:  NC/AT,  conjunctivae clear and pink, no thyromegaly, nodules, adenopathy, no JVD, sclera -anicteric  CHEST:  Full & symmetric excursion, no increased effort, breath sounds clear  HEART:  Regular rhythm, S1, S2, no murmur/rub/S3/S4, no abdominal bruit, no edema  ABDOMEN:  Soft, non-tender, non-distended, normoactive bowel sounds,  no masses ,no hepato-splenomegaly, no signs of chronic liver disease  EXTEREMITIES:  no cyanosis,clubbing or edema  SKIN:  No rash/erythema/ecchymoses/petechiae/wounds/abscess/warm/dry  NEURO:  Alert, oriented, no asterixis, no tremor, no encephalopathy      LABS:                        10.4   11.36 )-----------( 212      ( 11 Dec 2021 06:45 )             32.4     12-11    142  |  108  |  24<H>  ----------------------------<  127<H>  3.7   |  21<L>  |  1.37<H>    Ca    8.6      11 Dec 2021 06:44    TPro  6.2  /  Alb  3.5  /  TBili  0.4  /  DBili  x   /  AST  19  /  ALT  14  /  AlkPhos  170<H>  12-09        amylase   lipase  RADIOLOGY & ADDITIONAL TESTS:  
Patient is a 89y old  Female who presents with a chief complaint of GI bleeding x 1 day (11 Dec 2021 11:29)      SUBJECTIVE / OVERNIGHT EVENTS:    Events noted.  CONSTITUTIONAL: No fever,  or fatigue  RESPIRATORY: No cough, wheezing,  No shortness of breath  CARDIOVASCULAR: No chest pain, palpitations, dizziness, or leg swelling  GASTROINTESTINAL: No abdominal or epigastric pain. No nausea, vomiting.      MEDICATIONS  (STANDING):  atorvastatin 10 milliGRAM(s) Oral at bedtime  bisacodyl 5 milliGRAM(s) Oral at bedtime  dextrose 40% Gel 15 Gram(s) Oral once  dextrose 5%. 1000 milliLiter(s) (50 mL/Hr) IV Continuous <Continuous>  dextrose 5%. 1000 milliLiter(s) (100 mL/Hr) IV Continuous <Continuous>  dextrose 50% Injectable 25 Gram(s) IV Push once  dextrose 50% Injectable 12.5 Gram(s) IV Push once  glucagon  Injectable 1 milliGRAM(s) IntraMuscular once  influenza  Vaccine (HIGH DOSE) 0.7 milliLiter(s) IntraMuscular once  insulin glargine Injectable (LANTUS) 15 Unit(s) SubCutaneous at bedtime  insulin lispro (ADMELOG) corrective regimen sliding scale   SubCutaneous three times a day before meals  insulin lispro (ADMELOG) corrective regimen sliding scale   SubCutaneous at bedtime  metoprolol succinate  milliGRAM(s) Oral daily  pantoprazole    Tablet 40 milliGRAM(s) Oral before breakfast  traZODone 50 milliGRAM(s) Oral at bedtime    MEDICATIONS  (PRN):  acetaminophen     Tablet .. 650 milliGRAM(s) Oral every 6 hours PRN Temp greater or equal to 38C (100.4F), Mild Pain (1 - 3)  melatonin 3 milliGRAM(s) Oral at bedtime PRN Insomnia        CAPILLARY BLOOD GLUCOSE      POCT Blood Glucose.: 197 mg/dL (11 Dec 2021 16:27)  POCT Blood Glucose.: 181 mg/dL (11 Dec 2021 11:26)  POCT Blood Glucose.: 97 mg/dL (11 Dec 2021 07:32)    I&O's Summary    10 Dec 2021 07:01  -  11 Dec 2021 07:00  --------------------------------------------------------  IN: 420 mL / OUT: 1100 mL / NET: -680 mL    11 Dec 2021 07:01  -  11 Dec 2021 21:08  --------------------------------------------------------  IN: 280 mL / OUT: 300 mL / NET: -20 mL        T(C): 36.7 (12-11-21 @ 20:20), Max: 37.4 (12-11-21 @ 00:12)  HR: 73 (12-11-21 @ 20:20) (61 - 87)  BP: 157/66 (12-11-21 @ 20:20) (152/63 - 196/88)  RR: 18 (12-11-21 @ 20:20) (18 - 18)  SpO2: 95% (12-11-21 @ 20:20) (92% - 96%)    PHYSICAL EXAM:    NECK: Supple, No JVD  CHEST/LUNG: Clear to auscultation bilaterally; No wheezing.  HEART: Regular rate and rhythm; No murmurs, rubs, or gallops  ABDOMEN: Soft, Nontender, Nondistended; Bowel sounds present  EXTREMITIES:   No edema  NEUROLOGY: AAO X 3      LABS:                        10.4   11.02 )-----------( 218      ( 11 Dec 2021 20:18 )             31.9     12-11    142  |  108  |  24<H>  ----------------------------<  127<H>  3.7   |  21<L>  |  1.37<H>    Ca    8.6      11 Dec 2021 06:44    TPro  6.2  /  Alb  3.5  /  TBili  0.4  /  DBili  x   /  AST  19  /  ALT  14  /  AlkPhos  170<H>  12-09            CAPILLARY BLOOD GLUCOSE      POCT Blood Glucose.: 197 mg/dL (11 Dec 2021 16:27)  POCT Blood Glucose.: 181 mg/dL (11 Dec 2021 11:26)  POCT Blood Glucose.: 97 mg/dL (11 Dec 2021 07:32)        RADIOLOGY & ADDITIONAL TESTS:    Imaging Personally Reviewed:    Consultant(s) Notes Reviewed:      Care Discussed with Consultants/Other Providers:    Elmo Ac MD, CMD, FACP    [address] [place] [address]  Office Tel: [phone]  Cell: [phone]

## 2021-12-13 NOTE — PROGRESS NOTE ADULT - PROVIDER SPECIALTY LIST ADULT
Gastroenterology
Gastroenterology
Internal Medicine
Cardiology
Internal Medicine
Cardiology
Cardiology
Gastroenterology
Internal Medicine
Cardiology
Cardiology
Internal Medicine

## 2021-12-13 NOTE — CHART NOTE - NSCHARTNOTEFT_GEN_A_CORE
Pt son Alejo, requesting booster shot before discharge home. Pt received 1st covid vaccination ( Moderna) 2/19/21, 2nd vaccination 3/22/21. Time elapsed >8months. Moderna covid booster ordered to be given prior to discharge. monitor for 15 minutes post.

## 2021-12-13 NOTE — PROGRESS NOTE ADULT - ASSESSMENT
GIB  Anemia  Monitor hemoglobin, transfuse as needed.   colonoscopy showed diverticulosis   fu with GI    CAD history of cabg  asa on hold   cont statin     PAD  plan as above    HTN   better with amlodipine     "PAF"  Hr stable  at present time she is not a candidate for a/c given significant GIB  though no evidence of afib appreciated on tele     pt to follow up with her primary card soon after discharge

## 2021-12-18 ENCOUNTER — RX RENEWAL (OUTPATIENT)
Age: 86
End: 2021-12-18

## 2021-12-21 ENCOUNTER — APPOINTMENT (OUTPATIENT)
Dept: CARDIOLOGY | Facility: CLINIC | Age: 86
End: 2021-12-21

## 2021-12-30 PROCEDURE — 71045 X-RAY EXAM CHEST 1 VIEW: CPT

## 2021-12-30 PROCEDURE — 86923 COMPATIBILITY TEST ELECTRIC: CPT

## 2021-12-30 PROCEDURE — 85730 THROMBOPLASTIN TIME PARTIAL: CPT

## 2021-12-30 PROCEDURE — 82435 ASSAY OF BLOOD CHLORIDE: CPT

## 2021-12-30 PROCEDURE — 85014 HEMATOCRIT: CPT

## 2021-12-30 PROCEDURE — 86900 BLOOD TYPING SEROLOGIC ABO: CPT

## 2021-12-30 PROCEDURE — 83605 ASSAY OF LACTIC ACID: CPT

## 2021-12-30 PROCEDURE — 82330 ASSAY OF CALCIUM: CPT

## 2021-12-30 PROCEDURE — U0005: CPT

## 2021-12-30 PROCEDURE — 80053 COMPREHEN METABOLIC PANEL: CPT

## 2021-12-30 PROCEDURE — 83036 HEMOGLOBIN GLYCOSYLATED A1C: CPT

## 2021-12-30 PROCEDURE — 84132 ASSAY OF SERUM POTASSIUM: CPT

## 2021-12-30 PROCEDURE — 85025 COMPLETE CBC W/AUTO DIFF WBC: CPT

## 2021-12-30 PROCEDURE — 78278 ACUTE GI BLOOD LOSS IMAGING: CPT

## 2021-12-30 PROCEDURE — 84295 ASSAY OF SERUM SODIUM: CPT

## 2021-12-30 PROCEDURE — 99285 EMERGENCY DEPT VISIT HI MDM: CPT | Mod: 25

## 2021-12-30 PROCEDURE — 36415 COLL VENOUS BLD VENIPUNCTURE: CPT

## 2021-12-30 PROCEDURE — A9560: CPT

## 2021-12-30 PROCEDURE — 36430 TRANSFUSION BLD/BLD COMPNT: CPT

## 2021-12-30 PROCEDURE — 86850 RBC ANTIBODY SCREEN: CPT

## 2021-12-30 PROCEDURE — 80048 BASIC METABOLIC PNL TOTAL CA: CPT

## 2021-12-30 PROCEDURE — 85027 COMPLETE CBC AUTOMATED: CPT

## 2021-12-30 PROCEDURE — 85018 HEMOGLOBIN: CPT

## 2021-12-30 PROCEDURE — 84484 ASSAY OF TROPONIN QUANT: CPT

## 2021-12-30 PROCEDURE — U0003: CPT

## 2021-12-30 PROCEDURE — 97530 THERAPEUTIC ACTIVITIES: CPT

## 2021-12-30 PROCEDURE — 85610 PROTHROMBIN TIME: CPT

## 2021-12-30 PROCEDURE — 82947 ASSAY GLUCOSE BLOOD QUANT: CPT

## 2021-12-30 PROCEDURE — 96375 TX/PRO/DX INJ NEW DRUG ADDON: CPT

## 2021-12-30 PROCEDURE — 82962 GLUCOSE BLOOD TEST: CPT

## 2021-12-30 PROCEDURE — 97161 PT EVAL LOW COMPLEX 20 MIN: CPT

## 2021-12-30 PROCEDURE — 82803 BLOOD GASES ANY COMBINATION: CPT

## 2021-12-30 PROCEDURE — 97116 GAIT TRAINING THERAPY: CPT

## 2021-12-30 PROCEDURE — P9016: CPT

## 2021-12-30 PROCEDURE — 86901 BLOOD TYPING SEROLOGIC RH(D): CPT

## 2021-12-30 PROCEDURE — 96374 THER/PROPH/DIAG INJ IV PUSH: CPT

## 2022-01-28 ENCOUNTER — INPATIENT (INPATIENT)
Facility: HOSPITAL | Age: 87
LOS: 5 days | Discharge: HOME HEALTH | DRG: 291 | End: 2022-02-03
Attending: INTERNAL MEDICINE | Admitting: INTERNAL MEDICINE
Payer: MEDICARE

## 2022-01-28 VITALS
HEART RATE: 75 BPM | RESPIRATION RATE: 22 BRPM | OXYGEN SATURATION: 100 % | SYSTOLIC BLOOD PRESSURE: 210 MMHG | HEIGHT: 65 IN | TEMPERATURE: 98 F | WEIGHT: 151.02 LBS | DIASTOLIC BLOOD PRESSURE: 80 MMHG

## 2022-01-28 DIAGNOSIS — I50.9 HEART FAILURE, UNSPECIFIED: ICD-10-CM

## 2022-01-28 LAB
ALBUMIN SERPL ELPH-MCNC: 4.3 G/DL — SIGNIFICANT CHANGE UP (ref 3.3–5)
ALP SERPL-CCNC: 246 U/L — HIGH (ref 40–120)
ALT FLD-CCNC: 15 U/L — SIGNIFICANT CHANGE UP (ref 10–45)
ANION GAP SERPL CALC-SCNC: 16 MMOL/L — SIGNIFICANT CHANGE UP (ref 5–17)
APPEARANCE UR: CLEAR — SIGNIFICANT CHANGE UP
APTT BLD: 42.5 SEC — HIGH (ref 27.5–35.5)
AST SERPL-CCNC: 26 U/L — SIGNIFICANT CHANGE UP (ref 10–40)
BACTERIA # UR AUTO: NEGATIVE — SIGNIFICANT CHANGE UP
BASE EXCESS BLDV CALC-SCNC: 1.8 MMOL/L — SIGNIFICANT CHANGE UP (ref -2–2)
BASOPHILS # BLD AUTO: 0.06 K/UL — SIGNIFICANT CHANGE UP (ref 0–0.2)
BASOPHILS NFR BLD AUTO: 0.6 % — SIGNIFICANT CHANGE UP (ref 0–2)
BILIRUB SERPL-MCNC: 0.4 MG/DL — SIGNIFICANT CHANGE UP (ref 0.2–1.2)
BILIRUB UR-MCNC: NEGATIVE — SIGNIFICANT CHANGE UP
BLOOD GAS VENOUS - CREATININE: SIGNIFICANT CHANGE UP MG/DL (ref 0.5–1.3)
BUN SERPL-MCNC: 33 MG/DL — HIGH (ref 7–23)
CA-I SERPL-SCNC: 1.26 MMOL/L — SIGNIFICANT CHANGE UP (ref 1.15–1.33)
CALCIUM SERPL-MCNC: 10.2 MG/DL — SIGNIFICANT CHANGE UP (ref 8.4–10.5)
CHLORIDE BLDV-SCNC: 102 MMOL/L — SIGNIFICANT CHANGE UP (ref 96–108)
CHLORIDE SERPL-SCNC: 99 MMOL/L — SIGNIFICANT CHANGE UP (ref 96–108)
CO2 BLDV-SCNC: 29 MMOL/L — HIGH (ref 22–26)
CO2 SERPL-SCNC: 21 MMOL/L — LOW (ref 22–31)
COLOR SPEC: COLORLESS — SIGNIFICANT CHANGE UP
CREAT SERPL-MCNC: 1.22 MG/DL — SIGNIFICANT CHANGE UP (ref 0.5–1.3)
DIFF PNL FLD: NEGATIVE — SIGNIFICANT CHANGE UP
EOSINOPHIL # BLD AUTO: 0.23 K/UL — SIGNIFICANT CHANGE UP (ref 0–0.5)
EOSINOPHIL NFR BLD AUTO: 2.4 % — SIGNIFICANT CHANGE UP (ref 0–6)
EPI CELLS # UR: 0 /HPF — SIGNIFICANT CHANGE UP
GAS PNL BLDV: 137 MMOL/L — SIGNIFICANT CHANGE UP (ref 136–145)
GAS PNL BLDV: SIGNIFICANT CHANGE UP
GAS PNL BLDV: SIGNIFICANT CHANGE UP
GLUCOSE BLDV-MCNC: 126 MG/DL — HIGH (ref 70–99)
GLUCOSE SERPL-MCNC: 112 MG/DL — HIGH (ref 70–99)
GLUCOSE UR QL: NEGATIVE — SIGNIFICANT CHANGE UP
HCO3 BLDV-SCNC: 27 MMOL/L — SIGNIFICANT CHANGE UP (ref 22–29)
HCT VFR BLD CALC: 37.2 % — SIGNIFICANT CHANGE UP (ref 34.5–45)
HCT VFR BLDA CALC: 35 % — SIGNIFICANT CHANGE UP (ref 34.5–46.5)
HGB BLD CALC-MCNC: 11.5 G/DL — LOW (ref 11.7–16.1)
HGB BLD-MCNC: 11.4 G/DL — LOW (ref 11.5–15.5)
IMM GRANULOCYTES NFR BLD AUTO: 0.5 % — SIGNIFICANT CHANGE UP (ref 0–1.5)
INR BLD: 1.3 RATIO — HIGH (ref 0.88–1.16)
KETONES UR-MCNC: NEGATIVE — SIGNIFICANT CHANGE UP
LACTATE BLDV-MCNC: 1.1 MMOL/L — SIGNIFICANT CHANGE UP (ref 0.7–2)
LEUKOCYTE ESTERASE UR-ACNC: NEGATIVE — SIGNIFICANT CHANGE UP
LYMPHOCYTES # BLD AUTO: 0.8 K/UL — LOW (ref 1–3.3)
LYMPHOCYTES # BLD AUTO: 8.3 % — LOW (ref 13–44)
MCHC RBC-ENTMCNC: 27.6 PG — SIGNIFICANT CHANGE UP (ref 27–34)
MCHC RBC-ENTMCNC: 30.6 GM/DL — LOW (ref 32–36)
MCV RBC AUTO: 90.1 FL — SIGNIFICANT CHANGE UP (ref 80–100)
MONOCYTES # BLD AUTO: 0.96 K/UL — HIGH (ref 0–0.9)
MONOCYTES NFR BLD AUTO: 9.9 % — SIGNIFICANT CHANGE UP (ref 2–14)
NEUTROPHILS # BLD AUTO: 7.58 K/UL — HIGH (ref 1.8–7.4)
NEUTROPHILS NFR BLD AUTO: 78.3 % — HIGH (ref 43–77)
NITRITE UR-MCNC: NEGATIVE — SIGNIFICANT CHANGE UP
NRBC # BLD: 0 /100 WBCS — SIGNIFICANT CHANGE UP (ref 0–0)
NT-PROBNP SERPL-SCNC: 1860 PG/ML — HIGH (ref 0–300)
PCO2 BLDV: 45 MMHG — HIGH (ref 39–42)
PH BLDV: 7.39 — SIGNIFICANT CHANGE UP (ref 7.32–7.43)
PH UR: 6.5 — SIGNIFICANT CHANGE UP (ref 5–8)
PLATELET # BLD AUTO: 320 K/UL — SIGNIFICANT CHANGE UP (ref 150–400)
PO2 BLDV: 32 MMHG — SIGNIFICANT CHANGE UP (ref 25–45)
POTASSIUM BLDV-SCNC: 4.5 MMOL/L — SIGNIFICANT CHANGE UP (ref 3.5–5.1)
POTASSIUM SERPL-MCNC: 4.6 MMOL/L — SIGNIFICANT CHANGE UP (ref 3.5–5.3)
POTASSIUM SERPL-SCNC: 4.6 MMOL/L — SIGNIFICANT CHANGE UP (ref 3.5–5.3)
PROT SERPL-MCNC: 8.2 G/DL — SIGNIFICANT CHANGE UP (ref 6–8.3)
PROT UR-MCNC: ABNORMAL
PROTHROM AB SERPL-ACNC: 15.4 SEC — HIGH (ref 10.6–13.6)
RBC # BLD: 4.13 M/UL — SIGNIFICANT CHANGE UP (ref 3.8–5.2)
RBC # FLD: 16.6 % — HIGH (ref 10.3–14.5)
RBC CASTS # UR COMP ASSIST: 0 /HPF — SIGNIFICANT CHANGE UP (ref 0–4)
SAO2 % BLDV: 56.1 % — LOW (ref 67–88)
SARS-COV-2 RNA SPEC QL NAA+PROBE: SIGNIFICANT CHANGE UP
SODIUM SERPL-SCNC: 136 MMOL/L — SIGNIFICANT CHANGE UP (ref 135–145)
SP GR SPEC: 1.01 — LOW (ref 1.01–1.02)
TROPONIN T, HIGH SENSITIVITY RESULT: 26 NG/L — SIGNIFICANT CHANGE UP (ref 0–51)
TROPONIN T, HIGH SENSITIVITY RESULT: 29 NG/L — SIGNIFICANT CHANGE UP (ref 0–51)
UROBILINOGEN FLD QL: NEGATIVE — SIGNIFICANT CHANGE UP
WBC # BLD: 9.68 K/UL — SIGNIFICANT CHANGE UP (ref 3.8–10.5)
WBC # FLD AUTO: 9.68 K/UL — SIGNIFICANT CHANGE UP (ref 3.8–10.5)
WBC UR QL: 2 /HPF — SIGNIFICANT CHANGE UP (ref 0–5)

## 2022-01-28 PROCEDURE — 71045 X-RAY EXAM CHEST 1 VIEW: CPT | Mod: 26

## 2022-01-28 PROCEDURE — 99285 EMERGENCY DEPT VISIT HI MDM: CPT | Mod: GC

## 2022-01-28 PROCEDURE — 93010 ELECTROCARDIOGRAM REPORT: CPT

## 2022-01-28 RX ORDER — FUROSEMIDE 40 MG
40 TABLET ORAL ONCE
Refills: 0 | Status: COMPLETED | OUTPATIENT
Start: 2022-01-28 | End: 2022-01-28

## 2022-01-28 RX ADMIN — Medication 40 MILLIGRAM(S): at 21:58

## 2022-01-28 NOTE — ED PROVIDER NOTE - PROGRESS NOTE DETAILS
discussed patient with vavasis, no acute ischemic changes on EKG, pending work up at this time. agrees that story is concerning for possible progress of heart disease. recommends admitting patient and will have cardio follow. [facility] PGY3 - W/u c/w CHF exacerbation.  Will admit to Dr. Andrade. [facility] PGY3 - Dr. Andrade is off for the weekend and requesting admission to Dr. Ac.

## 2022-01-28 NOTE — ED ADULT NURSE NOTE - OBJECTIVE STATEMENT
pt 89 yo female recent covid infection last month brought by son for cough returning causing left side sternal pain pt lives independently per son uses walker pt alert and oriented on arrival ststes appetite good but some chest left side pain when coughing pt denies any fever or chills clear bilateral breath sounds skin warm dry exam by md withlabs sent as ordered

## 2022-01-28 NOTE — ED PROVIDER NOTE - OBJECTIVE STATEMENT
89 yo F with a pmhx of CAD s/p CABG, HTN, HLD, PVD s/p stents in bilateral legs, CKD (Baseline Cr reportedly 1.2), IDDM presents to the ED with Chest pain. She states she had a episode of CP on sunday,  days ago, and had another episode of chest pain today prompting her to come to the ED. Her chest pain is pressure like sensation, feels similar to her prior CP requiring a bypass. She admits to associated SOB and dyspnea on exertion that has worsened over the last week. She denies any abdominal pain, LE swelling, N/V/D, fevers. has has covid vaccine x3 and no sick contacts at home. She denies any other symptoms at bedside.  [facility] discussion with son, abram at bedside, declined to answer at this time. 89 yo F with a pmhx of CAD s/p CABG, HTN, HLD, PVD s/p stents in bilateral legs, CKD (Baseline Cr reportedly 1.2), IDDM presents to the ED with Chest pain. She states she had a episode of CP on sunday,  days ago, and had another episode of chest pain today prompting her to come to the ED. Her chest pain is pressure like sensation, feels similar to her prior CP requiring a bypass. She admits to associated SOB and dyspnea on exertion that has worsened over the last week. She denies any abdominal pain, LE swelling, N/V/D, fevers. has covid vaccine x3 and no sick contacts at home. She denies any other symptoms at bedside.  [facility] discussion with son, abram at bedside, declined to answer at this time.

## 2022-01-28 NOTE — ED ADULT NURSE NOTE - NSICDXFAMILYHX_GEN_ALL_CORE_FT
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

## 2022-01-28 NOTE — ED PROVIDER NOTE - ATTENDING CONTRIBUTION TO CARE
Attending MD Amaro: I personally have seen and examined this patient.  Resident note reviewed and agree on plan of care and except where noted.  See below for details.     Seen in [facility], accompanied by son  Cards: Dr. Rodney    89F with PMH/PSH including HTN, HLD, DM, CAD s/p CABG, PVD s/p bilateral LE stents, CKD presents to the ED with chest pain.  Reports that she had chest pain about 5 days ago and then another later on in week.  Reports chest pain is pressure like, non radiating, associated with shortness of breath, feels similar to chest pain she had prior to CABG.  Reports worsening dyspnea on exertion and decreased exercise tolerance over the last week.  Reports walks with difficulty with cane at baseline.  Denies abdominal pain, nausea, vomiting, diarrhea.  Denies bloody or black stools.  Denies fevers, cough, sore throat, sick contacts, recent illness.  Reports COVID vaccine x 3.  A ten (10) point review of systems was negative other than as stated in the HPI or elsewhere in the chart.     Exam:   General: NAD  HENT: head NCAT, airway patent  Eyes: anicteric, no conjunctival injection   Chest: lungs decreased breath sounds bilateral bases, poor inspiratory effort, no wheezing, no rhonchi, no rales, well healed sternotomy  Cardiac: +S1S2, regular, no m/r/g  GI: abdomen soft with +BS, NT, exam limited secondary to body habitus  : no CVAT  MSK: FROM at neck, no tenderness to midline palpation  Neuro: moving all extremities spontaneously  Psych: normal mood and affect           A/P: 89F with chest pain, concern for ACS, will obtain labs including trop, EKG, CXR, DDx also includes CHF exacerbation, will also keep on cardiac monitor, history and clinical picture not consistent with infectious process such as PNA or viral URI, will attempt to contact cards, likely admit

## 2022-01-28 NOTE — ED PROVIDER NOTE - CLINICAL SUMMARY MEDICAL DECISION MAKING FREE TEXT BOX
89 yo F with a pmhx of CAD s/p CABG, HTN, HLD, PVD s/p stents in bilateral legs, CKD (Baseline Cr reportedly 1.2), IDDM presents to the ED with Chest pain. admits to assocaited dysonea that is worse with exertion. vitals nl notable for HTN, other vitals wnl. no respiratory distress at bedside. PE as noted above. ddx includes acs vs acute on chronic chf vs pna. may have elements to HTN emergency. will order labs, imaging, ekg, meds, reassess

## 2022-01-28 NOTE — ED ADULT NURSE NOTE - NSIMPLEMENTINTERV_GEN_ALL_ED
Implemented All Fall with Harm Risk Interventions:  Roy to call system. Call bell, personal items and telephone within reach. Instruct patient to call for assistance. Room bathroom lighting operational. Non-slip footwear when patient is off stretcher. Physically safe environment: no spills, clutter or unnecessary equipment. Stretcher in lowest position, wheels locked, appropriate side rails in place. Provide visual cue, wrist band, yellow gown, etc. Monitor gait and stability. Monitor for mental status changes and reorient to person, place, and time. Review medications for side effects contributing to fall risk. Reinforce activity limits and safety measures with patient and family. Provide visual clues: red socks.

## 2022-01-28 NOTE — ED ADULT TRIAGE NOTE - CHIEF COMPLAINT QUOTE
Admitted in the hosp with Divertic With 3 units PRBC Wet Covid 12/26 Coughing, sob on exertion and l chest pain

## 2022-01-28 NOTE — ED PROVIDER NOTE - PHYSICAL EXAMINATION
GENERAL: no acute distress, non-toxic appearing  HEAD: normocephalic, atraumatic  HEENT: normal conjunctiva, oral mucosa moist, neck supple    CARDIAC: regular rate and rhythm, well healed CABG scar    PULM: poor inspiratory effort    GI: abdomen nondistended, soft, nontender, no guarding or rebound tenderness  : no CVA tenderness, no suprapubic tenderness  NEURO: alert and oriented x 3, normal speech, PERRLA, EOMI, no focal motor or sensory deficits  MSK: no visible deformities, no peripheral edema, calf tenderness/redness/swelling  SKIN: no visible rashes, dry, well-perfused  PSYCH: appropriate mood and affect

## 2022-01-28 NOTE — ED PROVIDER NOTE - SHIFT CHANGE DETAILS
Signout received from Dr. Hernandez.  89F w/ multiple risk factors p/w chest pain.  Spoke w/ cardiologist, who recommends admission.  Pending w/u and admission.

## 2022-01-29 DIAGNOSIS — I44.1 ATRIOVENTRICULAR BLOCK, SECOND DEGREE: ICD-10-CM

## 2022-01-29 DIAGNOSIS — I50.9 HEART FAILURE, UNSPECIFIED: ICD-10-CM

## 2022-01-29 DIAGNOSIS — E11.9 TYPE 2 DIABETES MELLITUS WITHOUT COMPLICATIONS: ICD-10-CM

## 2022-01-29 DIAGNOSIS — I10 ESSENTIAL (PRIMARY) HYPERTENSION: ICD-10-CM

## 2022-01-29 DIAGNOSIS — J90 PLEURAL EFFUSION, NOT ELSEWHERE CLASSIFIED: ICD-10-CM

## 2022-01-29 DIAGNOSIS — I25.10 ATHEROSCLEROTIC HEART DISEASE OF NATIVE CORONARY ARTERY WITHOUT ANGINA PECTORIS: ICD-10-CM

## 2022-01-29 DIAGNOSIS — Z29.9 ENCOUNTER FOR PROPHYLACTIC MEASURES, UNSPECIFIED: ICD-10-CM

## 2022-01-29 LAB
ANION GAP SERPL CALC-SCNC: 14 MMOL/L — SIGNIFICANT CHANGE UP (ref 5–17)
BUN SERPL-MCNC: 35 MG/DL — HIGH (ref 7–23)
CALCIUM SERPL-MCNC: 9.6 MG/DL — SIGNIFICANT CHANGE UP (ref 8.4–10.5)
CHLORIDE SERPL-SCNC: 100 MMOL/L — SIGNIFICANT CHANGE UP (ref 96–108)
CO2 SERPL-SCNC: 24 MMOL/L — SIGNIFICANT CHANGE UP (ref 22–31)
CREAT SERPL-MCNC: 1.34 MG/DL — HIGH (ref 0.5–1.3)
GLUCOSE BLDC GLUCOMTR-MCNC: 127 MG/DL — HIGH (ref 70–99)
GLUCOSE BLDC GLUCOMTR-MCNC: 129 MG/DL — HIGH (ref 70–99)
GLUCOSE BLDC GLUCOMTR-MCNC: 141 MG/DL — HIGH (ref 70–99)
GLUCOSE BLDC GLUCOMTR-MCNC: 221 MG/DL — HIGH (ref 70–99)
GLUCOSE BLDC GLUCOMTR-MCNC: 65 MG/DL — LOW (ref 70–99)
GLUCOSE BLDC GLUCOMTR-MCNC: 89 MG/DL — SIGNIFICANT CHANGE UP (ref 70–99)
GLUCOSE SERPL-MCNC: 129 MG/DL — HIGH (ref 70–99)
MAGNESIUM SERPL-MCNC: 2 MG/DL — SIGNIFICANT CHANGE UP (ref 1.6–2.6)
PHOSPHATE SERPL-MCNC: 4.7 MG/DL — HIGH (ref 2.5–4.5)
POTASSIUM SERPL-MCNC: 4.1 MMOL/L — SIGNIFICANT CHANGE UP (ref 3.5–5.3)
POTASSIUM SERPL-SCNC: 4.1 MMOL/L — SIGNIFICANT CHANGE UP (ref 3.5–5.3)
SODIUM SERPL-SCNC: 138 MMOL/L — SIGNIFICANT CHANGE UP (ref 135–145)

## 2022-01-29 PROCEDURE — 99223 1ST HOSP IP/OBS HIGH 75: CPT

## 2022-01-29 RX ORDER — INSULIN GLARGINE 100 [IU]/ML
15 INJECTION, SOLUTION SUBCUTANEOUS ONCE
Refills: 0 | Status: COMPLETED | OUTPATIENT
Start: 2022-01-29 | End: 2022-01-29

## 2022-01-29 RX ORDER — INSULIN GLARGINE 100 [IU]/ML
15 INJECTION, SOLUTION SUBCUTANEOUS AT BEDTIME
Refills: 0 | Status: DISCONTINUED | OUTPATIENT
Start: 2022-01-29 | End: 2022-02-03

## 2022-01-29 RX ORDER — PANTOPRAZOLE SODIUM 20 MG/1
40 TABLET, DELAYED RELEASE ORAL
Refills: 0 | Status: DISCONTINUED | OUTPATIENT
Start: 2022-01-29 | End: 2022-02-03

## 2022-01-29 RX ORDER — INSULIN LISPRO 100/ML
5 VIAL (ML) SUBCUTANEOUS
Refills: 0 | Status: DISCONTINUED | OUTPATIENT
Start: 2022-01-29 | End: 2022-02-03

## 2022-01-29 RX ORDER — DEXTROSE 50 % IN WATER 50 %
25 SYRINGE (ML) INTRAVENOUS ONCE
Refills: 0 | Status: DISCONTINUED | OUTPATIENT
Start: 2022-01-29 | End: 2022-02-03

## 2022-01-29 RX ORDER — TRAZODONE HCL 50 MG
50 TABLET ORAL AT BEDTIME
Refills: 0 | Status: DISCONTINUED | OUTPATIENT
Start: 2022-01-29 | End: 2022-02-03

## 2022-01-29 RX ORDER — SODIUM CHLORIDE 9 MG/ML
1000 INJECTION, SOLUTION INTRAVENOUS
Refills: 0 | Status: DISCONTINUED | OUTPATIENT
Start: 2022-01-29 | End: 2022-02-03

## 2022-01-29 RX ORDER — AMLODIPINE BESYLATE 2.5 MG/1
10 TABLET ORAL DAILY
Refills: 0 | Status: DISCONTINUED | OUTPATIENT
Start: 2022-01-29 | End: 2022-02-03

## 2022-01-29 RX ORDER — POLYETHYLENE GLYCOL 3350 17 G/17G
17 POWDER, FOR SOLUTION ORAL DAILY
Refills: 0 | Status: DISCONTINUED | OUTPATIENT
Start: 2022-01-29 | End: 2022-02-03

## 2022-01-29 RX ORDER — INSULIN LISPRO 100/ML
VIAL (ML) SUBCUTANEOUS
Refills: 0 | Status: DISCONTINUED | OUTPATIENT
Start: 2022-01-29 | End: 2022-02-03

## 2022-01-29 RX ORDER — ATORVASTATIN CALCIUM 80 MG/1
10 TABLET, FILM COATED ORAL AT BEDTIME
Refills: 0 | Status: DISCONTINUED | OUTPATIENT
Start: 2022-01-29 | End: 2022-02-03

## 2022-01-29 RX ORDER — DEXTROSE 50 % IN WATER 50 %
15 SYRINGE (ML) INTRAVENOUS ONCE
Refills: 0 | Status: DISCONTINUED | OUTPATIENT
Start: 2022-01-29 | End: 2022-02-03

## 2022-01-29 RX ORDER — SENNA PLUS 8.6 MG/1
2 TABLET ORAL AT BEDTIME
Refills: 0 | Status: DISCONTINUED | OUTPATIENT
Start: 2022-01-29 | End: 2022-02-03

## 2022-01-29 RX ORDER — FUROSEMIDE 40 MG
40 TABLET ORAL EVERY 12 HOURS
Refills: 0 | Status: DISCONTINUED | OUTPATIENT
Start: 2022-01-29 | End: 2022-02-01

## 2022-01-29 RX ORDER — DEXTROSE 50 % IN WATER 50 %
12.5 SYRINGE (ML) INTRAVENOUS ONCE
Refills: 0 | Status: DISCONTINUED | OUTPATIENT
Start: 2022-01-29 | End: 2022-02-03

## 2022-01-29 RX ORDER — GLUCAGON INJECTION, SOLUTION 0.5 MG/.1ML
1 INJECTION, SOLUTION SUBCUTANEOUS ONCE
Refills: 0 | Status: DISCONTINUED | OUTPATIENT
Start: 2022-01-29 | End: 2022-02-03

## 2022-01-29 RX ORDER — LANOLIN ALCOHOL/MO/W.PET/CERES
3 CREAM (GRAM) TOPICAL AT BEDTIME
Refills: 0 | Status: DISCONTINUED | OUTPATIENT
Start: 2022-01-29 | End: 2022-02-03

## 2022-01-29 RX ADMIN — ATORVASTATIN CALCIUM 10 MILLIGRAM(S): 80 TABLET, FILM COATED ORAL at 23:05

## 2022-01-29 RX ADMIN — INSULIN GLARGINE 15 UNIT(S): 100 INJECTION, SOLUTION SUBCUTANEOUS at 06:08

## 2022-01-29 RX ADMIN — SENNA PLUS 2 TABLET(S): 8.6 TABLET ORAL at 23:06

## 2022-01-29 RX ADMIN — Medication 1: at 12:33

## 2022-01-29 RX ADMIN — PANTOPRAZOLE SODIUM 40 MILLIGRAM(S): 20 TABLET, DELAYED RELEASE ORAL at 06:26

## 2022-01-29 RX ADMIN — Medication 5 UNIT(S): at 12:34

## 2022-01-29 RX ADMIN — Medication 50 MILLIGRAM(S): at 23:06

## 2022-01-29 RX ADMIN — INSULIN GLARGINE 15 UNIT(S): 100 INJECTION, SOLUTION SUBCUTANEOUS at 23:08

## 2022-01-29 RX ADMIN — POLYETHYLENE GLYCOL 3350 17 GRAM(S): 17 POWDER, FOR SOLUTION ORAL at 12:51

## 2022-01-29 RX ADMIN — Medication 40 MILLIGRAM(S): at 17:42

## 2022-01-29 RX ADMIN — Medication 5 UNIT(S): at 10:21

## 2022-01-29 RX ADMIN — Medication 40 MILLIGRAM(S): at 06:26

## 2022-01-29 NOTE — PHYSICAL THERAPY INITIAL EVALUATION ADULT - GAIT DEVIATIONS NOTED, PT EVAL
PT ACUTE  Treatment Session          Pt seen on 5 nursing unit. Frequency Comments: SUN (for documentation for possible d/c Monday back to facility) M T W F-  OT triaged [id number]:  Recommendation for Discharge: PT: Assisted living (02/02/19 1300)                                                                                                                 Admitting complaint: Generalized weakness [R53.1]  Acute UTI [N39.0]  History of Parkinson's disease [Z86.69]  History of dementia [Z86.59]                                     Precautions  Precautions Comments: baseline dementia, Parkinson's non-verbal (02/01/19 1550)    SUBJECTIVE: Patient's Personal Goal: none stated (02/01/19 1550)  Subjective: nods head; non-verbal (02/02/19 1300)  Subjective/Objective Comments: POA present during session; interrupted by CNA for vitals; sitting up before and after therapy; time for toileting in bathroom and depends change; speech present at end of session for swallow assessment (02/02/19 1300)    OBJECTIVE:  Basic Lines: O2;Capped IV (02/01/19 1550)  Safety Measures: Alarms(call light in reach ,  daughter present) (02/01/19 1550)    RN reported Manus Mariola Fall Scale Score: 85    ASSESSMENT:   Patient progressing well, requiring only 1 hand held support for walking at times though initially prefers [id number] two HHA support. Maneuvering in bathroom without any support at times though also noted freezing episode with vc's required to resume sequencing and demonstrate safe approach to toilet. Continue skilled PT intervention. Shavonne Torres, reporting that patient needs to be at one hand hold support for [facility].         Other Therapeutic Intervention: additional time discussing POC and goals with POA (02/02/19 1300)     EDUCATION:   On this date, the patient was educated on importance of mobility, safety awareness, transfer prep/postural set. The response to education was: Demonstrates understanding and Needs reinforcement    PT Identified Barriers to Discharge: medical  needs,  weakness,  impaired balance/ mobility,  fall risk     PLAN:   Continue skilled PT, including the following Treatment/Interventions: Functional transfer training;Strengthening;Patient/Family training;Bed mobility;Gait training; Safety Education (02/01/19 1550)   Frequency Comments: SUN (for documentation for possible d/c Monday back to facility) M T W F-  OT triaged OUT (02/02/19 1300)    Treatment Plan for Next Session: assess bed mobility, consider additional balance assessment and activities; hand hold assist of 1-2 for gait  Additional Plan Considerations: wear shoes when up       RECOMMENDATIONS FOR DISCHARGE:  Recommendation for Discharge: PT: Assisted living (02/02/19 1300)    PT/OT Mobility Equipment for Discharge: no needs (02/02/19 1300)        Assistance needed when returning home:   Discussed with patient and Fox Mccurdy, recommendation and plan to return to [facility] (Unable to accept over the weekend). ICU Mobility Assesment (PERME):       Last 24 hours of Functional Data  Bed Mobility   Bed Mobility  Bed Mobility Comments: not observed (02/02/19 1300)    Transfers  Transfers  Sit to Stand: Moderate Assist (Mod) (02/02/19 1300)  Stand to Sit: Moderate Assist (Mod) (02/02/19 1300)  Stand Pivot Transfers: Total Assist - Non-dependent (02/01/19 1550)  Toilet Transfers: Total Assist - Non-dependent(commode over toilet, highest setting) (02/02/19 1300)  Assistive Device/: 2 People;Gait Belt (02/02/19 1300)  Transfer Comments 1: despite 2 people present, patient demonstrates ability to transfer sit>stand on repeat trials with only 1 hand support and cues  (02/02/19 1300)      Gait  Gait  Gait Assistance:  Total Assist - Non-dependent (02/02/19 1300)  Assistive Device/: 2 People;Gait Belt (02/02/19 1300)  Ambulation Distance "(Feet): 60 Feet (02/02/19 1300)  Pattern: Shuffle (02/01/19 1550)  Gait Comments 1: plus another 20 feet with hand hold assist of 1-2, releases hand of second person at times, reaches for wall if available, maneuvering in bathroom without HHA (02/02/19 1300)  Gait Comments 2: occasional cues for stepping (""right left\"") if freezing during turns (Parkinson's) (02/02/19 1300),      Stairs  Stairs Mobility  Stair Management Assistance: Not applicable (08/79/24 4004)  Stairs Mobility Comments: no stair needs (02/01/19 1550)       Neuromuscular Re-education       Balance  Balance  Sitting - Static: Modified Independent (02/02/19 1300)  Sitting - Dynamic: Supervision (Supv) (02/02/19 1300)  Standing - Static: Minimal Assist (Min) (02/02/19 1300)  Standing - Dynamic (eyes open): Total Assist (Total)(min/ mod A  of 2) (02/01/19 1550)  Balance Comments #1: no LOB noted, patient likely prefers 1-2 hand support for walking though appears to not require more than close supervision to light min assist for mobility (02/02/19 1300)    Wheelchair Mobility       Patient's Personal Goal: none stated (02/01/19 1550)    Therapy Goals:    Goals  Short Term Goals to Be Reviewed On: 02/08/19 (02/01/19 1550)  Short Term Goals = Discharge Goals: Yes (02/01/19 1550)  Goal Agreement: Patient agrees with goals and treatment plan;Family/significant other/caregiver agrees (02/01/19 1550)  Bed Mobility Discharge Goal: min A of 1 (02/01/19 1550)  Transfer Discharge Goal: min A of 1 (02/01/19 1550)  Ambulation Discharge Goal: 150' with  min HHA  of 1  (02/01/19 1550)        PT Time Spent: 32 minutes (02/02/19 1300)    See PT flowsheet for full details regarding the PT therapy provided.       " decreased yaniv/decreased step length/decreased stride length/decreased weight-shifting ability

## 2022-01-29 NOTE — H&P ADULT - ASSESSMENT
Patient is a 89 yo F with PMHx of HTN/HLD, DM2, CAD (s/p CABG), PVD (s/p stents in bilateral LE), CKD (Baseline Cr reportedly 1.2),  recent admission for GIB 2/2 chronic gastritis who presented with complaints of chest pain, admitted for ADHF    Patient is a 89 yo F with PMHx of HTN/HLD, DM2, CAD (s/p CABG), PVD (s/p stents in bilateral LE), CKD (Baseline Cr reportedly 1.2), recent admission for GIB 2/2 chronic gastritis who presented with complaints of dyspnea and ?chest pain, admitted for ADHF

## 2022-01-29 NOTE — PHYSICAL THERAPY INITIAL EVALUATION ADULT - GAIT TRAINING, PT EVAL
GOAL: pt will be able to independently ambulate 150ft with use of rollator/rolling walker within 2 weeks

## 2022-01-29 NOTE — ED ADULT NURSE REASSESSMENT NOTE - NS ED NURSE REASSESS COMMENT FT1
Patient assistance to the bed side commode. Patient given socks and readjusted in the stretcher.
Patient had an episode of bradycardia, MD Amaro at bedside. Patient is asymptotic at this time no chest pain, pressure, or palpitations. As per the tele monitor patient is in [place] on the monitor. Patient HR does come back up to the 60's which is baseline.
No shortness of breath or difficulty breathing. No chest pain, pressure or palpitations. No abdominal pain, nausea or vomiting. No fever, chills, or body aches. Patient is asking if she can eat at this time

## 2022-01-29 NOTE — H&P ADULT - NSICDXPASTMEDICALHX_GEN_ALL_CORE_FT
PAST MEDICAL HISTORY:  Diabetes     Essential hypertension, benign     Hypercholesteremia     Peripheral vascular disease      PAST MEDICAL HISTORY:  CAD (coronary artery disease)     Diabetes     Essential hypertension, benign     History of chronic gastritis     Hypercholesteremia     Peripheral vascular disease

## 2022-01-29 NOTE — H&P ADULT - PROBLEM SELECTOR PLAN 1
- patient presenting with progressive dyspnea and ?episode of left sided chest pain  - patient was unable to elaborate as to the timing or quality of the left sided chest pain  - CXR with new b/l pleural effusions and BNP elevated at 1860  - s/p 40mg IV lasix in the ED   - presentation is concerning for ADHF   - will start IV Lasix 04mg BID  - monitor BMP, Mg and Phos q12 and replete for goal k >4, Phos >3 and Mg >2  - check TTE  - if there is evidence of new regional wall motion abnormalities, patient will require ischemic workup   - strict Is/Os and daily standing weight

## 2022-01-29 NOTE — PROGRESS NOTE ADULT - SUBJECTIVE AND OBJECTIVE BOX
Patient is a 89y old  Female who presents with a chief complaint of ADHF (2022 01:51)      SUBJECTIVE / OVERNIGHT EVENTS:    Events noted.  CONSTITUTIONAL: No fever,  or fatigue  RESPIRATORY: No cough, wheezing,  No shortness of breath  CARDIOVASCULAR: No chest pain, palpitations, dizziness, or leg swelling  GASTROINTESTINAL: No abdominal or epigastric pain. No nausea,      MEDICATIONS  (STANDING):  amLODIPine   Tablet 10 milliGRAM(s) Oral daily  atorvastatin 10 milliGRAM(s) Oral at bedtime  dextrose 40% Gel 15 Gram(s) Oral once  dextrose 5%. 1000 milliLiter(s) (50 mL/Hr) IV Continuous <Continuous>  dextrose 5%. 1000 milliLiter(s) (100 mL/Hr) IV Continuous <Continuous>  dextrose 50% Injectable 25 Gram(s) IV Push once  dextrose 50% Injectable 12.5 Gram(s) IV Push once  dextrose 50% Injectable 25 Gram(s) IV Push once  furosemide   Injectable 40 milliGRAM(s) IV Push every 12 hours  glucagon  Injectable 1 milliGRAM(s) IntraMuscular once  insulin glargine Injectable (LANTUS) 15 Unit(s) SubCutaneous at bedtime  insulin lispro (ADMELOG) corrective regimen sliding scale   SubCutaneous three times a day before meals  insulin lispro Injectable (ADMELOG) 5 Unit(s) SubCutaneous three times a day before meals  pantoprazole    Tablet 40 milliGRAM(s) Oral before breakfast  polyethylene glycol 3350 17 Gram(s) Oral daily  senna 2 Tablet(s) Oral at bedtime  traZODone 50 milliGRAM(s) Oral at bedtime    MEDICATIONS  (PRN):  melatonin 3 milliGRAM(s) Oral at bedtime PRN Insomnia        CAPILLARY BLOOD GLUCOSE      POCT Blood Glucose.: 89 mg/dL (2022 14:41)  POCT Blood Glucose.: 65 mg/dL (2022 14:21)  POCT Blood Glucose.: 127 mg/dL (2022 09:49)  POCT Blood Glucose.: 129 mg/dL (2022 05:58)  POCT Blood Glucose.: 141 mg/dL (2022 04:43)    I&O's Summary      T(C): 36.7 (22 @ 21:52), Max: 36.7 (22 @ 23:32)  HR: 75 (22 @ 21:52) (53 - 75)  BP: 177/71 (22 @ 21:52) (156/68 - 182/78)  RR: 18 (22 @ 21:52) (16 - 18)  SpO2: 99% (22 @ 21:52) (96% - 99%)    PHYSICAL EXAM:  GENERAL: NAD  NECK: Supple, No JVD  CHEST/LUNG: Clear to auscultation bilaterally; No wheezing.  HEART: Regular rate and rhythm; No murmurs, rubs, or gallops  ABDOMEN: Soft, Nontender, Nondistended; Bowel sounds present  EXTREMITIES:   No edema  NEUROLOGY: AAO X 3      LABS:                        11.4   9.68  )-----------( 320      ( 2022 18:42 )             37.2         138  |  100  |  35<H>  ----------------------------<  129<H>  4.1   |  24  |  1.34<H>    Ca    9.6      2022 07:03  Phos  4.7       Mg     2.0         TPro  8.2  /  Alb  4.3  /  TBili  0.4  /  DBili  x   /  AST  26  /  ALT  15  /  AlkPhos  246<H>      PT/INR - ( 2022 18:42 )   PT: 15.4 sec;   INR: 1.30 ratio         PTT - ( 2022 18:42 )  PTT:42.5 sec      Urinalysis Basic - ( 2022 23:40 )    Color: Colorless / Appearance: Clear / S.008 / pH: x  Gluc: x / Ketone: Negative  / Bili: Negative / Urobili: Negative   Blood: x / Protein: 30 mg/dL / Nitrite: Negative   Leuk Esterase: Negative / RBC: 0 /hpf / WBC 2 /HPF   Sq Epi: x / Non Sq Epi: 0 /hpf / Bacteria: Negative      CAPILLARY BLOOD GLUCOSE      POCT Blood Glucose.: 89 mg/dL (2022 14:41)  POCT Blood Glucose.: 65 mg/dL (2022 14:21)  POCT Blood Glucose.: 127 mg/dL (2022 09:49)  POCT Blood Glucose.: 129 mg/dL (2022 05:58)  POCT Blood Glucose.: 141 mg/dL (2022 04:43)        RADIOLOGY & ADDITIONAL TESTS:    Imaging Personally Reviewed:    Consultant(s) Notes Reviewed:      Care Discussed with Consultants/Other Providers:    Elmo Ac MD, CMD, FACP    [id number] [id number]  Office Tel: [phone]  Cell: [phone]

## 2022-01-29 NOTE — H&P ADULT - PROBLEM SELECTOR PLAN 5
- BP currently in acceptable range  - continue home amlodipine with hold parameters  - hold home metoprolol as above  - continue to monitor vitals q8

## 2022-01-29 NOTE — H&P ADULT - PROBLEM SELECTOR PLAN 2
- patient found to have occasional episodes of Mobitz type 2 on tele  - will hold home Metoprolol   - will ensure electrolytes are adequately repleted   - continue to monitor on tele  - cardiology vs EP consult in the AM  - repeat EKG in the AM

## 2022-01-29 NOTE — PHYSICAL THERAPY INITIAL EVALUATION ADULT - IMPAIRMENTS FOUND, PT EVAL
bed mobility, transfers, stairs/aerobic capacity/endurance/gait, locomotion, and balance/muscle strength

## 2022-01-29 NOTE — H&P ADULT - HISTORY OF PRESENT ILLNESS
Patient is a 89 yo F with PMHx of HTN/HLD, DM2, CAD (s/p CABG), PVD (s/p stents in bilateral LE), CKD (Baseline Cr reportedly 1.2), recent admission for GIB 2/2 chronic gastritis who presented with complaints of dyspnea and ?chest pain. Patient is a poor historian, therefore, history is somewhat limited. Patient reports that she has been experiencing progressively worsening dyspnea. Patient was concerned that it may be cardiac in etiology, which prompted her to present to the ED. Patient states that she has left sided chest pain currently, however could not elaborate as to the timing or chronicity of the chest pain    In the ED, vitals significant for borderline bradycardia (HR 61), Labs significant for a trop of 29 which decreased to 26 on repeat and a BNP of 1860. a CXR was obtained which revealed b/l pleural effusions and increased pulmonary vascular markings. Patient was given a dose of 40mg IV Lasix and admitted to medicine for further management.    Of note, on review of tele, patient found to have occasional episodes of Mobitz type 1.

## 2022-01-29 NOTE — PHYSICAL THERAPY INITIAL EVALUATION ADULT - TRANSFER TRAINING, PT EVAL
GOAL: pt will be able to perform transfers independently with use of rollator/rolling walker within 2 weeks

## 2022-01-29 NOTE — H&P ADULT - PROBLEM SELECTOR PLAN 4
- patient on home lantus 32u qhs  - will start patient on 15u Lantus qhs and lispro 5u with meals  - low dose ISS  - monitor FS before meals and at bedtime

## 2022-01-29 NOTE — PHYSICAL THERAPY INITIAL EVALUATION ADULT - ADDITIONAL COMMENTS
Pt lives alone in an apartment, no entry steps, 13 steps inside (+ rail). Prior to admission pt was independent with all functional mobility including household ambulation with rollator. Pt performs ADL's independently. Goal of therapy: go home.

## 2022-01-29 NOTE — PROGRESS NOTE ADULT - ASSESSMENT
Patient is a 89 yo F with PMHx of HTN/HLD, DM2, CAD (s/p CABG), PVD (s/p stents in bilateral LE), CKD (Baseline Cr reportedly 1.2), recent admission for GIB 2/2 chronic gastritis who presented with complaints of dyspnea and ?chest pain, admitted for ADHF

## 2022-01-29 NOTE — H&P ADULT - PROBLEM SELECTOR PLAN 3
- patient with b/l pleural effusions on CXR  - likely 2/2 to ADHF  - plan as above  - repeat CXR when patient is closer to euvolemia

## 2022-01-29 NOTE — PHYSICAL THERAPY INITIAL EVALUATION ADULT - PLANNED THERAPY INTERVENTIONS, PT EVAL
stairs: GOAL: pt will be able to independently ascend/descend total of 13 steps with use of one rail via step to step pattern within 2 weeks/bed mobility training/gait training/strengthening/transfer training

## 2022-01-29 NOTE — H&P ADULT - PROBLEM SELECTOR PLAN 6
- Diet: DASH CC  - DVT ppx: pharmaceutical DVT ppx contraindicated in the setting of recent GIB. SCDs  - Dispo: pending clinical improvement. PT consult

## 2022-01-29 NOTE — H&P ADULT - NSHPLABSRESULTS_GEN_ALL_CORE
Personally reviewed available labs, imaging and ekg  [1]  CBC Full  -  ( 28 Jan 2022 18:42 )  WBC Count : 9.68 K/uL  RBC Count : 4.13 M/uL  Hemoglobin : 11.4 g/dL  Hematocrit : 37.2 %  Platelet Count - Automated : 320 K/uL  Mean Cell Volume : 90.1 fl  Mean Cell Hemoglobin : 27.6 pg  Mean Cell Hemoglobin Concentration : 30.6 gm/dL  Auto Neutrophil # : 7.58 K/uL  Auto Lymphocyte # : 0.80 K/uL  Auto Monocyte # : 0.96 K/uL  Auto Eosinophil # : 0.23 K/uL  Auto Basophil # : 0.06 K/uL  Auto Neutrophil % : 78.3 %  Auto Lymphocyte % : 8.3 %  Auto Monocyte % : 9.9 %  Auto Eosinophil % : 2.4 %  Auto Basophil % : 0.6 %    01-28    136  |  99  |  33<H>  ----------------------------<  112<H>  4.6   |  21<L>  |  1.22    Ca    10.2      28 Jan 2022 18:42    TPro  8.2  /  Alb  4.3  /  TBili  0.4  /  DBili  x   /  AST  26  /  ALT  15  /  AlkPhos  246<H>  01-28    PT/INR - ( 28 Jan 2022 18:42 )   PT: 15.4 sec;   INR: 1.30 ratio         PTT - ( 28 Jan 2022 18:42 )  PTT:42.5 sec  Imaging:  [ 2] I independently reviewed CXR and significant for b/l pleural effusions and increased pulmonary vascular markings     EKG:  [2] I independently reviewed EKG/cardiac tracing and NSR appreciated    Review of old records:  [2] I personally reviewed previous records which identified recent admission for GIB 2/2 chronic gastritis Personally reviewed available labs, imaging and ekg  [1]  CBC Full  -  ( 28 Jan 2022 18:42 )  WBC Count : 9.68 K/uL  RBC Count : 4.13 M/uL  Hemoglobin : 11.4 g/dL  Hematocrit : 37.2 %  Platelet Count - Automated : 320 K/uL  Mean Cell Volume : 90.1 fl  Mean Cell Hemoglobin : 27.6 pg  Mean Cell Hemoglobin Concentration : 30.6 gm/dL  Auto Neutrophil # : 7.58 K/uL  Auto Lymphocyte # : 0.80 K/uL  Auto Monocyte # : 0.96 K/uL  Auto Eosinophil # : 0.23 K/uL  Auto Basophil # : 0.06 K/uL  Auto Neutrophil % : 78.3 %  Auto Lymphocyte % : 8.3 %  Auto Monocyte % : 9.9 %  Auto Eosinophil % : 2.4 %  Auto Basophil % : 0.6 %    01-28    136  |  99  |  33<H>  ----------------------------<  112<H>  4.6   |  21<L>  |  1.22    Ca    10.2      28 Jan 2022 18:42    TPro  8.2  /  Alb  4.3  /  TBili  0.4  /  DBili  x   /  AST  26  /  ALT  15  /  AlkPhos  246<H>  01-28    PT/INR - ( 28 Jan 2022 18:42 )   PT: 15.4 sec;   INR: 1.30 ratio         PTT - ( 28 Jan 2022 18:42 )  PTT:42.5 sec  Imaging:  [ 2] I independently reviewed CXR and significant for b/l pleural effusions and increased pulmonary vascular markings     EKG:  [2] I independently reviewed EKG/cardiac tracing and patient noted to be in normal sinus rhythm with first degree heart block. Further review on tele revealed intermittent Mobitz type 1     Review of old records:  [2] I personally reviewed previous records which identified recent admission for GIB 2/2 chronic gastritis

## 2022-01-30 LAB
ALBUMIN SERPL ELPH-MCNC: 3.8 G/DL — SIGNIFICANT CHANGE UP (ref 3.3–5)
ALP SERPL-CCNC: 219 U/L — HIGH (ref 40–120)
ALT FLD-CCNC: 12 U/L — SIGNIFICANT CHANGE UP (ref 10–45)
ANION GAP SERPL CALC-SCNC: 17 MMOL/L — SIGNIFICANT CHANGE UP (ref 5–17)
APTT BLD: 40.4 SEC — HIGH (ref 27.5–35.5)
AST SERPL-CCNC: 17 U/L — SIGNIFICANT CHANGE UP (ref 10–40)
BASOPHILS # BLD AUTO: 0.05 K/UL — SIGNIFICANT CHANGE UP (ref 0–0.2)
BASOPHILS NFR BLD AUTO: 0.6 % — SIGNIFICANT CHANGE UP (ref 0–2)
BILIRUB DIRECT SERPL-MCNC: 0.1 MG/DL — SIGNIFICANT CHANGE UP (ref 0–0.3)
BILIRUB INDIRECT FLD-MCNC: 0.3 MG/DL — SIGNIFICANT CHANGE UP (ref 0.2–1)
BILIRUB SERPL-MCNC: 0.4 MG/DL — SIGNIFICANT CHANGE UP (ref 0.2–1.2)
BUN SERPL-MCNC: 41 MG/DL — HIGH (ref 7–23)
CALCIUM SERPL-MCNC: 9.4 MG/DL — SIGNIFICANT CHANGE UP (ref 8.4–10.5)
CHLORIDE SERPL-SCNC: 94 MMOL/L — LOW (ref 96–108)
CO2 SERPL-SCNC: 24 MMOL/L — SIGNIFICANT CHANGE UP (ref 22–31)
CREAT SERPL-MCNC: 1.52 MG/DL — HIGH (ref 0.5–1.3)
CULTURE RESULTS: SIGNIFICANT CHANGE UP
EOSINOPHIL # BLD AUTO: 0.27 K/UL — SIGNIFICANT CHANGE UP (ref 0–0.5)
EOSINOPHIL NFR BLD AUTO: 3 % — SIGNIFICANT CHANGE UP (ref 0–6)
GLUCOSE BLDC GLUCOMTR-MCNC: 106 MG/DL — HIGH (ref 70–99)
GLUCOSE BLDC GLUCOMTR-MCNC: 114 MG/DL — HIGH (ref 70–99)
GLUCOSE BLDC GLUCOMTR-MCNC: 125 MG/DL — HIGH (ref 70–99)
GLUCOSE BLDC GLUCOMTR-MCNC: 170 MG/DL — HIGH (ref 70–99)
GLUCOSE SERPL-MCNC: 94 MG/DL — SIGNIFICANT CHANGE UP (ref 70–99)
HCT VFR BLD CALC: 36.5 % — SIGNIFICANT CHANGE UP (ref 34.5–45)
HGB BLD-MCNC: 11.2 G/DL — LOW (ref 11.5–15.5)
IMM GRANULOCYTES NFR BLD AUTO: 0.5 % — SIGNIFICANT CHANGE UP (ref 0–1.5)
INR BLD: 1.24 RATIO — HIGH (ref 0.88–1.16)
LYMPHOCYTES # BLD AUTO: 0.82 K/UL — LOW (ref 1–3.3)
LYMPHOCYTES # BLD AUTO: 9.3 % — LOW (ref 13–44)
MAGNESIUM SERPL-MCNC: 1.9 MG/DL — SIGNIFICANT CHANGE UP (ref 1.6–2.6)
MCHC RBC-ENTMCNC: 27.3 PG — SIGNIFICANT CHANGE UP (ref 27–34)
MCHC RBC-ENTMCNC: 30.7 GM/DL — LOW (ref 32–36)
MCV RBC AUTO: 89 FL — SIGNIFICANT CHANGE UP (ref 80–100)
MONOCYTES # BLD AUTO: 1.02 K/UL — HIGH (ref 0–0.9)
MONOCYTES NFR BLD AUTO: 11.5 % — SIGNIFICANT CHANGE UP (ref 2–14)
NEUTROPHILS # BLD AUTO: 6.66 K/UL — SIGNIFICANT CHANGE UP (ref 1.8–7.4)
NEUTROPHILS NFR BLD AUTO: 75.1 % — SIGNIFICANT CHANGE UP (ref 43–77)
NRBC # BLD: 0 /100 WBCS — SIGNIFICANT CHANGE UP (ref 0–0)
PHOSPHATE SERPL-MCNC: 4.6 MG/DL — HIGH (ref 2.5–4.5)
PLATELET # BLD AUTO: 348 K/UL — SIGNIFICANT CHANGE UP (ref 150–400)
POTASSIUM SERPL-MCNC: 3.8 MMOL/L — SIGNIFICANT CHANGE UP (ref 3.5–5.3)
POTASSIUM SERPL-SCNC: 3.8 MMOL/L — SIGNIFICANT CHANGE UP (ref 3.5–5.3)
PROT SERPL-MCNC: 7.5 G/DL — SIGNIFICANT CHANGE UP (ref 6–8.3)
PROTHROM AB SERPL-ACNC: 14.7 SEC — HIGH (ref 10.6–13.6)
RBC # BLD: 4.1 M/UL — SIGNIFICANT CHANGE UP (ref 3.8–5.2)
RBC # FLD: 16.5 % — HIGH (ref 10.3–14.5)
SODIUM SERPL-SCNC: 135 MMOL/L — SIGNIFICANT CHANGE UP (ref 135–145)
SPECIMEN SOURCE: SIGNIFICANT CHANGE UP
WBC # BLD: 8.86 K/UL — SIGNIFICANT CHANGE UP (ref 3.8–10.5)
WBC # FLD AUTO: 8.86 K/UL — SIGNIFICANT CHANGE UP (ref 3.8–10.5)

## 2022-01-30 PROCEDURE — 93306 TTE W/DOPPLER COMPLETE: CPT | Mod: 26

## 2022-01-30 PROCEDURE — 93010 ELECTROCARDIOGRAM REPORT: CPT

## 2022-01-30 RX ADMIN — Medication 40 MILLIGRAM(S): at 06:00

## 2022-01-30 RX ADMIN — Medication 50 MILLIGRAM(S): at 22:28

## 2022-01-30 RX ADMIN — ATORVASTATIN CALCIUM 10 MILLIGRAM(S): 80 TABLET, FILM COATED ORAL at 22:28

## 2022-01-30 RX ADMIN — Medication 5 UNIT(S): at 12:53

## 2022-01-30 RX ADMIN — Medication 40 MILLIGRAM(S): at 18:26

## 2022-01-30 RX ADMIN — SENNA PLUS 2 TABLET(S): 8.6 TABLET ORAL at 22:28

## 2022-01-30 RX ADMIN — Medication 5 UNIT(S): at 20:05

## 2022-01-30 RX ADMIN — AMLODIPINE BESYLATE 10 MILLIGRAM(S): 2.5 TABLET ORAL at 06:00

## 2022-01-30 RX ADMIN — Medication 3 MILLIGRAM(S): at 22:28

## 2022-01-30 RX ADMIN — INSULIN GLARGINE 15 UNIT(S): 100 INJECTION, SOLUTION SUBCUTANEOUS at 22:22

## 2022-01-30 RX ADMIN — POLYETHYLENE GLYCOL 3350 17 GRAM(S): 17 POWDER, FOR SOLUTION ORAL at 13:03

## 2022-01-30 RX ADMIN — Medication 1: at 12:54

## 2022-01-30 RX ADMIN — Medication 5 UNIT(S): at 08:51

## 2022-01-30 RX ADMIN — PANTOPRAZOLE SODIUM 40 MILLIGRAM(S): 20 TABLET, DELAYED RELEASE ORAL at 06:00

## 2022-01-30 NOTE — CONSULT NOTE ADULT - ASSESSMENT
anemia  recent gi bleed    no overt signs of bleeding  cont diet as tolerated  had recent egd/colon, no need to repeat  cbc daily  will cont to follow    I reviewed the overnight course of events on the unit, re-confirming the patient history. I discussed the care with the patient and their family  The plan of care was discussed with the physician assistant and modifications were made to the notation where appropriate.   Differential diagnosis and plan of care discussed with patient after the evaluation  35 minutes spent on total encounter of which more than fifty percent of the encounter was spent counseling and/or coordinating care by the attending physician.  Advanced care planning was discussed with patient and family.  Advanced care planning forms were reviewed and discussed.  Risks, benefits and alternatives of gastroenterologic procedures were discussed in detail and all questions were answered.

## 2022-01-30 NOTE — PATIENT PROFILE ADULT - FALL HARM RISK - HARM RISK INTERVENTIONS

## 2022-01-30 NOTE — CONSULT NOTE ADULT - ASSESSMENT
A/P    89 yo F with PMHx of HTN/HLD, DM2, CAD (s/p CABG), PVD (s/p stents in bilateral LE), CKD (Baseline Cr reportedly 1.2), recent admission for GIB 2/2 chronic gastritis who presented with complaints of dyspnea and chest pain.    #acute on chronic systolic HF  -clinically stable and improved post iv lasix  -cont iv bid for now   -check echo to eval lv and valve fxn    #CAD, s/p CABG  -atypical cp   -now denies, trop negative   -cont asa  -if new lv dysfxn, will consider ischemic eval     #AV block   -tele reviewed  -some episodes of mobitz I Iblock with few brief episodes of mobitz II on high dose toprol  -cont to monitor off bb for now   -avoid avn meds    dvt ppx        70 minutes spent on total encounter; more than 50% of the visit was spent counseling and/or coordinating care by the attending physician.

## 2022-01-30 NOTE — PROGRESS NOTE ADULT - SUBJECTIVE AND OBJECTIVE BOX
Patient is a 89y old  Female who presents with a chief complaint of ADHF (2022 12:02)      SUBJECTIVE / OVERNIGHT EVENTS:    Events noted.  CONSTITUTIONAL: No fever,  or fatigue  RESPIRATORY: No cough, wheezing,  No shortness of breath  CARDIOVASCULAR: No chest pain, palpitations, dizziness, or leg swelling  GASTROINTESTINAL: No abdominal or epigastric pain.       MEDICATIONS  (STANDING):  amLODIPine   Tablet 10 milliGRAM(s) Oral daily  atorvastatin 10 milliGRAM(s) Oral at bedtime  dextrose 40% Gel 15 Gram(s) Oral once  dextrose 5%. 1000 milliLiter(s) (50 mL/Hr) IV Continuous <Continuous>  dextrose 5%. 1000 milliLiter(s) (100 mL/Hr) IV Continuous <Continuous>  dextrose 50% Injectable 25 Gram(s) IV Push once  dextrose 50% Injectable 12.5 Gram(s) IV Push once  dextrose 50% Injectable 25 Gram(s) IV Push once  furosemide   Injectable 40 milliGRAM(s) IV Push every 12 hours  glucagon  Injectable 1 milliGRAM(s) IntraMuscular once  insulin glargine Injectable (LANTUS) 15 Unit(s) SubCutaneous at bedtime  insulin lispro (ADMELOG) corrective regimen sliding scale   SubCutaneous three times a day before meals  insulin lispro Injectable (ADMELOG) 5 Unit(s) SubCutaneous three times a day before meals  pantoprazole    Tablet 40 milliGRAM(s) Oral before breakfast  polyethylene glycol 3350 17 Gram(s) Oral daily  senna 2 Tablet(s) Oral at bedtime  traZODone 50 milliGRAM(s) Oral at bedtime    MEDICATIONS  (PRN):  melatonin 3 milliGRAM(s) Oral at bedtime PRN Insomnia        CAPILLARY BLOOD GLUCOSE      POCT Blood Glucose.: 106 mg/dL (2022 22:25)  POCT Blood Glucose.: 114 mg/dL (2022 19:53)  POCT Blood Glucose.: 170 mg/dL (2022 12:18)  POCT Blood Glucose.: 125 mg/dL (2022 08:24)    I&O's Summary    2022 07:01  -  2022 23:04  --------------------------------------------------------  IN: 240 mL / OUT: 200 mL / NET: 40 mL        T(C): 36.6 (22 @ 21:23), Max: 36.8 (22 @ 17:30)  HR: 70 (22 @ 21:48) (62 - 70)  BP: 162/60 (22 @ 21:48) (148/68 - 192/77)  RR: 18 (22 @ 21:48) (17 - 18)  SpO2: 96% (22 @ 21:48) (95% - 100%)    PHYSICAL EXAM:    NECK: Supple, No JVD  CHEST/LUNG: Clear to auscultation bilaterally; No wheezing.  HEART: Regular rate and rhythm; No murmurs, rubs, or gallops  ABDOMEN: Soft, Nontender, Nondistended; Bowel sounds present  EXTREMITIES:   No edema  NEUROLOGY: AAO       LABS:                        11.2   8.86  )-----------( 348      ( 2022 11:14 )             36.5         135  |  94<L>  |  41<H>  ----------------------------<  94  3.8   |  24  |  1.52<H>    Ca    9.4      2022 11:14  Phos  4.6       Mg     1.9         TPro  7.5  /  Alb  3.8  /  TBili  0.4  /  DBili  0.1  /  AST  17  /  ALT  12  /  AlkPhos  219<H>      PT/INR - ( 2022 11:14 )   PT: 14.7 sec;   INR: 1.24 ratio         PTT - ( 2022 11:14 )  PTT:40.4 sec      Urinalysis Basic - ( 2022 23:40 )    Color: Colorless / Appearance: Clear / S.008 / pH: x  Gluc: x / Ketone: Negative  / Bili: Negative / Urobili: Negative   Blood: x / Protein: 30 mg/dL / Nitrite: Negative   Leuk Esterase: Negative / RBC: 0 /hpf / WBC 2 /HPF   Sq Epi: x / Non Sq Epi: 0 /hpf / Bacteria: Negative      CAPILLARY BLOOD GLUCOSE      POCT Blood Glucose.: 106 mg/dL (2022 22:25)  POCT Blood Glucose.: 114 mg/dL (2022 19:53)  POCT Blood Glucose.: 170 mg/dL (2022 12:18)  POCT Blood Glucose.: 125 mg/dL (2022 08:24)        RADIOLOGY & ADDITIONAL TESTS:    Imaging Personally Reviewed:    Consultant(s) Notes Reviewed:      Care Discussed with Consultants/Other Providers:    Elmo Ac MD, CMD, FACP    [address] [place] [address]  Office Tel: [phone]  Cell: [phone]

## 2022-01-31 LAB
ALBUMIN SERPL ELPH-MCNC: 3.5 G/DL — SIGNIFICANT CHANGE UP (ref 3.3–5)
ALP SERPL-CCNC: 197 U/L — HIGH (ref 40–120)
ALT FLD-CCNC: 12 U/L — SIGNIFICANT CHANGE UP (ref 10–45)
ANION GAP SERPL CALC-SCNC: 14 MMOL/L — SIGNIFICANT CHANGE UP (ref 5–17)
ANION GAP SERPL CALC-SCNC: 15 MMOL/L — SIGNIFICANT CHANGE UP (ref 5–17)
ANION GAP SERPL CALC-SCNC: 17 MMOL/L — SIGNIFICANT CHANGE UP (ref 5–17)
AST SERPL-CCNC: 19 U/L — SIGNIFICANT CHANGE UP (ref 10–40)
BASOPHILS # BLD AUTO: 0.04 K/UL — SIGNIFICANT CHANGE UP (ref 0–0.2)
BASOPHILS NFR BLD AUTO: 0.5 % — SIGNIFICANT CHANGE UP (ref 0–2)
BILIRUB SERPL-MCNC: 0.3 MG/DL — SIGNIFICANT CHANGE UP (ref 0.2–1.2)
BUN SERPL-MCNC: 44 MG/DL — HIGH (ref 7–23)
BUN SERPL-MCNC: 46 MG/DL — HIGH (ref 7–23)
BUN SERPL-MCNC: 52 MG/DL — HIGH (ref 7–23)
CALCIUM SERPL-MCNC: 9 MG/DL — SIGNIFICANT CHANGE UP (ref 8.4–10.5)
CALCIUM SERPL-MCNC: 9.2 MG/DL — SIGNIFICANT CHANGE UP (ref 8.4–10.5)
CALCIUM SERPL-MCNC: 9.3 MG/DL — SIGNIFICANT CHANGE UP (ref 8.4–10.5)
CHLORIDE SERPL-SCNC: 95 MMOL/L — LOW (ref 96–108)
CHLORIDE SERPL-SCNC: 97 MMOL/L — SIGNIFICANT CHANGE UP (ref 96–108)
CHLORIDE SERPL-SCNC: 98 MMOL/L — SIGNIFICANT CHANGE UP (ref 96–108)
CO2 SERPL-SCNC: 22 MMOL/L — SIGNIFICANT CHANGE UP (ref 22–31)
CO2 SERPL-SCNC: 23 MMOL/L — SIGNIFICANT CHANGE UP (ref 22–31)
CO2 SERPL-SCNC: 24 MMOL/L — SIGNIFICANT CHANGE UP (ref 22–31)
CREAT SERPL-MCNC: 1.68 MG/DL — HIGH (ref 0.5–1.3)
CREAT SERPL-MCNC: 1.86 MG/DL — HIGH (ref 0.5–1.3)
CREAT SERPL-MCNC: 1.95 MG/DL — HIGH (ref 0.5–1.3)
EOSINOPHIL # BLD AUTO: 0.28 K/UL — SIGNIFICANT CHANGE UP (ref 0–0.5)
EOSINOPHIL NFR BLD AUTO: 3.3 % — SIGNIFICANT CHANGE UP (ref 0–6)
GLUCOSE BLDC GLUCOMTR-MCNC: 118 MG/DL — HIGH (ref 70–99)
GLUCOSE BLDC GLUCOMTR-MCNC: 166 MG/DL — HIGH (ref 70–99)
GLUCOSE BLDC GLUCOMTR-MCNC: 177 MG/DL — HIGH (ref 70–99)
GLUCOSE BLDC GLUCOMTR-MCNC: 190 MG/DL — HIGH (ref 70–99)
GLUCOSE BLDC GLUCOMTR-MCNC: 214 MG/DL — HIGH (ref 70–99)
GLUCOSE SERPL-MCNC: 104 MG/DL — HIGH (ref 70–99)
GLUCOSE SERPL-MCNC: 152 MG/DL — HIGH (ref 70–99)
GLUCOSE SERPL-MCNC: 86 MG/DL — SIGNIFICANT CHANGE UP (ref 70–99)
HCT VFR BLD CALC: 33.9 % — LOW (ref 34.5–45)
HGB BLD-MCNC: 10.5 G/DL — LOW (ref 11.5–15.5)
IMM GRANULOCYTES NFR BLD AUTO: 0.4 % — SIGNIFICANT CHANGE UP (ref 0–1.5)
LYMPHOCYTES # BLD AUTO: 0.93 K/UL — LOW (ref 1–3.3)
LYMPHOCYTES # BLD AUTO: 11 % — LOW (ref 13–44)
MAGNESIUM SERPL-MCNC: 1.8 MG/DL — SIGNIFICANT CHANGE UP (ref 1.6–2.6)
MAGNESIUM SERPL-MCNC: 2.2 MG/DL — SIGNIFICANT CHANGE UP (ref 1.6–2.6)
MAGNESIUM SERPL-MCNC: 2.7 MG/DL — HIGH (ref 1.6–2.6)
MCHC RBC-ENTMCNC: 27.9 PG — SIGNIFICANT CHANGE UP (ref 27–34)
MCHC RBC-ENTMCNC: 31 GM/DL — LOW (ref 32–36)
MCV RBC AUTO: 89.9 FL — SIGNIFICANT CHANGE UP (ref 80–100)
MONOCYTES # BLD AUTO: 1.05 K/UL — HIGH (ref 0–0.9)
MONOCYTES NFR BLD AUTO: 12.4 % — SIGNIFICANT CHANGE UP (ref 2–14)
NEUTROPHILS # BLD AUTO: 6.13 K/UL — SIGNIFICANT CHANGE UP (ref 1.8–7.4)
NEUTROPHILS NFR BLD AUTO: 72.4 % — SIGNIFICANT CHANGE UP (ref 43–77)
NRBC # BLD: 0 /100 WBCS — SIGNIFICANT CHANGE UP (ref 0–0)
PHOSPHATE SERPL-MCNC: 4.3 MG/DL — SIGNIFICANT CHANGE UP (ref 2.5–4.5)
PHOSPHATE SERPL-MCNC: 4.5 MG/DL — SIGNIFICANT CHANGE UP (ref 2.5–4.5)
PHOSPHATE SERPL-MCNC: 4.8 MG/DL — HIGH (ref 2.5–4.5)
PLATELET # BLD AUTO: 335 K/UL — SIGNIFICANT CHANGE UP (ref 150–400)
POTASSIUM SERPL-MCNC: 4.1 MMOL/L — SIGNIFICANT CHANGE UP (ref 3.5–5.3)
POTASSIUM SERPL-MCNC: 4.4 MMOL/L — SIGNIFICANT CHANGE UP (ref 3.5–5.3)
POTASSIUM SERPL-MCNC: 4.8 MMOL/L — SIGNIFICANT CHANGE UP (ref 3.5–5.3)
POTASSIUM SERPL-SCNC: 4.1 MMOL/L — SIGNIFICANT CHANGE UP (ref 3.5–5.3)
POTASSIUM SERPL-SCNC: 4.4 MMOL/L — SIGNIFICANT CHANGE UP (ref 3.5–5.3)
POTASSIUM SERPL-SCNC: 4.8 MMOL/L — SIGNIFICANT CHANGE UP (ref 3.5–5.3)
PROT SERPL-MCNC: 6.9 G/DL — SIGNIFICANT CHANGE UP (ref 6–8.3)
RBC # BLD: 3.77 M/UL — LOW (ref 3.8–5.2)
RBC # FLD: 16.4 % — HIGH (ref 10.3–14.5)
SODIUM SERPL-SCNC: 134 MMOL/L — LOW (ref 135–145)
SODIUM SERPL-SCNC: 135 MMOL/L — SIGNIFICANT CHANGE UP (ref 135–145)
SODIUM SERPL-SCNC: 136 MMOL/L — SIGNIFICANT CHANGE UP (ref 135–145)
WBC # BLD: 8.46 K/UL — SIGNIFICANT CHANGE UP (ref 3.8–10.5)
WBC # FLD AUTO: 8.46 K/UL — SIGNIFICANT CHANGE UP (ref 3.8–10.5)

## 2022-01-31 RX ORDER — MAGNESIUM SULFATE 500 MG/ML
2 VIAL (ML) INJECTION ONCE
Refills: 0 | Status: COMPLETED | OUTPATIENT
Start: 2022-01-31 | End: 2022-01-31

## 2022-01-31 RX ADMIN — POLYETHYLENE GLYCOL 3350 17 GRAM(S): 17 POWDER, FOR SOLUTION ORAL at 13:02

## 2022-01-31 RX ADMIN — Medication 1: at 18:25

## 2022-01-31 RX ADMIN — PANTOPRAZOLE SODIUM 40 MILLIGRAM(S): 20 TABLET, DELAYED RELEASE ORAL at 06:50

## 2022-01-31 RX ADMIN — Medication 5 UNIT(S): at 12:58

## 2022-01-31 RX ADMIN — Medication 40 MILLIGRAM(S): at 06:51

## 2022-01-31 RX ADMIN — Medication 40 MILLIGRAM(S): at 18:35

## 2022-01-31 RX ADMIN — Medication 5 UNIT(S): at 18:25

## 2022-01-31 RX ADMIN — Medication 5 UNIT(S): at 09:19

## 2022-01-31 RX ADMIN — Medication 25 GRAM(S): at 01:44

## 2022-01-31 RX ADMIN — AMLODIPINE BESYLATE 10 MILLIGRAM(S): 2.5 TABLET ORAL at 02:24

## 2022-01-31 RX ADMIN — Medication 50 MILLIGRAM(S): at 21:37

## 2022-01-31 RX ADMIN — SENNA PLUS 2 TABLET(S): 8.6 TABLET ORAL at 21:37

## 2022-01-31 RX ADMIN — INSULIN GLARGINE 15 UNIT(S): 100 INJECTION, SOLUTION SUBCUTANEOUS at 21:39

## 2022-01-31 RX ADMIN — ATORVASTATIN CALCIUM 10 MILLIGRAM(S): 80 TABLET, FILM COATED ORAL at 21:37

## 2022-01-31 RX ADMIN — Medication 1: at 12:59

## 2022-01-31 NOTE — CONSULT NOTE ADULT - SUBJECTIVE AND OBJECTIVE BOX
[facility]  Rodriguez Goldberg PA-C  [facility] [place] [address]  [phone]      Chief Complaint:  Patient is a 89y old  Female who presents with a chief complaint of ADHF (2022 16:58)      HPI:Patient is a 89 yo F with PMHx of HTN/HLD, DM2, CAD (s/p CABG), PVD (s/p stents in bilateral LE), CKD (Baseline Cr reportedly 1.2), recent admission for GIB 2/2 chronic gastritis who presented with complaints of dyspnea and ?chest pain. Patient is a poor historian, therefore, history is somewhat limited. Patient reports that she has been experiencing progressively worsening dyspnea. Patient was concerned that it may be cardiac in etiology, which prompted her to present to the ED. Patient states that she has left sided chest pain currently, however could not elaborate as to the timing or chronicity of the chest pain    In the ED, vitals significant for borderline bradycardia (HR 61), Labs significant for a trop of 29 which decreased to 26 on repeat and a BNP of 1860. a CXR was obtained which revealed b/l pleural effusions and increased pulmonary vascular markings. Patient was given a dose of 40mg IV Lasix and admitted to medicine for further management.  Allergies:  No Known Allergies      Medications:  amLODIPine   Tablet 10 milliGRAM(s) Oral daily  atorvastatin 10 milliGRAM(s) Oral at bedtime  dextrose 40% Gel 15 Gram(s) Oral once  dextrose 5%. 1000 milliLiter(s) IV Continuous <Continuous>  dextrose 5%. 1000 milliLiter(s) IV Continuous <Continuous>  dextrose 50% Injectable 25 Gram(s) IV Push once  dextrose 50% Injectable 12.5 Gram(s) IV Push once  dextrose 50% Injectable 25 Gram(s) IV Push once  furosemide   Injectable 40 milliGRAM(s) IV Push every 12 hours  glucagon  Injectable 1 milliGRAM(s) IntraMuscular once  insulin glargine Injectable (LANTUS) 15 Unit(s) SubCutaneous at bedtime  insulin lispro (ADMELOG) corrective regimen sliding scale   SubCutaneous three times a day before meals  insulin lispro Injectable (ADMELOG) 5 Unit(s) SubCutaneous three times a day before meals  melatonin 3 milliGRAM(s) Oral at bedtime PRN  pantoprazole    Tablet 40 milliGRAM(s) Oral before breakfast  polyethylene glycol 3350 17 Gram(s) Oral daily  senna 2 Tablet(s) Oral at bedtime  traZODone 50 milliGRAM(s) Oral at bedtime      PMHX/PSHX:  Essential hypertension, benign    Hypercholesteremia    CAD (coronary artery disease)    Peripheral vascular disease    Diabetes    CAD (coronary artery disease)    History of chronic gastritis    S/P carotid endarterectomy    S/P coronary artery by pass    History of cholecystectomy    S/P vascular surgery        Family history:  No pertinent family history in first degree relatives        Social History:     ROS:     General:  No wt loss, fevers, chills, night sweats, fatigue,   Eyes:  Good vision, no reported pain  ENT:  No sore throat, pain, runny nose, dysphagia  CV:  No pain, palpitations, hypo/hypertension  Resp:  No dyspnea, cough, tachypnea, wheezing  GI:  No pain, No nausea, No vomiting, No diarrhea, No constipation, No weight loss, No fever, No pruritis, No rectal bleeding, No tarry stools, No dysphagia,  :  No pain, bleeding, incontinence, nocturia  Muscle:  No pain, weakness  Neuro:  No weakness, tingling, memory problems  Psych:  No fatigue, insomnia, mood problems, depression  Endocrine:  No polyuria, polydipsia, cold/heat intolerance  Heme:  No petechiae, ecchymosis, easy bruisability  Skin:  No rash, tattoos, scars, edema      PHYSICAL EXAM:   Vital Signs:  Vital Signs Last 24 Hrs  T(C): 36.6 (2022 04:43), Max: 36.7 (2022 21:52)  T(F): 97.9 (2022 04:43), Max: 98.1 (2022 21:52)  HR: 69 (2022 05:54) (60 - 75)  BP: 152/79 (2022 05:54) (152/79 - 192/77)  BP(mean): --  RR: 18 (2022 04:43) (18 - 18)  SpO2: 99% (2022 04:43) (98% - 99%)  Daily     Daily     GENERAL:  Appears stated age,   HEENT:  NC/AT,    CHEST:  Full & symmetric excursion,   HEART:  Regular rhythm  ABDOMEN:  Soft, non-tender, non-distended,   EXTEREMITIES:  no cyanosis,clubbing or edema  SKIN:  No rash  NEURO:  Alert,    LABS:                        11.4   9.68  )-----------( 320      ( 2022 18:42 )             37.2         138  |  100  |  35<H>  ----------------------------<  129<H>  4.1   |  24  |  1.34<H>    Ca    9.6      2022 07:03  Phos  4.7       Mg     2.0         TPro  8.2  /  Alb  4.3  /  TBili  0.4  /  DBili  x   /  AST  26  /  ALT  15  /  AlkPhos  246<H>      LIVER FUNCTIONS - ( 2022 18:42 )  Alb: 4.3 g/dL / Pro: 8.2 g/dL / ALK PHOS: 246 U/L / ALT: 15 U/L / AST: 26 U/L / GGT: x           PT/INR - ( 2022 18:42 )   PT: 15.4 sec;   INR: 1.30 ratio         PTT - ( 2022 18:42 )  PTT:42.5 sec  Urinalysis Basic - ( 2022 23:40 )    Color: Colorless / Appearance: Clear / S.008 / pH: x  Gluc: x / Ketone: Negative  / Bili: Negative / Urobili: Negative   Blood: x / Protein: 30 mg/dL / Nitrite: Negative   Leuk Esterase: Negative / RBC: 0 /hpf / WBC 2 /HPF   Sq Epi: x / Non Sq Epi: 0 /hpf / Bacteria: Negative          Imaging:          
NEPHROLOGY - NSN    Patient seen and examined.    HPI:  Patient is a 89 yo F with PMHx of HTN/HLD, DM2, CAD (s/p CABG), PVD (s/p stents in bilateral LE), CKD (Baseline Cr reportedly 1.2), recent admission for GIB 2/2 chronic gastritis who presented with complaints of dyspnea and ?chest pain. Patient is a poor historian, therefore, history is somewhat limited. Patient reports that she has been experiencing progressively worsening dyspnea. Patient was concerned that it may be cardiac in etiology, which prompted her to present to the ED. Patient states that she has left sided chest pain currently, however could not elaborate as to the timing or chronicity of the chest pain    In the ED, vitals significant for borderline bradycardia (HR 61), Labs significant for a trop of 29 which decreased to 26 on repeat and a BNP of 1860. a CXR was obtained which revealed b/l pleural effusions and increased pulmonary vascular markings. Patient was given a dose of 40mg IV Lasix and admitted to medicine for further management.    Of note, on review of tele, patient found to have occasional episodes of Mobitz type 1.  (29 Jan 2022 01:51)      PAST MEDICAL & SURGICAL HISTORY:  Essential hypertension, benign    Hypercholesteremia    Peripheral vascular disease    Diabetes    CAD (coronary artery disease)    History of chronic gastritis    S/P carotid endarterectomy    S/P coronary artery by pass    History of cholecystectomy    S/P vascular surgery        MEDICATIONS  (STANDING):  amLODIPine   Tablet 10 milliGRAM(s) Oral daily  atorvastatin 10 milliGRAM(s) Oral at bedtime  dextrose 40% Gel 15 Gram(s) Oral once  dextrose 5%. 1000 milliLiter(s) (50 mL/Hr) IV Continuous <Continuous>  dextrose 5%. 1000 milliLiter(s) (100 mL/Hr) IV Continuous <Continuous>  dextrose 50% Injectable 25 Gram(s) IV Push once  dextrose 50% Injectable 12.5 Gram(s) IV Push once  dextrose 50% Injectable 25 Gram(s) IV Push once  furosemide   Injectable 40 milliGRAM(s) IV Push every 12 hours  glucagon  Injectable 1 milliGRAM(s) IntraMuscular once  insulin glargine Injectable (LANTUS) 15 Unit(s) SubCutaneous at bedtime  insulin lispro (ADMELOG) corrective regimen sliding scale   SubCutaneous three times a day before meals  insulin lispro Injectable (ADMELOG) 5 Unit(s) SubCutaneous three times a day before meals  pantoprazole    Tablet 40 milliGRAM(s) Oral before breakfast  polyethylene glycol 3350 17 Gram(s) Oral daily  senna 2 Tablet(s) Oral at bedtime  traZODone 50 milliGRAM(s) Oral at bedtime      Allergies    No Known Allergies    Intolerances        SOCIAL HISTORY:  Denies alcohol abuse, drug abuse or tobacco usage.     FAMILY HISTORY:  No pertinent family history in first degree relatives        VITALS:  T(C): 36.7 (01-31-22 @ 11:36), Max: 36.8 (01-30-22 @ 17:30)  HR: 66 (01-31-22 @ 11:36) (62 - 70)  BP: 146/67 (01-31-22 @ 11:36) (146/67 - 177/78)  RR: 18 (01-31-22 @ 11:36) (17 - 18)  SpO2: 98% (01-31-22 @ 11:36) (95% - 100%)    REVIEW OF SYSTEMS:    poor historian     PHYSICAL EXAM:  Constitutional: looks stated age  HEENT: EOMI  Neck:    Respiratory: poor effort   Cardiovascular: S1 and S2, RRR  Gastrointestinal: + BS, soft, NT, ND  Extremities: No peripheral edema, + peripheral pulses  Neurological: , CN2-12 intact  Psychiatric: Normal mood, normal affect  : No Hawkins  Skin: No rashes, C/D/I  Access: Not applicable    I and O's:    01-30 @ 07:01  -  01-31 @ 07:00  --------------------------------------------------------  IN: 290 mL / OUT: 1000 mL / NET: -710 mL    01-31 @ 07:01  -  01-31 @ 17:20  --------------------------------------------------------  IN: 480 mL / OUT: 1100 mL / NET: -620 mL          LABS:                        10.5   8.46  )-----------( 335      ( 31 Jan 2022 07:06 )             33.9     01-31    135  |  97  |  44<H>  ----------------------------<  86  4.1   |  24  |  1.68<H>    Ca    9.3      31 Jan 2022 07:13  Phos  4.5     01-31  Mg     2.7     01-31    TPro  6.9  /  Alb  3.5  /  TBili  0.3  /  DBili  x   /  AST  19  /  ALT  12  /  AlkPhos  197<H>  01-31      URINE:      RADIOLOGY & ADDITIONAL STUDIES:    < from: Xray Chest 1 View- PORTABLE-Urgent (Xray Chest 1 View- PORTABLE-Urgent .) (01.28.22 @ 18:29) >    ACC: [id number] EXAM:  XR CHEST PORTABLE URGENT 1V                          PROCEDURE DATE:  01/28/2022          INTERPRETATION:  EXAMINATION: XR CHEST URGENT    CLINICAL INDICATION: Shortness of breath    TECHNIQUE: Single frontal, portable view of the chest was obtained.    COMPARISON: Chest radiograph 12/7/2021.    FINDINGS:  The heart is enlarged. Status post median sternotomy and CABG.  The lungs are clear.  Bilateral pleural effusions. No pneumothorax.    IMPRESSION:  Bilateral pleural effusions    --- End of Report ---      < from: Transthoracic Echocardiogram (01.30.22 @ 09:52) >    Patient name: MAGED MUHAMMAD  YOB: 1929   Age: 89 (F)   MR#: [id number]  Study Date: 1/30/2022  [facility] Yumiko [facility] RDCS  Study quality: Technically fair  Referring Physician: Elmo Ac MD  Blood Pressure: 152/79 mmHg  Height: 165 cm  Weight: 68 kg  BSA: 1.8 m2  ------------------------------------------------------------------------  PROCEDURE: Transthoracic echocardiogram with 2-D, M-Mode  and complete spectral and color flow Doppler.  INDICATION: Heart failure, unspecified (I50.9)  ------------------------------------------------------------------------  Dimensions:    Normal Values:  LA:     3.9    2.0 - 4.0 cm  Ao:     2.7    2.0 - 3.8 cm  SEPTUM: 1.3    0.6 - 1.2 cm  PWT:    1.3    0.6 - 1.1 cm  LVIDd:  4.3    3.0 - 5.6 cm  LVIDs:  2.8    1.8 - 4.0 cm  Derived variables:  LVMI: 118 g/m2  RWT: 0.60  Fractional short: 35 %  EF (Iglesias Rule): 65 %Doppler Peak Velocity (m/sec):  AoV=1.5  ------------------------------------------------------------------------  Observations:  Mitral Valve: Mitral annular calcification, otherwise  normal mitral valve. Mild mitral regurgitation.  Aortic Valve/Aorta: Calcified trileaflet aortic valve with  normal opening. Peak transaortic valve gradient equals 9 mm  Hg, mean transaortic valve gradient equals 5 mm Hg, aortic  valve velocity time integral equals 30 cm, estimated aortic  valve area equals 2.1 sqcm. Peak left ventricular outflow  tract gradient equals 4 mm Hg, mean gradient is equal to 2  mm Hg, LVOT velocity time integral equals 22 cm.  Aortic Root: 2.7 cm.  LVOT diameter: 1.9 cm.  Left Atrium: Mildly dilated left atrium.  LA volume index =  36 cc/m2.  Left Ventricle: Normal left ventricular systolic function.  No segmental wallmotion abnormalities. Moderate concentric  left ventricular hypertrophy. Increased E/e'  is consistent  with elevated left ventricular filling pressure.  Right Heart: Normal right atrium. Right ventricular  enlargement with normal right ventricular systolic  function. Normal tricuspid valve. Mild tricuspid  regurgitation. Normal pulmonic valve.  Pericardium/Pleura: Normal pericardium with no pericardial  effusion.  Hemodynamic: Estimated right atrial pressure is 8 mm Hg.  Estimated right ventricular systolic pressure equals 37 mm  Hg, assuming right atrial pressure equals 8 mm Hg,  consistent with borderline pulmonary hypertension.  ------------------------------------------------------------------------  Conclusions:  1. Mildly dilated left atrium.  LA volume index = 36 cc/m2.  2. Moderate concentric left ventricular hypertrophy.  3. Normal left ventricular systolic function. No segmental  wall motion abnormalities.  4. Increased E/e'  is consistent with elevated left  ventricular filling pressure.  5. Right ventricular enlargement with normal right  ventricular systolic function.  6. Estimated pulmonary artery systolic pressure equals 37  mm Hg, assuming right atrial pressure equals 8 mm Hg,  consistent with borderline pulmonary pressures.  ------------------------------------------------------------------------  Confirmed on  1/30/2022 - 12:10:19 by SCOT Montgomery  ------------------------------------------------------------------------    < end of copied text >      SAIGE SAMUEL MD; Resident Radiology  This document has been electronically signed.  WAQAR CAMPBELL MD; Attending Radiologist  This document has been electronically signed. Jan 28 2022  7:24PM    < end of copied text >  
CARDIOLOGY CONSULT NOTE - DR. RO    HPI:  Patient is a 89 yo F with PMHx of HTN/HLD, DM2, CAD (s/p CABG), PVD (s/p stents in bilateral LE), CKD (Baseline Cr reportedly 1.2), recent admission for GIB 2/2 chronic gastritis who presented with complaints of dyspnea and ?chest pain. Patient is a poor historian, therefore, history is somewhat limited. Patient reports that she has been experiencing progressively worsening dyspnea. Patient was concerned that it may be cardiac in etiology, which prompted her to present to the ED. Patient states that she has left sided chest pain currently, however could not elaborate as to the timing or chronicity of the chest pain    In the ED, vitals significant for borderline bradycardia (HR 61), Labs significant for a trop of 29 which decreased to 26 on repeat and a BNP of 1860. a CXR was obtained which revealed b/l pleural effusions and increased pulmonary vascular markings. Patient was given a dose of 40mg IV Lasix and admitted to medicine for further management.    Of note, on review of tele, patient found to have occasional episodes of Mobitz type 1.  (29 Jan 2022 01:51)    PAST MEDICAL & SURGICAL HISTORY:  Essential hypertension, benign    Hypercholesteremia    Peripheral vascular disease    Diabetes    CAD (coronary artery disease)    History of chronic gastritis    S/P carotid endarterectomy    S/P coronary artery by pass    History of cholecystectomy    S/P vascular surgery          PREVIOUS DIAGNOSTIC TESTING:    [ ] Echocardiogram:  [ ]  Catheterization:  [ ] Stress Test:  	    MEDICATIONS:    Home Medications:  AMLODIPINE BESYLATE 10 MG TAB: 1 tab(s) orally once a day (08 Dec 2021 00:55)  ATORVASTATIN 10 MG TABLET: 1 tab(s) orally once a day (08 Dec 2021 00:55)  LEVEMIR FLEXTOUCH 100 UNIT/ML: 32 unit(s) subcutaneous once a day (at bedtime) (08 Dec 2021 00:55)  METOPROLOL SUCC  MG TAB: 1 tab(s) orally once a day (08 Dec 2021 00:55)  PANTOPRAZOLE SOD DR 40 MG TAB: 1 tab(s) orally once a day (08 Dec 2021 00:55)  polyethylene glycol 3350 oral powder for reconstitution: 17 gram(s) orally once a day (08 Dec 2021 00:55)  senna oral tablet: 2 tab(s) orally once a day (at bedtime) (08 Dec 2021 00:55)  TRAZODONE 50 MG TABLET: 1 tab(s) orally once a day (at bedtime) (08 Dec 2021 00:55)      MEDICATIONS  (STANDING):  amLODIPine   Tablet 10 milliGRAM(s) Oral daily  atorvastatin 10 milliGRAM(s) Oral at bedtime  dextrose 40% Gel 15 Gram(s) Oral once  dextrose 5%. 1000 milliLiter(s) (50 mL/Hr) IV Continuous <Continuous>  dextrose 5%. 1000 milliLiter(s) (100 mL/Hr) IV Continuous <Continuous>  dextrose 50% Injectable 25 Gram(s) IV Push once  dextrose 50% Injectable 12.5 Gram(s) IV Push once  dextrose 50% Injectable 25 Gram(s) IV Push once  furosemide   Injectable 40 milliGRAM(s) IV Push every 12 hours  glucagon  Injectable 1 milliGRAM(s) IntraMuscular once  insulin glargine Injectable (LANTUS) 15 Unit(s) SubCutaneous at bedtime  insulin lispro (ADMELOG) corrective regimen sliding scale   SubCutaneous three times a day before meals  insulin lispro Injectable (ADMELOG) 5 Unit(s) SubCutaneous three times a day before meals  pantoprazole    Tablet 40 milliGRAM(s) Oral before breakfast  polyethylene glycol 3350 17 Gram(s) Oral daily  senna 2 Tablet(s) Oral at bedtime  traZODone 50 milliGRAM(s) Oral at bedtime      FAMILY HISTORY:  No pertinent family history in first degree relatives        SOCIAL HISTORY:    [ ] Non-smoker  [ ] Smoker  [ ] Alcohol    Allergies    No Known Allergies    Intolerances    	    REVIEW OF SYSTEMS:  CONSTITUTIONAL: No fever, weight loss, or fatigue  EYES: No eye pain, visual disturbances, or discharge  ENMT:  No difficulty hearing, tinnitus, vertigo; No sinus or throat pain  NECK: No pain or stiffness  RESPIRATORY: No cough, wheezing, chills or hemoptysis; ++Shortness of Breath  CARDIOVASCULAR: as HPI  GASTROINTESTINAL: No abdominal or epigastric pain. No nausea, vomiting, or hematemesis; No diarrhea or constipation. No melena or hematochezia.  GENITOURINARY: No dysuria, frequency, hematuria, or incontinence  NEUROLOGICAL: No headaches, memory loss, loss of strength, numbness, or tremors  SKIN: No itching, burning, rashes, or lesions   	  [ ] All others negative	  [ ] Unable to obtain    PHYSICAL EXAM:    T(C): 36.6 (01-30-22 @ 04:43), Max: 36.7 (01-29-22 @ 21:52)  HR: 69 (01-30-22 @ 05:54) (64 - 75)  BP: 152/79 (01-30-22 @ 05:54) (152/79 - 192/77)  RR: 18 (01-30-22 @ 04:43) (18 - 18)  SpO2: 99% (01-30-22 @ 04:43) (99% - 99%)  Wt(kg): --  I&O's Summary    Daily     Daily     Appearance: Normal	  Psychiatry: A & O x 3, Mood & affect appropriate  HEENT:   Normal oral mucosa, PERRL, EOMI	  Lymphatic: No lymphadenopathy  Cardiovascular: Normal S1 S2,RRR, No JVD, No murmurs  Respiratory: Lungs clear to auscultation	  Gastrointestinal:  Soft, Non-tender, + BS	  Skin: No rashes, No ecchymoses, No cyanosis	  Neurologic: Non-focal  Extremities: Normal range of motion, No clubbing, cyanosis or edema  Vascular: Peripheral pulses palpable 2+ bilaterally    TELEMETRY: 	    ECG:  	sr, rbbb, 1st degree avb  no acute ischemic abnl   RADIOLOGY:  OTHER: 	  	  LABS:	 	    CARDIAC MARKERS:        proBNP:     Lipid Profile:   HgA1c:   TSH:                           11.2   8.86  )-----------( 348      ( 30 Jan 2022 11:14 )             36.5     01-30    135  |  94<L>  |  41<H>  ----------------------------<  94  3.8   |  24  |  1.52<H>    Ca    9.4      30 Jan 2022 11:14  Phos  4.6     01-30  Mg     1.9     01-30    TPro  7.5  /  Alb  3.8  /  TBili  0.4  /  DBili  0.1  /  AST  17  /  ALT  12  /  AlkPhos  219<H>  01-30    PT/INR - ( 30 Jan 2022 11:14 )   PT: 14.7 sec;   INR: 1.24 ratio         PTT - ( 30 Jan 2022 11:14 )  PTT:40.4 sec    Creatinine, Serum: 1.52 mg/dL (01-30-22 @ 11:14)  Creatinine, Serum: 1.34 mg/dL (01-29-22 @ 07:03)  Creatinine, Serum: 1.22 mg/dL (01-28-22 @ 18:42)        ASSESSMENT/PLAN: 	              
CHIEF COMPLAINT: "I was short of breath but now Im better"     HISTORY OF PRESENT ILLNESS: 89 y.o. Female with PMHx of HTN, HLD, DM2, CAD (s/p CABG > 20 years ago), PVD (s/p stents in bilateral LE), CKD (Baseline Cr reportedly 1.2), recent admission for Anemia/ GIB secondary to chronic gastritis who presented with complaints of dyspnea and left sided chest pain non radiating.  Patient was found to have elevated BNP 1860, chest XRay with bilateral pleural effusions and was admitted for Acute Diastolic Heart Failure.  Denies syncope.  Currently denies CP, palpitations, dizziness, lightheadedness, n/v/d, fever or abdominal pain.       Allergies    No Known Allergies    Intolerances    MEDICATIONS:  amLODIPine   Tablet 10 milliGRAM(s) Oral daily  furosemide   Injectable 40 milliGRAM(s) IV Push every 12 hours    melatonin 3 milliGRAM(s) Oral at bedtime PRN  traZODone 50 milliGRAM(s) Oral at bedtime    pantoprazole    Tablet 40 milliGRAM(s) Oral before breakfast  polyethylene glycol 3350 17 Gram(s) Oral daily  senna 2 Tablet(s) Oral at bedtime    atorvastatin 10 milliGRAM(s) Oral at bedtime  dextrose 40% Gel 15 Gram(s) Oral once  dextrose 50% Injectable 25 Gram(s) IV Push once  dextrose 50% Injectable 12.5 Gram(s) IV Push once  dextrose 50% Injectable 25 Gram(s) IV Push once  glucagon  Injectable 1 milliGRAM(s) IntraMuscular once  insulin glargine Injectable (LANTUS) 15 Unit(s) SubCutaneous at bedtime  insulin lispro (ADMELOG) corrective regimen sliding scale   SubCutaneous three times a day before meals  insulin lispro Injectable (ADMELOG) 5 Unit(s) SubCutaneous three times a day before meals    dextrose 5%. 1000 milliLiter(s) IV Continuous <Continuous>  dextrose 5%. 1000 milliLiter(s) IV Continuous <Continuous>      PAST MEDICAL & SURGICAL HISTORY:  Essential hypertension, benign    Hypercholesteremia    Peripheral vascular disease    Diabetes    CAD (coronary artery disease)    History of chronic gastritis    S/P carotid endarterectomy    S/P coronary artery by pass    History of cholecystectomy    S/P vascular surgery    FAMILY HISTORY:  No pertinent family history in first degree relatives    SOCIAL HISTORY:    [x ] Non-smoker  [ ] Smoker  [ x] Denies Alcohol      REVIEW OF SYSTEMS:  See HPI. Otherwise, 10 point ROS done and otherwise negative.    PHYSICAL EXAM:  T(C): 36.7 (01-31-22 @ 11:36), Max: 36.7 (01-31-22 @ 11:36)  HR: 66 (01-31-22 @ 11:36) (62 - 70)  BP: 146/67 (01-31-22 @ 11:36) (146/67 - 177/78)  RR: 18 (01-31-22 @ 11:36) (17 - 18)  SpO2: 98% (01-31-22 @ 11:36) (95% - 98%)    30 Jan 2022 07:01  -  31 Jan 2022 07:00  --------------------------------------------------------  IN: 290 mL / OUT: 1000 mL / NET: -710 mL    31 Jan 2022 07:01  -  31 Jan 2022 18:07  --------------------------------------------------------  IN: 480 mL / OUT: 1100 mL / NET: -620 mL    Appearance: Normal	  Cardiovascular: Normal S1 S2, regular.    Respiratory: Lungs clear to auscultation	  Psychiatry: A & O x 3, Mood & affect appropriate  Gastrointestinal:  Soft, Non-tender, + BS		  Extremities: Normal range of motion, No clubbing, cyanosis. Trace BL/LE edema.   Vascular: Peripheral pulses palpable 2+ bilaterally    LABS:	 	    CBC Full  -  ( 31 Jan 2022 07:06 )  WBC Count : 8.46 K/uL  Hemoglobin : 10.5 g/dL  Hematocrit : 33.9 %  Platelet Count - Automated : 335 K/uL  Mean Cell Volume : 89.9 fl  Mean Cell Hemoglobin : 27.9 pg  Mean Cell Hemoglobin Concentration : 31.0 gm/dL  Auto Neutrophil # : 6.13 K/uL  Auto Lymphocyte # : 0.93 K/uL  Auto Monocyte # : 1.05 K/uL  Auto Eosinophil # : 0.28 K/uL  Auto Basophil # : 0.04 K/uL  Auto Neutrophil % : 72.4 %  Auto Lymphocyte % : 11.0 %  Auto Monocyte % : 12.4 %  Auto Eosinophil % : 3.3 %  Auto Basophil % : 0.5 %    01-31    135  |  97  |  44<H>  ----------------------------<  86  4.1   |  24  |  1.68<H>  01-30    136  |  98  |  46<H>  ----------------------------<  104<H>  4.8   |  23  |  1.86<H>    Ca    9.3      31 Jan 2022 07:13  Ca    9.0      30 Jan 2022 23:29  Phos  4.5     01-31  Phos  4.3     01-30  Mg     2.7     01-31  Mg     1.8     01-30    TPro  6.9  /  Alb  3.5  /  TBili  0.3  /  DBili  x   /  AST  19  /  ALT  12  /  AlkPhos  197<H>  01-31  TPro  7.5  /  Alb  3.8  /  TBili  0.4  /  DBili  0.1  /  AST  17  /  ALT  12  /  AlkPhos  219<H>  01-30      TELEMETRY: 	NSR, 1st Degree HB 60-70's    ECG:  	NSR, 1st degree   	  TTE:     Patient name: MAGED MUHAMMAD  YOB: 1929   Age: 89 (F)   MR#: [id number]  Study Date: 1/30/2022  [facility] Yumiko Vera RDCS  Study quality: Technically fair  Referring Physician: Elmo Ac MD  Blood Pressure: 152/79 mmHg  Height: 165 cm  Weight: 68 kg  BSA: 1.8 m2  ------------------------------------------------------------------------  PROCEDURE: Transthoracic echocardiogram with 2-D, M-Mode  and complete spectral and color flow Doppler.  INDICATION: Heart failure, unspecified (I50.9)  ------------------------------------------------------------------------  Dimensions:    Normal Values:  LA:     3.9    2.0 - 4.0 cm  Ao:     2.7    2.0 - 3.8 cm  SEPTUM: 1.3    0.6 - 1.2 cm  PWT:    1.3    0.6 - 1.1 cm  LVIDd:  4.3    3.0 - 5.6 cm  LVIDs:  2.8    1.8 - 4.0 cm  Derived variables:  LVMI: 118 g/m2  RWT: 0.60  Fractional short: 35 %  EF (Iglesias Rule): 65 %Doppler Peak Velocity (m/sec):  AoV=1.5  ------------------------------------------------------------------------  Observations:  Mitral Valve: Mitral annular calcification, otherwise  normal mitral valve. Mild mitral regurgitation.  Aortic Valve/Aorta: Calcified trileaflet aortic valve with  normal opening. Peak transaortic valve gradient equals 9 mm  Hg, mean transaortic valve gradient equals 5 mm Hg, aortic  valve velocity time integral equals 30 cm, estimated aortic  valve area equals 2.1 sqcm. Peak left ventricular outflow  tract gradient equals 4 mm Hg, mean gradient is equal to 2  mm Hg, LVOT velocity time integral equals 22 cm.  Aortic Root: 2.7 cm.  LVOT diameter: 1.9 cm.  Left Atrium: Mildly dilated left atrium.  LA volume index =  36 cc/m2.  Left Ventricle: Normal left ventricular systolic function.  No segmental wallmotion abnormalities. Moderate concentric  left ventricular hypertrophy. Increased E/e'  is consistent  with elevated left ventricular filling pressure.  Right Heart: Normal right atrium. Right ventricular  enlargement with normal right ventricular systolic  function. Normal tricuspid valve. Mild tricuspid  regurgitation. Normal pulmonic valve.  Pericardium/Pleura: Normal pericardium with no pericardial  effusion.  Hemodynamic: Estimated right atrial pressure is 8 mm Hg.  Estimated right ventricular systolic pressure equals 37 mm  Hg, assuming right atrial pressure equals 8 mm Hg,  consistent with borderline pulmonary hypertension.  ------------------------------------------------------------------------  Conclusions:  1. Mildly dilated left atrium.  LA volume index = 36 cc/m2.  2. Moderate concentric left ventricular hypertrophy.  3. Normal left ventricular systolic function. No segmental  wall motion abnormalities.  4. Increased E/e'  is consistent with elevated left  ventricular filling pressure.  5. Right ventricular enlargement with normal right  ventricular systolic function.  6. Estimated pulmonary artery systolic pressure equals 37  mm Hg, assuming right atrial pressure equals 8 mm Hg,  consistent with borderline pulmonary pressures.  ------------------------------------------------------------------------  Confirmed on  1/30/2022 - 12:10:19 by SCOT Montgomery  ---------------------

## 2022-01-31 NOTE — PROGRESS NOTE ADULT - SUBJECTIVE AND OBJECTIVE BOX
DATE OF SERVICE: 01-31-22 @ 16:25  CHIEF COMPLAINT:Patient is a 89y old  Female who presents with a chief complaint of ADHF (31 Jan 2022 11:28)    	        PAST MEDICAL & SURGICAL HISTORY:  Essential hypertension, benign    Hypercholesteremia    Peripheral vascular disease    Diabetes    CAD (coronary artery disease)    History of chronic gastritis    S/P carotid endarterectomy    S/P coronary artery by pass    History of cholecystectomy    S/P vascular surgery              RESPIRATORY: dec sob  CARDIOVASCULAR: No chest pain, palpitations, passing out, dizziness, or leg swelling  GASTROINTESTINAL: No abdominal or epigastric pain. No nausea, vomiting, or hematemesis; No diarrhea or constipation. No melena or hematochezia.  GENITOURINARY: No dysuria, frequency, hematuria, or incontinence  NEUROLOGICAL: No headaches,     Medications:  MEDICATIONS  (STANDING):  amLODIPine   Tablet 10 milliGRAM(s) Oral daily  atorvastatin 10 milliGRAM(s) Oral at bedtime  dextrose 40% Gel 15 Gram(s) Oral once  dextrose 5%. 1000 milliLiter(s) (50 mL/Hr) IV Continuous <Continuous>  dextrose 5%. 1000 milliLiter(s) (100 mL/Hr) IV Continuous <Continuous>  dextrose 50% Injectable 25 Gram(s) IV Push once  dextrose 50% Injectable 12.5 Gram(s) IV Push once  dextrose 50% Injectable 25 Gram(s) IV Push once  furosemide   Injectable 40 milliGRAM(s) IV Push every 12 hours  glucagon  Injectable 1 milliGRAM(s) IntraMuscular once  insulin glargine Injectable (LANTUS) 15 Unit(s) SubCutaneous at bedtime  insulin lispro (ADMELOG) corrective regimen sliding scale   SubCutaneous three times a day before meals  insulin lispro Injectable (ADMELOG) 5 Unit(s) SubCutaneous three times a day before meals  pantoprazole    Tablet 40 milliGRAM(s) Oral before breakfast  polyethylene glycol 3350 17 Gram(s) Oral daily  senna 2 Tablet(s) Oral at bedtime  traZODone 50 milliGRAM(s) Oral at bedtime    MEDICATIONS  (PRN):  melatonin 3 milliGRAM(s) Oral at bedtime PRN Insomnia    	    PHYSICAL EXAM:  T(C): 36.7 (01-31-22 @ 11:36), Max: 36.8 (01-30-22 @ 17:30)  HR: 66 (01-31-22 @ 11:36) (62 - 70)  BP: 146/67 (01-31-22 @ 11:36) (146/67 - 177/78)  RR: 18 (01-31-22 @ 11:36) (17 - 18)  SpO2: 98% (01-31-22 @ 11:36) (95% - 100%)  Wt(kg): --  I&O's Summary    30 Jan 2022 07:01  -  31 Jan 2022 07:00  --------------------------------------------------------  IN: 290 mL / OUT: 1000 mL / NET: -710 mL    31 Jan 2022 07:01  -  31 Jan 2022 16:25  --------------------------------------------------------  IN: 480 mL / OUT: 1100 mL / NET: -620 mL        Appearance: Normal	  HEENT:   Normal oral mucosa, PERRL, EOMI	    Cardiovascular: Normal S1 S2, No JVD,  Respiratory:dec bs   Psychiatry: A & O x 3,   Gastrointestinal:  Soft, Non-tender, + BS	  Skin: No rashes, No ecchymoses, No cyanosis	  Neurologic: Non-focal  Extremities: dec rom    TELEMETRY: 	    ECG:  	  RADIOLOGY:  OTHER: 	  	  LABS:	 	    CARDIAC MARKERS:                                10.5   8.46  )-----------( 335      ( 31 Jan 2022 07:06 )             33.9     01-31    135  |  97  |  44<H>  ----------------------------<  86  4.1   |  24  |  1.68<H>    Ca    9.3      31 Jan 2022 07:13  Phos  4.5     01-31  Mg     2.7     01-31    TPro  6.9  /  Alb  3.5  /  TBili  0.3  /  DBili  x   /  AST  19  /  ALT  12  /  AlkPhos  197<H>  01-31    proBNP:   Lipid Profile:   HgA1c:   TSH:

## 2022-01-31 NOTE — CONSULT NOTE ADULT - ASSESSMENT
89 yo F with PMHx of HTN/HLD, DM2, CAD (s/p CABG), PVD (s/p stents in bilateral LE), CKD (Baseline Cr reportedly 1.2), recent admission for GIB 2/2 chronic gastritis who presented with complaints of dyspnea and ?chest pain  Acute diastolic heart failure - BL effusions   Mild PHTN   COLETTE that is likely hemodynamic.  Underlying CKD stage 3 b as a manifestation of age alone     1 EPS-Monitor for bradycardia.  Heart rate was low and did have Mobitz.  Not on AV inna agents  EPS to see the patient (was this whole event precipitated by a low heart rate)  2 Renal - Keep IV lasix today n assess to change to PO in am   No need for renal sono  Check UA   3 Pulm-Supplemental oxygen;  Standard DVT     [email or web address]   [phone]

## 2022-01-31 NOTE — PROVIDER CONTACT NOTE (OTHER) - ASSESSMENT
Pt asymptomatic and VSS (see chart). Pt asymptomatic and VS as charted. /62 manually, 177/78 electronically

## 2022-01-31 NOTE — PHARMACOTHERAPY INTERVENTION NOTE - COMMENTS
[facility] Medication Education     Heart failure education provided to patient (Patient poor historian therefore interview was limited)  -Reviewed doses and possible side effects for current heart failure medications:  MEDICATIONS  (STANDING):  furosemide   Injectable 40 milliGRAM(s) IV Push every 12 hours    Medication adherence review:  -Who administers the patient’s medications outpatient? Patient/Caregiver  -Is the patient able to name/identify his/her heart failure medications? Limited  -Is the patient able to identify the dosing frequency of his/her heart failure medications? Limited    Diuretic review:  -Does the patient take a loop diuretic outpatient? Yes  -Is the diuretic prescribed as a standing dose or as needed? Standing dose  -Does the patient take the diuretic as prescribed? Yes  -Does the patient check their weight daily? No  -Does the patient know who to escalate to if they notice a change in their weight or heart failure symptoms? Yes    Additional information reviewed:  Low salt diet/fluid restriction  Blood pressure control    Answered all of the patient’s questions to the best of my ability. Patient exhibited understanding of heart failure medication regimen and management. [facility] Medication Education     Heart failure education provided to patient (Patient poor historian therefore interview was limited)  -Reviewed doses and possible side effects for current heart failure medications:  MEDICATIONS  (STANDING):  furosemide   Injectable 40 milliGRAM(s) IV Push every 12 hours    Medication adherence review:  -Who administers the patient’s medications outpatient? Patient/Caregiver  -Is the patient able to name/identify his/her heart failure medications? Limited  -Is the patient able to identify the dosing frequency of his/her heart failure medications? Limited    Diuretic review:  -Does the patient take a loop diuretic outpatient? Yes  -Is the diuretic prescribed as a standing dose or as needed? Standing dose  -Does the patient take the diuretic as prescribed? Yes  -Does the patient check their weight daily? No  -Does the patient know who to escalate to if they notice a change in their weight or heart failure symptoms? Yes    Additional information reviewed:  Low salt diet/fluid restriction  Blood pressure control

## 2022-01-31 NOTE — PROVIDER CONTACT NOTE (OTHER) - SITUATION
Patient Name: Lucia Morning  Attending MD: Flakita Levi MD  MRN: [id number]  YOB: 1955  Account Number: [de-identified]  Age: 59  Admit Type: Outpatient  Gender: Female  Instrument Name: MELY [id number] 1954  Procedure Date No Time: 9/9/2019  Grafts or Implants: Grafts or Implants Not Applicable  Procedure:            Colonoscopy  Pre-Op Diagnosis:     High risk colon cancer surveillance: Personal history                        of colonic polyps  Providers:            Flakita Levi MD  Sedation:             Monitored Anesthesia Care  Procedure:       Pre-Anesthesia Assessment:       - Prior to the procedure, a History and Physical was performed, and       patient medications and allergies were reviewed. The patient is       competent. The risks and benefits of the procedure and the sedation       options and risks were discussed with the patient. All questions were       answered and informed consent was obtained. Patient identification and       proposed procedure were verified by the physician, the nurse, the       anesthetist and the technician in the endoscopy suite. Mental Status       Examination: alert and oriented. Airway Examination: normal       oropharyngeal airway and neck mobility. Respiratory Examination: clear       to auscultation. CV Examination: normal. Prophylactic Antibiotics: The       patient does not require prophylactic antibiotics. Prior Anticoagulants: The patient has taken no previous anticoagulant or antiplatelet agents. ASA Grade Assessment: II - A patient with mild systemic disease. After       reviewing the risks and benefits, the patient was deemed in satisfactory       condition to undergo the procedure. The anesthesia plan was to use       monitored anesthesia care (MAC). Immediately prior to administration of       medications, the patient was re-assessed for adequacy to receive       sedatives.  The heart rate, respiratory rate, oxygen saturations, blood pressure, adequacy of pulmonary ventilation, and response to care were       monitored throughout the procedure. The physical status of the patient       was re-assessed after the procedure. A History and Physical was performed prior to the procedure. Patient       medications and allergies were reviewed. The risks and benefits of the       procedure and sedation options were discussed. Questions were answered       and informed consent was obtained. Patient identification and proposed       procedure were verified. The patient was deemed in satisfactory       condition to undergo the procedure. The heart rate, respiratory rate,       oxygen saturations, blood pressure and response to sedation were       monitored throughout the procedure. The endoscope was passed under direct vision. The Colonoscope was       introduced through the anus and advanced to the cecum, identified by       appendiceal orifice and ileocecal valve. . The physical status of the       patient was re-assessed after the procedure. The colonoscopy was       performed without difficulty. The patient tolerated the procedure well. The quality of the bowel preparation was excellent. Scope Withdrawal Time 0 hours 19 minutes 32 seconds  Findings:       The perianal and digital rectal examinations were normal.       A frond-like/villous non-obstructing medium-sized mass was found in the       cecum. The mass was non-circumferential. The mass measured two cm in       length. In addition, its diameter measured eleven mm. No bleeding was       present. This was biopsied with a cold forceps for histology. A 3 mm polyp was found in the ascending colon. The polyp was sessile. The polyp was removed with a cold biopsy forceps. Resection and       retrieval were complete. Four sessile polyps were found in the proximal transverse colon. The       polyps were 2 to 3 mm in size.  These polyps were removed with a cold biopsy forceps. Resection and retrieval were complete. Post-Op Diagnosis:       - Tumor in the cecum. Biopsied.       - One 3 mm polyp in the ascending colon, removed with a cold biopsy       forceps. Resected and retrieved. - Four 2 to 3 mm polyps in the proximal transverse colon, removed with a       cold biopsy forceps. Resected and retrieved. - Specimens were removed  Recommendation:       - Discharge patient to home.       - Resume regular diet. - Continue present medications. - Await pathology results. - Return to my office in 1 week. Complications:       No immediate complications. Nisha Hurd MD.  Nisha Hurd MD  2019 10:26:35 AM  Number of Addenda: 0  Procedure Code(s):    --- Professional ---                        14013, Colonoscopy, flexible; with biopsy, single or                        multiple  CPT copyright 2017 [employer]. All rights reserved. The codes documented in this report are preliminary and upon  review may  be revised to meet current compliance requirements. Total Procedure Duration Time 0 hours 21 minutes 20 seconds  Estimated Blood Loss:       Estimated blood loss: none. Scope In: 10:03:43 AM  Scope Out: 10:25:03 AM       No specimens removed during this procedure unless otherwise noted. No assistants present.   Â     Pathology Report 2019 10:37 AM ACL   Name: Sonia Hauser MRN: Â  Â  [id number] Â    /Age:1955 (Age: 59) LINUS BARRIGA  Gina Free Â Visit#: [id number] Â    Sex:F Â    C[facility] Cruz Surgical Pathology Report Gregor [place]   Client: [address]   MAKAYLA LAWLER    Submitting Physician: MD LINUS Giron    Â    Date Specimen Collected: 19 Ankit BARRIGA  Accession #: [id number] Â    Date Specimen Received: Â 19 [address] [place] Â    Date Reported: [facility]  9/10/2019 16:54 Â  Â  Â Location: [facility] ______________________________________________________________________________ Samantha Dooley   Pathologic Diagnosis : Â    Â    A: Cecal mass biopsy: Â    - Fragments of tubular adenoma, see comment. Â    Â    B: Ascending polyp: Â    - Tubular adenoma. Â    Â    C: Transverse polyps x4: Â    - Tubular adenomas. Â    Â    Comment: A: Multiple additional deeper levels are examined.  Â    Â    Lexii Frausto MD Â   Electronic Signature (ME) 9/10/2019 16:54   ______________________________________________________________________________ [facility]   Clinical Information: Â    Adenomatous polyp Â    Â    Specimen(s) Submitted: aSmantha Dooley   A: Â Cecal mass biopsy Â    B: Â Ascending polyp Â    C: Â Transverse polyps x4 Â    Â  Pts HR went down to 34 while sleeping. afib, Rhythm went back to HR in 60's after sitting up

## 2022-01-31 NOTE — PROGRESS NOTE ADULT - SUBJECTIVE AND OBJECTIVE BOX
CARDIOLOGY FOLLOW UP - Dr. Drake  DATE OF SERVICE: 1/31/22    CC no cp or sob   tele events noted over night hr as low as 30s 40s while sleeping         REVIEW OF SYSTEMS:  CONSTITUTIONAL: No fever, weight loss, or fatigue  RESPIRATORY: No cough, wheezing, chills or hemoptysis; No Shortness of Breath  CARDIOVASCULAR: No chest pain, palpitations, passing out, dizziness, or leg swelling  GASTROINTESTINAL: No abdominal or epigastric pain. No nausea, vomiting, or hematemesis; No diarrhea or constipation. No melena or hematochezia.      PHYSICAL EXAM:  T(C): 36.6 (01-31-22 @ 04:45), Max: 36.8 (01-30-22 @ 17:30)  HR: 62 (01-31-22 @ 04:45) (62 - 70)  BP: 169/74 (01-31-22 @ 04:35) (148/68 - 177/78)  RR: 18 (01-31-22 @ 04:45) (17 - 18)  SpO2: 97% (01-31-22 @ 04:45) (95% - 100%)  Wt(kg): --  I&O's Summary    30 Jan 2022 07:01  -  31 Jan 2022 07:00  --------------------------------------------------------  IN: 290 mL / OUT: 1000 mL / NET: -710 mL    31 Jan 2022 07:01  -  31 Jan 2022 11:30  --------------------------------------------------------  IN: 240 mL / OUT: 800 mL / NET: -560 mL        Appearance: Normal	  Cardiovascular: Normal S1 S2,RRR, No JVD, No murmurs  Respiratory: Lungs clear to auscultation	  Gastrointestinal:  Soft, Non-tender, + BS	  Extremities: Normal range of motion, No clubbing, cyanosis or edema      Home Medications:  AMLODIPINE BESYLATE 10 MG TAB: 1 tab(s) orally once a day (08 Dec 2021 00:55)  ATORVASTATIN 10 MG TABLET: 1 tab(s) orally once a day (08 Dec 2021 00:55)  LEVEMIR FLEXTOUCH 100 UNIT/ML: 32 unit(s) subcutaneous once a day (at bedtime) (08 Dec 2021 00:55)  METOPROLOL SUCC  MG TAB: 1 tab(s) orally once a day (08 Dec 2021 00:55)  PANTOPRAZOLE SOD DR 40 MG TAB: 1 tab(s) orally once a day (08 Dec 2021 00:55)  polyethylene glycol 3350 oral powder for reconstitution: 17 gram(s) orally once a day (08 Dec 2021 00:55)  senna oral tablet: 2 tab(s) orally once a day (at bedtime) (08 Dec 2021 00:55)  TRAZODONE 50 MG TABLET: 1 tab(s) orally once a day (at bedtime) (08 Dec 2021 00:55)      MEDICATIONS  (STANDING):  amLODIPine   Tablet 10 milliGRAM(s) Oral daily  atorvastatin 10 milliGRAM(s) Oral at bedtime  dextrose 40% Gel 15 Gram(s) Oral once  dextrose 5%. 1000 milliLiter(s) (50 mL/Hr) IV Continuous <Continuous>  dextrose 5%. 1000 milliLiter(s) (100 mL/Hr) IV Continuous <Continuous>  dextrose 50% Injectable 25 Gram(s) IV Push once  dextrose 50% Injectable 12.5 Gram(s) IV Push once  dextrose 50% Injectable 25 Gram(s) IV Push once  furosemide   Injectable 40 milliGRAM(s) IV Push every 12 hours  glucagon  Injectable 1 milliGRAM(s) IntraMuscular once  insulin glargine Injectable (LANTUS) 15 Unit(s) SubCutaneous at bedtime  insulin lispro (ADMELOG) corrective regimen sliding scale   SubCutaneous three times a day before meals  insulin lispro Injectable (ADMELOG) 5 Unit(s) SubCutaneous three times a day before meals  pantoprazole    Tablet 40 milliGRAM(s) Oral before breakfast  polyethylene glycol 3350 17 Gram(s) Oral daily  senna 2 Tablet(s) Oral at bedtime  traZODone 50 milliGRAM(s) Oral at bedtime      TELEMETRY: nsr 1st degree avb hr 50-60s  hr as low as 34-40 overnight 	    ECG:  	  RADIOLOGY:  < from: Transthoracic Echocardiogram (01.30.22 @ 09:52) >  EF (Iglesias Rule): 65 %Doppler Peak Velocity (m/sec):  AoV=1.5  ------------------------------------------------------------------------  Observations:  Mitral Valve: Mitral annular calcification, otherwise  normal mitral valve. Mild mitral regurgitation.  Aortic Valve/Aorta: Calcified trileaflet aortic valve with  normal opening. Peak transaortic valve gradient equals 9 mm  Hg, mean transaortic valve gradient equals 5 mm Hg, aortic  valve velocity time integral equals 30 cm, estimated aortic  valve area equals 2.1 sqcm. Peak left ventricular outflow  tract gradient equals 4 mm Hg, mean gradient is equal to 2  mm Hg, LVOT velocity time integral equals 22 cm.  Aortic Root: 2.7 cm.  LVOT diameter: 1.9 cm.  Left Atrium: Mildly dilated left atrium.  LA volume index =  36 cc/m2.  Left Ventricle: Normal left ventricular systolic function.  No segmental wallmotion abnormalities. Moderate concentric  left ventricular hypertrophy. Increased E/e'  is consistent  with elevated left ventricular filling pressure.  Right Heart: Normal right atrium. Right ventricular  enlargement with normal right ventricular systolic  function. Normal tricuspid valve. Mild tricuspid  regurgitation. Normal pulmonic valve.  Pericardium/Pleura: Normal pericardium with no pericardial  effusion.  Hemodynamic: Estimated right atrial pressure is 8 mm Hg.  Estimated right ventricular systolic pressure equals 37 mm  Hg, assuming right atrial pressure equals 8 mm Hg,  consistent with borderline pulmonary hypertension.  ------------------------------------------------------------------------  Conclusions:  1. Mildly dilated left atrium.  LA volume index = 36 cc/m2.  2. Moderate concentric left ventricular hypertrophy.  3. Normal left ventricular systolic function. No segmental  wall motion abnormalities.  4. Increased E/e'  is consistent with elevated left  ventricular filling pressure.  5. Right ventricular enlargement with normal right  ventricular systolic function.  6. Estimated pulmonary artery systolic pressure equals 37  mm Hg, assuming right atrial pressure equals 8 mm Hg,  consistent with borderline pulmonary pressures.  ------------------------------------------------------------------------  Confirmed on  1/30/2022 - 12:10:19 by SCOT Montgomery  ------------------------------------------------------------------------    < end of copied text >    DIAGNOSTIC TESTING:  [ ] Echocardiogram:  [ ]  Catheterization:  [ ] Stress Test:    OTHER: 	    LABS:	 	    Troponin T, High Sensitivity Result: 26 ng/L [0 - 51] (01-28 @ 22:39)  Troponin T, High Sensitivity Result: 29 ng/L [0 - 51] (01-28 @ 18:42)                          10.5   8.46  )-----------( 335      ( 31 Jan 2022 07:06 )             33.9     01-31    135  |  97  |  44<H>  ----------------------------<  86  4.1   |  24  |  1.68<H>    Ca    9.3      31 Jan 2022 07:13  Phos  4.5     01-31  Mg     2.7     01-31    TPro  6.9  /  Alb  3.5  /  TBili  0.3  /  DBili  x   /  AST  19  /  ALT  12  /  AlkPhos  197<H>  01-31    PT/INR - ( 30 Jan 2022 11:14 )   PT: 14.7 sec;   INR: 1.24 ratio         PTT - ( 30 Jan 2022 11:14 )  PTT:40.4 sec

## 2022-01-31 NOTE — CONSULT NOTE ADULT - ASSESSMENT
89 y.o. Female with PMHx of HTN, HLD, DM2, CAD (s/p CABG > 20 years ago), PVD (s/p stents in bilateral LE), CKD (Baseline Cr reportedly 1.2), recent admission for Anemia/ GIB secondary to chronic gastritis who presented with complaints of dyspnea and left sided chest pain non radiating.  Patient was found to have elevated BNP 1860, chest XRay with bilateral pleural effusions and was admitted for Acute Diastolic Heart Failure.  EP consulted for telemetry revealing Wenckebach and episodes during sleep of ATach with high degree AV Block.   TTE with EF 65%.     1. Wenckebach and episodes during sleep of ATach with high degree AV BLock  2. CAD, s/p CABG > 20 years ago   3. COLETTE on CKD    - Continue to observe off of BB/AV inna blockers (Was on Toprol XL at home)   - currently being diuresed with IV Lasix   - No indication for PPM at this time  - continue to observe on tele    DOM Martinez [facility]  [id number] 89 y.o. Female with PMHx of HTN, HLD, DM2, CAD (s/p CABG > 20 years ago), PVD (s/p stents in bilateral LE), CKD (Baseline Cr reportedly 1.2), recent admission for Anemia/ GIB secondary to chronic gastritis who presented with complaints of dyspnea and left sided chest pain non radiating.  Patient was found to have elevated BNP 1860, chest XRay with bilateral pleural effusions and was admitted for Acute Diastolic Heart Failure.  EP consulted for telemetry revealing Wenckebach and episodes during sleep of ATach with high degree AV Block.   TTE with EF 65%.     1. Wenckebach and episodes during sleep of ATach with high degree AV BLock  2. CAD, s/p CABG > 20 years ago   3. COLETTE on CKD    - Continue to observe off of BB/AV inna blockers (Was on Toprol XL 100mg at home)   - currently being diuresed with IV Lasix   - No indication for PPM at this time  - continue to observe on tele    DOM Martinez [facility]  [id number]

## 2022-01-31 NOTE — PROGRESS NOTE ADULT - ASSESSMENT
ECHO 1/30/22:  EF 65%, mild MR, nl LV sys fx  mod concentric LVH      A/P    89 yo F with PMHx of HTN/HLD, DM2, CAD (s/p CABG), PVD (s/p stents in bilateral LE), CKD (Baseline Cr reportedly 1.2), recent admission for GIB 2/2 chronic gastritis who presented with complaints of dyspnea and chest pain.    #acute on chronic systolic HF  -clinically stable and improved post iv lasix  -cont iv bid for now- change to PO tomorrow     -echo with EF 65%, mild MR, nl LV sys fx  mod concentric LVH      #CAD, s/p CABG  -atypical cp   -now denies, trop negative   -cont asa, echo with nl LV sys fx      #AV block   -tele reviewed  -some episodes of mobitz I Iblock with few brief episodes of mobitz II on high dose toprol  -cont to monitor off bb for now - events over night noted- as hr low as 30s - appears to have temp HB- Call ep for eval   -echo with nl LV sys fx   -avoid avn meds    dvt ppx

## 2022-01-31 NOTE — PROVIDER CONTACT NOTE (OTHER) - SITUATION
Pts HR went down to 45 then 38 while sleeping.  Rhythm went back to HR of 53 after sitting up Pts HR went down to 45 then 38 while sleeping.  Rhythm went back to A-fib, HR in 50's after sitting up Pts HR went down to 45 then 38 while sleeping. afib, Rhythm went back to HR in 50's after sitting up

## 2022-01-31 NOTE — PROVIDER CONTACT NOTE (OTHER) - REASON
Pts HR went down to 45 then 38 while sleeping. Rhythm went back to HR of 53 after sitting up Pts HR went down to 45 then 38 while sleeping. Rhythm went back to HR in 50's after sitting up Pts HR went down to 45 then 38 while sleeping. afib, Rhythm went back to HR in 50's after sitting up

## 2022-01-31 NOTE — PROGRESS NOTE ADULT - SUBJECTIVE AND OBJECTIVE BOX
[facility]  Rodriguez Goldberg PA-C  [facility] [place] [address]  [phone]      INTERVAL HPI/OVERNIGHT EVENTS:  pt seen and examined, resting comfortably in bed  +BM , no signs of bleeding  tolerating diet, with good appetite     MEDICATIONS  (STANDING):  amLODIPine   Tablet 10 milliGRAM(s) Oral daily  atorvastatin 10 milliGRAM(s) Oral at bedtime  dextrose 40% Gel 15 Gram(s) Oral once  dextrose 5%. 1000 milliLiter(s) (50 mL/Hr) IV Continuous <Continuous>  dextrose 5%. 1000 milliLiter(s) (100 mL/Hr) IV Continuous <Continuous>  dextrose 50% Injectable 25 Gram(s) IV Push once  dextrose 50% Injectable 12.5 Gram(s) IV Push once  dextrose 50% Injectable 25 Gram(s) IV Push once  furosemide   Injectable 40 milliGRAM(s) IV Push every 12 hours  glucagon  Injectable 1 milliGRAM(s) IntraMuscular once  insulin glargine Injectable (LANTUS) 15 Unit(s) SubCutaneous at bedtime  insulin lispro (ADMELOG) corrective regimen sliding scale   SubCutaneous three times a day before meals  insulin lispro Injectable (ADMELOG) 5 Unit(s) SubCutaneous three times a day before meals  pantoprazole    Tablet 40 milliGRAM(s) Oral before breakfast  polyethylene glycol 3350 17 Gram(s) Oral daily  senna 2 Tablet(s) Oral at bedtime  traZODone 50 milliGRAM(s) Oral at bedtime    MEDICATIONS  (PRN):  melatonin 3 milliGRAM(s) Oral at bedtime PRN Insomnia      Allergies    No Known Allergies    Intolerances        ROS:   General:  No wt loss, fevers, chills, night sweats, fatigue,   Eyes:  Good vision, no reported pain  ENT:  No sore throat, pain, runny nose, dysphagia  CV:  No pain, palpitations, hypo/hypertension  Resp:  No dyspnea, cough, tachypnea, wheezing  GI:  No pain, No nausea, No vomiting, No diarrhea, No constipation, No weight loss, No fever, No pruritis, No rectal bleeding, No tarry stools, No dysphagia,  :  No pain, bleeding, incontinence, nocturia  Muscle:  No pain, weakness  Neuro:  No weakness, tingling, memory problems  Psych:  No fatigue, insomnia, mood problems, depression  Endocrine:  No polyuria, polydipsia, cold/heat intolerance  Heme:  No petechiae, ecchymosis, easy bruisability  Skin:  No rash, tattoos, scars, edema      PHYSICAL EXAM:   Vital Signs Last 24 Hrs  T(C): 36.6 (2022 04:45), Max: 36.8 (2022 17:30)  T(F): 97.9 (2022 04:45), Max: 98.2 (2022 17:30)  HR: 62 (2022 04:45) (62 - 70)  BP: 169/74 (2022 04:35) (148/68 - 177/78)  BP(mean): --  RR: 18 (2022 04:45) (17 - 18)  SpO2: 97% (2022 04:45) (95% - 100%)  Daily     Daily Weight in k.2 (2022 04:45)    GENERAL:  Appears stated age,   HEENT:  NC/AT,    CHEST:  Full & symmetric excursion,   HEART:  Regular rhythm,  ABDOMEN:  Soft, non-tender, non-distended,  EXTEREMITIES:  no cyanosis  SKIN:  No rash  NEURO:  Alert,       LABS:                        10.5   8.46  )-----------( 335      ( 2022 07:06 )             33.9         135  |  97  |  44<H>  ----------------------------<  86  4.1   |  24  |  1.68<H>    Ca    9.3      2022 07:13  Phos  4.5       Mg     2.7         TPro  6.9  /  Alb  3.5  /  TBili  0.3  /  DBili  x   /  AST  19  /  ALT  12  /  AlkPhos  197<H>      PT/INR - ( 2022 11:14 )   PT: 14.7 sec;   INR: 1.24 ratio         PTT - ( 2022 11:14 )  PTT:40.4 sec      RADIOLOGY & ADDITIONAL TESTS:

## 2022-02-01 LAB
ANION GAP SERPL CALC-SCNC: 13 MMOL/L — SIGNIFICANT CHANGE UP (ref 5–17)
BUN SERPL-MCNC: 53 MG/DL — HIGH (ref 7–23)
CALCIUM SERPL-MCNC: 9.3 MG/DL — SIGNIFICANT CHANGE UP (ref 8.4–10.5)
CHLORIDE SERPL-SCNC: 98 MMOL/L — SIGNIFICANT CHANGE UP (ref 96–108)
CO2 SERPL-SCNC: 25 MMOL/L — SIGNIFICANT CHANGE UP (ref 22–31)
CREAT SERPL-MCNC: 1.73 MG/DL — HIGH (ref 0.5–1.3)
GLUCOSE BLDC GLUCOMTR-MCNC: 121 MG/DL — HIGH (ref 70–99)
GLUCOSE BLDC GLUCOMTR-MCNC: 174 MG/DL — HIGH (ref 70–99)
GLUCOSE BLDC GLUCOMTR-MCNC: 226 MG/DL — HIGH (ref 70–99)
GLUCOSE BLDC GLUCOMTR-MCNC: 75 MG/DL — SIGNIFICANT CHANGE UP (ref 70–99)
GLUCOSE SERPL-MCNC: 85 MG/DL — SIGNIFICANT CHANGE UP (ref 70–99)
MAGNESIUM SERPL-MCNC: 2.3 MG/DL — SIGNIFICANT CHANGE UP (ref 1.6–2.6)
PHOSPHATE SERPL-MCNC: 5.2 MG/DL — HIGH (ref 2.5–4.5)
POTASSIUM SERPL-MCNC: 3.9 MMOL/L — SIGNIFICANT CHANGE UP (ref 3.5–5.3)
POTASSIUM SERPL-SCNC: 3.9 MMOL/L — SIGNIFICANT CHANGE UP (ref 3.5–5.3)
SODIUM SERPL-SCNC: 136 MMOL/L — SIGNIFICANT CHANGE UP (ref 135–145)

## 2022-02-01 PROCEDURE — 99221 1ST HOSP IP/OBS SF/LOW 40: CPT

## 2022-02-01 RX ORDER — FUROSEMIDE 40 MG
40 TABLET ORAL
Refills: 0 | Status: DISCONTINUED | OUTPATIENT
Start: 2022-02-01 | End: 2022-02-03

## 2022-02-01 RX ORDER — HYDRALAZINE HCL 50 MG
25 TABLET ORAL THREE TIMES A DAY
Refills: 0 | Status: DISCONTINUED | OUTPATIENT
Start: 2022-02-01 | End: 2022-02-03

## 2022-02-01 RX ADMIN — Medication 40 MILLIGRAM(S): at 06:04

## 2022-02-01 RX ADMIN — Medication 50 MILLIGRAM(S): at 21:40

## 2022-02-01 RX ADMIN — Medication 40 MILLIGRAM(S): at 18:15

## 2022-02-01 RX ADMIN — Medication 1: at 18:14

## 2022-02-01 RX ADMIN — Medication 5 UNIT(S): at 09:21

## 2022-02-01 RX ADMIN — INSULIN GLARGINE 15 UNIT(S): 100 INJECTION, SOLUTION SUBCUTANEOUS at 21:40

## 2022-02-01 RX ADMIN — Medication 25 MILLIGRAM(S): at 21:10

## 2022-02-01 RX ADMIN — PANTOPRAZOLE SODIUM 40 MILLIGRAM(S): 20 TABLET, DELAYED RELEASE ORAL at 06:04

## 2022-02-01 RX ADMIN — AMLODIPINE BESYLATE 10 MILLIGRAM(S): 2.5 TABLET ORAL at 06:04

## 2022-02-01 RX ADMIN — Medication 25 MILLIGRAM(S): at 13:39

## 2022-02-01 RX ADMIN — Medication 5 UNIT(S): at 13:26

## 2022-02-01 RX ADMIN — SENNA PLUS 2 TABLET(S): 8.6 TABLET ORAL at 21:39

## 2022-02-01 RX ADMIN — ATORVASTATIN CALCIUM 10 MILLIGRAM(S): 80 TABLET, FILM COATED ORAL at 21:41

## 2022-02-01 RX ADMIN — POLYETHYLENE GLYCOL 3350 17 GRAM(S): 17 POWDER, FOR SOLUTION ORAL at 13:39

## 2022-02-01 RX ADMIN — Medication 5 UNIT(S): at 18:14

## 2022-02-01 NOTE — PROGRESS NOTE ADULT - SUBJECTIVE AND OBJECTIVE BOX
DATE OF SERVICE: 02-01-22 @ 11:22  CHIEF COMPLAINT:Patient is a 89y old  Female who presents with a chief complaint of ADHF (01 Feb 2022 10:44)    	        PAST MEDICAL & SURGICAL HISTORY:  Essential hypertension, benign    Hypercholesteremia    Peripheral vascular disease    Diabetes    CAD (coronary artery disease)    History of chronic gastritis    S/P carotid endarterectomy    S/P coronary artery by pass    History of cholecystectomy    S/P vascular surgery            REVIEW OF SYSTEMS:  CONSTITUTIONAL: No fever, weight loss, or fatigue  EYES: No eye pain, visual disturbances, or discharge  NECK: No pain or stiffness  RESPIRATORY: breathing much better  CARDIOVASCULAR: No chest pain, palpitations, passing out, dizziness, or leg swelling  GASTROINTESTINAL: No abdominal or epigastric pain. No nausea, vomiting, or hematemesis;   GENITOURINARY: No dysuria, frequency, hematuria, or incontinence  NEUROLOGICAL: No headaches,   MUSCULOSKELETAL: No joint pain or swelling; No muscle, back, or extremity pain    Medications:  MEDICATIONS  (STANDING):  amLODIPine   Tablet 10 milliGRAM(s) Oral daily  atorvastatin 10 milliGRAM(s) Oral at bedtime  dextrose 40% Gel 15 Gram(s) Oral once  dextrose 5%. 1000 milliLiter(s) (50 mL/Hr) IV Continuous <Continuous>  dextrose 5%. 1000 milliLiter(s) (100 mL/Hr) IV Continuous <Continuous>  dextrose 50% Injectable 25 Gram(s) IV Push once  dextrose 50% Injectable 12.5 Gram(s) IV Push once  dextrose 50% Injectable 25 Gram(s) IV Push once  furosemide    Tablet 40 milliGRAM(s) Oral two times a day  glucagon  Injectable 1 milliGRAM(s) IntraMuscular once  hydrALAZINE 25 milliGRAM(s) Oral three times a day  insulin glargine Injectable (LANTUS) 15 Unit(s) SubCutaneous at bedtime  insulin lispro (ADMELOG) corrective regimen sliding scale   SubCutaneous three times a day before meals  insulin lispro Injectable (ADMELOG) 5 Unit(s) SubCutaneous three times a day before meals  pantoprazole    Tablet 40 milliGRAM(s) Oral before breakfast  polyethylene glycol 3350 17 Gram(s) Oral daily  senna 2 Tablet(s) Oral at bedtime  traZODone 50 milliGRAM(s) Oral at bedtime    MEDICATIONS  (PRN):  melatonin 3 milliGRAM(s) Oral at bedtime PRN Insomnia    	    PHYSICAL EXAM:  T(C): 36.6 (02-01-22 @ 04:57), Max: 36.7 (01-31-22 @ 11:36)  HR: 65 (02-01-22 @ 04:57) (65 - 72)  BP: 176/69 (02-01-22 @ 04:57) (146/67 - 176/69)  RR: 18 (02-01-22 @ 04:57) (18 - 18)  SpO2: 95% (02-01-22 @ 04:57) (95% - 98%)  Wt(kg): --  I&O's Summary    31 Jan 2022 07:01  -  01 Feb 2022 07:00  --------------------------------------------------------  IN: 780 mL / OUT: 2200 mL / NET: -1420 mL    01 Feb 2022 07:01  -  01 Feb 2022 11:22  --------------------------------------------------------  IN: 240 mL / OUT: 0 mL / NET: 240 mL        Appearance: Normal	  HEENT:   Normal oral mucosa, PERRL, EOMI	  Lymphatic: No lymphadenopathy  Cardiovascular: Normal S1 S2, No JVD,   Respiratory: dec bs  Psychiatry: A & O   Gastrointestinal:  Soft, Non-tender, + BS	  Skin: No rashes, No ecchymoses, No cyanosis	  Neurologic: Non-focal  Extremities: Normal range of motion, No clubbing, cyanosis or edema  Vascular: Peripheral pulses palpable 2+ bilaterally    TELEMETRY: 	    ECG:  	  RADIOLOGY:  OTHER: 	  	  LABS:	 	    CARDIAC MARKERS:                                10.5   8.46  )-----------( 335      ( 31 Jan 2022 07:06 )             33.9     02-01    136  |  98  |  53<H>  ----------------------------<  85  3.9   |  25  |  1.73<H>    Ca    9.3      01 Feb 2022 06:45  Phos  5.2     02-01  Mg     2.3     02-01    TPro  6.9  /  Alb  3.5  /  TBili  0.3  /  DBili  x   /  AST  19  /  ALT  12  /  AlkPhos  197<H>  01-31    proBNP:   Lipid Profile:   HgA1c:   TSH:

## 2022-02-01 NOTE — PROGRESS NOTE ADULT - SUBJECTIVE AND OBJECTIVE BOX
[facility]  Rodriguez Goldberg PA-C  [facility] [place] [address]  [phone]      INTERVAL HPI/OVERNIGHT EVENTS:  pt seen and examined, no new events  no complaints, tolerating diet, no N/V/D or abdominal pain    MEDICATIONS  (STANDING):  amLODIPine   Tablet 10 milliGRAM(s) Oral daily  atorvastatin 10 milliGRAM(s) Oral at bedtime  dextrose 40% Gel 15 Gram(s) Oral once  dextrose 5%. 1000 milliLiter(s) (50 mL/Hr) IV Continuous <Continuous>  dextrose 5%. 1000 milliLiter(s) (100 mL/Hr) IV Continuous <Continuous>  dextrose 50% Injectable 25 Gram(s) IV Push once  dextrose 50% Injectable 12.5 Gram(s) IV Push once  dextrose 50% Injectable 25 Gram(s) IV Push once  furosemide   Injectable 40 milliGRAM(s) IV Push every 12 hours  glucagon  Injectable 1 milliGRAM(s) IntraMuscular once  insulin glargine Injectable (LANTUS) 15 Unit(s) SubCutaneous at bedtime  insulin lispro (ADMELOG) corrective regimen sliding scale   SubCutaneous three times a day before meals  insulin lispro Injectable (ADMELOG) 5 Unit(s) SubCutaneous three times a day before meals  pantoprazole    Tablet 40 milliGRAM(s) Oral before breakfast  polyethylene glycol 3350 17 Gram(s) Oral daily  senna 2 Tablet(s) Oral at bedtime  traZODone 50 milliGRAM(s) Oral at bedtime    MEDICATIONS  (PRN):  melatonin 3 milliGRAM(s) Oral at bedtime PRN Insomnia      Allergies    No Known Allergies    Intolerances        ROS:   General:  No wt loss, fevers, chills, night sweats, fatigue,   Eyes:  Good vision, no reported pain  ENT:  No sore throat, pain, runny nose, dysphagia  CV:  No pain, palpitations, hypo/hypertension  Resp:  No dyspnea, cough, tachypnea, wheezing  GI:  No pain, No nausea, No vomiting, No diarrhea, No constipation, No weight loss, No fever, No pruritis, No rectal bleeding, No tarry stools, No dysphagia,  :  No pain, bleeding, incontinence, nocturia  Muscle:  No pain, weakness  Neuro:  No weakness, tingling, memory problems  Psych:  No fatigue, insomnia, mood problems, depression  Endocrine:  No polyuria, polydipsia, cold/heat intolerance  Heme:  No petechiae, ecchymosis, easy bruisability  Skin:  No rash, tattoos, scars, edema      PHYSICAL EXAM:   Vital Signs Last 24 Hrs  T(C): 36.6 (2022 04:57), Max: 36.6 (2022 04:57)  T(F): 97.9 (2022 04:57), Max: 97.9 (2022 04:57)  HR: 65 (2022 04:57) (65 - 72)  BP: 176/69 (2022 04:57) (155/65 - 176/69)  BP(mean): --  RR: 18 (2022 04:57) (18 - 18)  SpO2: 95% (2022 04:57) (95% - 98%)  Daily     Daily Weight in k.2 (2022 04:45)    GENERAL:  Appears stated age,   HEENT:  NC/AT,    CHEST:  Full & symmetric excursion,   HEART:  Regular rhythm,  ABDOMEN:  Soft, non-tender, non-distended,  EXTEREMITIES:  no cyanosis  SKIN:  No rash  NEURO:  Alert,       LABS:                                   10.5   8.46  )-----------( 335      ( 2022 07:06 )             33.9   -    136  |  98  |  53<H>  ----------------------------<  85  3.9   |  25  |  1.73<H>    Ca    9.3      2022 06:45  Phos  5.2     02-  Mg     2.3     02-    TPro  6.9  /  Alb  3.5  /  TBili  0.3  /  DBili  x   /  AST  19  /  ALT  12  /  AlkPhos  197<H>        RADIOLOGY & ADDITIONAL TESTS:

## 2022-02-01 NOTE — PROGRESS NOTE ADULT - ASSESSMENT
89 yo F with PMHx of HTN/HLD, DM2, CAD (s/p CABG), PVD (s/p stents in bilateral LE), CKD (Baseline Cr reportedly 1.2), recent admission for GIB 2/2 chronic gastritis who presented with complaints of dyspnea and ?chest pain  Acute diastolic heart failure - BL effusions   Mild PHTN   COLETTE that is likely hemodynamic.  Underlying CKD stage 3 b as a manifestation of age alone     1 EPS-Monitor for bradycardia.     Not on AV inna agents     2 Renal -  Change lasix to PO    No need for renal sono  Check UA .    3 Pulm-Supplemental oxygen;  Standard DVT     4 CVS-Start Hydralazine for BP support;  Off Toprol     Sayed Ali   [facility]   [phone]

## 2022-02-01 NOTE — PROGRESS NOTE ADULT - SUBJECTIVE AND OBJECTIVE BOX
CARDIOLOGY FOLLOW UP - Dr. Drake  DATE OF SERVICE: 2/1/22    CC no cp or sob         REVIEW OF SYSTEMS:  CONSTITUTIONAL: No fever, weight loss, or fatigue  RESPIRATORY: No cough, wheezing, chills or hemoptysis; No Shortness of Breath  CARDIOVASCULAR: No chest pain, palpitations, passing out, dizziness, or leg swelling  GASTROINTESTINAL: No abdominal or epigastric pain. No nausea, vomiting, or hematemesis; No diarrhea or constipation. No melena or hematochezia.    PHYSICAL EXAM:  T(C): 36.6 (02-01-22 @ 04:57), Max: 36.7 (01-31-22 @ 11:36)  HR: 65 (02-01-22 @ 04:57) (65 - 72)  BP: 176/69 (02-01-22 @ 04:57) (146/67 - 176/69)  RR: 18 (02-01-22 @ 04:57) (18 - 18)  SpO2: 95% (02-01-22 @ 04:57) (95% - 98%)  Wt(kg): --  I&O's Summary    31 Jan 2022 07:01  -  01 Feb 2022 07:00  --------------------------------------------------------  IN: 780 mL / OUT: 2200 mL / NET: -1420 mL    01 Feb 2022 07:01  -  01 Feb 2022 10:44  --------------------------------------------------------  IN: 240 mL / OUT: 0 mL / NET: 240 mL        Appearance: Normal	  Cardiovascular: Normal S1 S2,RRR  Respiratory: Lungs clear to auscultation	  Gastrointestinal:  Soft, Non-tender, + BS	  Extremities: Normal range of motion, No clubbing, cyanosis or edema      Home Medications:  AMLODIPINE BESYLATE 10 MG TAB: 1 tab(s) orally once a day (08 Dec 2021 00:55)  ATORVASTATIN 10 MG TABLET: 1 tab(s) orally once a day (08 Dec 2021 00:55)  LEVEMIR FLEXTOUCH 100 UNIT/ML: 32 unit(s) subcutaneous once a day (at bedtime) (08 Dec 2021 00:55)  METOPROLOL SUCC  MG TAB: 1 tab(s) orally once a day (08 Dec 2021 00:55)  PANTOPRAZOLE SOD DR 40 MG TAB: 1 tab(s) orally once a day (08 Dec 2021 00:55)  polyethylene glycol 3350 oral powder for reconstitution: 17 gram(s) orally once a day (08 Dec 2021 00:55)  senna oral tablet: 2 tab(s) orally once a day (at bedtime) (08 Dec 2021 00:55)  TRAZODONE 50 MG TABLET: 1 tab(s) orally once a day (at bedtime) (08 Dec 2021 00:55)      MEDICATIONS  (STANDING):  amLODIPine   Tablet 10 milliGRAM(s) Oral daily  atorvastatin 10 milliGRAM(s) Oral at bedtime  dextrose 40% Gel 15 Gram(s) Oral once  dextrose 5%. 1000 milliLiter(s) (50 mL/Hr) IV Continuous <Continuous>  dextrose 5%. 1000 milliLiter(s) (100 mL/Hr) IV Continuous <Continuous>  dextrose 50% Injectable 25 Gram(s) IV Push once  dextrose 50% Injectable 12.5 Gram(s) IV Push once  dextrose 50% Injectable 25 Gram(s) IV Push once  furosemide    Tablet 40 milliGRAM(s) Oral two times a day  glucagon  Injectable 1 milliGRAM(s) IntraMuscular once  hydrALAZINE 25 milliGRAM(s) Oral three times a day  insulin glargine Injectable (LANTUS) 15 Unit(s) SubCutaneous at bedtime  insulin lispro (ADMELOG) corrective regimen sliding scale   SubCutaneous three times a day before meals  insulin lispro Injectable (ADMELOG) 5 Unit(s) SubCutaneous three times a day before meals  pantoprazole    Tablet 40 milliGRAM(s) Oral before breakfast  polyethylene glycol 3350 17 Gram(s) Oral daily  senna 2 Tablet(s) Oral at bedtime  traZODone 50 milliGRAM(s) Oral at bedtime      TELEMETRY: SB/ NSR hr 50-70	    ECG:  	  RADIOLOGY:   DIAGNOSTIC TESTING:  [ ] Echocardiogram:  [ ]  Catheterization:  [ ] Stress Test:    OTHER: 	    LABS:	 	    Troponin T, High Sensitivity Result: 26 ng/L [0 - 51] (01-28 @ 22:39)  Troponin T, High Sensitivity Result: 29 ng/L [0 - 51] (01-28 @ 18:42)                          10.5   8.46  )-----------( 335      ( 31 Jan 2022 07:06 )             33.9     02-01    136  |  98  |  53<H>  ----------------------------<  85  3.9   |  25  |  1.73<H>    Ca    9.3      01 Feb 2022 06:45  Phos  5.2     02-01  Mg     2.3     02-01    TPro  6.9  /  Alb  3.5  /  TBili  0.3  /  DBili  x   /  AST  19  /  ALT  12  /  AlkPhos  197<H>  01-31    PT/INR - ( 30 Jan 2022 11:14 )   PT: 14.7 sec;   INR: 1.24 ratio         PTT - ( 30 Jan 2022 11:14 )  PTT:40.4 sec

## 2022-02-01 NOTE — PROGRESS NOTE ADULT - SUBJECTIVE AND OBJECTIVE BOX
[facility] [phone]        Patient seen and examined in bed.  She was in good spirits and denied SOB this am         MEDICATIONS  (STANDING):  amLODIPine   Tablet 10 milliGRAM(s) Oral daily  atorvastatin 10 milliGRAM(s) Oral at bedtime  dextrose 40% Gel 15 Gram(s) Oral once  dextrose 5%. 1000 milliLiter(s) (50 mL/Hr) IV Continuous <Continuous>  dextrose 5%. 1000 milliLiter(s) (100 mL/Hr) IV Continuous <Continuous>  dextrose 50% Injectable 25 Gram(s) IV Push once  dextrose 50% Injectable 12.5 Gram(s) IV Push once  dextrose 50% Injectable 25 Gram(s) IV Push once  furosemide   Injectable 40 milliGRAM(s) IV Push every 12 hours  glucagon  Injectable 1 milliGRAM(s) IntraMuscular once  insulin glargine Injectable (LANTUS) 15 Unit(s) SubCutaneous at bedtime  insulin lispro (ADMELOG) corrective regimen sliding scale   SubCutaneous three times a day before meals  insulin lispro Injectable (ADMELOG) 5 Unit(s) SubCutaneous three times a day before meals  pantoprazole    Tablet 40 milliGRAM(s) Oral before breakfast  polyethylene glycol 3350 17 Gram(s) Oral daily  senna 2 Tablet(s) Oral at bedtime  traZODone 50 milliGRAM(s) Oral at bedtime      VITAL:  T(C): , Max: 36.7 (01-31-22 @ 11:36)  T(F): , Max: 98 (01-31-22 @ 11:36)  HR: 65 (02-01-22 @ 04:57)  BP: 176/69 (02-01-22 @ 04:57)  BP(mean): --  RR: 18 (02-01-22 @ 04:57)  SpO2: 95% (02-01-22 @ 04:57)  Wt(kg): --    I and O's:    01-31 @ 07:01  -  02-01 @ 07:00  --------------------------------------------------------  IN: 780 mL / OUT: 2200 mL / NET: -1420 mL          PHYSICAL EXAM:    Constitutional: NAD  Neck:  No JVD  Respiratory: CTAB/L  Cardiovascular: S1 and S2  Gastrointestinal: BS+, soft, NT/ND  Extremities: No peripheral edema  Neurological: A/O x 3, no focal deficits  Psychiatric: Normal mood, normal affect  : No Hawkins  Skin: No rashes  Access: Not applicable    LABS:                        10.5   8.46  )-----------( 335      ( 31 Jan 2022 07:06 )             33.9     02-01    136  |  98  |  53<H>  ----------------------------<  85  3.9   |  25  |  1.73<H>    Ca    9.3      01 Feb 2022 06:45  Phos  5.2     02-01  Mg     2.3     02-01    TPro  6.9  /  Alb  3.5  /  TBili  0.3  /  DBili  x   /  AST  19  /  ALT  12  /  AlkPhos  197<H>  01-31          Urine Studies:          RADIOLOGY & ADDITIONAL STUDIES:    < from: Xray Chest 1 View- PORTABLE-Urgent (Xray Chest 1 View- PORTABLE-Urgent .) (01.28.22 @ 18:29) >    ACC: [id number] EXAM:  XR CHEST PORTABLE URGENT 1V                          PROCEDURE DATE:  01/28/2022          INTERPRETATION:  EXAMINATION: XR CHEST URGENT    CLINICAL INDICATION: Shortness of breath    TECHNIQUE: Single frontal, portable view of the chest was obtained.    COMPARISON: Chest radiograph 12/7/2021.    FINDINGS:  The heart is enlarged. Status post median sternotomy and CABG.  The lungs are clear.  Bilateral pleural effusions. No pneumothorax.    IMPRESSION:  Bilateral pleural effusions    --- End of Report ---          SAIGE SAMUEL MD; Resident Radiology  This document has been electronically signed.  WAQAR CAMPBELL MD; Attending Radiologist  This document has been electronically signed. Jan 28 2022  7:24PM    < end of copied text >

## 2022-02-01 NOTE — PROGRESS NOTE ADULT - SUBJECTIVE AND OBJECTIVE BOX
24H hour events: No over night events. Denies c/o CP, palpitations or SOB.     MEDICATIONS:  amLODIPine   Tablet 10 milliGRAM(s) Oral daily  furosemide    Tablet 40 milliGRAM(s) Oral two times a day  hydrALAZINE 25 milliGRAM(s) Oral three times a day    melatonin 3 milliGRAM(s) Oral at bedtime PRN  traZODone 50 milliGRAM(s) Oral at bedtime    pantoprazole    Tablet 40 milliGRAM(s) Oral before breakfast  polyethylene glycol 3350 17 Gram(s) Oral daily  senna 2 Tablet(s) Oral at bedtime    atorvastatin 10 milliGRAM(s) Oral at bedtime  dextrose 40% Gel 15 Gram(s) Oral once  dextrose 50% Injectable 25 Gram(s) IV Push once  dextrose 50% Injectable 12.5 Gram(s) IV Push once  dextrose 50% Injectable 25 Gram(s) IV Push once  glucagon  Injectable 1 milliGRAM(s) IntraMuscular once  insulin glargine Injectable (LANTUS) 15 Unit(s) SubCutaneous at bedtime  insulin lispro (ADMELOG) corrective regimen sliding scale   SubCutaneous three times a day before meals  insulin lispro Injectable (ADMELOG) 5 Unit(s) SubCutaneous three times a day before meals    dextrose 5%. 1000 milliLiter(s) IV Continuous <Continuous>  dextrose 5%. 1000 milliLiter(s) IV Continuous <Continuous>      REVIEW OF SYSTEMS:  Complete 10point ROS negative.    PHYSICAL EXAM:  T(C): 36.6 (02-01-22 @ 04:57), Max: 36.7 (01-31-22 @ 11:36)  HR: 65 (02-01-22 @ 04:57) (65 - 72)  BP: 176/69 (02-01-22 @ 04:57) (146/67 - 176/69)  RR: 18 (02-01-22 @ 04:57) (18 - 18)  SpO2: 95% (02-01-22 @ 04:57) (95% - 98%)    31 Jan 2022 07:01  -  01 Feb 2022 07:00  --------------------------------------------------------  IN: 780 mL / OUT: 2200 mL / NET: -1420 mL    01 Feb 2022 07:01  -  01 Feb 2022 10:54  --------------------------------------------------------  IN: 240 mL / OUT: 0 mL / NET: 240 mL    Appearance: Normal	  Cardiovascular: Normal S1 S2, regular. No JVD, No murmurs, No edema  Respiratory: Lungs clear to auscultation	  Psychiatry: A & O x 3, Mood & affect appropriate  Gastrointestinal:  Soft, Non-tender, + BS	  Extremities: Normal range of motion, No clubbing, cyanosis or edema  Vascular: Peripheral pulses palpable 2+ bilaterally      LABS:	 	    CBC Full  -  ( 31 Jan 2022 07:06 )  WBC Count : 8.46 K/uL  Hemoglobin : 10.5 g/dL  Hematocrit : 33.9 %  Platelet Count - Automated : 335 K/uL  Mean Cell Volume : 89.9 fl  Mean Cell Hemoglobin : 27.9 pg  Mean Cell Hemoglobin Concentration : 31.0 gm/dL  Auto Neutrophil # : 6.13 K/uL  Auto Lymphocyte # : 0.93 K/uL  Auto Monocyte # : 1.05 K/uL  Auto Eosinophil # : 0.28 K/uL  Auto Basophil # : 0.04 K/uL  Auto Neutrophil % : 72.4 %  Auto Lymphocyte % : 11.0 %  Auto Monocyte % : 12.4 %  Auto Eosinophil % : 3.3 %  Auto Basophil % : 0.5 %    02-01    136  |  98  |  53<H>  ----------------------------<  85  3.9   |  25  |  1.73<H>  01-31    134<L>  |  95<L>  |  52<H>  ----------------------------<  152<H>  4.4   |  22  |  1.95<H>    Ca    9.3      01 Feb 2022 06:45  Ca    9.2      31 Jan 2022 20:50  Phos  5.2     02-01  Phos  4.8     01-31  Mg     2.3     02-01  Mg     2.2     01-31    TPro  6.9  /  Alb  3.5  /  TBili  0.3  /  DBili  x   /  AST  19  /  ALT  12  /  AlkPhos  197<H>  01-31  TPro  7.5  /  Alb  3.8  /  TBili  0.4  /  DBili  0.1  /  AST  17  /  ALT  12  /  AlkPhos  219<H>  01-30      proBNP: Serum Pro-Brain Natriuretic Peptide: 1860 pg/mL (01-28 @ 18:42)      TELEMETRY: 	    	       24H hour events: No over night events. Denies c/o CP, palpitations or SOB.     MEDICATIONS:  amLODIPine   Tablet 10 milliGRAM(s) Oral daily  furosemide    Tablet 40 milliGRAM(s) Oral two times a day  hydrALAZINE 25 milliGRAM(s) Oral three times a day    melatonin 3 milliGRAM(s) Oral at bedtime PRN  traZODone 50 milliGRAM(s) Oral at bedtime    pantoprazole    Tablet 40 milliGRAM(s) Oral before breakfast  polyethylene glycol 3350 17 Gram(s) Oral daily  senna 2 Tablet(s) Oral at bedtime    atorvastatin 10 milliGRAM(s) Oral at bedtime  dextrose 40% Gel 15 Gram(s) Oral once  dextrose 50% Injectable 25 Gram(s) IV Push once  dextrose 50% Injectable 12.5 Gram(s) IV Push once  dextrose 50% Injectable 25 Gram(s) IV Push once  glucagon  Injectable 1 milliGRAM(s) IntraMuscular once  insulin glargine Injectable (LANTUS) 15 Unit(s) SubCutaneous at bedtime  insulin lispro (ADMELOG) corrective regimen sliding scale   SubCutaneous three times a day before meals  insulin lispro Injectable (ADMELOG) 5 Unit(s) SubCutaneous three times a day before meals    dextrose 5%. 1000 milliLiter(s) IV Continuous <Continuous>  dextrose 5%. 1000 milliLiter(s) IV Continuous <Continuous>      REVIEW OF SYSTEMS:  Complete 10point ROS negative.    PHYSICAL EXAM:  T(C): 36.6 (02-01-22 @ 04:57), Max: 36.7 (01-31-22 @ 11:36)  HR: 65 (02-01-22 @ 04:57) (65 - 72)  BP: 176/69 (02-01-22 @ 04:57) (146/67 - 176/69)  RR: 18 (02-01-22 @ 04:57) (18 - 18)  SpO2: 95% (02-01-22 @ 04:57) (95% - 98%)    31 Jan 2022 07:01  -  01 Feb 2022 07:00  --------------------------------------------------------  IN: 780 mL / OUT: 2200 mL / NET: -1420 mL    01 Feb 2022 07:01  -  01 Feb 2022 10:54  --------------------------------------------------------  IN: 240 mL / OUT: 0 mL / NET: 240 mL    Appearance: Normal	  Cardiovascular: Normal S1 S2, regular. No JVD, No murmurs, No edema  Respiratory: Lungs clear to auscultation	  Psychiatry: A & O x 3, Mood & affect appropriate  Gastrointestinal:  Soft, Non-tender, + BS	  Extremities: Normal range of motion, No clubbing, cyanosis or edema  Vascular: Peripheral pulses palpable 2+ bilaterally      LABS:	 	    CBC Full  -  ( 31 Jan 2022 07:06 )  WBC Count : 8.46 K/uL  Hemoglobin : 10.5 g/dL  Hematocrit : 33.9 %  Platelet Count - Automated : 335 K/uL  Mean Cell Volume : 89.9 fl  Mean Cell Hemoglobin : 27.9 pg  Mean Cell Hemoglobin Concentration : 31.0 gm/dL  Auto Neutrophil # : 6.13 K/uL  Auto Lymphocyte # : 0.93 K/uL  Auto Monocyte # : 1.05 K/uL  Auto Eosinophil # : 0.28 K/uL  Auto Basophil # : 0.04 K/uL  Auto Neutrophil % : 72.4 %  Auto Lymphocyte % : 11.0 %  Auto Monocyte % : 12.4 %  Auto Eosinophil % : 3.3 %  Auto Basophil % : 0.5 %    02-01    136  |  98  |  53<H>  ----------------------------<  85  3.9   |  25  |  1.73<H>  01-31    134<L>  |  95<L>  |  52<H>  ----------------------------<  152<H>  4.4   |  22  |  1.95<H>    Ca    9.3      01 Feb 2022 06:45  Ca    9.2      31 Jan 2022 20:50  Phos  5.2     02-01  Phos  4.8     01-31  Mg     2.3     02-01  Mg     2.2     01-31    TPro  6.9  /  Alb  3.5  /  TBili  0.3  /  DBili  x   /  AST  19  /  ALT  12  /  AlkPhos  197<H>  01-31  TPro  7.5  /  Alb  3.8  /  TBili  0.4  /  DBili  0.1  /  AST  17  /  ALT  12  /  AlkPhos  219<H>  01-30      proBNP: Serum Pro-Brain Natriuretic Peptide: 1860 pg/mL (01-28 @ 18:42)      TELEMETRY: 	  NSR with episodes of Wenckebach rate 50's- 70's

## 2022-02-01 NOTE — PROGRESS NOTE ADULT - ASSESSMENT
ECHO 1/30/22:  EF 65%, mild MR, nl LV sys fx  mod concentric LVH      A/P    89 yo F with PMHx of HTN/HLD, DM2, CAD (s/p CABG), PVD (s/p stents in bilateral LE), CKD (Baseline Cr reportedly 1.2), recent admission for GIB 2/2 chronic gastritis who presented with complaints of dyspnea and chest pain.    #acute on chronic systolic HF  -clinically stable and improved post iv lasix  -c/w lasix 40 mg PO BID  -echo with EF 65%, mild MR, nl LV sys fx  mod concentric LVH      #CAD, s/p CABG  -atypical cp   -now denies, trop negative   -cont asa, echo with nl LV sys fx      #AV block   -tele reviewed  -some episodes of mobitz I Iblock with few brief episodes of mobitz II on high dose toprol  -echo with nl LV sys fx   -EP f/u noted- Wenckebach and episodes during sleep of ATach with high degree AV BLock-  no PPm for now -- ep to follow up   -cont to monitor off bb for now - no events over night  dvt ppx       ECHO 1/30/22:  EF 65%, mild MR, nl LV sys fx  mod concentric LVH      A/P    89 yo F with PMHx of HTN/HLD, DM2, CAD (s/p CABG), PVD (s/p stents in bilateral LE), CKD (Baseline Cr reportedly 1.2), recent admission for GIB 2/2 chronic gastritis who presented with complaints of dyspnea and chest pain.    #acute on chronic systolic HF  -clinically stable and improved post iv lasix  -c/w lasix 40 mg PO BID  -echo with EF 65%, mild MR, nl LV sys fx  mod concentric LVH      #CAD, s/p CABG  -atypical cp   -now denies, trop negative   -cont asa, echo with nl LV sys fx      #AV block   -tele reviewed  -some episodes of mobitz I Iblock with few brief episodes of mobitz II on high dose toprol  -echo with nl LV sys fx   -EP f/u noted- Wenckebach and episodes during sleep of ATach with high degree AV BLock-  no PPm for now -- ep to follow up   -cont to monitor off bb for now - no events over night    #htn   bp elevated- started on hydral     dvt ppx

## 2022-02-01 NOTE — PROGRESS NOTE ADULT - ASSESSMENT
89 y.o. Female with PMHx of HTN, HLD, DM2, CAD (s/p CABG > 20 years ago), PVD (s/p stents in bilateral LE), CKD (Baseline Cr reportedly 1.2), recent admission for Anemia/ GIB secondary to chronic gastritis who presented with complaints of dyspnea and left sided chest pain non radiating.  Patient was found to have elevated BNP 1860, chest XRay with bilateral pleural effusions and was admitted for Acute Diastolic Heart Failure.  EP consulted for telemetry revealing Wenckebach and episodes during sleep of ATach with high degree AV Block.   TTE with EF 65%.     1. Wenckebach and episodes during sleep of ATach with high degree AV BLock  2. CAD, s/p CABG > 20 years ago   3. COLETTE on CKD    - Continue to observe off of BB/AV inna blockers (Was on Toprol XL 100mg at home)   - currently being diuresed with IV Lasix BID  - No indication for PPM at this time  - continue to observe on tele    DOM Martinez [facility]  [phone] 89 y.o. Female with PMHx of HTN, HLD, DM2, CAD (s/p CABG > 20 years ago), PVD (s/p stents in bilateral LE), CKD (Baseline Cr reportedly 1.2), recent admission for Anemia/ GIB secondary to chronic gastritis who presented with complaints of dyspnea and left sided chest pain non radiating.  Patient was found to have elevated BNP 1860, chest XRay with bilateral pleural effusions and was admitted for Acute Diastolic Heart Failure.  EP consulted for telemetry revealing Wenckebach and episodes during sleep of ATach with high degree AV Block.   TTE with EF 65%.     1. Wenckebach and episodes during sleep of ATach with high degree AV BLock  2. CAD, s/p CABG > 20 years ago   3. COLETTE on CKD    - Continue to observe off of BB/AV inna blockers (Was on Toprol XL 100mg at home)   - currently being diuresed with IV Lasix BID  - No indication for PPM at this time  - continue to observe on tele    DOM Martinez [facility]  [phone]    Addendum- EP will sign off, reconsult as needed   [phone]

## 2022-02-02 LAB
ANION GAP SERPL CALC-SCNC: 14 MMOL/L — SIGNIFICANT CHANGE UP (ref 5–17)
BUN SERPL-MCNC: 62 MG/DL — HIGH (ref 7–23)
CALCIUM SERPL-MCNC: 9.6 MG/DL — SIGNIFICANT CHANGE UP (ref 8.4–10.5)
CHLORIDE SERPL-SCNC: 96 MMOL/L — SIGNIFICANT CHANGE UP (ref 96–108)
CO2 SERPL-SCNC: 26 MMOL/L — SIGNIFICANT CHANGE UP (ref 22–31)
CREAT SERPL-MCNC: 1.87 MG/DL — HIGH (ref 0.5–1.3)
GLUCOSE BLDC GLUCOMTR-MCNC: 111 MG/DL — HIGH (ref 70–99)
GLUCOSE BLDC GLUCOMTR-MCNC: 164 MG/DL — HIGH (ref 70–99)
GLUCOSE BLDC GLUCOMTR-MCNC: 173 MG/DL — HIGH (ref 70–99)
GLUCOSE BLDC GLUCOMTR-MCNC: 192 MG/DL — HIGH (ref 70–99)
GLUCOSE SERPL-MCNC: 110 MG/DL — HIGH (ref 70–99)
POTASSIUM SERPL-MCNC: 3.8 MMOL/L — SIGNIFICANT CHANGE UP (ref 3.5–5.3)
POTASSIUM SERPL-SCNC: 3.8 MMOL/L — SIGNIFICANT CHANGE UP (ref 3.5–5.3)
SODIUM SERPL-SCNC: 136 MMOL/L — SIGNIFICANT CHANGE UP (ref 135–145)

## 2022-02-02 PROCEDURE — 93010 ELECTROCARDIOGRAM REPORT: CPT

## 2022-02-02 RX ADMIN — Medication 25 MILLIGRAM(S): at 21:31

## 2022-02-02 RX ADMIN — Medication 5 UNIT(S): at 13:24

## 2022-02-02 RX ADMIN — Medication 1: at 17:51

## 2022-02-02 RX ADMIN — ATORVASTATIN CALCIUM 10 MILLIGRAM(S): 80 TABLET, FILM COATED ORAL at 21:31

## 2022-02-02 RX ADMIN — INSULIN GLARGINE 15 UNIT(S): 100 INJECTION, SOLUTION SUBCUTANEOUS at 22:11

## 2022-02-02 RX ADMIN — Medication 5 UNIT(S): at 17:51

## 2022-02-02 RX ADMIN — Medication 40 MILLIGRAM(S): at 05:22

## 2022-02-02 RX ADMIN — Medication 50 MILLIGRAM(S): at 21:30

## 2022-02-02 RX ADMIN — PANTOPRAZOLE SODIUM 40 MILLIGRAM(S): 20 TABLET, DELAYED RELEASE ORAL at 05:22

## 2022-02-02 RX ADMIN — Medication 40 MILLIGRAM(S): at 17:52

## 2022-02-02 RX ADMIN — Medication 25 MILLIGRAM(S): at 05:22

## 2022-02-02 RX ADMIN — POLYETHYLENE GLYCOL 3350 17 GRAM(S): 17 POWDER, FOR SOLUTION ORAL at 13:25

## 2022-02-02 RX ADMIN — AMLODIPINE BESYLATE 10 MILLIGRAM(S): 2.5 TABLET ORAL at 05:22

## 2022-02-02 RX ADMIN — Medication 25 MILLIGRAM(S): at 13:25

## 2022-02-02 RX ADMIN — Medication 1: at 13:24

## 2022-02-02 RX ADMIN — Medication 5 UNIT(S): at 09:13

## 2022-02-02 NOTE — PROVIDER CONTACT NOTE (OTHER) - ACTION/TREATMENT ORDERED:
Provider notified and aware, 12 Lead ECG completed, continue to monitor for changes in pt status, cardiology team to assess pt
Provider notified. Continue to monitor and administer norvasc early.
EKG done and history noted to be in afib
Provider notified. Continue to monitor.
NP notified. Hold amlodipine, ok to give furosemide
OK to give lantus

## 2022-02-02 NOTE — PROGRESS NOTE ADULT - SUBJECTIVE AND OBJECTIVE BOX
DATE OF SERVICE: 02-02-22 @ 13:50  CHIEF COMPLAINT:Patient is a 89y old  Female who presents with a chief complaint of ADHF (02 Feb 2022 11:47)    	        PAST MEDICAL & SURGICAL HISTORY:  Essential hypertension, benign    Hypercholesteremia    Peripheral vascular disease    Diabetes    CAD (coronary artery disease)    History of chronic gastritis    S/P carotid endarterectomy    S/P coronary artery by pass    History of cholecystectomy    S/P vascular surgery            REVIEW OF SYSTEMS:  feels better  EYES: No eye pain, visual disturbances, or discharge  NECK: No pain or stiffness  RESPIRATORY: sob much better  CARDIOVASCULAR: No chest pain, palpitations, passing out, dizziness,  GASTROINTESTINAL: No abdominal or epigastric pain. No nausea, vomiting, or hematemesis; No diarrhea or constipation. No melena or hematochezia.  GENITOURINARY: No dysuria, frequency, hematuria, or incontinence  NEUROLOGICAL: No headaches, memory loss, loss of strength, numbness, or tremors  pain    Medications:  MEDICATIONS  (STANDING):  amLODIPine   Tablet 10 milliGRAM(s) Oral daily  atorvastatin 10 milliGRAM(s) Oral at bedtime  dextrose 40% Gel 15 Gram(s) Oral once  dextrose 5%. 1000 milliLiter(s) (50 mL/Hr) IV Continuous <Continuous>  dextrose 5%. 1000 milliLiter(s) (100 mL/Hr) IV Continuous <Continuous>  dextrose 50% Injectable 25 Gram(s) IV Push once  dextrose 50% Injectable 12.5 Gram(s) IV Push once  dextrose 50% Injectable 25 Gram(s) IV Push once  furosemide    Tablet 40 milliGRAM(s) Oral two times a day  glucagon  Injectable 1 milliGRAM(s) IntraMuscular once  hydrALAZINE 25 milliGRAM(s) Oral three times a day  insulin glargine Injectable (LANTUS) 15 Unit(s) SubCutaneous at bedtime  insulin lispro (ADMELOG) corrective regimen sliding scale   SubCutaneous three times a day before meals  insulin lispro Injectable (ADMELOG) 5 Unit(s) SubCutaneous three times a day before meals  pantoprazole    Tablet 40 milliGRAM(s) Oral before breakfast  polyethylene glycol 3350 17 Gram(s) Oral daily  senna 2 Tablet(s) Oral at bedtime  traZODone 50 milliGRAM(s) Oral at bedtime    MEDICATIONS  (PRN):  melatonin 3 milliGRAM(s) Oral at bedtime PRN Insomnia    	    PHYSICAL EXAM:  T(C): 36.3 (02-02-22 @ 11:29), Max: 37 (02-02-22 @ 01:15)  HR: 62 (02-02-22 @ 13:29) (62 - 73)  BP: 166/66 (02-02-22 @ 13:29) (146/67 - 169/64)  RR: 17 (02-02-22 @ 13:29) (17 - 18)  SpO2: 98% (02-02-22 @ 13:29) (93% - 99%)  Wt(kg): --  I&O's Summary    01 Feb 2022 07:01  -  02 Feb 2022 07:00  --------------------------------------------------------  IN: 1100 mL / OUT: 1900 mL / NET: -800 mL    02 Feb 2022 07:01  -  02 Feb 2022 13:50  --------------------------------------------------------  IN: 240 mL / OUT: 600 mL / NET: -360 mL        Appearance: Normal	  HEENT:   Normal oral mucosa, PERRL, EOMI	  Lymphatic: No lymphadenopathy  Cardiovascular: Normal S1 S2, No JVD, No murmurs, No edema  Respiratory: Lungs clear to auscultation	  Psychiatry: A & O x 3, Mood & affect appropriate  Gastrointestinal:  Soft, Non-tender, + BS	  Skin: No rashes, No ecchymoses, No cyanosis	  Neurologic: Non-focal  Extremities: Normal range of motion, No clubbing, cyanosis or edema  Vascular: Peripheral pulses palpable 2+ bilaterally    TELEMETRY: 	    ECG:  	  RADIOLOGY:  OTHER: 	  	  LABS:	 	    CARDIAC MARKERS:            02-02    136  |  96  |  62<H>  ----------------------------<  110<H>  3.8   |  26  |  1.87<H>    Ca    9.6      02 Feb 2022 06:35  Phos  5.2     02-01  Mg     2.3     02-01      proBNP:   Lipid Profile:   HgA1c:   TSH:

## 2022-02-02 NOTE — PROGRESS NOTE ADULT - SUBJECTIVE AND OBJECTIVE BOX
CARDIOLOGY FOLLOW UP - Dr. Drake  DATE OF SERVICE: 2/2/22    CC no cp or sob  events noted- no afib on tele- pt with 1st degree/ 2nd degree type I     REVIEW OF SYSTEMS:  CONSTITUTIONAL: No fever, weight loss, or fatigue  RESPIRATORY: No cough, wheezing, chills or hemoptysis; No Shortness of Breath  CARDIOVASCULAR: No chest pain, palpitations, passing out, dizziness, or leg swelling  GASTROINTESTINAL: No abdominal or epigastric pain. No nausea, vomiting, or hematemesis; No diarrhea or constipation. No melena or hematochezia.      PHYSICAL EXAM:  T(C): 36.6 (02-02-22 @ 04:22), Max: 37 (02-02-22 @ 01:15)  HR: 67 (02-02-22 @ 04:22) (64 - 73)  BP: 146/67 (02-02-22 @ 04:22) (131/67 - 169/64)  RR: 18 (02-02-22 @ 04:22) (18 - 18)  SpO2: 93% (02-02-22 @ 04:22) (93% - 99%)  Wt(kg): --  I&O's Summary    01 Feb 2022 07:01  -  02 Feb 2022 07:00  --------------------------------------------------------  IN: 1100 mL / OUT: 1900 mL / NET: -800 mL    02 Feb 2022 07:01  -  02 Feb 2022 10:55  --------------------------------------------------------  IN: 240 mL / OUT: 0 mL / NET: 240 mL        Appearance: Normal	  Cardiovascular: Normal S1 S2,RRR, No JVD, No murmurs  Respiratory: Lungs clear to auscultation	  Gastrointestinal:  Soft, Non-tender, + BS	  Extremities: Normal range of motion, No clubbing, cyanosis or edema      Home Medications:  AMLODIPINE BESYLATE 10 MG TAB: 1 tab(s) orally once a day (08 Dec 2021 00:55)  ATORVASTATIN 10 MG TABLET: 1 tab(s) orally once a day (08 Dec 2021 00:55)  LEVEMIR FLEXTOUCH 100 UNIT/ML: 32 unit(s) subcutaneous once a day (at bedtime) (08 Dec 2021 00:55)  METOPROLOL SUCC  MG TAB: 1 tab(s) orally once a day (08 Dec 2021 00:55)  PANTOPRAZOLE SOD DR 40 MG TAB: 1 tab(s) orally once a day (08 Dec 2021 00:55)  polyethylene glycol 3350 oral powder for reconstitution: 17 gram(s) orally once a day (08 Dec 2021 00:55)  senna oral tablet: 2 tab(s) orally once a day (at bedtime) (08 Dec 2021 00:55)  TRAZODONE 50 MG TABLET: 1 tab(s) orally once a day (at bedtime) (08 Dec 2021 00:55)      MEDICATIONS  (STANDING):  amLODIPine   Tablet 10 milliGRAM(s) Oral daily  atorvastatin 10 milliGRAM(s) Oral at bedtime  dextrose 40% Gel 15 Gram(s) Oral once  dextrose 5%. 1000 milliLiter(s) (50 mL/Hr) IV Continuous <Continuous>  dextrose 5%. 1000 milliLiter(s) (100 mL/Hr) IV Continuous <Continuous>  dextrose 50% Injectable 25 Gram(s) IV Push once  dextrose 50% Injectable 12.5 Gram(s) IV Push once  dextrose 50% Injectable 25 Gram(s) IV Push once  furosemide    Tablet 40 milliGRAM(s) Oral two times a day  glucagon  Injectable 1 milliGRAM(s) IntraMuscular once  hydrALAZINE 25 milliGRAM(s) Oral three times a day  insulin glargine Injectable (LANTUS) 15 Unit(s) SubCutaneous at bedtime  insulin lispro (ADMELOG) corrective regimen sliding scale   SubCutaneous three times a day before meals  insulin lispro Injectable (ADMELOG) 5 Unit(s) SubCutaneous three times a day before meals  pantoprazole    Tablet 40 milliGRAM(s) Oral before breakfast  polyethylene glycol 3350 17 Gram(s) Oral daily  senna 2 Tablet(s) Oral at bedtime  traZODone 50 milliGRAM(s) Oral at bedtime      TELEMETRY: nsr 2nd degree type 1  hr 50-90	    ECG:  	  RADIOLOGY:   DIAGNOSTIC TESTING:  [ ] Echocardiogram:  [ ]  Catheterization:  [ ] Stress Test:    OTHER: 	    LABS:	 	    Troponin T, High Sensitivity Result: 26 ng/L [0 - 51] (01-28 @ 22:39)  Troponin T, High Sensitivity Result: 29 ng/L [0 - 51] (01-28 @ 18:42)      02-02    136  |  96  |  62<H>  ----------------------------<  110<H>  3.8   |  26  |  1.87<H>    Ca    9.6      02 Feb 2022 06:35  Phos  5.2     02-01  Mg     2.3     02-01

## 2022-02-02 NOTE — PROGRESS NOTE ADULT - SUBJECTIVE AND OBJECTIVE BOX
[facility]  Rodriguez Goldberg PA-C  [facility] [place] [address]  [phone]      INTERVAL HPI/OVERNIGHT EVENTS:  pt seen and examined, no new events  feeling well, brown stool reported  wants to go home     MEDICATIONS  (STANDING):  amLODIPine   Tablet 10 milliGRAM(s) Oral daily  atorvastatin 10 milliGRAM(s) Oral at bedtime  dextrose 40% Gel 15 Gram(s) Oral once  dextrose 5%. 1000 milliLiter(s) (50 mL/Hr) IV Continuous <Continuous>  dextrose 5%. 1000 milliLiter(s) (100 mL/Hr) IV Continuous <Continuous>  dextrose 50% Injectable 25 Gram(s) IV Push once  dextrose 50% Injectable 12.5 Gram(s) IV Push once  dextrose 50% Injectable 25 Gram(s) IV Push once  furosemide   Injectable 40 milliGRAM(s) IV Push every 12 hours  glucagon  Injectable 1 milliGRAM(s) IntraMuscular once  insulin glargine Injectable (LANTUS) 15 Unit(s) SubCutaneous at bedtime  insulin lispro (ADMELOG) corrective regimen sliding scale   SubCutaneous three times a day before meals  insulin lispro Injectable (ADMELOG) 5 Unit(s) SubCutaneous three times a day before meals  pantoprazole    Tablet 40 milliGRAM(s) Oral before breakfast  polyethylene glycol 3350 17 Gram(s) Oral daily  senna 2 Tablet(s) Oral at bedtime  traZODone 50 milliGRAM(s) Oral at bedtime    MEDICATIONS  (PRN):  melatonin 3 milliGRAM(s) Oral at bedtime PRN Insomnia      Allergies    No Known Allergies    Intolerances        ROS:   General:  No wt loss, fevers, chills, night sweats, fatigue,   Eyes:  Good vision, no reported pain  ENT:  No sore throat, pain, runny nose, dysphagia  CV:  No pain, palpitations, hypo/hypertension  Resp:  No dyspnea, cough, tachypnea, wheezing  GI:  No pain, No nausea, No vomiting, No diarrhea, No constipation, No weight loss, No fever, No pruritis, No rectal bleeding, No tarry stools, No dysphagia,  :  No pain, bleeding, incontinence, nocturia  Muscle:  No pain, weakness  Neuro:  No weakness, tingling, memory problems  Psych:  No fatigue, insomnia, mood problems, depression  Endocrine:  No polyuria, polydipsia, cold/heat intolerance  Heme:  No petechiae, ecchymosis, easy bruisability  Skin:  No rash, tattoos, scars, edema      PHYSICAL EXAM:   Vital Signs Last 24 Hrs  T(C): 36.6 (2022 04:22), Max: 37 (2022 01:15)  T(F): 97.8 (2022 04:22), Max: 98.6 (2022 01:15)  HR: 67 (:22) (64 - 73)  BP: 146/67 (2022 04:22) (131/67 - 169/64)  BP(mean): --  RR: 18 (:22) (18 - 18)  SpO2: 93% (:22) (93% - 99%)  Daily     Daily Weight in k.2 (2022 04:45)    GENERAL:  Appears stated age,   HEENT:  NC/AT,    CHEST:  Full & symmetric excursion,   HEART:  Regular rhythm,  ABDOMEN:  Soft, non-tender, non-distended,  EXTEREMITIES:  no cyanosis  SKIN:  No rash  NEURO:  Alert,       LABS:                                   10.5   8.46  )-----------( 335      ( 2022 07:06 )             33.9   -    136  |  98  |  53<H>  ----------------------------<  85  3.9   |  25  |  1.73<H>    Ca    9.3      2022 06:45  Phos  5.2     02-  Mg     2.3     -    TPro  6.9  /  Alb  3.5  /  TBili  0.3  /  DBili  x   /  AST  19  /  ALT  12  /  AlkPhos  197<H>        RADIOLOGY & ADDITIONAL TESTS:

## 2022-02-02 NOTE — PROVIDER CONTACT NOTE (OTHER) - ASSESSMENT
Pt A&Ox4, denies pain or discomfort, chest pain, SOB, heart palpitations, lightheadedness of dizziness. Vitals Temp 98.6F, HR 64, Sat O2 99%, RR 18 and /64.

## 2022-02-02 NOTE — PROVIDER CONTACT NOTE (OTHER) - BACKGROUND
Pt admitted for Heart Failure. Pmhx of CAD, Diabetes, Peripheral Vascular Disease, HTN, AV block Mobitz 2, Acute decompensated heart failure, and pleural effusion
Pt admitted for heart failure
pt admitted for HF, pmh of av blocks, cad and pvd. pt on 2L NC. Pt's metropolol held and has occassional episodes of mobitz type 2 on tele. Surgical hx of coronary artery bypass.
pt found to have previous episodes of Mobitz Type 2, which Toprol is being held
pt admitted for HF, pmh of av blocks, cad and pvd. pt on 2L NC. Pt's metropolol held and has occassional episodes of mobitz type 2 on tele. Surgical hx of coronary artery bypass.
Pt admitted for heart failure

## 2022-02-02 NOTE — PROVIDER CONTACT NOTE (OTHER) - RECOMMENDATIONS
Notify Provider, 12 Lead ECG, Provider to Bedside to assess pt
EKG? labs?
Meds? Labs? Manual BP?
Monitor? Meds?

## 2022-02-02 NOTE — PROGRESS NOTE ADULT - ASSESSMENT
ECHO 1/30/22:  EF 65%, mild MR, nl LV sys fx  mod concentric LVH      A/P    89 yo F with PMHx of HTN/HLD, DM2, CAD (s/p CABG), PVD (s/p stents in bilateral LE), CKD (Baseline Cr reportedly 1.2), recent admission for GIB 2/2 chronic gastritis who presented with complaints of dyspnea and chest pain.    #acute on chronic systolic HF  -clinically stable and improved post iv lasix  -c/w lasix 40 mg PO BID  -echo with EF 65%, mild MR, nl LV sys fx  mod concentric LVH      #CAD, s/p CABG  -atypical cp   -now denies, trop negative   -cont asa, echo with nl LV sys fx      #AV block   -tele reviewed  -some episodes of mobitz I block with few brief episodes of mobitz II on high dose toprol  -echo with nl LV sys fx   -EP f/u noted- Wenckebach and episodes during sleep of ATach with high degree AV BLock-  no PPm for now -- ep to follow up   -cont to monitor off bb for now - still with 2nd degree HB type I    #htn   bp mildly elevated uptitrate hydral as needed      dvt ppx

## 2022-02-02 NOTE — PROGRESS NOTE ADULT - ASSESSMENT
89 yo F with PMHx of HTN/HLD, DM2, CAD (s/p CABG), PVD (s/p stents in bilateral LE), CKD (Baseline Cr reportedly 1.2), recent admission for GIB 2/2 chronic gastritis who presented with complaints of dyspnea and ?chest pain  Acute diastolic heart failure - BL effusions   Mild PHTN   COLETTE that is likely hemodynamic.  Underlying CKD stage 3 b as a manifestation of age alone     1 EPS-Monitor for bradycardia.     Not on AV inna agents     2 Renal -  Change lasix to PO    No need for renal sono  Check UA .    3 Pulm-Supplemental oxygen;  Standard DVT     4 CVS-Hydralazine for BP support;  Off Toprol     Sayed [facility]   [id number]

## 2022-02-02 NOTE — PROVIDER CONTACT NOTE (OTHER) - DATE AND TIME:
30-Jan-2022 23:50
31-Jan-2022 04:35
29-Jan-2022 06:02
31-Jan-2022 02:15
02-Feb-2022 01:20
29-Jan-2022 06:22

## 2022-02-02 NOTE — GOALS OF CARE CONVERSATION - ADVANCED CARE PLANNING - CONVERSATION DETAILS
Spoke to patient and son regarding advance directives. Patient was confused at conversation and referred all questions to son Alejo. Alejo expressed that patient has previously completed health care proxy form and will provide form if necessary.     Regarding code status, patient will remain Full Code, including CPR and Intubation. Alejo expressed that if patient needs to be intubated or oxygen, she has a deviated septum and there may be complications. Encouraged HCP to inform medical team if situation occurs.

## 2022-02-02 NOTE — PROGRESS NOTE ADULT - SUBJECTIVE AND OBJECTIVE BOX
NEPHROLOGY-NSN [phone]        Patient seen and examined in bed.  She was about the same         MEDICATIONS  (STANDING):  amLODIPine   Tablet 10 milliGRAM(s) Oral daily  atorvastatin 10 milliGRAM(s) Oral at bedtime  dextrose 40% Gel 15 Gram(s) Oral once  dextrose 5%. 1000 milliLiter(s) (50 mL/Hr) IV Continuous <Continuous>  dextrose 5%. 1000 milliLiter(s) (100 mL/Hr) IV Continuous <Continuous>  dextrose 50% Injectable 25 Gram(s) IV Push once  dextrose 50% Injectable 12.5 Gram(s) IV Push once  dextrose 50% Injectable 25 Gram(s) IV Push once  furosemide    Tablet 40 milliGRAM(s) Oral two times a day  glucagon  Injectable 1 milliGRAM(s) IntraMuscular once  hydrALAZINE 25 milliGRAM(s) Oral three times a day  insulin glargine Injectable (LANTUS) 15 Unit(s) SubCutaneous at bedtime  insulin lispro (ADMELOG) corrective regimen sliding scale   SubCutaneous three times a day before meals  insulin lispro Injectable (ADMELOG) 5 Unit(s) SubCutaneous three times a day before meals  pantoprazole    Tablet 40 milliGRAM(s) Oral before breakfast  polyethylene glycol 3350 17 Gram(s) Oral daily  senna 2 Tablet(s) Oral at bedtime  traZODone 50 milliGRAM(s) Oral at bedtime      VITAL:  T(C): , Max: 37 (02-02-22 @ 01:15)  T(F): , Max: 98.6 (02-02-22 @ 01:15)  HR: 67 (02-02-22 @ 04:22)  BP: 146/67 (02-02-22 @ 04:22)  BP(mean): --  RR: 18 (02-02-22 @ 04:22)  SpO2: 93% (02-02-22 @ 04:22)  Wt(kg): --    I and O's:    02-01 @ 07:01  -  02-02 @ 07:00  --------------------------------------------------------  IN: 1100 mL / OUT: 1900 mL / NET: -800 mL          PHYSICAL EXAM:    Constitutional: NAD  Neck:  No JVD  Respiratory: CTAB/L  Cardiovascular: S1 and S2  Gastrointestinal: BS+, soft, NT/ND  Extremities: No peripheral edema  Neurological:  no focal deficits  Psychiatric: Normal mood, normal affect  : No Hawkins  Skin: No rashes  Access: Not applicable    LABS:    02-02    136  |  96  |  62<H>  ----------------------------<  110<H>  3.8   |  26  |  1.87<H>    Ca    9.6      02 Feb 2022 06:35  Phos  5.2     02-01  Mg     2.3     02-01            Urine Studies:          RADIOLOGY & ADDITIONAL STUDIES:

## 2022-02-03 ENCOUNTER — TRANSCRIPTION ENCOUNTER (OUTPATIENT)
Age: 87
End: 2022-02-03

## 2022-02-03 VITALS
TEMPERATURE: 98 F | RESPIRATION RATE: 18 BRPM | HEART RATE: 72 BPM | OXYGEN SATURATION: 96 % | SYSTOLIC BLOOD PRESSURE: 148 MMHG | DIASTOLIC BLOOD PRESSURE: 58 MMHG

## 2022-02-03 PROBLEM — I25.10 ATHEROSCLEROTIC HEART DISEASE OF NATIVE CORONARY ARTERY WITHOUT ANGINA PECTORIS: Chronic | Status: ACTIVE | Noted: 2022-01-29

## 2022-02-03 PROBLEM — Z87.19 PERSONAL HISTORY OF OTHER DISEASES OF THE DIGESTIVE SYSTEM: Chronic | Status: ACTIVE | Noted: 2022-01-29

## 2022-02-03 LAB
GLUCOSE BLDC GLUCOMTR-MCNC: 116 MG/DL — HIGH (ref 70–99)
GLUCOSE BLDC GLUCOMTR-MCNC: 131 MG/DL — HIGH (ref 70–99)

## 2022-02-03 PROCEDURE — 82803 BLOOD GASES ANY COMBINATION: CPT

## 2022-02-03 PROCEDURE — U0005: CPT

## 2022-02-03 PROCEDURE — 85610 PROTHROMBIN TIME: CPT

## 2022-02-03 PROCEDURE — 80048 BASIC METABOLIC PNL TOTAL CA: CPT

## 2022-02-03 PROCEDURE — 82947 ASSAY GLUCOSE BLOOD QUANT: CPT

## 2022-02-03 PROCEDURE — U0003: CPT

## 2022-02-03 PROCEDURE — 87086 URINE CULTURE/COLONY COUNT: CPT

## 2022-02-03 PROCEDURE — 83880 ASSAY OF NATRIURETIC PEPTIDE: CPT

## 2022-02-03 PROCEDURE — 85730 THROMBOPLASTIN TIME PARTIAL: CPT

## 2022-02-03 PROCEDURE — 97530 THERAPEUTIC ACTIVITIES: CPT

## 2022-02-03 PROCEDURE — 96374 THER/PROPH/DIAG INJ IV PUSH: CPT

## 2022-02-03 PROCEDURE — 85018 HEMOGLOBIN: CPT

## 2022-02-03 PROCEDURE — 82330 ASSAY OF CALCIUM: CPT

## 2022-02-03 PROCEDURE — 84295 ASSAY OF SERUM SODIUM: CPT

## 2022-02-03 PROCEDURE — 84484 ASSAY OF TROPONIN QUANT: CPT

## 2022-02-03 PROCEDURE — 93005 ELECTROCARDIOGRAM TRACING: CPT

## 2022-02-03 PROCEDURE — 71045 X-RAY EXAM CHEST 1 VIEW: CPT

## 2022-02-03 PROCEDURE — 84100 ASSAY OF PHOSPHORUS: CPT

## 2022-02-03 PROCEDURE — 85025 COMPLETE CBC W/AUTO DIFF WBC: CPT

## 2022-02-03 PROCEDURE — 82962 GLUCOSE BLOOD TEST: CPT

## 2022-02-03 PROCEDURE — 97116 GAIT TRAINING THERAPY: CPT

## 2022-02-03 PROCEDURE — 99285 EMERGENCY DEPT VISIT HI MDM: CPT

## 2022-02-03 PROCEDURE — 84132 ASSAY OF SERUM POTASSIUM: CPT

## 2022-02-03 PROCEDURE — 80076 HEPATIC FUNCTION PANEL: CPT

## 2022-02-03 PROCEDURE — 80053 COMPREHEN METABOLIC PANEL: CPT

## 2022-02-03 PROCEDURE — 93306 TTE W/DOPPLER COMPLETE: CPT

## 2022-02-03 PROCEDURE — 83605 ASSAY OF LACTIC ACID: CPT

## 2022-02-03 PROCEDURE — 82435 ASSAY OF BLOOD CHLORIDE: CPT

## 2022-02-03 PROCEDURE — 81001 URINALYSIS AUTO W/SCOPE: CPT

## 2022-02-03 PROCEDURE — 83735 ASSAY OF MAGNESIUM: CPT

## 2022-02-03 PROCEDURE — 82565 ASSAY OF CREATININE: CPT

## 2022-02-03 PROCEDURE — 36415 COLL VENOUS BLD VENIPUNCTURE: CPT

## 2022-02-03 PROCEDURE — 97162 PT EVAL MOD COMPLEX 30 MIN: CPT

## 2022-02-03 PROCEDURE — 85014 HEMATOCRIT: CPT

## 2022-02-03 RX ORDER — PANTOPRAZOLE SODIUM 20 MG/1
1 TABLET, DELAYED RELEASE ORAL
Qty: 0 | Refills: 0 | DISCHARGE
Start: 2022-02-03

## 2022-02-03 RX ORDER — INSULIN DETEMIR 100/ML (3)
32 INSULIN PEN (ML) SUBCUTANEOUS
Qty: 0 | Refills: 0 | DISCHARGE

## 2022-02-03 RX ORDER — AMLODIPINE BESYLATE 2.5 MG/1
1 TABLET ORAL
Qty: 0 | Refills: 0 | DISCHARGE

## 2022-02-03 RX ORDER — METOPROLOL TARTRATE 50 MG
1 TABLET ORAL
Qty: 0 | Refills: 0 | DISCHARGE

## 2022-02-03 RX ORDER — HYDRALAZINE HCL 50 MG
1 TABLET ORAL
Qty: 90 | Refills: 0 | DISCHARGE
Start: 2022-02-03 | End: 2022-03-04

## 2022-02-03 RX ORDER — PANTOPRAZOLE SODIUM 20 MG/1
1 TABLET, DELAYED RELEASE ORAL
Qty: 0 | Refills: 0 | DISCHARGE

## 2022-02-03 RX ORDER — AMLODIPINE BESYLATE 2.5 MG/1
1 TABLET ORAL
Qty: 0 | Refills: 0 | DISCHARGE
Start: 2022-02-03

## 2022-02-03 RX ORDER — FUROSEMIDE 40 MG
1 TABLET ORAL
Qty: 30 | Refills: 0
Start: 2022-02-03 | End: 2022-03-04

## 2022-02-03 RX ORDER — HYDRALAZINE HCL 50 MG
1 TABLET ORAL
Qty: 90 | Refills: 0
Start: 2022-02-03 | End: 2022-03-04

## 2022-02-03 RX ADMIN — Medication 25 MILLIGRAM(S): at 05:16

## 2022-02-03 RX ADMIN — AMLODIPINE BESYLATE 10 MILLIGRAM(S): 2.5 TABLET ORAL at 05:16

## 2022-02-03 RX ADMIN — Medication 5 UNIT(S): at 08:59

## 2022-02-03 RX ADMIN — Medication 25 MILLIGRAM(S): at 13:35

## 2022-02-03 RX ADMIN — Medication 5 UNIT(S): at 13:36

## 2022-02-03 RX ADMIN — Medication 40 MILLIGRAM(S): at 05:16

## 2022-02-03 RX ADMIN — PANTOPRAZOLE SODIUM 40 MILLIGRAM(S): 20 TABLET, DELAYED RELEASE ORAL at 05:16

## 2022-02-03 NOTE — DISCHARGE NOTE PROVIDER - NSDCMRMEDTOKEN_GEN_ALL_CORE_FT
amLODIPine 10 mg oral tablet: 1 tab(s) orally once a day  ATORVASTATIN 10 MG TABLET: 1 tab(s) orally once a day  furosemide 40 mg oral tablet: 1 tab(s) orally once a day  hydrALAZINE 25 mg oral tablet: 1 tab(s) orally 3 times a day  LEVEMIR FLEXTOUCH 100 UNIT/ML: 15 unit(s) subcutaneous once a day (at bedtime)  pantoprazole 40 mg oral delayed release tablet: 1 tab(s) orally once a day (before a meal)  polyethylene glycol 3350 oral powder for reconstitution: 17 gram(s) orally once a day  senna oral tablet: 2 tab(s) orally once a day (at bedtime)  TRAZODONE 50 MG TABLET: 1 tab(s) orally once a day (at bedtime)   amLODIPine 10 mg oral tablet: 1 tab(s) orally once a day  ATORVASTATIN 10 MG TABLET: 1 tab(s) orally once a day  furosemide 40 mg oral tablet: 1 tab(s) orally once a day  hydrALAZINE 25 mg oral tablet: 1 tab(s) orally 3 times a day  LEVEMIR FLEXTOUCH 100 UNIT/ML: 15 unit(s) subcutaneous once a day (at bedtime)  pantoprazole 40 mg oral delayed release tablet: 1 tab(s) orally once a day (before a meal)  polyethylene glycol 3350 oral powder for reconstitution: 17 gram(s) orally once a day  Rolling walker :   senna oral tablet: 2 tab(s) orally once a day (at bedtime)  TRAZODONE 50 MG TABLET: 1 tab(s) orally once a day (at bedtime)

## 2022-02-03 NOTE — PROGRESS NOTE ADULT - SUBJECTIVE AND OBJECTIVE BOX
CARDIOLOGY FOLLOW UP - Dr. Drake  DATE OF SERVICE: 2/3/22    CC no cp or sob      REVIEW OF SYSTEMS:  CONSTITUTIONAL: No fever, weight loss, or fatigue  RESPIRATORY: No cough, wheezing, chills or hemoptysis; No Shortness of Breath  CARDIOVASCULAR: No chest pain, palpitations, passing out, dizziness, or leg swelling  GASTROINTESTINAL: No abdominal or epigastric pain. No nausea, vomiting, or hematemesis; No diarrhea or constipation. No melena or hematochezia.  VASCULAR: No edema     PHYSICAL EXAM:  T(C): 36.8 (02-03-22 @ 05:04), Max: 36.8 (02-02-22 @ 20:36)  HR: 63 (02-03-22 @ 06:30) (62 - 72)  BP: 148/60 (02-03-22 @ 06:30) (148/60 - 182/71)  RR: 18 (02-03-22 @ 05:04) (17 - 18)  SpO2: 96% (02-03-22 @ 05:04) (95% - 98%)  Wt(kg): --  I&O's Summary    02 Feb 2022 07:01  -  03 Feb 2022 07:00  --------------------------------------------------------  IN: 880 mL / OUT: 2150 mL / NET: -1270 mL        Appearance: Normal	  Cardiovascular: Normal S1 S2,RRR, No JVD, No murmurs  Respiratory: Lungs clear to auscultation	  Gastrointestinal:  Soft, Non-tender, + BS	  Extremities: Normal range of motion, No clubbing, cyanosis or edema      Home Medications:  AMLODIPINE BESYLATE 10 MG TAB: 1 tab(s) orally once a day (08 Dec 2021 00:55)  ATORVASTATIN 10 MG TABLET: 1 tab(s) orally once a day (08 Dec 2021 00:55)  LEVEMIR FLEXTOUCH 100 UNIT/ML: 32 unit(s) subcutaneous once a day (at bedtime) (08 Dec 2021 00:55)  METOPROLOL SUCC  MG TAB: 1 tab(s) orally once a day (08 Dec 2021 00:55)  PANTOPRAZOLE SOD DR 40 MG TAB: 1 tab(s) orally once a day (08 Dec 2021 00:55)  polyethylene glycol 3350 oral powder for reconstitution: 17 gram(s) orally once a day (08 Dec 2021 00:55)  senna oral tablet: 2 tab(s) orally once a day (at bedtime) (08 Dec 2021 00:55)  TRAZODONE 50 MG TABLET: 1 tab(s) orally once a day (at bedtime) (08 Dec 2021 00:55)      MEDICATIONS  (STANDING):  amLODIPine   Tablet 10 milliGRAM(s) Oral daily  atorvastatin 10 milliGRAM(s) Oral at bedtime  dextrose 40% Gel 15 Gram(s) Oral once  dextrose 5%. 1000 milliLiter(s) (50 mL/Hr) IV Continuous <Continuous>  dextrose 5%. 1000 milliLiter(s) (100 mL/Hr) IV Continuous <Continuous>  dextrose 50% Injectable 25 Gram(s) IV Push once  dextrose 50% Injectable 12.5 Gram(s) IV Push once  dextrose 50% Injectable 25 Gram(s) IV Push once  furosemide    Tablet 40 milliGRAM(s) Oral two times a day  glucagon  Injectable 1 milliGRAM(s) IntraMuscular once  hydrALAZINE 25 milliGRAM(s) Oral three times a day  insulin glargine Injectable (LANTUS) 15 Unit(s) SubCutaneous at bedtime  insulin lispro (ADMELOG) corrective regimen sliding scale   SubCutaneous three times a day before meals  insulin lispro Injectable (ADMELOG) 5 Unit(s) SubCutaneous three times a day before meals  pantoprazole    Tablet 40 milliGRAM(s) Oral before breakfast  polyethylene glycol 3350 17 Gram(s) Oral daily  senna 2 Tablet(s) Oral at bedtime  traZODone 50 milliGRAM(s) Oral at bedtime      TELEMETRY: SB/NSR hr 50-60  2nd degree type 1	    ECG:  	  RADIOLOGY:   DIAGNOSTIC TESTING:  [ ] Echocardiogram:  [ ]  Catheterization:  [ ] Stress Test:    OTHER: 	    LABS:	 	    Troponin T, High Sensitivity Result: 26 ng/L [0 - 51] (01-28 @ 22:39)  Troponin T, High Sensitivity Result: 29 ng/L [0 - 51] (01-28 @ 18:42)      02-02    136  |  96  |  62<H>  ----------------------------<  110<H>  3.8   |  26  |  1.87<H>    Ca    9.6      02 Feb 2022 06:35

## 2022-02-03 NOTE — PROGRESS NOTE ADULT - PROBLEM SELECTOR PROBLEM 1
Acute decompensated heart failure
(1) body pink, extremities blue

## 2022-02-03 NOTE — DISCHARGE NOTE PROVIDER - PROVIDER TOKENS
PROVIDER:[TOKEN:[[id number]]],PROVIDER:[TOKEN:[[id number]]] PROVIDER:[TOKEN:[[id number]]],PROVIDER:[TOKEN:[[id number]]],PROVIDER:[TOKEN:[[id number]]],PROVIDER:[TOKEN:[[id number]]]

## 2022-02-03 NOTE — PROGRESS NOTE ADULT - SUBJECTIVE AND OBJECTIVE BOX
DATE OF SERVICE: 02-03-22 @ 13:22  CHIEF COMPLAINT:Patient is a 89y old  Female who presents with a chief complaint of ADHF (03 Feb 2022 12:51)    	        PAST MEDICAL & SURGICAL HISTORY:  Essential hypertension, benign    Hypercholesteremia    Peripheral vascular disease    Diabetes    CAD (coronary artery disease)    History of chronic gastritis    S/P carotid endarterectomy    S/P coronary artery by pass    History of cholecystectomy    S/P vascular surgery            REVIEW OF SYSTEMS:    RESPIRATORY: No cough, wheezing, chills or hemoptysis; No Shortness of Breath  CARDIOVASCULAR: No chest pain, palpitations, passing out, dizziness, or leg swelling  GASTROINTESTINAL: No abdominal or epigastric pain. No nausea, vomiting, or hematemesis;   GENITOURINARY: No dysuria, frequency, hematuria, or incontinence  NEUROLOGICAL: No headaches, memory loss, loss of strength, numbness, or tremors    Medications:  MEDICATIONS  (STANDING):  amLODIPine   Tablet 10 milliGRAM(s) Oral daily  atorvastatin 10 milliGRAM(s) Oral at bedtime  dextrose 40% Gel 15 Gram(s) Oral once  dextrose 5%. 1000 milliLiter(s) (50 mL/Hr) IV Continuous <Continuous>  dextrose 5%. 1000 milliLiter(s) (100 mL/Hr) IV Continuous <Continuous>  dextrose 50% Injectable 25 Gram(s) IV Push once  dextrose 50% Injectable 12.5 Gram(s) IV Push once  dextrose 50% Injectable 25 Gram(s) IV Push once  glucagon  Injectable 1 milliGRAM(s) IntraMuscular once  hydrALAZINE 25 milliGRAM(s) Oral three times a day  insulin glargine Injectable (LANTUS) 15 Unit(s) SubCutaneous at bedtime  insulin lispro (ADMELOG) corrective regimen sliding scale   SubCutaneous three times a day before meals  insulin lispro Injectable (ADMELOG) 5 Unit(s) SubCutaneous three times a day before meals  pantoprazole    Tablet 40 milliGRAM(s) Oral before breakfast  polyethylene glycol 3350 17 Gram(s) Oral daily  senna 2 Tablet(s) Oral at bedtime  traZODone 50 milliGRAM(s) Oral at bedtime    MEDICATIONS  (PRN):  melatonin 3 milliGRAM(s) Oral at bedtime PRN Insomnia    	    PHYSICAL EXAM:  T(C): 36.7 (02-03-22 @ 11:41), Max: 36.8 (02-02-22 @ 20:36)  HR: 72 (02-03-22 @ 11:41) (62 - 72)  BP: 148/58 (02-03-22 @ 11:41) (148/58 - 182/71)  RR: 18 (02-03-22 @ 11:41) (17 - 18)  SpO2: 96% (02-03-22 @ 11:41) (95% - 98%)  Wt(kg): --  I&O's Summary    02 Feb 2022 07:01  -  03 Feb 2022 07:00  --------------------------------------------------------  IN: 880 mL / OUT: 2150 mL / NET: -1270 mL        Appearance: Normal	  HEENT:   Normal oral mucosa, PERRL, EOMI	  Lymphatic: No lymphadenopathy  Cardiovascular: Normal S1 S2, No JVD, No murmurs, No edema  Respiratory: Lungs clear to auscultation	  Psychiatry: A & O x 3,   Gastrointestinal:  Soft, Non-tender, + BS	  Skin: No rashes, No ecchymoses, No cyanosis	  Neurologic: Non-focal  Extremities: Normal range of motion, No clubbing, cyanosis or edema  Vascular: Peripheral pulses palpable 2+ bilaterally    TELEMETRY: 	    ECG:  	  RADIOLOGY:  OTHER: 	  	  LABS:	 	    CARDIAC MARKERS:            02-02    136  |  96  |  62<H>  ----------------------------<  110<H>  3.8   |  26  |  1.87<H>    Ca    9.6      02 Feb 2022 06:35      proBNP:   Lipid Profile:   HgA1c:   TSH:

## 2022-02-03 NOTE — PROGRESS NOTE ADULT - PROBLEM SELECTOR PLAN 6
- Diet: DASH CC  - DVT ppx: pharmaceutical DVT ppx contraindicated in the setting of recent GIB. SCDs
- Diet: DASH CC  - DVT ppx: pharmaceutical DVT ppx contraindicated in the setting of recent GIB. SCDs
- Diet: DASH CC  - DVT ppx: pharmaceutical DVT ppx contraindicated in the setting of recent GIB. SCDs    d/c planning
- Diet: DASH CC  - DVT ppx: pharmaceutical DVT ppx contraindicated in the setting of recent GIB. SCDs
- Diet: DASH CC  - DVT ppx: pharmaceutical DVT ppx contraindicated in the setting of recent GIB. SCDs    d/c planning
- Diet: DASH CC  - DVT ppx: pharmaceutical DVT ppx contraindicated in the setting of recent GIB. SCDs

## 2022-02-03 NOTE — PROGRESS NOTE ADULT - ASSESSMENT
Patient is a 89 yo F with PMHx of HTN/HLD, DM2, CAD (s/p CABG), PVD (s/p stents in bilateral LE), CKD (Baseline Cr reportedly 1.2), recent admission for GIB 2/2 chronic gastritis who presented with complaints of dyspnea and ?chest pain, admitted for ADHF

## 2022-02-03 NOTE — PROGRESS NOTE ADULT - PROBLEM SELECTOR PROBLEM 2
AV block, Mobitz 2

## 2022-02-03 NOTE — DISCHARGE NOTE NURSING/CASE MANAGEMENT/SOCIAL WORK - NSDCVIVACCINE_GEN_ALL_CORE_FT
COVID-19, mRNA, LNP-S, PF, 100 mcg/ 0.5 mL dose (Moderna); 13-Dec-2021 18:56; Dara Ny (KAMRYN); [employer] [id number] (Exp. Date: 26-Dec-2021); IntraMuscular; Deltoid Left.; 0.25 milliLiter(s);

## 2022-02-03 NOTE — CHART NOTE - NSCHARTNOTEFT_GEN_A_CORE
RD CHF education chart note    Patient was visited by RD for CHF education. Heart failure education provided to the patient in detail. Discussed heart failure nutrition therapy, sodium and fluid intake, importance of diet adherence, daily weights monitoring with the patient. Reinforced importance of weight gain parameters and importance of contacting MD’s about weight changes. Provided handouts on heart failure nutrition therapy, reading heart healthy nutrition labels, heart healthy shopping tips and low sodium food list. Patient verbalized understanding demonstrated by teach back method. RD contact information left with patient for any future questioning.
Medicine PA Note     Notified by RN that patient converted to A.fib on tele. Pt is asymptomatic, rate controlled and mentating well at bedside.   EKG performed -> patient does not have A.fib, but has 1st degree vs Mobitz 1 which is already known   Discussed w/ overnight card fellows on EKG, agreed that patient not have A.fib.   Continue to monitor patient's vitals closely overnight   Endorse/sign out to day team on overnight events   RN aware of management     Kyra Beyer PA-C   Dept of Medicine
Pt medically cleared for d/c   Medications reviewed and reconciled with DR Andrade   Pt to follow up with DR Rodney on 02/18 at 0230 Pm  and pcp on 02/10 at 0430 PM   marciano BILL

## 2022-02-03 NOTE — DISCHARGE NOTE PROVIDER - HOSPITAL COURSE
ECHO 1/30/22:  EF 65%, mild MR, nl LV sys fx  mod concentric LVH      A/P    89 yo F with PMHx of HTN/HLD, DM2, CAD (s/p CABG), PVD (s/p stents in bilateral LE), CKD (Baseline Cr reportedly 1.2), recent admission for GIB 2/2 chronic gastritis who presented with complaints of dyspnea and chest pain.    #acute on chronic systolic HF  -clinically stable and improved post iv lasix  -c/w lasix 40 mg PO BID  -echo with EF 65%, mild MR, nl LV sys fx  mod concentric LVH      #CAD, s/p CABG  -atypical cp   -now denies, trop negative   -cont asa, echo with nl LV sys fx      #AV block   -tele reviewed  -some episodes of mobitz I block with few brief episodes of mobitz II on high dose toprol  -echo with nl LV sys fx   -EP f/u noted- Wenckebach and episodes during sleep of ATach with high degree AV BLock-  no PPm for now -- ep to follow up   -cont to monitor off bb for now - still with 2nd degree HB type I    #htn   bp mildly elevated uptitrate hydral as needed     ECHO 1/30/22:  EF 65%, mild MR, nl LV sys fx  mod concentric LVH      A/P    89 yo F with PMHx of HTN/HLD, DM2, CAD (s/p CABG), PVD (s/p stents in bilateral LE), CKD (Baseline Cr reportedly 1.2), recent admission for GIB 2/2 chronic gastritis who presented with complaints of dyspnea and chest pain.    #acute on chronic systolic HF  -clinically stable and improved post iv lasix  -c/w lasix 40 mg PO BID  -echo with EF 65%, mild MR, nl LV sys fx  mod concentric LVH      #CAD, s/p CABG  -atypical cp   -now denies, trop negative   -cont asa, echo with nl LV sys fx      #AV block   -tele reviewed  -some episodes of mobitz I block with few brief episodes of mobitz II on high dose toprol  -echo with nl LV sys fx   -EP f/u noted- Wenckebach and episodes during sleep of ATach with high degree AV BLock-  no PPm for now -- ep to follow up   -cont to monitor off bb for now - still with 2nd degree HB type I    #htn   bp mildly elevated hydralazine doses adjusted    ECHO 1/30/22:  EF 65%, mild MR, nl LV sys fx  mod concentric LVH      89 yo F with PMHx of HTN/HLD, DM2, CAD (s/p CABG), PVD (s/p stents in bilateral LE), CKD (Baseline Cr reportedly 1.2), recent admission for GIB 2/2 chronic gastritis who presented with complaints of dyspnea and chest pain.  Found to have acute on chronic HF , clinically stable and improved post iv Lasix  echo with EF 65%, mild MR, nl LV sys fx  mod concentric LVH    Pt also noted to have AV block  some episodes of Mobitz I block with few brief episodes of mobitz II on high dose Toprol , EP consulted  f/u noted- Wenckebach and episodes during sleep of ATach with high degree AV BLock-  no PPm for now  , htn bp mildly elevated hydralazine doses adjusted .   HD stable , will be discharged home with Home Pt with  close follow up from PCP and cardiology

## 2022-02-03 NOTE — PROGRESS NOTE ADULT - ASSESSMENT
89 yo F with PMHx of HTN/HLD, DM2, CAD (s/p CABG), PVD (s/p stents in bilateral LE), CKD (Baseline Cr reportedly 1.2), recent admission for GIB 2/2 chronic gastritis who presented with complaints of dyspnea and ?chest pain  Acute diastolic heart failure - BL effusions   Mild PHTN   COLETTE that is likely hemodynamic.  Underlying CKD stage 3 b as a manifestation of age alone     1 EPS-Monitor for bradycardia.     Not on AV inna agents     2 Renal -  Change lasix to PO and on discharge change the lasix to qd alt with BID dosing  Bloods as outpt in 7-10 days       3 Pulm-  Standard DVT     4 CVS-Hydralazine for BP support;  Off Toprol     Sayed [facility]   [phone]

## 2022-02-03 NOTE — PROGRESS NOTE ADULT - PROBLEM SELECTOR PLAN 3
- patient with b/l pleural effusions on CXR  - likely 2/2 to ADHF

## 2022-02-03 NOTE — PROGRESS NOTE ADULT - PROBLEM SELECTOR PLAN 2
- patient found to have occasional episodes of Mobitz type 2 on tele  -  hold home Metoprolol   - Tele
- patient found to have occasional episodes of Mobitz type 2 on tele  cards f/u    ? ppm    discussed w/ son
- patient found to have occasional episodes of Mobitz type 2 on tele  cards f/u    ep f/u noted  cards f/u noted  no b blockers      discussed w/ son
- patient found to have occasional episodes of Mobitz type 2 on tele  cards f/u    ep f/u noted  cards f/u noted  no b blockers  NO ppm as per ep/ cards      discussed w/ son at length  cards f/u next week
- patient found to have occasional episodes of Mobitz type 2 on tele  - will hold home Metoprolol   - Tele
- patient found to have occasional episodes of Mobitz type 2 on tele  cards f/u

## 2022-02-03 NOTE — PROGRESS NOTE ADULT - ASSESSMENT
anemia  recent gi bleed    no overt signs of bleeding  cont diet as tolerated  had recent egd/colon, no need to repeat  cbc daily  dc planning as per primary   will cont to follow    I reviewed the overnight course of events on the unit, re-confirming the patient history. I discussed the care with the patient and their family  The plan of care was discussed with the physician assistant and modifications were made to the notation where appropriate.   Differential diagnosis and plan of care discussed with patient after the evaluation  35 minutes spent on total encounter of which more than fifty percent of the encounter was spent counseling and/or coordinating care by the attending physician.  Advanced care planning was discussed with patient and family.  Advanced care planning forms were reviewed and discussed.  Risks, benefits and alternatives of gastroenterologic procedures were discussed in detail and all questions were answered.

## 2022-02-03 NOTE — DISCHARGE NOTE NURSING/CASE MANAGEMENT/SOCIAL WORK - PATIENT PORTAL LINK FT
You can access the FollowMyHealth Patient Portal offered by [facility] by registering at the following website: [email or web address]. By joining [facility]’s FollowMyHealth portal, you will also be able to view your health information using other applications (apps) compatible with our system.

## 2022-02-03 NOTE — PROGRESS NOTE ADULT - PROBLEM SELECTOR PLAN 5
- BP currently in acceptable range  - continue home amlodipine with hold parameters
stable  - continue current tx  pt
stable  - continue current tx  pt
- BP currently in acceptable range  - continue home amlodipine with hold parameters
stable  - continue current tx  pt
stable  - continue current tx  pt

## 2022-02-03 NOTE — PROGRESS NOTE ADULT - SUBJECTIVE AND OBJECTIVE BOX
NEPHROLOGY-NS[facility] [phone]        Patient seen and examined in bed.   She was on room air         MEDICATIONS  (STANDING):  amLODIPine   Tablet 10 milliGRAM(s) Oral daily  atorvastatin 10 milliGRAM(s) Oral at bedtime  dextrose 40% Gel 15 Gram(s) Oral once  dextrose 5%. 1000 milliLiter(s) (50 mL/Hr) IV Continuous <Continuous>  dextrose 5%. 1000 milliLiter(s) (100 mL/Hr) IV Continuous <Continuous>  dextrose 50% Injectable 25 Gram(s) IV Push once  dextrose 50% Injectable 12.5 Gram(s) IV Push once  dextrose 50% Injectable 25 Gram(s) IV Push once  furosemide    Tablet 40 milliGRAM(s) Oral two times a day  glucagon  Injectable 1 milliGRAM(s) IntraMuscular once  hydrALAZINE 25 milliGRAM(s) Oral three times a day  insulin glargine Injectable (LANTUS) 15 Unit(s) SubCutaneous at bedtime  insulin lispro (ADMELOG) corrective regimen sliding scale   SubCutaneous three times a day before meals  insulin lispro Injectable (ADMELOG) 5 Unit(s) SubCutaneous three times a day before meals  pantoprazole    Tablet 40 milliGRAM(s) Oral before breakfast  polyethylene glycol 3350 17 Gram(s) Oral daily  senna 2 Tablet(s) Oral at bedtime  traZODone 50 milliGRAM(s) Oral at bedtime      VITAL:  T(C): , Max: 36.8 (02-02-22 @ 20:36)  T(F): , Max: 98.3 (02-02-22 @ 20:36)  HR: 63 (02-03-22 @ 06:30)  BP: 148/60 (02-03-22 @ 06:30)  BP(mean): --  RR: 18 (02-03-22 @ 05:04)  SpO2: 96% (02-03-22 @ 05:04)  Wt(kg): --    I and O's:    02-02 @ 07:01  -  02-03 @ 07:00  --------------------------------------------------------  IN: 880 mL / OUT: 2150 mL / NET: -1270 mL          PHYSICAL EXAM:    Constitutional: NAD  Neck:  No JVD  Respiratory: CTAB/L  Cardiovascular: S1 and S2  Gastrointestinal: BS+, soft, NT/ND  Extremities: No peripheral edema  Neurological: A/O x 3, no focal deficits  Psychiatric: Normal mood, normal affect  : No Hawkins  Skin: No rashes  Access: Not applicable    LABS:    02-02    136  |  96  |  62<H>  ----------------------------<  110<H>  3.8   |  26  |  1.87<H>    Ca    9.6      02 Feb 2022 06:35            Urine Studies:          RADIOLOGY & ADDITIONAL STUDIES:          < from: Transthoracic Echocardiogram (01.30.22 @ 09:52) >    Patient name: MAGED MUHAMMAD  YOB: 1929   Age: 89 (F)   MR#: [id number]  Study Date: 1/30/2022  [facility]um[facility] RDCS  Study quality: Technically fair  Referring Physician: Elmo Ac MD  Blood Pressure: 152/79 mmHg  Height: 165 cm  Weight: 68 kg  BSA: 1.8 m2  ------------------------------------------------------------------------  PROCEDURE: Transthoracic echocardiogram with 2-D, M-Mode  and complete spectral and color flow Doppler.  INDICATION: Heart failure, unspecified (I50.9)  ------------------------------------------------------------------------  Dimensions:    Normal Values:  LA:     3.9    2.0 - 4.0 cm  Ao:     2.7    2.0 - 3.8 cm  SEPTUM: 1.3    0.6 - 1.2 cm  PWT:    1.3    0.6 - 1.1 cm  LVIDd:  4.3    3.0 - 5.6 cm  LVIDs:  2.8    1.8 - 4.0 cm  Derived variables:  LVMI: 118 g/m2  RWT: 0.60  Fractional short: 35 %  EF (Iglesias Rule): 65 %Doppler Peak Velocity (m/sec):  AoV=1.5  ------------------------------------------------------------------------  Observations:  Mitral Valve: Mitral annular calcification, otherwise  normal mitral valve. Mild mitral regurgitation.  Aortic Valve/Aorta: Calcified trileaflet aortic valve with  normal opening. Peak transaortic valve gradient equals 9 mm  Hg, mean transaortic valve gradient equals 5 mm Hg, aortic  valve velocity time integral equals 30 cm, estimated aortic  valve area equals 2.1 sqcm. Peak left ventricular outflow  tract gradient equals 4 mm Hg, mean gradient is equal to 2  mm Hg, LVOT velocity time integral equals 22 cm.  Aortic Root: 2.7 cm.  LVOT diameter: 1.9 cm.  Left Atrium: Mildly dilated left atrium.  LA volume index =  36 cc/m2.  Left Ventricle: Normal left ventricular systolic function.  No segmental wallmotion abnormalities. Moderate concentric  left ventricular hypertrophy. Increased E/e'  is consistent  with elevated left ventricular filling pressure.  Right Heart: Normal right atrium. Right ventricular  enlargement with normal right ventricular systolic  function. Normal tricuspid valve. Mild tricuspid  regurgitation. Normal pulmonic valve.  Pericardium/Pleura: Normal pericardium with no pericardial  effusion.  Hemodynamic: Estimated right atrial pressure is 8 mm Hg.  Estimated right ventricular systolic pressure equals 37 mm  Hg, assuming right atrial pressure equals 8 mm Hg,  consistent with borderline pulmonary hypertension.  ------------------------------------------------------------------------  Conclusions:    < end of copied text >

## 2022-02-03 NOTE — PROGRESS NOTE ADULT - SUBJECTIVE AND OBJECTIVE BOX
[facility]  Rodriguez Goldberg PA-C  [facility] [place] [address]  [phone]      INTERVAL HPI/OVERNIGHT EVENTS:  pt seen and examined, no new events      MEDICATIONS  (STANDING):  amLODIPine   Tablet 10 milliGRAM(s) Oral daily  atorvastatin 10 milliGRAM(s) Oral at bedtime  dextrose 40% Gel 15 Gram(s) Oral once  dextrose 5%. 1000 milliLiter(s) (50 mL/Hr) IV Continuous <Continuous>  dextrose 5%. 1000 milliLiter(s) (100 mL/Hr) IV Continuous <Continuous>  dextrose 50% Injectable 25 Gram(s) IV Push once  dextrose 50% Injectable 12.5 Gram(s) IV Push once  dextrose 50% Injectable 25 Gram(s) IV Push once  furosemide   Injectable 40 milliGRAM(s) IV Push every 12 hours  glucagon  Injectable 1 milliGRAM(s) IntraMuscular once  insulin glargine Injectable (LANTUS) 15 Unit(s) SubCutaneous at bedtime  insulin lispro (ADMELOG) corrective regimen sliding scale   SubCutaneous three times a day before meals  insulin lispro Injectable (ADMELOG) 5 Unit(s) SubCutaneous three times a day before meals  pantoprazole    Tablet 40 milliGRAM(s) Oral before breakfast  polyethylene glycol 3350 17 Gram(s) Oral daily  senna 2 Tablet(s) Oral at bedtime  traZODone 50 milliGRAM(s) Oral at bedtime    MEDICATIONS  (PRN):  melatonin 3 milliGRAM(s) Oral at bedtime PRN Insomnia      Allergies    No Known Allergies    Intolerances        ROS:   General:  No wt loss, fevers, chills, night sweats, fatigue,   Eyes:  Good vision, no reported pain  ENT:  No sore throat, pain, runny nose, dysphagia  CV:  No pain, palpitations, hypo/hypertension  Resp:  No dyspnea, cough, tachypnea, wheezing  GI:  No pain, No nausea, No vomiting, No diarrhea, No constipation, No weight loss, No fever, No pruritis, No rectal bleeding, No tarry stools, No dysphagia,  :  No pain, bleeding, incontinence, nocturia  Muscle:  No pain, weakness  Neuro:  No weakness, tingling, memory problems  Psych:  No fatigue, insomnia, mood problems, depression  Endocrine:  No polyuria, polydipsia, cold/heat intolerance  Heme:  No petechiae, ecchymosis, easy bruisability  Skin:  No rash, tattoos, scars, edema      PHYSICAL EXAM:   Vital Signs Last 24 Hrs  T(C): 36.8 (2022 05:04), Max: 36.8 (2022 20:36)  T(F): 98.2 (2022 05:04), Max: 98.3 (2022 20:36)  HR: 63 (2022 06:30) (62 - 72)  BP: 148/60 (2022 06:30) (148/60 - 182/71)  BP(mean): --  RR: 18 (2022 05:04) (17 - 18)  SpO2: 96% (2022 05:04) (95% - 98%)  Daily     Daily Weight in k.2 (2022 04:45)    GENERAL:  Appears stated age,   HEENT:  NC/AT,    CHEST:  Full & symmetric excursion,   HEART:  Regular rhythm,  ABDOMEN:  Soft, non-tender, non-distended,  EXTEREMITIES:  no cyanosis  SKIN:  No rash  NEURO:  Alert,       LABS:                                10.5   8.46  )-----------( 335      ( 2022 07:06 )             33.9   02-01    136  |  98  |  53<H>  ----------------------------<  85  3.9   |  25  |  1.73<H>    Ca    9.3      2022 06:45  Phos  5.2     02-01  Mg     2.3     02-01    TPro  6.9  /  Alb  3.5  /  TBili  0.3  /  DBili  x   /  AST  19  /  ALT  12  /  AlkPhos  197<H>        RADIOLOGY & ADDITIONAL TESTS:

## 2022-02-03 NOTE — DISCHARGE NOTE NURSING/CASE MANAGEMENT/SOCIAL WORK - NSDCPEFALRISK_GEN_ALL_CORE
For information on Fall & Injury Prevention, visit: [email or web address] OR  [email or web address] OR  https://www.cdc.gov/steadi/patient.html

## 2022-02-03 NOTE — DISCHARGE NOTE PROVIDER - CARE PROVIDERS DIRECT ADDRESSES
,[email or web address],DirectAddress_[email or web address]_[email or web address],DirectAddress_Unknown

## 2022-02-03 NOTE — DISCHARGE NOTE PROVIDER - CARE PROVIDER_API CALL
Ced Rodney)  Cardiovascular Disease; Nuclear Cardiology  [address]  [place] [address]  Phone: [phone]  Fax: [phone]  Follow Up Time:     NICHOLAS ROSADO  Pulmonary Diseases  [address]  [place] [address]  Phone: [phone]  Fax: [phone])-  Follow Up Time:    Ced Rodney)  Cardiovascular Disease; Nuclear Cardiology  [address]  [place] [address]  Phone: [phone]  Fax: [phone]  Follow Up Time:     NICHOLAS ROSADO  Pulmonary Diseases  [address]  [place] [address]  Phone: ()-  Fax: [phone])-  Follow Up Time:     Shekhar Canseco)  Cardiac Electrophysiology; Cardiology  [address] [place] [address]  Phone: [phone]  Fax: [phone]  Follow Up Time:     JOSHUA LEE  Internal Medicine  [address] [place] [address]  Phone: [phone]  Fax: [phone])-  Follow Up Time:

## 2022-02-03 NOTE — PROGRESS NOTE ADULT - TIME BILLING
Agree with above NP note.  cv stable  clinically improved post lasix   echo with normal lv fxn  tele with av block on high dose bb  bb on hold   eps eval   d/w out cardio dr garcia
Patient seen and examined, agree with the above assessment and plan by PARAS Beatty.  events noted  no evidence of afib  first degree/wenckebach on tele  cv stable  clinically improved post lasix   echo with normal lv fxn  bb remains on hold   ECHO noted w normal LV  eps eval noted-no indication for PPM now
Agree with above NP note.  cv stable  first degree/wenckebach on tele  clinically improved post lasix   echo with normal lv fxn  bb remains on hold   eps eval noted-no indication for PPM now  dcp
Agree with above NP note.  cv stable  clinically improved post lasix   echo with normal lv fxn  tele with PAT and av block  bb remains on hold   ECHO noted w normal LV  eps eval noted-no indication for PPM now unless pt manifest further SVT/deanne

## 2022-02-03 NOTE — DISCHARGE NOTE PROVIDER - NSDCCPCAREPLAN_GEN_ALL_CORE_FT
PRINCIPAL DISCHARGE DIAGNOSIS  Diagnosis: CHF exacerbation  Assessment and Plan of Treatment: Weigh yourself daily.  If you gain 3lbs in 3 days, or 5lbs in a week call your Health Care Provider.  Do not eat or drink foods containing more than 2000mg of salt (sodium) in your diet every day.  Call your Health Care Provider if you have any swelling or increased swelling in your feet, ankles, and/or stomach.  Take all of your medication as directed.  If you become dizzy call your Health Care Provider.        SECONDARY DISCHARGE DIAGNOSES  Diagnosis: Hypertension  Assessment and Plan of Treatment: Follow up with your medical doctor to establish long term blood pressure treatment goals.      Diagnosis: AV block, Mobitz 2  Assessment and Plan of Treatment: Hold Toprol   EP consult no indication    Diagnosis: Type 2 diabetes mellitus  Assessment and Plan of Treatment: HgA1C this admission.  Make sure you get your HgA1c checked every three months.  If you take oral diabetes medications, check your blood glucose two times a day.  If you take insulin, check your blood glucose before meals and at bedtime.  It's important not to skip any meals.  Keep a log of your blood glucose results and always take it with you to your doctor appointments.  Keep a list of your current medications including injectables and over the counter medications and bring this medication list with you to all your doctor appointments.  If you have not seen your opthalmologist this year call for appointment.  Check your feet daily for redness, sores, or openings. Do not self treat. If no improvement in two days call your primary care physician for an appointment.  Low blood sugar (hypoglycemia) is a blood sugar below 70mg/dl. Check your blood sugar if you feel signs/symptoms of hypoglycemia. If your blood sugar is below 70 take 15 grams of carbohydrates (ex 4 oz of apple juice, 3-4 glucosr tablets, or 4-6 oz of regular soda) wait 15 minutes and repeat blood sugar to make sure it comes up above 70.  If your blood sugar is above 70 and you are due for a meal, have a meal.  If you are not due for a meal have a snack.  This snack helps keeps your blood sugar at a safe range.      Diagnosis: COLETTE (acute kidney injury)  Assessment and Plan of Treatment: Avoid taking (NSAIDs) - (ex: Ibuprofen, Advil, Celebrex, Naprosyn)  Avoid taking any nephrotoxic agents (can harm kidneys) - Intravenous contrast for diagnostic testing, combination cold medications.  Have all medications adjusted for your renal function by your Health Care Provider.  Blood pressure control is important.  Take all medication as prescribed.

## 2022-02-03 NOTE — PROGRESS NOTE ADULT - PROBLEM SELECTOR PLAN 1
chf/ w/ diastolic dyfxn  Tele  Cardio eval appreciated  c/w current tx  change lasix to po
chf/ w/ diastolic dyfxn  Tele  Cardio eval appreciated  c/w current tx  changed lasix to po
Tele  Cardio eval called  IV Lasix  BMP
chf/ w/ diastolic dyfxn  Tele  Cardio eval appreciated  c/w current tx  changed lasix to po daily for d/c
Tele  Cardio eval appreciated  c/w current tx
Tele  Cardio eval appreciated  IV Lasix  BMP

## 2022-02-03 NOTE — DISCHARGE NOTE PROVIDER - NSDCFUADDAPPT_GEN_ALL_CORE_FT
please follow up with your PCP  with in a week   APPTS ARE READY TO BE MADE: [ ] YES    Best Family or Patient Contact (if needed):    Additional Information about above appointments (if needed):    1:   2:   3:     Other comments or requests:    please follow up with your PCP  with in a week   APPTS ARE READY TO BE MADE: [x ] YES    Best Family or Patient Contact (if needed):    Additional Information about above appointments (if needed):    1: You have an appointment with DR Rodney on 02/18/2022 at 0230 Pm   2: You have an appointment with DR Uribe pcp on 02/10/2022 at 0430 PM   3:     Other comments or requests:

## 2022-02-04 ENCOUNTER — TRANSCRIPTION ENCOUNTER (OUTPATIENT)
Age: 87
End: 2022-02-04

## 2022-02-09 ENCOUNTER — APPOINTMENT (OUTPATIENT)
Dept: CARE COORDINATION | Facility: HOME HEALTH | Age: 87
End: 2022-02-09
Payer: MEDICARE

## 2022-02-09 DIAGNOSIS — E11.9 TYPE 2 DIABETES MELLITUS W/OUT COMPLICATIONS: ICD-10-CM

## 2022-02-09 DIAGNOSIS — Z79.4 TYPE 2 DIABETES MELLITUS W/OUT COMPLICATIONS: ICD-10-CM

## 2022-02-09 PROCEDURE — 99496 TRANSJ CARE MGMT HIGH F2F 7D: CPT | Mod: 95

## 2022-02-09 RX ORDER — METOPROLOL SUCCINATE 100 MG/1
100 TABLET, EXTENDED RELEASE ORAL
Refills: 0 | Status: DISCONTINUED | COMMUNITY
End: 2022-02-09

## 2022-02-09 RX ORDER — OMEPRAZOLE 40 MG/1
40 CAPSULE, DELAYED RELEASE ORAL
Refills: 1 | Status: DISCONTINUED | COMMUNITY
End: 2022-02-09

## 2022-02-09 RX ORDER — CLOPIDOGREL 75 MG/1
75 TABLET, FILM COATED ORAL
Refills: 0 | Status: DISCONTINUED | COMMUNITY
End: 2022-02-09

## 2022-02-09 RX ORDER — FLUTICASONE PROPIONATE 50 UG/1
50 SPRAY, METERED NASAL
Refills: 0 | Status: DISCONTINUED | COMMUNITY
End: 2022-02-09

## 2022-02-09 NOTE — HISTORY OF PRESENT ILLNESS
[Home] : at home, [unfilled] , at the time of the visit. [Other Location: e.g. Home (Enter Location, City,State)___] : at [unfilled] [Family Member] : family member [Verbal consent obtained from patient] : the patient, [unfilled] [Post-hospitalization from ___ Hospital] : Post-hospitalization from [unfilled] Hospital [Admitted on: ___] : The patient was admitted on [unfilled] [Discharged on ___] : discharged on [unfilled] [Discharge Summary] : discharge summary [Discharge Med List] : discharge medication list [Med Reconciliation] : medication reconciliation has been completed [Patient Contacted By: ____] : and contacted by [unfilled] [FreeTextEntry2] : 89 yo F with PMHx of HTN/HLD, DM2, CAD (s/p CABG), PVD (s/p stents in bilateral LE), CKD (Baseline Cr reportedly 1.2), recent admission for GIB 2/2 chronic gastritis who presented with complaints of dyspnea and chest pain.\par Found to have acute on chronic HF , clinically stable and improved post iv Lasix  echo with EF 65%, mild MR, nl LV sys fx  mod concentric LVH  \par Pt also noted to have AV block  some episodes of Mobitz I block with few brief episodes of mobitz II on high dose Toprol , EP consulted  f/u noted- Wenckebach and episodes during sleep of ATach with high degree AV BLock-  no PPm for now \par \par Since dc home, pt and son (whom is caregiver) states that her SOB and WEEKS has improved significantly.  She has been between 138-140lbs since dc, and BP has been improving.  PT took BP today and it was 138/78, her personal BP cuff is broken so I recommended she ask Dr. Uribe (her PCP and pulm) for a script for a new one.  She denies any feelings of cp/palpitations, and will be following up with Dr. Rodney on 2/18 (cards).  No signs of reurrent GIB.\par \par Her FSBS was ranging between 130s-320 so her son spoke to her endo yesterday and they increased her Levemir to 22units daily.  \par \par Homecare RN/PT on board.  TCM numbers provided for any questions/concerns.  \par \par

## 2022-02-11 ENCOUNTER — TRANSCRIPTION ENCOUNTER (OUTPATIENT)
Age: 87
End: 2022-02-11

## 2022-02-15 ENCOUNTER — RX RENEWAL (OUTPATIENT)
Age: 87
End: 2022-02-15

## 2022-02-23 ENCOUNTER — APPOINTMENT (OUTPATIENT)
Dept: CARDIOLOGY | Facility: CLINIC | Age: 87
End: 2022-02-23
Payer: MEDICARE

## 2022-02-23 ENCOUNTER — NON-APPOINTMENT (OUTPATIENT)
Age: 87
End: 2022-02-23

## 2022-02-23 VITALS
OXYGEN SATURATION: 100 % | WEIGHT: 139 LBS | BODY MASS INDEX: 23.16 KG/M2 | HEART RATE: 67 BPM | RESPIRATION RATE: 12 BRPM | TEMPERATURE: 96.9 F | HEIGHT: 65 IN | SYSTOLIC BLOOD PRESSURE: 174 MMHG | DIASTOLIC BLOOD PRESSURE: 61 MMHG

## 2022-02-23 VITALS — SYSTOLIC BLOOD PRESSURE: 172 MMHG | DIASTOLIC BLOOD PRESSURE: 64 MMHG

## 2022-02-23 DIAGNOSIS — I10 ESSENTIAL (PRIMARY) HYPERTENSION: ICD-10-CM

## 2022-02-23 PROCEDURE — 99215 OFFICE O/P EST HI 40 MIN: CPT | Mod: 25

## 2022-02-23 PROCEDURE — 93000 ELECTROCARDIOGRAM COMPLETE: CPT

## 2022-02-26 ENCOUNTER — RX RENEWAL (OUTPATIENT)
Age: 87
End: 2022-02-26

## 2022-02-28 ENCOUNTER — NON-APPOINTMENT (OUTPATIENT)
Age: 87
End: 2022-02-28

## 2022-02-28 NOTE — DISCUSSION/SUMMARY
[FreeTextEntry1] : IMPRESSION: Ms. MUHAMMAD is a 89 year old woman with a history of coronary artery disease status post CABG 5/9/2002 (LIMA to LAD, SVG to the OM, and SVG to the PDA, peripheral vascular disease status post stents to the left leg complicated by compartment syndrome in 2010, carotid artery stenosis status post left carotid endarterectomy, HTN, chronic renal insufficiency, diabetes mellitus, anemia, prior COVID infection, and dyslipidemia who presents today for follow up of coronary artery disease and her abnormal ECG.\par \par PLAN:\par 1. She will continue on ASA 81 mg daily given her coronary artery disease, peripheral vascular disease and carotid disease. She never had a nuclear stress test given her rhythm disorder. Her Plavix was stopped when she was hospitalized in December with a GI bleed. \par 2. She had episodes of second degree AV block on Telemetry during her recent hospitalization and her Metoprolol was stopped. She appears to be in atrial fibrillation with a competing junctional pacemaker on her ECG that was performed today. She will have a 1 week ZIO patch placed today to evaluate her arrhythmia. She is not on systemic anticoagulation given her anemia and history of  bleed. \par 3. Her blood pressure is elevated and has been elevated at home. She will continue on Amlodipine 10 mg daily and Lasix 40 mg daily. I have increased the dose of her Hydralazine to 25 mg four times a day.  \par 4. She will have repeat blood work in 2 weeks to follow up her anemia and renal insufficiency. There are no signs of decompensated heart failure on exam. She will continue on Lasix 40 mg daily. I will be checking a pro-BNP with her next blood test. \par 5. I will arrange for follow up after I have the results of her ZIO patch. Her son will continue to follow her blood pressure at home. \par 6. I spent approximately 45-50 minutes with the patient and her son during this encounter, including documentation and reviewing her hospital records.

## 2022-02-28 NOTE — PHYSICAL EXAM
[Well Developed] : well developed [Well Nourished] : well nourished [No Acute Distress] : no acute distress [Normal Conjunctiva] : normal conjunctiva [Normal Rate] : normal [Normal S1] : normal S1 [Normal S2] : normal S2 [II] : a grade 2 [No Pitting Edema] : no pitting edema present [Clear Lung Fields] : clear lung fields [Good Air Entry] : good air entry [No Respiratory Distress] : no respiratory distress  [Soft] : abdomen soft [Non Tender] : non-tender [Normal Bowel Sounds] : normal bowel sounds [No Edema] : no edema [Moves all extremities] : moves all extremities [Normal Speech] : normal speech [Alert and Oriented] : alert and oriented [Normal memory] : normal memory [Irregularly Irregular] : irregularly irregular [Bruit] : no bruit heard [de-identified] : Extraocular muscles intact. Anicteric sclerae. [de-identified] : She was wearing a face mask during the examination.  [de-identified] : Her gait was slow and she was in a wheelchair during the examination. [de-identified] : No visible ulcers. No significant rash was appreciated on exam.

## 2022-02-28 NOTE — HISTORY OF PRESENT ILLNESS
[FreeTextEntry1] : Patient is a 89 year old woman with a history of coronary artery disease status post CABG 5/9/2002 (LIMA to LAD, SVG to the OM, and SVG to the PDA, peripheral vascular disease status post stents to the left leg complicated by compartment syndrome in 2010, carotid artery stenosis status post left carotid endarterectomy, HTN, chronic renal insufficiency, diabetes mellitus, anemia, prior COVID infection, and dyslipidemia who presents today for follow up of coronary artery disease and her abnormal ECG. She mentions experiencing hip pain. She feels that her breathing is better. She is worried about her anemia. She never had hip surgery and is considering acupuncture. She currently denies any chest pain, dyspnea at rest, palpitations, headaches, dizziness, and syncope. She was admitted on 1/28/2022 with dyspnea that was felt to be secondary to decompensated heart failure. She was diuresed during her hospitalization. She was also found to have second degree AV block on Telemetry and her Metoprolol was discontinued. She was previously hospitalized in December with anemia secondary to a GI bleed.

## 2022-02-28 NOTE — CARDIOLOGY SUMMARY
[LVEF ___%] : LVEF [unfilled]% [___] : [unfilled] [None] : no pulmonary hypertension [Moderate] : moderate mitral regurgitation [de-identified] : 2/23/2022: Atrial fibrillation with a competing junctional pacemaker at 67 beats per minute with a right bundle branch block, and T wave abnormality, consider inferior ischemia [de-identified] : 1/30/2022: Mild mitral regurgitation. Mildly dilated left atrium. Normal left ventricular systolic function. No segmental wall motion abnormalities. EF= 65%. Moderate concentric left ventricular hypertrophy. Right ventricular enlargement with normal right ventricular systolic function. Mild tricuspid regurgitation. PASP= 37 mmHg, consistent with borderline pulmonary hypertension.

## 2022-03-08 ENCOUNTER — LABORATORY RESULT (OUTPATIENT)
Age: 87
End: 2022-03-08

## 2022-04-19 ENCOUNTER — APPOINTMENT (OUTPATIENT)
Dept: CARDIOLOGY | Facility: CLINIC | Age: 87
End: 2022-04-19

## 2022-04-25 ENCOUNTER — RX RENEWAL (OUTPATIENT)
Age: 87
End: 2022-04-25

## 2022-07-28 ENCOUNTER — RX RENEWAL (OUTPATIENT)
Age: 87
End: 2022-07-28

## 2022-09-09 ENCOUNTER — RX RENEWAL (OUTPATIENT)
Age: 87
End: 2022-09-09

## 2022-11-04 NOTE — PHYSICAL THERAPY INITIAL EVALUATION ADULT - LEVEL OF INDEPENDENCE: STAND/SIT, REHAB EVAL
----- Message from Rashaad Schmitt PA-C sent at 11/4/2022  2:36 PM CDT -----  Please let the patient know that all of her cultures were negative, uncertain as to the cause of her vaginitis.  Would recommend following up with Gynecology prior to initiating any other treatment.   minimum assist (75% patients effort)

## 2022-12-09 ENCOUNTER — APPOINTMENT (OUTPATIENT)
Dept: ELECTROPHYSIOLOGY | Facility: CLINIC | Age: 87
End: 2022-12-09

## 2022-12-09 ENCOUNTER — NON-APPOINTMENT (OUTPATIENT)
Age: 87
End: 2022-12-09

## 2022-12-09 VITALS
HEIGHT: 65 IN | DIASTOLIC BLOOD PRESSURE: 64 MMHG | HEART RATE: 63 BPM | OXYGEN SATURATION: 98 % | BODY MASS INDEX: 23.32 KG/M2 | WEIGHT: 140 LBS | TEMPERATURE: 96.9 F | SYSTOLIC BLOOD PRESSURE: 188 MMHG

## 2022-12-09 DIAGNOSIS — I50.33 ACUTE ON CHRONIC DIASTOLIC (CONGESTIVE) HEART FAILURE: ICD-10-CM

## 2022-12-09 DIAGNOSIS — R94.31 ABNORMAL ELECTROCARDIOGRAM [ECG] [EKG]: ICD-10-CM

## 2022-12-09 DIAGNOSIS — I44.1 ATRIOVENTRICULAR BLOCK, SECOND DEGREE: ICD-10-CM

## 2022-12-09 DIAGNOSIS — I45.10 UNSPECIFIED RIGHT BUNDLE-BRANCH BLOCK: ICD-10-CM

## 2022-12-09 PROCEDURE — 99213 OFFICE O/P EST LOW 20 MIN: CPT

## 2022-12-09 PROCEDURE — 93000 ELECTROCARDIOGRAM COMPLETE: CPT

## 2022-12-09 RX ORDER — INSULIN DETEMIR 100 [IU]/ML
100 INJECTION, SOLUTION SUBCUTANEOUS
Refills: 0 | Status: ACTIVE | COMMUNITY

## 2022-12-09 RX ORDER — BLOOD-GLUCOSE METER
KIT MISCELLANEOUS
Qty: 1 | Refills: 0 | Status: ACTIVE | COMMUNITY
Start: 2022-02-23

## 2022-12-09 RX ORDER — AMLODIPINE BESYLATE 10 MG/1
10 TABLET ORAL
Qty: 30 | Refills: 0 | Status: ACTIVE | COMMUNITY
Start: 2020-12-15

## 2022-12-09 RX ORDER — PEN NEEDLE, DIABETIC 32GX 5/32"
32G X 4 MM NEEDLE, DISPOSABLE MISCELLANEOUS
Qty: 200 | Refills: 0 | Status: ACTIVE | COMMUNITY
Start: 2022-09-09

## 2022-12-09 RX ORDER — SODIUM CHLORIDE 0.65 %
0.65 AEROSOL, SPRAY (ML) NASAL
Qty: 44 | Refills: 0 | Status: ACTIVE | COMMUNITY
Start: 2022-09-13

## 2022-12-09 RX ORDER — TRAZODONE HYDROCHLORIDE 300 MG/1
TABLET ORAL
Refills: 0 | Status: ACTIVE | COMMUNITY

## 2022-12-09 RX ORDER — MOMETASONE FUROATE 1 MG/ML
0.1 SOLUTION TOPICAL
Qty: 30 | Refills: 0 | Status: ACTIVE | COMMUNITY
Start: 2022-09-13

## 2022-12-09 RX ORDER — MULTIVITAMIN
TABLET ORAL
Refills: 0 | Status: ACTIVE | COMMUNITY

## 2022-12-09 RX ORDER — ASPIRIN ENTERIC COATED TABLETS 81 MG 81 MG/1
81 TABLET, DELAYED RELEASE ORAL
Refills: 0 | Status: ACTIVE | COMMUNITY

## 2022-12-09 RX ORDER — FUROSEMIDE 40 MG/1
40 TABLET ORAL
Refills: 0 | Status: ACTIVE | COMMUNITY

## 2022-12-09 RX ORDER — PANTOPRAZOLE 40 MG/1
40 TABLET, DELAYED RELEASE ORAL DAILY
Qty: 30 | Refills: 0 | Status: ACTIVE | COMMUNITY
Start: 2022-02-09

## 2022-12-09 RX ORDER — WHEELCHAIR
EACH MISCELLANEOUS
Qty: 1 | Refills: 0 | Status: ACTIVE | COMMUNITY
Start: 2020-08-20

## 2022-12-09 RX ORDER — AZELASTINE HYDROCHLORIDE 137 UG/1
137 SPRAY, METERED NASAL
Qty: 30 | Refills: 0 | Status: ACTIVE | COMMUNITY
Start: 2022-09-13

## 2022-12-09 RX ORDER — VIT A/VIT C/VIT E/ZINC/COPPER 4296-226
CAPSULE ORAL
Refills: 0 | Status: ACTIVE | COMMUNITY

## 2022-12-09 RX ORDER — GABAPENTIN 300 MG
300 TABLET ORAL DAILY
Refills: 0 | Status: ACTIVE | COMMUNITY

## 2022-12-09 RX ORDER — MUPIROCIN 20 MG/G
2 OINTMENT TOPICAL
Qty: 22 | Refills: 0 | Status: ACTIVE | COMMUNITY
Start: 2022-05-17

## 2022-12-09 RX ORDER — CHROMIUM 200 MCG
TABLET ORAL
Refills: 0 | Status: ACTIVE | COMMUNITY

## 2022-12-09 RX ORDER — ATORVASTATIN CALCIUM 10 MG/1
10 TABLET, FILM COATED ORAL
Refills: 0 | Status: ACTIVE | COMMUNITY

## 2022-12-09 NOTE — HISTORY OF PRESENT ILLNESS
[FreeTextEntry1] : 89 yo F with PMHx of HTN/HLD, DM2, CAD (s/p CABG), PVD (s/p stents in bilateral LE), CKD (Baseline Cr reportedly 1.2), recent admission for GIB 2/2 chronic gastritis, hospitalized in Feb 2022 for acute on chronic CHF (echo with EF 65%, mild MR, nl LV sys fx  mod concentric LVH)  presents today for initial evaluation.During hospitalization noted to have AV block  some episodes of Mobitz I block with few brief episodes of Mobitz II on high dose Toprol , EP consulted  f/u noted- Wenckebach and episodes during sleep of ATach with high degree AV BLock. Discharged to f/u with cardiologist/EP for ppm evaluation. States she remained asymptomatic and was not consulted. States she needs a hip replacement for which her Cardiologist told her that her heart rate is slow and may need PPM before the hip surgery. Admits doing well. Denies any chest pain, palpitations, sob/WEEKS or syncope.

## 2022-12-09 NOTE — REASON FOR VISIT
[Arrhythmia/ECG Abnorrmalities] : arrhythmia/ECG abnormalities [FreeTextEntry3] : Dr CARY SCHROEDER/Milan Mari

## 2022-12-09 NOTE — DISCUSSION/SUMMARY
[EKG obtained to assist in diagnosis and management of assessed problem(s)] : EKG obtained to assist in diagnosis and management of assessed problem(s) [FreeTextEntry1] : IMPRESSION:\par \par 1. Mobitz I and RBBB.  Patient asymptomatic and high vagal tone worsens block, consistent with vagotonic block and not infra Hisian block. Patient at acceptable risk for hip surgery\par \par HTN: resume oral antihypertensives as prescribed. Encouraged heart healthy diet, sodium restriction, and weight loss. Continue regular f/u with Cardiologist for further HTN management.\par \par HLD: resume statin therapy as prescribed and regular f/u with Cardiologist for routine lipid monitoring and management.\par

## 2022-12-09 NOTE — CARDIOLOGY SUMMARY
[de-identified] : 1/30/2022: Mild mitral regurgitation. Mildly dilated left atrium. Normal left ventricular systolic function. No segmental wall motion abnormalities. EF= 65%. Moderate concentric left ventricular hypertrophy. Right ventricular enlargement with normal right ventricular systolic function. Mild tricuspid regurgitation. PASP= 37 mmHg, consistent with borderline pulmonary hypertension. \par \par Echo: 1/23/2020, Normal left ventricular systolic function. Concentric remodeling. Mild tricuspid regurgitation. PASP= 34 mmHg., no pulmonary hypertension, moderate mitral regurgitation LVEF 69%.  [de-identified] : 5/9/2002: LIMA to LAD, SVG to OM, and SVG to the PDA.

## 2023-04-11 ENCOUNTER — APPOINTMENT (OUTPATIENT)
Dept: CARDIOLOGY | Facility: CLINIC | Age: 88
End: 2023-04-11
Payer: MEDICARE

## 2023-04-11 ENCOUNTER — LABORATORY RESULT (OUTPATIENT)
Age: 88
End: 2023-04-11

## 2023-04-11 ENCOUNTER — NON-APPOINTMENT (OUTPATIENT)
Age: 88
End: 2023-04-11

## 2023-04-11 VITALS — DIASTOLIC BLOOD PRESSURE: 68 MMHG | SYSTOLIC BLOOD PRESSURE: 156 MMHG

## 2023-04-11 VITALS — SYSTOLIC BLOOD PRESSURE: 136 MMHG | DIASTOLIC BLOOD PRESSURE: 60 MMHG

## 2023-04-11 VITALS
BODY MASS INDEX: 24.13 KG/M2 | RESPIRATION RATE: 12 BRPM | WEIGHT: 145 LBS | HEART RATE: 71 BPM | DIASTOLIC BLOOD PRESSURE: 70 MMHG | TEMPERATURE: 97.2 F | SYSTOLIC BLOOD PRESSURE: 167 MMHG | OXYGEN SATURATION: 98 %

## 2023-04-11 DIAGNOSIS — I10 ESSENTIAL (PRIMARY) HYPERTENSION: ICD-10-CM

## 2023-04-11 DIAGNOSIS — R05.9 COUGH, UNSPECIFIED: ICD-10-CM

## 2023-04-11 PROCEDURE — 93000 ELECTROCARDIOGRAM COMPLETE: CPT

## 2023-04-11 PROCEDURE — 99214 OFFICE O/P EST MOD 30 MIN: CPT | Mod: 25

## 2023-05-04 ENCOUNTER — APPOINTMENT (OUTPATIENT)
Dept: CARDIOLOGY | Facility: CLINIC | Age: 88
End: 2023-05-04

## 2023-07-05 NOTE — H&P ADULT - NEGATIVE CARDIOVASCULAR SYMPTOMS
Occupational Therapy    Visit Type: treatment and discharge  SUBJECTIVE  Patient agreed to participate in therapy this date.  Pt stated:  My son will be in and out checking on me.  Patient / Family Goal: return to previous functional status, maximize function and return home    OBJECTIVE     Cognitive Status   Level of Consciousness   - alert  Arousal Alertness   - appropriate responses to stimuli  Affect/Behavior    - cooperative  Orientation    - Oriented to: person, place, time and situation  Functional Communication   - Overall Status: within functional limits   - Forms of Communication: verbal  Attention Span    - Attention: intact  Following Direction   - follows all commands and directions consistently  Transition Between Tasks   - transitions without difficulty  Memory   - intact  Safety Awareness/Insight   - intact  Awareness of Deficits   - fully aware of deficits  Verbal Expression   - intact    Vitals:  Stable on room air    Patient Activity Tolerance: 1 to 1 activity to rest    Hand Dominance: right-handed    UE Function:    • Left: normal    • Right: normal      Range of Motion (ROM)   (degrees unless noted; active unless noted; norms in ( ); negative=lacking to 0, positive=beyond 0)  WFL: LUE, RUE    Strength  (out of 5 unless noted, standard test position unless noted)   WFL: LUE, RUE        Transfers  - Sit to stand: independent  - Stand to sit: independent  Pt completed sit/stand transfer and dynamic standing independently  Pt stated no concerns for functional mobility post discharge.      Activities of Daily Living (ADLs)  Lower Body Dressing:   - Assist: independent  - Position: chair  Pt donned pants independently  Pt educated on energy conservation strategies for self cares post discharge.  Pt reports having grab bar and shower chair in home.  Pt stated no concerns for self cares post discharge.  Instrumental Activities of Daily Living:  Pt reports son available to assist PRN.  Pt educated on  energy conservation strategies for home management.  Pt verbalized understanding.   Interventions      Shoulder horizontal abduction: bilateral, seated, resistive, 10 reps, 1 sets, green theraband   Elbow extension: bilateral, resistive, 10 reps, 1 sets, green theaband    D2 pattern: bilateral, seated, resistive, 10 reps, 1 sets, green theraband    Additional exercise details: Pt completed B UE green theraband exercises to increase strength, endurance and ROM for carry over to ADLs and transfers.    Pt issued theraband, handout and instruction for independent exercise program.  Pt verbalized understanding.           ASSESSMENT   Progress: goals met    Summary of function and discharge needs based on today's assessment:  - Current level of function: at baseline level of function  - Therapy needs at discharge: therapy 1-3 times per week  AM-PAC  - Prior Level of Function:         Key: MOD A=moderate assistance, IND/MOD I=independent/modified independent  - Generalized Current Level of Function     - Current Self-Cares: 24       Scoring Key= >21 Modified Independent; 20-21 Supervision; 18-19 Minimal assist; 13-18 Moderate assist; 9-12 Max assist; <9 Total assist    Pt discussed concerns for home post discharge.  Pt stated no concerns citing perceived adequate functional ability and family assist available PRN  Pt agreeable to discharge from inpatient OT w/ home therapies to optimize functional ability and safety in home setting.       Discharge Recommendations:  PT/OT Mobility Equipment for Discharge: has cane, no other needs  PT/OT ADL Equipment for Discharge: has grab bars, shower chair  OT Identified Barriers to Discharge: deconditioning, activity intolerance, medical acuity           PLAN  Suggestions for next session as indicated: OT Frequency: 3-5 x per week  Frequency Comments: d/c OT 7/5 @ baseline        OT Frequency: 3-5 x per week      PT/OT Mobility Equipment for Discharge: has cane, no other needs  PT/OT  ADL Equipment for Discharge: has grab bars, shower chair      GOALS  Long Term Goals: (to be met by time of discharge from hospital)  Grooming: Patient will complete grooming tasks modified independent. Status: met   Upper body dressing: Patient will complete upper body dressing modified independent. Status: met   Lower body dressing: Patient will complete lower body dressing modified independent. Status: met   Toileting: Patient will complete toileting modified independent.  Status: met   Bathing: Patient will complete bathingmodified independent  Status: met Sit (edge of bed): Patient will sit at edge of bed for modified independent.  Status: met   Stand (even surface): Patient will stand on even surface for modified independent.  Status: met   Toilet transfer: Patient will complete toilet transfer with modified independent. Status: met   Tub/shower transfer: Patient will complete tub/shower transfer with modified independent. Status: met   Home setting transfer: Patient will complete home setting transfers with modified independent.  Status: met   Item retrieval: Patient will complete item retrieval modified independent.   Status: met   Home program: patient independent with home program as instructed.   Status: met         Documented in the chart in the following areas: Assessment/Plan.    Patient at End of Session:   Location: in chair  Safety measures: call light within reach  Handoff to: nurse      Therapy procedure time and total treatment time can be found documented on the Time Entry flowsheet   no palpitations

## 2023-08-03 ENCOUNTER — APPOINTMENT (OUTPATIENT)
Dept: CARDIOLOGY | Facility: CLINIC | Age: 88
End: 2023-08-03

## 2023-08-03 NOTE — CARDIOLOGY SUMMARY
[de-identified] : 4/11/2023: Junctional Rhythm at 75 bpm with a right bundle branch block and ST-T wave abnormality, consider inferior ischemia\par   [de-identified] : 1/30/2022: Mild mitral regurgitation. Mildly dilated left atrium. Normal left ventricular systolic function. No segmental wall motion abnormalities. EF= 65%. Moderate concentric left ventricular hypertrophy. Right ventricular enlargement with normal right ventricular systolic function. Mild tricuspid regurgitation. PASP= 37 mmHg, consistent with borderline pulmonary hypertension. [LVEF ___%] : LVEF [unfilled]% [___] : [unfilled] [None] : no pulmonary hypertension [Moderate] : moderate mitral regurgitation

## 2023-08-03 NOTE — DISCUSSION/SUMMARY
[FreeTextEntry1] : IMPRESSION: Ms. MUHAMMAD is a 89 year old woman with a history of coronary artery disease status post CABG 5/9/2002 (LIMA to LAD, SVG to the OM, and SVG to the PDA, peripheral vascular disease status post stents to the left leg complicated by compartment syndrome in 2010, carotid artery stenosis status post left carotid endarterectomy, HTN, chronic renal insufficiency, diabetes mellitus, anemia, prior COVID infection, and dyslipidemia who presents today for follow up of coronary artery disease and her abnormal ECG.  PLAN: 1. Given her cough and congestion, she will have a CXR. She will also schedule an echocardiogram to evaluate her LV function and for any valvular abnormalities. I will be checking complete bloodwork today, including a BNP.  2. She will continue on ASA 81 mg daily given her coronary artery disease, peripheral vascular disease and carotid disease. She never had a nuclear stress test given her rhythm disorder. Her Plavix was stopped when she was hospitalized in 2021 with a GI bleed.  3. She had episodes of second degree AV block and her Metoprolol was stopped. She appears to be in junctional rhythm on her ECG that was performed today. She is not on systemic anticoagulation given her anemia and history of  bleed.  4. Her blood pressure was OK when it was repeated at the time of the physical examination, thus she will continue on Amlodipine 10 mg daily, Hydralazine 25 mg four times a day and Lasix 40 mg daily.  5. She will have repeat blood work today to follow up her anemia and renal insufficiency. 6. She will follow up after her testing, or sooner if she is symptomatic.. Her son will continue to follow her blood pressure at home.

## 2023-08-03 NOTE — PHYSICAL EXAM
[Well Developed] : well developed [Well Nourished] : well nourished [No Acute Distress] : no acute distress [Normal Conjunctiva] : normal conjunctiva [Normal Rate] : normal [Irregularly Irregular] : irregularly irregular [Normal S1] : normal S1 [Normal S2] : normal S2 [II] : a grade 2 [___ +] : bilateral [unfilled]U+ pretibial pitting edema [Bruit] : no bruit heard [Clear Lung Fields] : clear lung fields [Good Air Entry] : good air entry [No Respiratory Distress] : no respiratory distress  [Soft] : abdomen soft [Non Tender] : non-tender [Normal Bowel Sounds] : normal bowel sounds [Moves all extremities] : moves all extremities [Normal Speech] : normal speech [Alert and Oriented] : alert and oriented [Normal memory] : normal memory [de-identified] : Extraocular muscles intact. Anicteric sclerae. [de-identified] : She was wearing a face mask during the examination.  [de-identified] : mild JVD [de-identified] : Her gait was slow. [de-identified] : No visible ulcers. No significant rash was appreciated on exam.

## 2023-08-03 NOTE — CARDIOLOGY SUMMARY
[LVEF ___%] : LVEF [unfilled]% [___] : [unfilled] [None] : no pulmonary hypertension [Moderate] : moderate mitral regurgitation [de-identified] : 4/11/2023: Junctional Rhythm at 75 bpm with a right bundle branch block and ST-T wave abnormality, consider inferior ischemia\par  [de-identified] : 1/30/2022: Mild mitral regurgitation. Mildly dilated left atrium. Normal left ventricular systolic function. No segmental wall motion abnormalities. EF= 65%. Moderate concentric left ventricular hypertrophy. Right ventricular enlargement with normal right ventricular systolic function. Mild tricuspid regurgitation. PASP= 37 mmHg, consistent with borderline pulmonary hypertension.

## 2023-08-03 NOTE — PHYSICAL EXAM
[Well Developed] : well developed [Well Nourished] : well nourished [No Acute Distress] : no acute distress [Normal Conjunctiva] : normal conjunctiva [Normal Rate] : normal [Irregularly Irregular] : irregularly irregular [Normal S1] : normal S1 [Normal S2] : normal S2 [II] : a grade 2 [Clear Lung Fields] : clear lung fields [Good Air Entry] : good air entry [No Respiratory Distress] : no respiratory distress  [Soft] : abdomen soft [Non Tender] : non-tender [Normal Bowel Sounds] : normal bowel sounds [Moves all extremities] : moves all extremities [Normal Speech] : normal speech [Alert and Oriented] : alert and oriented [Normal memory] : normal memory [___ +] : bilateral [unfilled]U+ pretibial pitting edema [Bruit] : no bruit heard [de-identified] : Extraocular muscles intact. Anicteric sclerae. [de-identified] : She was wearing a face mask during the examination.  [de-identified] : mild JVD [de-identified] : Her gait was slow. [de-identified] : No visible ulcers. No significant rash was appreciated on exam.

## 2023-08-03 NOTE — DISCUSSION/SUMMARY
[FreeTextEntry1] : IMPRESSION: Ms. MUHAMMAD is a 89 year old woman with a history of coronary artery disease status post CABG 5/9/2002 (LIMA to LAD, SVG to the OM, and SVG to the PDA, peripheral vascular disease status post stents to the left leg complicated by compartment syndrome in 2010, carotid artery stenosis status post left carotid endarterectomy, HTN, chronic renal insufficiency, diabetes mellitus, anemia, prior COVID infection, and dyslipidemia who presents today for follow up of coronary artery disease and her abnormal ECG.  PLAN: 1. Given her cough and congestion, she will have a CXR. She will also schedule an echocardiogram to evaluate her LV function and for any valvular abnormalities. I will be checking complete bloodwork today, including a BNP.  2. She will continue on ASA 81 mg daily given her coronary artery disease, peripheral vascular disease and carotid disease. She never had a nuclear stress test given her rhythm disorder. Her Plavix was stopped when she was hospitalized in 2021 with a GI bleed.  3. She had episodes of second degree AV block and her Metoprolol was stopped. She appears to be in junctional rhythm on her ECG that was performed today. She is not on systemic anticoagulation given her anemia and history of  bleed.  4. Her blood pressure was OK when it was repeated at the time of the physical examination, thus she will continue on Amlodipine 10 mg daily, Hydralazine 25 mg four times a day and Lasix 40 mg daily.  5. She will have repeat blood work today to follow up her anemia and renal insufficiency. 6. She will follow up after her testing, or sooner if she is symptomatic.. Her son will continue to follow her blood pressure at home.  [EKG obtained to assist in diagnosis and management of assessed problem(s)] : EKG obtained to assist in diagnosis and management of assessed problem(s)

## 2023-08-03 NOTE — HISTORY OF PRESENT ILLNESS
[FreeTextEntry1] : Patient is a 89 year old woman with a history of coronary artery disease status post CABG 5/9/2002 (LIMA to LAD, SVG to the OM, and SVG to the PDA, peripheral vascular disease status post stents to the left leg complicated by compartment syndrome in 2010, carotid artery stenosis status post left carotid endarterectomy, HTN, chronic renal insufficiency, diabetes mellitus, anemia, prior COVID infection, and dyslipidemia who presents today for follow up of coronary artery disease and her abnormal ECG. She has been experiencing a productive cough and congestion for the past 2-3 weeks, along with worsening exertional dyspnea. She tested negative for COVID. She has been taking Mucinex with some relief. She denies any fever, chills, headache, dizziness or chest pain. She has some chest tightness with coughing. She has been taking Hydralazine four times a day. She has chronic hip pain and will be seeing pain management

## 2023-09-20 NOTE — H&P ADULT - BIRTH SEX
[address] [place] [address] Rd (: 1960) is a 61 y.o. female here for evaluation of the following chief complaint(s):  Hypertension (3 month f/up), Palpitations, and Eczema         SUBJECTIVE/OBJECTIVE:  HPI      Ms. Lewis, 60 yo female, here today for three month follow-up. Last office visit-  right hand tendon cyst and heart palpitations. She has no new concerns today. PMH includes hypertension, hyperlipidemia, vitamin D deficiency, obesity, OA, eczema, Leukopenia    Was referred to orthopedics-  no intervention-  plan she reports is to monitor-  if worse to return to them  EKG, SR and Echo- EF 65% normal    [place]-     Fatigue -  last week or so-  has a URI/cold-  improved now  Upper lip erythema. Eczema outbreak upper lip-  Allergies   Allergen Reactions    Latex Itching     ITCHING, SWELLING       Current Outpatient Medications   Medication Sig Dispense Refill    metFORMIN (GLUCOPHAGE-XR) 500 MG extended release tablet Take 1 tablet by mouth Daily with supper 90 tablet 1    triamcinolone (KENALOG) 0.1 % cream Apply topically 2 times daily as needed to affected area. 45 g 1    lisinopril-hydroCHLOROthiazide (PRINZIDE;ZESTORETIC) 20-12.5 MG per tablet Take 1 tablet by mouth daily 90 tablet 1    atorvastatin (LIPITOR) 40 MG tablet Take 1 tablet by mouth daily 90 tablet 1    Multiple Vitamin (MULTI-VITAMIN PO) Take 1 tablet by mouth daily      vitamin D 25 MCG (1000 UT) CAPS TAKE 1 CAPSULE BY MOUTH EVERY DAY      Diclofenac Sodium (PENNSAID) 2 % SOLN ceived the following from [facility]: Outside name: Pennsaid 20 mg/gram /actuation(2 %) sopm       No current facility-administered medications for this visit.         Past Medical History:   Diagnosis Date    Adrenal nodule ([address]) 2014    NO TREATMENT NEEDED    Adverse effect of anesthesia     NO KNOWN HISTORY OF ANESTHESIA PROBLEMS IN FAMILY    Adverse effect of anesthesia 10/03/2016    \"TEND TO TAKE A LITTLE LONGER FOR Female

## 2023-10-26 NOTE — ED ADULT NURSE NOTE - INTERVENTIONS DEFINITIONS
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      Pt skipped his dose yesterday due to epistaxis. Discussed how to manage epistaxis ( holding x 5 min, nasal saline, vaseline, humidity).  Recommended not skipping his dose unless discussed w/ [facility].    Will likely have more greens for thanksgiving.     Assessment  (+) bleeding, bruising or thromboembolic complications  (+) missed/extra warfarin doses  (-) medication changes  (-) dietary changes  (-) alcohol changes  (-) smoking changes  (-) activity level changes  (-) fluid status changes  (-) hospital visits, ER visits, interruptions in therapy  (-) illness/infection, injuries, or falls    Plan   -Pt's INR=1.6 today, which is within the desired therapeutic range of 1.5 - 2.  -Pt's INR has decreased from 1.9 at the last visit.    -Pt has been taking warfarin 75mg weekly. INR likely to trend down slightly from held dose last night (hasn't been 24 hr yet).  Haven't seen full impact. Will not boost d/t epistaxis.  -Plan to have the pt continue 75mg/wk.   -Pt to return to clinic in 5 weeks (11/30/23).    Jo Salmon, PharmD, BCACP  [facility] [place]  Anticoagulation Clinical Pharmacist  [phone]    
Stretcher in lowest position, wheels locked, appropriate side rails in place/Physically safe environment: no spills, clutter or unnecessary equipment/Provide visual cue, wrist band, yellow gown, etc./Provide visual clues: red socks

## 2023-11-15 ENCOUNTER — RX RENEWAL (OUTPATIENT)
Age: 88
End: 2023-11-15

## 2023-12-27 NOTE — PRE-ANESTHESIA EVALUATION ADULT - NSANTHDISPORD_GEN_ALL_CORE
Detail Level: Zone
Samples Given: -aveeno balm, cerave itch relief
PACU
Render In Strict Bullet Format?: No
PACU

## 2024-01-12 ENCOUNTER — RX RENEWAL (OUTPATIENT)
Age: 89
End: 2024-01-12

## 2024-01-12 RX ORDER — HYDRALAZINE HYDROCHLORIDE 25 MG/1
25 TABLET ORAL
Qty: 360 | Refills: 0 | Status: ACTIVE | COMMUNITY
Start: 2022-02-09 | End: 1900-01-01

## 2024-02-29 ENCOUNTER — INPATIENT (INPATIENT)
Facility: HOSPITAL | Age: 89
LOS: 6 days | Discharge: HOME HEALTH | DRG: 291 | End: 2024-03-07
Attending: INTERNAL MEDICINE | Admitting: STUDENT IN AN ORGANIZED HEALTH CARE EDUCATION/TRAINING PROGRAM
Payer: MEDICARE

## 2024-02-29 VITALS
DIASTOLIC BLOOD PRESSURE: 71 MMHG | HEIGHT: 66 IN | OXYGEN SATURATION: 93 % | HEART RATE: 73 BPM | SYSTOLIC BLOOD PRESSURE: 186 MMHG | WEIGHT: 149.91 LBS | TEMPERATURE: 97 F | RESPIRATION RATE: 20 BRPM

## 2024-02-29 PROCEDURE — 99285 EMERGENCY DEPT VISIT HI MDM: CPT | Mod: GC

## 2024-03-01 DIAGNOSIS — I50.20 UNSPECIFIED SYSTOLIC (CONGESTIVE) HEART FAILURE: ICD-10-CM

## 2024-03-01 DIAGNOSIS — E78.5 HYPERLIPIDEMIA, UNSPECIFIED: ICD-10-CM

## 2024-03-01 DIAGNOSIS — I10 ESSENTIAL (PRIMARY) HYPERTENSION: ICD-10-CM

## 2024-03-01 DIAGNOSIS — N18.30 CHRONIC KIDNEY DISEASE, STAGE 3 UNSPECIFIED: ICD-10-CM

## 2024-03-01 DIAGNOSIS — I50.9 HEART FAILURE, UNSPECIFIED: ICD-10-CM

## 2024-03-01 DIAGNOSIS — I73.9 PERIPHERAL VASCULAR DISEASE, UNSPECIFIED: ICD-10-CM

## 2024-03-01 DIAGNOSIS — E11.9 TYPE 2 DIABETES MELLITUS WITHOUT COMPLICATIONS: ICD-10-CM

## 2024-03-01 DIAGNOSIS — I50.33 ACUTE ON CHRONIC DIASTOLIC (CONGESTIVE) HEART FAILURE: ICD-10-CM

## 2024-03-01 DIAGNOSIS — M16.11 UNILATERAL PRIMARY OSTEOARTHRITIS, RIGHT HIP: ICD-10-CM

## 2024-03-01 LAB
ALBUMIN SERPL ELPH-MCNC: 4.1 G/DL — SIGNIFICANT CHANGE UP (ref 3.3–5)
ALP SERPL-CCNC: 229 U/L — HIGH (ref 40–120)
ALT FLD-CCNC: 11 U/L — SIGNIFICANT CHANGE UP (ref 10–45)
ANION GAP SERPL CALC-SCNC: 13 MMOL/L — SIGNIFICANT CHANGE UP (ref 5–17)
APTT BLD: 39.7 SEC — HIGH (ref 24.5–35.6)
AST SERPL-CCNC: 20 U/L — SIGNIFICANT CHANGE UP (ref 10–40)
BASE EXCESS BLDV CALC-SCNC: -4.8 MMOL/L — LOW (ref -2–3)
BASOPHILS # BLD AUTO: 0.06 K/UL — SIGNIFICANT CHANGE UP (ref 0–0.2)
BASOPHILS NFR BLD AUTO: 0.8 % — SIGNIFICANT CHANGE UP (ref 0–2)
BILIRUB SERPL-MCNC: 0.2 MG/DL — SIGNIFICANT CHANGE UP (ref 0.2–1.2)
BUN SERPL-MCNC: 74 MG/DL — HIGH (ref 7–23)
CA-I SERPL-SCNC: 1.24 MMOL/L — SIGNIFICANT CHANGE UP (ref 1.15–1.33)
CALCIUM SERPL-MCNC: 9 MG/DL — SIGNIFICANT CHANGE UP (ref 8.4–10.5)
CHLORIDE BLDV-SCNC: 113 MMOL/L — HIGH (ref 96–108)
CHLORIDE SERPL-SCNC: 110 MMOL/L — HIGH (ref 96–108)
CO2 BLDV-SCNC: 23 MMOL/L — SIGNIFICANT CHANGE UP (ref 22–26)
CO2 SERPL-SCNC: 21 MMOL/L — LOW (ref 22–31)
CREAT SERPL-MCNC: 1.57 MG/DL — HIGH (ref 0.5–1.3)
EGFR: 30 ML/MIN/1.73M2 — LOW
EOSINOPHIL # BLD AUTO: 0.2 K/UL — SIGNIFICANT CHANGE UP (ref 0–0.5)
EOSINOPHIL NFR BLD AUTO: 2.7 % — SIGNIFICANT CHANGE UP (ref 0–6)
FLUAV AG NPH QL: SIGNIFICANT CHANGE UP
FLUBV AG NPH QL: SIGNIFICANT CHANGE UP
GAS PNL BLDV: 140 MMOL/L — SIGNIFICANT CHANGE UP (ref 136–145)
GAS PNL BLDV: SIGNIFICANT CHANGE UP
GAS PNL BLDV: SIGNIFICANT CHANGE UP
GLUCOSE BLDC GLUCOMTR-MCNC: 203 MG/DL — HIGH (ref 70–99)
GLUCOSE BLDC GLUCOMTR-MCNC: 225 MG/DL — HIGH (ref 70–99)
GLUCOSE BLDC GLUCOMTR-MCNC: 233 MG/DL — HIGH (ref 70–99)
GLUCOSE BLDV-MCNC: 157 MG/DL — HIGH (ref 70–99)
GLUCOSE SERPL-MCNC: 156 MG/DL — HIGH (ref 70–99)
HCO3 BLDV-SCNC: 22 MMOL/L — SIGNIFICANT CHANGE UP (ref 22–29)
HCT VFR BLD CALC: 32.2 % — LOW (ref 34.5–45)
HCT VFR BLDA CALC: 29 % — LOW (ref 34.5–46.5)
HGB BLD CALC-MCNC: 9.8 G/DL — LOW (ref 11.7–16.1)
HGB BLD-MCNC: 9.4 G/DL — LOW (ref 11.5–15.5)
IMM GRANULOCYTES NFR BLD AUTO: 0.4 % — SIGNIFICANT CHANGE UP (ref 0–0.9)
INR BLD: 1.27 RATIO — HIGH (ref 0.85–1.18)
LACTATE BLDV-MCNC: 1.1 MMOL/L — SIGNIFICANT CHANGE UP (ref 0.5–2)
LYMPHOCYTES # BLD AUTO: 0.51 K/UL — LOW (ref 1–3.3)
LYMPHOCYTES # BLD AUTO: 7 % — LOW (ref 13–44)
MAGNESIUM SERPL-MCNC: 2.2 MG/DL — SIGNIFICANT CHANGE UP (ref 1.6–2.6)
MCHC RBC-ENTMCNC: 26.5 PG — LOW (ref 27–34)
MCHC RBC-ENTMCNC: 29.2 GM/DL — LOW (ref 32–36)
MCV RBC AUTO: 90.7 FL — SIGNIFICANT CHANGE UP (ref 80–100)
MONOCYTES # BLD AUTO: 0.67 K/UL — SIGNIFICANT CHANGE UP (ref 0–0.9)
MONOCYTES NFR BLD AUTO: 9.2 % — SIGNIFICANT CHANGE UP (ref 2–14)
NEUTROPHILS # BLD AUTO: 5.81 K/UL — SIGNIFICANT CHANGE UP (ref 1.8–7.4)
NEUTROPHILS NFR BLD AUTO: 79.9 % — HIGH (ref 43–77)
NRBC # BLD: 0 /100 WBCS — SIGNIFICANT CHANGE UP (ref 0–0)
NT-PROBNP SERPL-SCNC: 3256 PG/ML — HIGH (ref 0–300)
PCO2 BLDV: 45 MMHG — HIGH (ref 39–42)
PH BLDV: 7.29 — LOW (ref 7.32–7.43)
PLATELET # BLD AUTO: 283 K/UL — SIGNIFICANT CHANGE UP (ref 150–400)
PO2 BLDV: 44 MMHG — SIGNIFICANT CHANGE UP (ref 25–45)
POTASSIUM BLDV-SCNC: 4.4 MMOL/L — SIGNIFICANT CHANGE UP (ref 3.5–5.1)
POTASSIUM SERPL-MCNC: 4.4 MMOL/L — SIGNIFICANT CHANGE UP (ref 3.5–5.3)
POTASSIUM SERPL-SCNC: 4.4 MMOL/L — SIGNIFICANT CHANGE UP (ref 3.5–5.3)
PROT SERPL-MCNC: 7.3 G/DL — SIGNIFICANT CHANGE UP (ref 6–8.3)
PROTHROM AB SERPL-ACNC: 13.9 SEC — HIGH (ref 9.5–13)
RBC # BLD: 3.55 M/UL — LOW (ref 3.8–5.2)
RBC # FLD: 17.2 % — HIGH (ref 10.3–14.5)
RSV RNA NPH QL NAA+NON-PROBE: SIGNIFICANT CHANGE UP
SAO2 % BLDV: 71.6 % — SIGNIFICANT CHANGE UP (ref 67–88)
SARS-COV-2 RNA SPEC QL NAA+PROBE: SIGNIFICANT CHANGE UP
SODIUM SERPL-SCNC: 144 MMOL/L — SIGNIFICANT CHANGE UP (ref 135–145)
TROPONIN T, HIGH SENSITIVITY RESULT: 60 NG/L — HIGH (ref 0–51)
TROPONIN T, HIGH SENSITIVITY RESULT: 60 NG/L — HIGH (ref 0–51)
WBC # BLD: 7.28 K/UL — SIGNIFICANT CHANGE UP (ref 3.8–10.5)
WBC # FLD AUTO: 7.28 K/UL — SIGNIFICANT CHANGE UP (ref 3.8–10.5)

## 2024-03-01 PROCEDURE — 99223 1ST HOSP IP/OBS HIGH 75: CPT

## 2024-03-01 PROCEDURE — 71250 CT THORAX DX C-: CPT | Mod: 26

## 2024-03-01 PROCEDURE — 71045 X-RAY EXAM CHEST 1 VIEW: CPT | Mod: 26

## 2024-03-01 PROCEDURE — 73502 X-RAY EXAM HIP UNI 2-3 VIEWS: CPT | Mod: 26,RT

## 2024-03-01 RX ORDER — ATORVASTATIN CALCIUM 80 MG/1
10 TABLET, FILM COATED ORAL AT BEDTIME
Refills: 0 | Status: DISCONTINUED | OUTPATIENT
Start: 2024-03-01 | End: 2024-03-07

## 2024-03-01 RX ORDER — TRAZODONE HCL 50 MG
1 TABLET ORAL
Qty: 0 | Refills: 0 | DISCHARGE

## 2024-03-01 RX ORDER — ASPIRIN/CALCIUM CARB/MAGNESIUM 324 MG
1 TABLET ORAL
Refills: 0 | DISCHARGE

## 2024-03-01 RX ORDER — HYDRALAZINE HCL 50 MG
25 TABLET ORAL EVERY 8 HOURS
Refills: 0 | Status: DISCONTINUED | OUTPATIENT
Start: 2024-03-01 | End: 2024-03-05

## 2024-03-01 RX ORDER — ASPIRIN/CALCIUM CARB/MAGNESIUM 324 MG
81 TABLET ORAL DAILY
Refills: 0 | Status: DISCONTINUED | OUTPATIENT
Start: 2024-03-01 | End: 2024-03-07

## 2024-03-01 RX ORDER — DEXTROSE 50 % IN WATER 50 %
12.5 SYRINGE (ML) INTRAVENOUS ONCE
Refills: 0 | Status: DISCONTINUED | OUTPATIENT
Start: 2024-03-01 | End: 2024-03-07

## 2024-03-01 RX ORDER — INSULIN LISPRO 100/ML
VIAL (ML) SUBCUTANEOUS
Refills: 0 | Status: DISCONTINUED | OUTPATIENT
Start: 2024-03-01 | End: 2024-03-07

## 2024-03-01 RX ORDER — SODIUM CHLORIDE 9 MG/ML
1000 INJECTION, SOLUTION INTRAVENOUS
Refills: 0 | Status: DISCONTINUED | OUTPATIENT
Start: 2024-03-01 | End: 2024-03-07

## 2024-03-01 RX ORDER — FUROSEMIDE 40 MG
80 TABLET ORAL ONCE
Refills: 0 | Status: COMPLETED | OUTPATIENT
Start: 2024-03-01 | End: 2024-03-01

## 2024-03-01 RX ORDER — PANTOPRAZOLE SODIUM 20 MG/1
40 TABLET, DELAYED RELEASE ORAL
Refills: 0 | Status: DISCONTINUED | OUTPATIENT
Start: 2024-03-01 | End: 2024-03-07

## 2024-03-01 RX ORDER — TRAZODONE HCL 50 MG
50 TABLET ORAL AT BEDTIME
Refills: 0 | Status: DISCONTINUED | OUTPATIENT
Start: 2024-03-01 | End: 2024-03-07

## 2024-03-01 RX ORDER — AMLODIPINE BESYLATE 2.5 MG/1
10 TABLET ORAL DAILY
Refills: 0 | Status: DISCONTINUED | OUTPATIENT
Start: 2024-03-01 | End: 2024-03-07

## 2024-03-01 RX ORDER — ERGOCALCIFEROL 1.25 MG/1
0 CAPSULE ORAL
Refills: 0 | DISCHARGE

## 2024-03-01 RX ORDER — OMEPRAZOLE 10 MG/1
1 CAPSULE, DELAYED RELEASE ORAL
Refills: 0 | DISCHARGE

## 2024-03-01 RX ORDER — INSULIN GLARGINE 100 [IU]/ML
5 INJECTION, SOLUTION SUBCUTANEOUS AT BEDTIME
Refills: 0 | Status: DISCONTINUED | OUTPATIENT
Start: 2024-03-01 | End: 2024-03-05

## 2024-03-01 RX ORDER — ATORVASTATIN CALCIUM 80 MG/1
1 TABLET, FILM COATED ORAL
Qty: 0 | Refills: 0 | DISCHARGE

## 2024-03-01 RX ORDER — ACETAMINOPHEN 500 MG
2 TABLET ORAL
Refills: 0 | DISCHARGE

## 2024-03-01 RX ORDER — DEXTROSE 50 % IN WATER 50 %
25 SYRINGE (ML) INTRAVENOUS ONCE
Refills: 0 | Status: DISCONTINUED | OUTPATIENT
Start: 2024-03-01 | End: 2024-03-07

## 2024-03-01 RX ORDER — HEPARIN SODIUM 5000 [USP'U]/ML
5000 INJECTION INTRAVENOUS; SUBCUTANEOUS EVERY 12 HOURS
Refills: 0 | Status: DISCONTINUED | OUTPATIENT
Start: 2024-03-01 | End: 2024-03-07

## 2024-03-01 RX ORDER — DEXTROSE 50 % IN WATER 50 %
15 SYRINGE (ML) INTRAVENOUS ONCE
Refills: 0 | Status: DISCONTINUED | OUTPATIENT
Start: 2024-03-01 | End: 2024-03-07

## 2024-03-01 RX ORDER — GLUCAGON INJECTION, SOLUTION 0.5 MG/.1ML
1 INJECTION, SOLUTION SUBCUTANEOUS ONCE
Refills: 0 | Status: DISCONTINUED | OUTPATIENT
Start: 2024-03-01 | End: 2024-03-07

## 2024-03-01 RX ORDER — ACETAMINOPHEN 500 MG
650 TABLET ORAL EVERY 8 HOURS
Refills: 0 | Status: DISCONTINUED | OUTPATIENT
Start: 2024-03-01 | End: 2024-03-07

## 2024-03-01 RX ADMIN — Medication 80 MILLIGRAM(S): at 01:59

## 2024-03-01 RX ADMIN — HEPARIN SODIUM 5000 UNIT(S): 5000 INJECTION INTRAVENOUS; SUBCUTANEOUS at 17:27

## 2024-03-01 RX ADMIN — Medication 2: at 17:54

## 2024-03-01 RX ADMIN — Medication 650 MILLIGRAM(S): at 13:42

## 2024-03-01 RX ADMIN — Medication 25 MILLIGRAM(S): at 13:42

## 2024-03-01 RX ADMIN — Medication 50 MILLIGRAM(S): at 21:55

## 2024-03-01 RX ADMIN — Medication 650 MILLIGRAM(S): at 14:36

## 2024-03-01 RX ADMIN — ATORVASTATIN CALCIUM 10 MILLIGRAM(S): 80 TABLET, FILM COATED ORAL at 21:55

## 2024-03-01 RX ADMIN — Medication 25 MILLIGRAM(S): at 21:55

## 2024-03-01 RX ADMIN — INSULIN GLARGINE 5 UNIT(S): 100 INJECTION, SOLUTION SUBCUTANEOUS at 21:56

## 2024-03-01 NOTE — H&P ADULT - NSHPPHYSICALEXAM_GEN_ALL_CORE
PHYSICAL EXAM: vital signs noted on [place]  in no apparent distress  HEENT: CARY EOMI  Neck: Supple, no JVD, no thyromegaly  Lungs: no wheeze, no crackles  CVS: S1 S2 ejection systolic murmur +   Abdomen: no tenderness, no organomegaly, BS present  Neuro: AO x 3 no focal weakness, no sensory abnormalities  Psych: appropriate affect  Skin: warm, dry  Ext: no cyanosis or clubbing, no edema  Msk: no joint swelling or deformities  Back: no CVA tenderness, no kyphosis/scoliosis

## 2024-03-01 NOTE — PHYSICAL THERAPY INITIAL EVALUATION ADULT - NSPTDISCHREC_GEN_A_CORE
TBD pending completion of functional assessment; anticipate PETAR Home with Home PT for strengthening, bed mob, transfer, gait and balance training/Home PT

## 2024-03-01 NOTE — PATIENT PROFILE ADULT - FALL HARM RISK - HARM RISK INTERVENTIONS

## 2024-03-01 NOTE — PHYSICAL THERAPY INITIAL EVALUATION ADULT - ADDITIONAL COMMENTS
Pt lives in a private house, 13 steps to enter with handrail which per pt's son, pt was able to negotiate independently with use of straight cane. Pt has a walk in shower and pt's son checks on her everyday 3-4 hours/day and son assists in laundry, cleaning, food shopping. Pt owns rollator, straight cane, shower chair

## 2024-03-01 NOTE — H&P ADULT - PROBLEM SELECTOR PLAN 1
s/p furosemide 80 mg IV x 1  will continue to monitor   cardiology help requested  echocardiogram to evaluate ejection murmur likely aortic flow murmur  LV function normal and AV normal opening echocardiogram in 2022  start furosemide 40 IV qd from tomorrow

## 2024-03-01 NOTE — CONSULT NOTE ADULT - SUBJECTIVE AND OBJECTIVE BOX
03-01-24 @ 18:21    Patient is a 89y old  Female who presents with a chief complaint of shortness of breath (01 Mar 2024 12:33)      HPI:  89 y/o female hx HTN/HLD, DM2, CAD (s/p CABG), PVD (s/p stents in bilateral LE), CKD (Baseline Cr reportedly 1.2), CHF presents with suspected CHF exacerbation. She was sent in by her cardiologist, Ced Rodney for IV diuretics because for the last week she has been having increased dyspnea on exertion. She also c/o left shoulder discomfort at this time and severe chronic right hip pain from arthritis.. She takes 40 mg po furosemide once daily. Otherwise patient feels well, denies headache, shortness of breath at rest, abdominal pain, dysuria/hematuria. (01 Mar 2024 10:54)     at bedside:   he says she was brought in here for chf and sob:   she has no underlying lung disease:   she has no cough or phlegm : never had fever at home:     ?FOLLOWING PRESENT  [x ] Hx of PE/DVT, [ x] Hx COPD, [x ] Hx of Asthma, [ x] Hx of Hospitalization, x[ ]  Hx of BiPAP/CPAP use, [ x] Hx of LORENZO    Allergies    No Known Allergies    Intolerances        PAST MEDICAL & SURGICAL HISTORY:  Essential hypertension, benign      Hypercholesteremia      Peripheral vascular disease      Diabetes      CAD (coronary artery disease)      History of chronic gastritis      S/P carotid endarterectomy      S/P coronary artery by pass      History of cholecystectomy      S/P vascular surgery          FAMILY HISTORY:  No pertinent family history in first degree relatives        Social History: [x  ] TOBACCO                  [ x ] ETOH                                 [x  ] IVDA/DRUGS    REVIEW OF SYSTEMS      General:x	    Skin/Breast:x  	  Ophthalmologic:x  	  ENMT:	x    Respiratory and Thorax:  sob,  jaimes   	  Cardiovascular:	x    Gastrointestinal:	x    Genitourinary:	x    Musculoskeletal:	x    Neurological:	x    Psychiatric:	x    Hematology/Lymphatics:	  x  Endocrine:	x    Allergic/Immunologic:	x    MEDICATIONS  (STANDING):  amLODIPine   Tablet 10 milliGRAM(s) Oral daily  aspirin enteric coated 81 milliGRAM(s) Oral daily  atorvastatin 10 milliGRAM(s) Oral at bedtime  dextrose 5%. 1000 milliLiter(s) (50 mL/Hr) IV Continuous <Continuous>  dextrose 5%. 1000 milliLiter(s) (100 mL/Hr) IV Continuous <Continuous>  dextrose 50% Injectable 25 Gram(s) IV Push once  dextrose 50% Injectable 12.5 Gram(s) IV Push once  dextrose 50% Injectable 25 Gram(s) IV Push once  glucagon  Injectable 1 milliGRAM(s) IntraMuscular once  heparin   Injectable 5000 Unit(s) SubCutaneous every 12 hours  hydrALAZINE 25 milliGRAM(s) Oral every 8 hours  insulin glargine Injectable (LANTUS) 5 Unit(s) SubCutaneous at bedtime  insulin lispro (ADMELOG) corrective regimen sliding scale   SubCutaneous three times a day before meals  pantoprazole    Tablet 40 milliGRAM(s) Oral before breakfast  traZODone 50 milliGRAM(s) Oral at bedtime    MEDICATIONS  (PRN):  acetaminophen     Tablet .. 650 milliGRAM(s) Oral every 8 hours PRN Temp greater or equal to 38C (100.4F), Mild Pain (1 - 3)  dextrose Oral Gel 15 Gram(s) Oral once PRN Blood Glucose LESS THAN 70 milliGRAM(s)/deciliter       Vital Signs Last 24 Hrs  T(C): 36.3 (01 Mar 2024 15:45), Max: 36.6 (01 Mar 2024 13:27)  T(F): 97.4 (01 Mar 2024 15:45), Max: 97.9 (01 Mar 2024 13:27)  HR: 65 (01 Mar 2024 15:45) (58 - 67)  BP: 155/62 (01 Mar 2024 15:45) (150/61 - 178/71)  BP(mean): 91 (01 Mar 2024 11:55) (91 - 91)  RR: 18 (01 Mar 2024 15:45) (18 - 20)  SpO2: 98% (01 Mar 2024 15:45) (95% - 99%)    Parameters below as of 01 Mar 2024 15:45  Patient On (Oxygen Delivery Method): nasal cannula  O2 Flow (L/min): 2  Orthostatic VS          I&O's Summary      Physical Exam:   GENERAL: NAD, well-groomed, well-developed  HEENT: CARY/   Atraumatic, Normocephalic  ENMT: No tonsillar erythema, exudates, or enlargement; Moist mucous membranes, Good dentition, No lesions  NECK: Supple, No JVD, Normal thyroid  CHEST/LUNG: bibasilar cracekls+  CVS: Regular rate and rhythm; No murmurs, rubs, or gallops  GI: : Soft, Nontender, Nondistended; Bowel sounds present  NERVOUS SYSTEM:  Alert & Oriented X3,  EXTREMITIES: + edema  LYMPH: No lymphadenopathy noted  SKIN: No rashes or lesions  ENDOCRINOLOGY: No Thyromegaly  PSYCH: Appropriate    Labs:  Venous<45<4>>44<<7.295>>Venous<<3><<4><<5<<449>>                            9.4    7.28  )-----------( 283      ( 01 Mar 2024 01:56 )             32.2     03-01    144  |  110<H>  |  74<H>  ----------------------------<  156<H>  4.4   |  21<L>  |  1.57<H>    Ca    9.0      01 Mar 2024 01:56  Mg     2.2     03-01    TPro  7.3  /  Alb  4.1  /  TBili  0.2  /  DBili  x   /  AST  20  /  ALT  11  /  AlkPhos  229<H>  03-01    CAPILLARY BLOOD GLUCOSE      POCT Blood Glucose.: 225 mg/dL (01 Mar 2024 17:50)  POCT Blood Glucose.: 233 mg/dL (01 Mar 2024 17:33)    LIVER FUNCTIONS - ( 01 Mar 2024 01:56 )  Alb: 4.1 g/dL / Pro: 7.3 g/dL / ALK PHOS: 229 U/L / ALT: 11 U/L / AST: 20 U/L / GGT: x           PT/INR - ( 01 Mar 2024 01:56 )   PT: 13.9 sec;   INR: 1.27 ratio         PTT - ( 01 Mar 2024 01:56 )  PTT:39.7 sec  Urinalysis Basic - ( 01 Mar 2024 01:56 )    Color: x / Appearance: x / SG: x / pH: x  Gluc: 156 mg/dL / Ketone: x  / Bili: x / Urobili: x   Blood: x / Protein: x / Nitrite: x   Leuk Esterase: x / RBC: x / WBC x   Sq Epi: x / Non Sq Epi: x / Bacteria: x      D DImer      Studies  Chest X-RAY  CT SCAN Chest   CT Abdomen  Venous Dopplers: LE:   Others    < from: Xray Chest 1 View- PORTABLE-Urgent (03.01.24 @ 03:42) >    ACC: [id number] EXAM:  XR CHEST PORTABLE URGENT 1V   ORDERED BY:  SILVINO OCHOA     PROCEDURE DATE:  03/01/2024          INTERPRETATION:  EXAMINATION: XR CHEST URGENT    CLINICAL INDICATION: Chest Pain    TECHNIQUE: Single frontal, portable view ofthe chest was obtained.    COMPARISON: Chest x-ray 1/28/2022    FINDINGS:    The heart size is normal in size.  Right midlung field patchy opacity may be of infectious etiology.  Small bilateral pleural effusions.. There is no pneumothorax.  No acutebony abnormality. Median sternotomy.    IMPRESSION:  Right midlung field patchy opacity and pleural effusions may be related   to asymmetric pulmonary edema or of infectious etiology.    --- End of Report ---          AMILCAR SKY DO; Resident Radiologist  This document has been electronically signed.  AMAN LEE MD; Attending Radiologist  This document has been electronically signed. Mar  1 2024 10:58AM    < end of copied text >  < from: Xray Chest 1 View- PORTABLE-Urgent (Xray Chest 1 View- PORTABLE-Urgent .) (01.28.22 @ 18:29) >      INTERPRETATION:  EXAMINATION: XR CHEST URGENT    CLINICAL INDICATION: Shortness of breath    TECHNIQUE: Single frontal, portable view of the chest was obtained.    COMPARISON: Chest radiograph 12/7/2021.    FINDINGS:  The heart is enlarged. Status post median sternotomy and CABG.  The lungs are clear.  Bilateral pleural effusions. No pneumothorax.    IMPRESSION:  Bilateral pleural effusions    --- End of Report ---          SAIGE SAMUEL MD; Resident Radiology  This document has been electronically signed.  WAQAR CAMPBELL MD; Attending Radiologist  This document has been electronically signed. Jan 28 2022  7:24PM    < end of copied text >

## 2024-03-01 NOTE — ED PROVIDER NOTE - CLINICAL SUMMARY MEDICAL DECISION MAKING FREE TEXT BOX
HTN/HLD, DM2, CAD (s/p CABG), PVD (s/p stents in bilateral LE), CKD (Baseline Cr reportedly 1.2), recent admission for GIB 2/2 chronic gastritis HTN/HLD, DM2, CAD (s/p CABG), PVD (s/p stents in bilateral LE) presents with one week of worsening exercise tolerance. She was sent in by cardiologist Dr. Rodney for IV diuresis. She is hemodynamically stable, afebrile, on exam she has mild bilateral pedal edema, basilar crackles bilaterally in lungs, palpable distal pulses and skin appears well perfused. Presentation consistent with CHF exacerbation, other differential includes ACS vs pneumonia. Will get cxr, labs, ekg, flu-covid, likely admit to Dr. Rhett Andrade for CHF exacerbation.

## 2024-03-01 NOTE — CONSULT NOTE ADULT - SUBJECTIVE AND OBJECTIVE BOX
Patient seen and evaluated at bedside    Chief Complaint: WEEKS    HPI:  89 y/o female hx HTN/HLD, DM2, CAD (s/p 3V CABG in 2002 [place] to LAD, SVG to the OM, and SVG to the PDA), PVD (s/p stents in bilateral LE), CKD (Baseline Cr reportedly 1.2), HFpEF presents with suspected CHF exacerbation. She was sent in by her cardiologist, Ced Rodney for IV diuretics because for the last week she has been having increased dyspnea on exertion. She also c/o left shoulder discomfort at this time and severe chronic right hip pain from arthritis. She takes 40 mg po furosemide once daily. Otherwise patient feels well, denies headache, shortness of breath at rest, abdominal pain, dysuria/hematuria. At baseline is not limited by SOB when ambulating, but limited by severe R hip OA. Now can only ambulate a few steps before becoming SOB.    In the ED, hypertensive to 170s, HR 60s, SpO2 98% on 2L. Given Lasix IV 80mg with improvement in symptoms.    PMHx:   Essential hypertension, benign    Hypercholesteremia    CAD (coronary artery disease)    Peripheral vascular disease    Diabetes    CAD (coronary artery disease)    History of chronic gastritis        PSHx:   S/P carotid endarterectomy    S/P coronary artery by pass    History of cholecystectomy    S/P vascular surgery        Allergies:  No Known Allergies      Home Meds:  Home Medications:  amLODIPine 10 mg oral tablet: 1 tab(s) orally once a day (01 Mar 2024 10:27)  AREDS 2: 1 tablet twice daily (01 Mar 2024 10:27)  aspirin 81 mg oral delayed release tablet: 1 tab(s) orally once a day (01 Mar 2024 10:27)  ATORVASTATIN 10 MG TABLET: 1 tab(s) orally once a day (01 Mar 2024 10:27)  furosemide 20 mg oral tablet: 1 tab(s) orally once a day (01 Mar 2024 10:27)  hydrALAZINE 25 mg oral tablet: 1 tab(s) orally 4 times a day (01 Mar 2024 10:27)  LEVEMIR FLEXTOUCH 100 UNIT/ML: 28 unit(s) subcutaneous once a day (at bedtime) (01 Mar 2024 10:27)  omeprazole 40 mg oral delayed release capsule: 1 cap(s) orally once a day (01 Mar 2024 10:27)  TRAZODONE 50 MG TABLET: 1 tab(s) orally once a day (at bedtime) (01 Mar 2024 10:27)  Tylenol 8 HR Arthritis Pain 650 mg oral tablet, extended release: 2 tab(s) orally 3 times a day (01 Mar 2024 10:27)  Vitamin D: once daily (01 Mar 2024 10:27)      Current Medications:   acetaminophen     Tablet .. 650 milliGRAM(s) Oral every 8 hours PRN  amLODIPine   Tablet 10 milliGRAM(s) Oral daily  aspirin enteric coated 81 milliGRAM(s) Oral daily  atorvastatin 10 milliGRAM(s) Oral at bedtime  dextrose 5%. 1000 milliLiter(s) IV Continuous <Continuous>  dextrose 5%. 1000 milliLiter(s) IV Continuous <Continuous>  dextrose 50% Injectable 25 Gram(s) IV Push once  dextrose 50% Injectable 25 Gram(s) IV Push once  dextrose 50% Injectable 12.5 Gram(s) IV Push once  dextrose Oral Gel 15 Gram(s) Oral once PRN  glucagon  Injectable 1 milliGRAM(s) IntraMuscular once  heparin   Injectable 5000 Unit(s) SubCutaneous every 12 hours  hydrALAZINE 25 milliGRAM(s) Oral every 8 hours  insulin glargine Injectable (LANTUS) 5 Unit(s) SubCutaneous at bedtime  insulin lispro (ADMELOG) corrective regimen sliding scale   SubCutaneous three times a day before meals  pantoprazole    Tablet 40 milliGRAM(s) Oral before breakfast  traZODone 50 milliGRAM(s) Oral at bedtime      FAMILY HISTORY:  No pertinent family history in first degree relatives        Social History:  as per H&P    REVIEW OF SYSTEMS:  All other review of systems is negative unless indicated above.    Physical Exam:  T(F): 97.4 (03-01), Max: 97.6 (03-01)  HR: 58 (03-01) (58 - 73)  BP: 150/61 (03-01) (150/61 - 186/71)  RR: 18 (03-01)  SpO2: 98% (03-01)  GENERAL: No acute distress, well-developed  HEAD:  Atraumatic, Normocephalic  ENT: EOMI, PERRLA, conjunctiva and sclera clear, Neck supple, + JVD, moist mucosa  CHEST/LUNG: Clear to auscultation bilaterally; No wheeze, equal breath sounds bilaterally   HEART: Regular rate and rhythm; No murmurs, rubs, or gallops  ABDOMEN: Soft, Nontender, Nondistended; Bowel sounds present  EXTREMITIES:  No clubbing, cyanosis, or edema  PSYCH: Nl behavior, nl affect  NEUROLOGY: AAOx3, non-focal, cranial nerves intact  SKIN: Normal color, No rashes or lesions  LINES:    Cardiovascular Diagnostic Testing:    ECG: Personally reviewed:    Echo: Personally reviewed:    Stress Testing:    Cath:    Imaging:    CXR: Personally reviewed    Labs: Personally reviewed                        9.4    7.28  )-----------( 283      ( 01 Mar 2024 01:56 )             32.2     03-01    144  |  110<H>  |  74<H>  ----------------------------<  156<H>  4.4   |  21<L>  |  1.57<H>    Ca    9.0      01 Mar 2024 01:56  Mg     2.2     03-01    TPro  7.3  /  Alb  4.1  /  TBili  0.2  /  DBili  x   /  AST  20  /  ALT  11  /  AlkPhos  229<H>  03-01    PT/INR - ( 01 Mar 2024 01:56 )   PT: 13.9 sec;   INR: 1.27 ratio         PTT - ( 01 Mar 2024 01:56 )  PTT:39.7 sec    CARDIAC MARKERS ( 01 Mar 2024 03:53 )  60 ng/L / x     / x     / x     / x     / x      CARDIAC MARKERS ( 01 Mar 2024 01:56 )  60 ng/L / x     / x     / x     / x     / x                     Patient seen and evaluated at bedside    Chief Complaint: WEEKS    HPI:  89 y/o female hx HTN/HLD, DM2, CAD (s/p 3V CABG in 2002 [place] to LAD, SVG to the OM, and SVG to the PDA), PVD (s/p stents in bilateral LE), CKD (Baseline Cr reportedly 1.2), HFpEF presents with suspected CHF exacerbation. She was sent in by her cardiologist, Ced Rodney for IV diuretics because for the last week she has been having increased dyspnea on exertion. She also c/o left shoulder discomfort at this time and severe chronic right hip pain from arthritis. She takes 40 mg po furosemide once daily. Otherwise patient feels well, denies headache, shortness of breath at rest, abdominal pain, dysuria/hematuria. At baseline is not limited by SOB when ambulating, but limited by severe R hip OA. Now can only ambulate a few steps before becoming SOB.    In the ED, hypertensive to 170s, HR 60s, SpO2 98% on 2L. Given Lasix IV 80mg with improvement in symptoms.    PMHx:   Essential hypertension, benign    Hypercholesteremia    CAD (coronary artery disease)    Peripheral vascular disease    Diabetes    CAD (coronary artery disease)    History of chronic gastritis        PSHx:   S/P carotid endarterectomy    S/P coronary artery by pass    History of cholecystectomy    S/P vascular surgery        Allergies:  No Known Allergies      Home Meds:  Home Medications:  amLODIPine 10 mg oral tablet: 1 tab(s) orally once a day (01 Mar 2024 10:27)  AREDS 2: 1 tablet twice daily (01 Mar 2024 10:27)  aspirin 81 mg oral delayed release tablet: 1 tab(s) orally once a day (01 Mar 2024 10:27)  ATORVASTATIN 10 MG TABLET: 1 tab(s) orally once a day (01 Mar 2024 10:27)  furosemide 20 mg oral tablet: 1 tab(s) orally once a day (01 Mar 2024 10:27)  hydrALAZINE 25 mg oral tablet: 1 tab(s) orally 4 times a day (01 Mar 2024 10:27)  LEVEMIR FLEXTOUCH 100 UNIT/ML: 28 unit(s) subcutaneous once a day (at bedtime) (01 Mar 2024 10:27)  omeprazole 40 mg oral delayed release capsule: 1 cap(s) orally once a day (01 Mar 2024 10:27)  TRAZODONE 50 MG TABLET: 1 tab(s) orally once a day (at bedtime) (01 Mar 2024 10:27)  Tylenol 8 HR Arthritis Pain 650 mg oral tablet, extended release: 2 tab(s) orally 3 times a day (01 Mar 2024 10:27)  Vitamin D: once daily (01 Mar 2024 10:27)      Current Medications:   acetaminophen     Tablet .. 650 milliGRAM(s) Oral every 8 hours PRN  amLODIPine   Tablet 10 milliGRAM(s) Oral daily  aspirin enteric coated 81 milliGRAM(s) Oral daily  atorvastatin 10 milliGRAM(s) Oral at bedtime  dextrose 5%. 1000 milliLiter(s) IV Continuous <Continuous>  dextrose 5%. 1000 milliLiter(s) IV Continuous <Continuous>  dextrose 50% Injectable 25 Gram(s) IV Push once  dextrose 50% Injectable 25 Gram(s) IV Push once  dextrose 50% Injectable 12.5 Gram(s) IV Push once  dextrose Oral Gel 15 Gram(s) Oral once PRN  glucagon  Injectable 1 milliGRAM(s) IntraMuscular once  heparin   Injectable 5000 Unit(s) SubCutaneous every 12 hours  hydrALAZINE 25 milliGRAM(s) Oral every 8 hours  insulin glargine Injectable (LANTUS) 5 Unit(s) SubCutaneous at bedtime  insulin lispro (ADMELOG) corrective regimen sliding scale   SubCutaneous three times a day before meals  pantoprazole    Tablet 40 milliGRAM(s) Oral before breakfast  traZODone 50 milliGRAM(s) Oral at bedtime      FAMILY HISTORY:  No pertinent family history in first degree relatives        Social History:  as per H&P    REVIEW OF SYSTEMS:  All other review of systems is negative unless indicated above.    Physical Exam:  T(F): 97.4 (03-01), Max: 97.6 (03-01)  HR: 58 (03-01) (58 - 73)  BP: 150/61 (03-01) (150/61 - 186/71)  RR: 18 (03-01)  SpO2: 98% (03-01)  GENERAL: No acute distress, well-developed  HEAD:  Atraumatic, Normocephalic  ENT: EOMI, PERRLA, conjunctiva and sclera clear, Neck supple, + JVD, moist mucosa  CHEST/LUNG: Clear to auscultation bilaterally; No wheeze, equal breath sounds bilaterally   HEART: Regular rate and rhythm; holosystolic murmur at apex  ABDOMEN: Soft, Nontender, Nondistended; Bowel sounds present  EXTREMITIES:  No clubbing, cyanosis, or edema  PSYCH: Nl behavior, nl affect  NEUROLOGY: AAOx3, non-focal, cranial nerves intact  SKIN: Normal color, No rashes or lesions  LINES:    Cardiovascular Diagnostic Testing:    ECG: Personally reviewed:    Echo: Personally reviewed:    Stress Testing:    Cath:    Imaging:    CXR: Personally reviewed    Labs: Personally reviewed                        9.4    7.28  )-----------( 283      ( 01 Mar 2024 01:56 )             32.2     03-01    144  |  110<H>  |  74<H>  ----------------------------<  156<H>  4.4   |  21<L>  |  1.57<H>    Ca    9.0      01 Mar 2024 01:56  Mg     2.2     03-01    TPro  7.3  /  Alb  4.1  /  TBili  0.2  /  DBili  x   /  AST  20  /  ALT  11  /  AlkPhos  229<H>  03-01    PT/INR - ( 01 Mar 2024 01:56 )   PT: 13.9 sec;   INR: 1.27 ratio         PTT - ( 01 Mar 2024 01:56 )  PTT:39.7 sec    CARDIAC MARKERS ( 01 Mar 2024 03:53 )  60 ng/L / x     / x     / x     / x     / x      CARDIAC MARKERS ( 01 Mar 2024 01:56 )  60 ng/L / x     / x     / x     / x     / x                     Patient seen and evaluated at bedside    Chief Complaint: WEEKS    HPI:  89 y/o female hx HTN/HLD, DM2, CAD (s/p 3V CABG in 2002 [place] to LAD, SVG to the OM, and SVG to the PDA), PVD (s/p stents in bilateral LE), CKD (Baseline Cr reportedly 1.2), HFpEF presents with suspected CHF exacerbation. She was sent in by her cardiologist, Ced Rodney for IV diuretics because for the last week she has been having increased dyspnea on exertion. She also c/o left shoulder discomfort at this time and severe chronic right hip pain from arthritis. She takes 40 mg po furosemide once daily. Otherwise patient feels well, denies headache, shortness of breath at rest, abdominal pain, dysuria/hematuria. At baseline is not limited by SOB when ambulating, but limited by severe R hip OA. Now can only ambulate a few steps before becoming SOB.    In the ED, hypertensive to 170s, HR 60s, SpO2 98% on 2L. Given Lasix IV 80mg with improvement in symptoms.    PMHx:   Essential hypertension, benign    Hypercholesteremia    CAD (coronary artery disease)    Peripheral vascular disease    Diabetes    CAD (coronary artery disease)    History of chronic gastritis        PSHx:   S/P carotid endarterectomy    S/P coronary artery bypass    History of cholecystectomy    S/P vascular surgery        Allergies:  No Known Allergies      Home Meds:  Home Medications:  amLODIPine 10 mg oral tablet: 1 tab(s) orally once a day (01 Mar 2024 10:27)  AREDS 2: 1 tablet twice daily (01 Mar 2024 10:27)  aspirin 81 mg oral delayed release tablet: 1 tab(s) orally once a day (01 Mar 2024 10:27)  ATORVASTATIN 10 MG TABLET: 1 tab(s) orally once a day (01 Mar 2024 10:27)  furosemide 20 mg oral tablet: 1 tab(s) orally once a day (01 Mar 2024 10:27)  hydrALAZINE 25 mg oral tablet: 1 tab(s) orally 4 times a day (01 Mar 2024 10:27)  LEVEMIR FLEXTOUCH 100 UNIT/ML: 28 unit(s) subcutaneous once a day (at bedtime) (01 Mar 2024 10:27)  omeprazole 40 mg oral delayed release capsule: 1 cap(s) orally once a day (01 Mar 2024 10:27)  TRAZODONE 50 MG TABLET: 1 tab(s) orally once a day (at bedtime) (01 Mar 2024 10:27)  Tylenol 8 HR Arthritis Pain 650 mg oral tablet, extended release: 2 tab(s) orally 3 times a day (01 Mar 2024 10:27)  Vitamin D: once daily (01 Mar 2024 10:27)      Current Medications:   acetaminophen     Tablet .. 650 milliGRAM(s) Oral every 8 hours PRN  amLODIPine   Tablet 10 milliGRAM(s) Oral daily  aspirin enteric coated 81 milliGRAM(s) Oral daily  atorvastatin 10 milliGRAM(s) Oral at bedtime  dextrose 5%. 1000 milliLiter(s) IV Continuous <Continuous>  dextrose 5%. 1000 milliLiter(s) IV Continuous <Continuous>  dextrose 50% Injectable 25 Gram(s) IV Push once  dextrose 50% Injectable 25 Gram(s) IV Push once  dextrose 50% Injectable 12.5 Gram(s) IV Push once  dextrose Oral Gel 15 Gram(s) Oral once PRN  glucagon  Injectable 1 milliGRAM(s) IntraMuscular once  heparin   Injectable 5000 Unit(s) SubCutaneous every 12 hours  hydrALAZINE 25 milliGRAM(s) Oral every 8 hours  insulin glargine Injectable (LANTUS) 5 Unit(s) SubCutaneous at bedtime  insulin lispro (ADMELOG) corrective regimen sliding scale   SubCutaneous three times a day before meals  pantoprazole    Tablet 40 milliGRAM(s) Oral before breakfast  traZODone 50 milliGRAM(s) Oral at bedtime      FAMILY HISTORY:  No pertinent family history in first degree relatives        REVIEW OF SYSTEMS:  All other review of systems is negative unless indicated above.    Physical Exam:  T(F): 97.4 (03-01), Max: 97.6 (03-01)  HR: 58 (03-01) (58 - 73)  BP: 150/61 (03-01) (150/61 - 186/71)  RR: 18 (03-01)  SpO2: 98% (03-01)  GENERAL: No acute distress, well-developed  HEAD:  Atraumatic, Normocephalic  ENT: EOMI, PERRLA, conjunctiva and sclera clear, Neck supple, + JVD, moist mucosa  CHEST/LUNG: Clear to auscultation bilaterally; No wheeze, equal breath sounds bilaterally   HEART: Regular rate and rhythm; holosystolic murmur at apex  ABDOMEN: Soft, Nontender, Nondistended; Bowel sounds present  EXTREMITIES:  No clubbing, cyanosis, or edema  PSYCH: Nl behavior, nl affect  NEUROLOGY: AAOx3, non-focal, cranial nerves intact  SKIN: Normal color, No rashes or lesions  LINES:    Cardiovascular Diagnostic Testing:      Labs:                         9.4    7.28  )-----------( 283      ( 01 Mar 2024 01:56 )             32.2     03-01    144  |  110<H>  |  74<H>  ----------------------------<  156<H>  4.4   |  21<L>  |  1.57<H>    Ca    9.0      01 Mar 2024 01:56  Mg     2.2     03-01    TPro  7.3  /  Alb  4.1  /  TBili  0.2  /  DBili  x   /  AST  20  /  ALT  11  /  AlkPhos  229<H>  03-01    PT/INR - ( 01 Mar 2024 01:56 )   PT: 13.9 sec;   INR: 1.27 ratio         PTT - ( 01 Mar 2024 01:56 )  PTT:39.7 sec    CARDIAC MARKERS ( 01 Mar 2024 03:53 )  60 ng/L / x     / x     / x     / x     / x      CARDIAC MARKERS ( 01 Mar 2024 01:56 )  60 ng/L / x     / x     / x     / x     / x

## 2024-03-01 NOTE — H&P ADULT - ASSESSMENT
89 y/o female hx HTN/HLD, DM2, CAD (s/p CABG), PVD (s/p stents in bilateral LE), CKD (Baseline Cr reportedly 1.2), CHF, admitted for work up and management of shortness of breath clinical presentation consistent with likely acute on chronic diastolic heart failure

## 2024-03-01 NOTE — ED ADULT NURSE NOTE - OBJECTIVE STATEMENT
89 y/o F A&Ox4 with PMH of CHF, DM, HTN, and HLD presents to the ED c/o SOB. Pt primarily Occitan speaking; requests son at the bedside to translate. Pt's son reports dyspnea on exertion for the past 2 weeks. Pt's son reports pt went to the her PCP who advised pt comes to the ED for further work up. Denies oxygen use at home. Denies chest pain, n/v/d, lightheadedness, dizziness, fever, chills. Patient safety maintained, bed is in lowest position, wheels locked, and side rails raised.

## 2024-03-01 NOTE — ED ADULT NURSE REASSESSMENT NOTE - NS ED NURSE REASSESS COMMENT FT1
report received from KAMRYN Kern in red. Pt a/ox4, vss, denies pain at the moment, side rails up and bed in lowest position. swapna matamoros inquired via teams for diet order. Pt provided with food.

## 2024-03-01 NOTE — ED ADULT NURSE NOTE - NSFALLRISKINTERV_ED_ALL_ED
Assistance with ambulation/Communicate fall risk and risk factors to all staff, patient, and family/Monitor gait and stability/Provide patient with walking aids/Bed in lowest position, wheels locked, appropriate side rails in place/Call bell, personal items and telephone in reach/Instruct patient to call for assistance before getting out of bed/chair/stretcher/Physically safe environment - no spills, clutter or unnecessary equipment/Purposeful Proactive Rounding/Room/bathroom lighting operational, light cord in reach

## 2024-03-01 NOTE — H&P ADULT - PROBLEM SELECTOR PLAN 3
HbA1C  finger sticks acceptable  no oral meds  diabetic diet  continue finger sticks with short acting insulin sliding scale   reduce long acting insulin to 5 units while inpatient for now  increase as needed

## 2024-03-01 NOTE — H&P ADULT - PROBLEM SELECTOR PLAN 7
severe  was advised against surgery secondary to advanced age   continue Tylenol   X Rays hip to evaluate progression

## 2024-03-01 NOTE — PHYSICAL THERAPY INITIAL EVALUATION ADULT - FOLLOWS COMMANDS/ANSWERS QUESTIONS, REHAB EVAL
DISCHARGE SUMMARY from Nurse    PATIENT INSTRUCTIONS:    What to do at Home:  Recommended activity: activity as tolerated    If you experience a return of any symptoms, please follow up with ***.    *  Please give a list of your current medications to your Primary Care Provider.    *  Please update this list whenever your medications are discontinued, doses are      changed, or new medications (including over-the-counter products) are added.    *  Please carry medication information at all times in case of emergency situations.    These are general instructions for a healthy lifestyle:    No smoking/ No tobacco products/ Avoid exposure to second hand smoke  Surgeon General's Warning:  Quitting smoking now greatly reduces serious risk to your health.    Obesity, smoking, and sedentary lifestyle greatly increases your risk for illness    A healthy diet, regular physical exercise & weight monitoring are important for maintaining a healthy lifestyle    You may be retaining fluid if you have a history of heart failure or if you experience any of the following symptoms:  Weight gain of 3 pounds or more overnight or 5 pounds in a week, increased swelling in our hands or feet or shortness of breath while lying flat in bed.  Please call your doctor as soon as you notice any of these symptoms; do not wait until your next office visit.        The discharge information has been reviewed with the patient.      
100% of the time/able to follow single-step instructions

## 2024-03-01 NOTE — H&P ADULT - NSICDXPASTMEDICALHX_GEN_ALL_CORE_FT
PAST MEDICAL HISTORY:  CAD (coronary artery disease)     Diabetes     Essential hypertension, benign     History of chronic gastritis     Hypercholesteremia     Peripheral vascular disease      Hold Trang for now

## 2024-03-01 NOTE — ED ADULT NURSE REASSESSMENT NOTE - NS ED NURSE REASSESS COMMENT FT1
Report received from KAMRYN Valencia. Pt a&ox4, able to follow commands. Breathing spontaneous & nonlabored. Abdomen soft & nondistended. IV site patent, no signs of phlebitis, flushing without difficulty. Call bell within reach, bed in lowest position. Patient admitted to tele, RTM waiting for bed. Pt given food tray. Son at bedside. Pt has no complaints at this time.

## 2024-03-01 NOTE — ED PROVIDER NOTE - OBJECTIVE STATEMENT
89 y/o female hx     HTN/HLD, DM2, CAD (s/p CABG), PVD (s/p stents in bilateral LE), CKD (Baseline Cr reportedly 1.2), recent admission for GIB 2/2 chronic gastritis who presented with     Found to have acute on chronic HF , clinically stable and improved post iv Lasix  echo with EF 65%, mild MR, nl LV sys fx  mod concentric LVH    Pt also noted to have AV block  some episodes of Mobitz I block with few brief episodes of mobitz II on high dose Toprol , EP consulted  f/u noted- Wenckebach and episodes during sleep of ATach with high degree AV BLock-  no PPm for now  , htn bp mildly elevated hydralazine doses adjusted .   HD stable , will be discharged home with Home Pt with  close follow up from PCP and cardiology 89 y/o female hx HTN/HLD, DM2, CAD (s/p CABG), PVD (s/p stents in bilateral LE), CKD (Baseline Cr reportedly 1.2), CHF presents with suspected CHF exacerbation. She was sent in by her cardiologist, Ced Rodney for IV diuretics because for the last week she has been having increased dyspnea on exertion. She also endorses left shoulder discomfort at this time. She takes 40 mg po lasix once daily. Otherwise patient feels well, denies headache, shortness of breath at rest, abdominal pain, dysuria/hematuria.

## 2024-03-01 NOTE — ED ADULT NURSE NOTE - PAIN RATING/NUMBER SCALE (0-10): ACTIVITY
GASTROINTESTINAL - ADULT    • Maintains or returns to baseline bowel function Progressing        RESPIRATORY - ADULT    • Achieves optimal ventilation and oxygenation Progressing        Pt alert/oriented x 4, mainly Citizen of [place] speaking.   Slight dyspneic with 0 (no pain/absence of nonverbal indicators of pain)

## 2024-03-01 NOTE — CONSULT NOTE ADULT - ASSESSMENT
89 y/o female hx HTN/HLD, DM2, CAD (s/p CABG), PVD (s/p stents in bilateral LE), CKD (Baseline Cr reportedly 1.2), CHF presents with suspected CHF exacerbation. She was sent in by her cardiologist, Ced Rodney for IV diuretics because for the last week she has been having increased dyspnea on exertion. She also c/o left shoulder discomfort at this time and severe chronic right hip pain from arthritis.. She takes 40 mg po furosemide once daily. Otherwise patient feels well, denies headache, shortness of breath at rest, abdominal pain, dysuria/hematuria. (01 Mar 2024 10:54)     at bedside:   he says she was brought in here for chf and sob:   she has no underlying lung disease:   she has no cough or phlegm : never had fever at home:     chf exacerbation/   R/O Pneumonia  HTN  DM  CAD  PVD  CKD    chf exacerbation/   diuretics per primary team/; seen by cards  -recd intimally diuretics  now on hold per cards   -check echo   R/O Pneumonia  -she has more opacity on rigth side:  but clinically she presented with heart failure:   -she has no fever:  check procal : and her wbc count is normal:   -keep off antibiotics  HTN  -controlled  DM  -monitor and control   CAD  -cont per cards  PVD  -per PMD  CKD  -monitor:    aleida barcenas

## 2024-03-01 NOTE — ED PROVIDER NOTE - ATTENDING CONTRIBUTION TO CARE
Patient with CHF and history as above sent in by her cardiologist for IV diuresis due to worsening dyspnea on exertion.  Outside of this, she is denying any other acute symptoms, particularly  denies leg swelling, chest pain, or shortness of breath at rest.  She endorses compliance with her medications.  On exam patient is well-appearing, breathing comfortably, bilateral crackles, saturating low 90s on room air, and was placed on 2 L via nasal cannula with saturation in the upper 90s now, no peripheral edema.  Workup in the ED shows BMP twice what it was last time, pulmonary edema on chest x-ray consistent with CHF exacerbation.  Patient troponin elevated but likely due to elevated creatinine given lack of chest pain, no acute EKG findings, stable troponin on repeat.  Patient admitted for further treatment of suspected CHF exacerbation

## 2024-03-01 NOTE — PHYSICAL THERAPY INITIAL EVALUATION ADULT - GENERAL OBSERVATIONS, REHAB EVAL
Received on the stretcher in the ED red36, +O2 2L/min, +IV, +[place], reports 1-6/10 R hip pain (pre-existing R hip issues per son at bedside), agreeable to PT

## 2024-03-01 NOTE — H&P ADULT - HISTORY OF PRESENT ILLNESS
89 y/o female hx HTN/HLD, DM2, CAD (s/p CABG), PVD (s/p stents in bilateral LE), CKD (Baseline Cr reportedly 1.2), CHF presents with suspected CHF exacerbation. She was sent in by her cardiologist, Ced Rodney for IV diuretics because for the last week she has been having increased dyspnea on exertion. She also endorses left shoulder discomfort at this time. She takes 40 mg po lasix once daily. Otherwise patient feels well, denies headache, shortness of breath at rest, abdominal pain, dysuria/hematuria. 89 y/o female hx HTN/HLD, DM2, CAD (s/p CABG), PVD (s/p stents in bilateral LE), CKD (Baseline Cr reportedly 1.2), CHF presents with suspected CHF exacerbation. She was sent in by her cardiologist, Ced Rodney for IV diuretics because for the last week she has been having increased dyspnea on exertion. She also c/o left shoulder discomfort at this time and severe chronic right hip pain from arthritis.. She takes 40 mg po furosemide once daily. Otherwise patient feels well, denies headache, shortness of breath at rest, abdominal pain, dysuria/hematuria.

## 2024-03-01 NOTE — ED PROVIDER NOTE - PHYSICAL EXAMINATION
General: Alert and Orientated x 3. No apparent distress.  Head: Normocephalic and atraumatic.  Eyes: PERRLA with EOMI.   ENT: MMM, Oropharynx clear  Neck: Supple. Trachea midline.   Cardiac: Normal S1 and S2 w/ RRR. No murmurs appreciated.   Pulmonary: CTA bilaterally. No increased WOB.   Abdominal: Soft, non-tender, non-distended  Neurologic: No focal sensory or motor deficits.  Musculoskeletal: Strength appropriate in all 4 extremities for age with no limited ROM.  Skin: Color appropriate for race. Intact, warm, and well-perfused.  Psychiatric: Appropriate mood and affect. No apparent risk to self or others. General: Alert and Orientated x 3. No apparent distress.  Head: Normocephalic and atraumatic.  Eyes: PERRLA with EOMI.   ENT: MMM  Neck: Supple.   Cardiac: Normal S1 and S2 w/ RRR. No murmurs appreciated.   Pulmonary: CTA bilaterally. No increased WOB. No rales  Abdominal: Soft, non-tender, non-distended  Neurologic: No focal sensory or motor deficits.  Musculoskeletal: Mild pedal edema bilaterally   Skin: Color appropriate for race. Intact, warm, and well-perfused.  Psychiatric: Appropriate mood and affect. No apparent risk to self or others.

## 2024-03-01 NOTE — H&P ADULT - NSHPADDITIONALINFOADULT_GEN_ALL_CORE
discussed with patient's son at the bedside in detail     76 minutes spent on total encounter. The necessity of the time spent during the encounter on this date of service was due to:     detailed physical exam, obtaining and reviewing history, physical examination, explaining the diagnosis, prognosis and treatment plan with the patient/family/caregiver. I also have spent the time ordering studies and testing, interpreting results, medicine reconciliation, and documentation as above

## 2024-03-01 NOTE — CONSULT NOTE ADULT - ASSESSMENT
89 y/o female hx HTN/HLD, DM2, CAD (s/p 3V CABG in 2002 [place] to LAD, SVG to the OM, and SVG to the PDA), PVD (s/p stents in bilateral LE), CKD (Baseline Cr reportedly 1.2), HFpEF presents with WEEKS, likely HFpEF exacerbation. Mild troponin elevation likely due to heart failure.    Recommendations:  - Please obtain ECG  - Follow up TTE  - Wean O2 as able  - s/p Lasix 80mg IV x1, can hold further diuresis today, will assess response for diuretic dose tomorrow  - Cont home ASA, Statin, Amlodipine, Hydralazine. May need uptitration in meds if remains hypertensive after diuresis  89 y/o female hx HTN/HLD, DM2, CAD (s/p 3V CABG in 2002 [place] to LAD, SVG to the OM, and SVG to the PDA), PVD (s/p stents in bilateral LE), CKD (Baseline Cr reportedly 1.2), HFpEF presents with WEEKS, likely HFpEF exacerbation. Mild troponin elevation likely due to heart failure.    Recommendations:  - Please obtain ECG  - Monitor on telemetry, has h/o 2nd deg AV block  - Follow up TTE  - Wean O2 as able  - s/p Lasix 80mg IV x1, can hold further diuresis today, will assess response for diuretic dose tomorrow  - Cont home ASA, Statin, Amlodipine, Hydralazine. May need uptitration in meds if remains hypertensive after diuresis  89 year old woman with hypertension, DM2, CAD (s/p 3V CABG in 2002 [place] to LAD, SVG to the OM, and SVG to the PDA), PVD (s/p stents in bilateral LE), CKD (Baseline Cr reportedly 1.2), HFpEF presents with WEEKS, likely HFpEF exacerbation. Mild troponin elevation likely due to heart failure.    Recommendations:  - Please obtain ECG  - Monitor on telemetry, has h/o 2nd deg AV block  - Follow up TTE  - Wean O2 as able  - s/p Lasix 80mg IV x1, can hold further diuresis today, will assess response for diuretic dose tomorrow  - Cont home ASA, Statin, Amlodipine, Hydralazine. May need uptitration in meds if remains hypertensive after diuresis

## 2024-03-01 NOTE — PHYSICAL THERAPY INITIAL EVALUATION ADULT - PERTINENT HX OF CURRENT PROBLEM, REHAB EVAL
89 y/o female hx HTN/HLD, DM2, CAD (s/p CABG), PVD (s/p stents in bilateral LE), CKD (Baseline Cr reportedly 1.2), CHF presents with suspected CHF exacerbation. She was sent in by her cardiologist, Ced Rodney for IV diuretics because for the last week she has been having increased dyspnea on exertion. She also endorses left shoulder discomfort at this time. She takes 40 mg po lasix once daily. Otherwise patient feels well, denies headache, shortness of breath at rest, abdominal pain, dysuria/hematuria. 89F hx HTN/HLD, DM2, CAD (s/p CABG), PVD (s/p stents in bilateral LE), CKD (Baseline Cr reportedly 1.2), CHF p/w suspected CHF exacerbation. Pt was sent in by her cardiologist, Ced Rodney for IV diuretics because for the last week she has been having increased dyspnea on exertion. She also endorses left shoulder discomfort at this time. She takes 40 mg po lasix once daily. Otherwise patient feels well, denies headache, shortness of breath at rest, abdominal pain, dysuria/hematuria.    CHEST XRAY 3/1: Right midlung field patchy opacity and pleural effusions may be related to asymmetric pulmonary edema or of infectious etiology.

## 2024-03-02 LAB
A1C WITH ESTIMATED AVERAGE GLUCOSE RESULT: 7.2 % — HIGH (ref 4–5.6)
ALBUMIN SERPL ELPH-MCNC: 3.8 G/DL — SIGNIFICANT CHANGE UP (ref 3.3–5)
ALP SERPL-CCNC: 213 U/L — HIGH (ref 40–120)
ALT FLD-CCNC: 11 U/L — SIGNIFICANT CHANGE UP (ref 10–45)
ANION GAP SERPL CALC-SCNC: 13 MMOL/L — SIGNIFICANT CHANGE UP (ref 5–17)
AST SERPL-CCNC: 17 U/L — SIGNIFICANT CHANGE UP (ref 10–40)
BILIRUB SERPL-MCNC: 0.2 MG/DL — SIGNIFICANT CHANGE UP (ref 0.2–1.2)
BUN SERPL-MCNC: 78 MG/DL — HIGH (ref 7–23)
CALCIUM SERPL-MCNC: 8.6 MG/DL — SIGNIFICANT CHANGE UP (ref 8.4–10.5)
CHLORIDE SERPL-SCNC: 110 MMOL/L — HIGH (ref 96–108)
CO2 SERPL-SCNC: 22 MMOL/L — SIGNIFICANT CHANGE UP (ref 22–31)
CREAT SERPL-MCNC: 1.92 MG/DL — HIGH (ref 0.5–1.3)
EGFR: 24 ML/MIN/1.73M2 — LOW
ESTIMATED AVERAGE GLUCOSE: 160 MG/DL — HIGH (ref 68–114)
GLUCOSE BLDC GLUCOMTR-MCNC: 122 MG/DL — HIGH (ref 70–99)
GLUCOSE BLDC GLUCOMTR-MCNC: 164 MG/DL — HIGH (ref 70–99)
GLUCOSE BLDC GLUCOMTR-MCNC: 208 MG/DL — HIGH (ref 70–99)
GLUCOSE BLDC GLUCOMTR-MCNC: 243 MG/DL — HIGH (ref 70–99)
GLUCOSE SERPL-MCNC: 117 MG/DL — HIGH (ref 70–99)
HCT VFR BLD CALC: 31 % — LOW (ref 34.5–45)
HGB BLD-MCNC: 8.9 G/DL — LOW (ref 11.5–15.5)
MCHC RBC-ENTMCNC: 25.9 PG — LOW (ref 27–34)
MCHC RBC-ENTMCNC: 28.7 GM/DL — LOW (ref 32–36)
MCV RBC AUTO: 90.4 FL — SIGNIFICANT CHANGE UP (ref 80–100)
NRBC # BLD: 0 /100 WBCS — SIGNIFICANT CHANGE UP (ref 0–0)
PLATELET # BLD AUTO: 285 K/UL — SIGNIFICANT CHANGE UP (ref 150–400)
POTASSIUM SERPL-MCNC: 4.6 MMOL/L — SIGNIFICANT CHANGE UP (ref 3.5–5.3)
POTASSIUM SERPL-SCNC: 4.6 MMOL/L — SIGNIFICANT CHANGE UP (ref 3.5–5.3)
PROT SERPL-MCNC: 6.9 G/DL — SIGNIFICANT CHANGE UP (ref 6–8.3)
RBC # BLD: 3.43 M/UL — LOW (ref 3.8–5.2)
RBC # FLD: 17.4 % — HIGH (ref 10.3–14.5)
SODIUM SERPL-SCNC: 145 MMOL/L — SIGNIFICANT CHANGE UP (ref 135–145)
WBC # BLD: 7.62 K/UL — SIGNIFICANT CHANGE UP (ref 3.8–10.5)
WBC # FLD AUTO: 7.62 K/UL — SIGNIFICANT CHANGE UP (ref 3.8–10.5)

## 2024-03-02 PROCEDURE — 99232 SBSQ HOSP IP/OBS MODERATE 35: CPT | Mod: GC

## 2024-03-02 PROCEDURE — 93010 ELECTROCARDIOGRAM REPORT: CPT

## 2024-03-02 RX ADMIN — Medication 1: at 17:23

## 2024-03-02 RX ADMIN — PANTOPRAZOLE SODIUM 40 MILLIGRAM(S): 20 TABLET, DELAYED RELEASE ORAL at 08:57

## 2024-03-02 RX ADMIN — AMLODIPINE BESYLATE 10 MILLIGRAM(S): 2.5 TABLET ORAL at 05:23

## 2024-03-02 RX ADMIN — HEPARIN SODIUM 5000 UNIT(S): 5000 INJECTION INTRAVENOUS; SUBCUTANEOUS at 17:22

## 2024-03-02 RX ADMIN — Medication 50 MILLIGRAM(S): at 22:50

## 2024-03-02 RX ADMIN — HEPARIN SODIUM 5000 UNIT(S): 5000 INJECTION INTRAVENOUS; SUBCUTANEOUS at 05:23

## 2024-03-02 RX ADMIN — Medication 25 MILLIGRAM(S): at 22:50

## 2024-03-02 RX ADMIN — Medication 81 MILLIGRAM(S): at 13:16

## 2024-03-02 RX ADMIN — Medication 25 MILLIGRAM(S): at 13:16

## 2024-03-02 RX ADMIN — Medication 25 MILLIGRAM(S): at 05:23

## 2024-03-02 RX ADMIN — Medication 2: at 12:30

## 2024-03-02 RX ADMIN — INSULIN GLARGINE 5 UNIT(S): 100 INJECTION, SOLUTION SUBCUTANEOUS at 22:51

## 2024-03-02 RX ADMIN — ATORVASTATIN CALCIUM 10 MILLIGRAM(S): 80 TABLET, FILM COATED ORAL at 22:50

## 2024-03-02 NOTE — PROGRESS NOTE ADULT - ASSESSMENT
89 year old woman with hypertension, DM2, CAD (s/p 3V CABG in 2002 [place] to LAD, SVG to the OM, and SVG to the PDA), PVD (s/p stents in bilateral LE), CKD (Baseline Cr reportedly 1.2), HFpEF presents with WEEKS, likely HFpEF exacerbation. Mild troponin elevation likely due to heart failure.    Recommendations:  - PLEASE OBTAIN EKG. ON TELE SUSPICIOUS FOR FIB. Prior in 2022 reviewed and with SR, RBBB, mobitz I 2nd degree AV block   - Follow up TTE  - Wean O2 as able  - s/p Lasix 80mg IV x1 yesterday. Recommendations today:   - Cont home ASA, Statin, Amlodipine 10. Please increase hydralazine to 25mg tid (and further as necessary) to target BP < 130/80   89 year old woman with hypertension, DM2, CAD (s/p 3V CABG in 2002 [place] to LAD, SVG to the OM, and SVG to the PDA), PVD (s/p stents in bilateral LE), CKD (Baseline Cr reportedly 1.2), HFpEF presents with WEEKS, likely HFpEF exacerbation. Mild troponin elevation likely due to heart failure.    Recommendations:  - PLEASE OBTAIN EKG. On tele occasional dropped beats. Prior in 2022 reviewed and with SR, RBBB, mobitz I 2nd degree AV block   - Follow up TTE  - Wean O2 as able  - s/p Lasix 80mg IV x1 yesterday. Recommendations today:   - Cont home ASA, Statin, Amlodipine 10. Please increase hydralazine to 25mg tid (and further as necessary) to target BP < 130/80   89 year old woman with hypertension, DM2, CAD (s/p 3V CABG in 2002 [place] to LAD, SVG to the OM, and SVG to the PDA), PVD (s/p stents in bilateral LE), CKD (Baseline Cr reportedly 1.2), HFpEF presents with WEEKS, likely HFpEF exacerbation. Mild troponin elevation likely due to heart failure.    Recommendations:  - On tele occasional dropped beats c/w mobitz II. 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 EKG today: Sinus rhythm RBBB, first degree AVB, KS 384ms   - Follow up TTE  - Wean O2 as able  - s/p Lasix 80mg IV x1 yesterday. I suspect she is euvolemic based on her exam, absence of peripheral edema, orthopnea and benign JVD. No further IV diuretics. If creatinine is stable/downtrending, would re-initiate her outpatient maintenance lasix 40mg starting tomorrow.  - Cont home ASA, Statin, Amlodipine 10. Please increase hydralazine to 50mg tid (and further as necessary) to target BP < 130/80   89 year old woman with hypertension, DM2, CAD (s/p 3V CABG in 2002 [place] to LAD, SVG to the OM, and SVG to the PDA), PVD (s/p stents in bilateral LE), CKD (Baseline Cr reportedly 1.2), HFpEF presents with WEEKS, likely HFpEF exacerbation. Mild troponin elevation likely due to heart failure.    Recommendations:  - On tele occasional dropped beats c/w mobitz II. 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 EKG today: Sinus rhythm RBBB, first degree AVB, SC 384ms   - Follow up TTE; RA pressure will be particularly helpful in assessing relative volume status.  - Wean O2 as able  - s/p Lasix 80mg IV x1 yesterday. Clinically, I suspect she is euvolemic based on her exam, absence of peripheral edema, orthopnea and benign JVD. No further IV diuretics. If creatinine is stable/downtrending, would re-initiate her outpatient maintenance lasix 40mg starting tomorrow.  - Cont home ASA, Statin, Amlodipine 10. Please increase hydralazine to 50mg tid (and further as necessary) to target BP < 130/80

## 2024-03-02 NOTE — PROGRESS NOTE ADULT - SUBJECTIVE AND OBJECTIVE BOX
Patient is a 89y old  Female who presents with a chief complaint of shortness of breath (02 Mar 2024 06:54)      DATE OF SERVICE: 03-02-24 @ 13:37    SUBJECTIVE / OVERNIGHT EVENTS: overnight events noted    ROS:  Resp: No cough no sputum production  CVS: No chest pain no palpitations no orthopnea  GI: no N/V/D  'I feel fine'   son at bedside         MEDICATIONS  (STANDING):  amLODIPine   Tablet 10 milliGRAM(s) Oral daily  aspirin enteric coated 81 milliGRAM(s) Oral daily  atorvastatin 10 milliGRAM(s) Oral at bedtime  dextrose 5%. 1000 milliLiter(s) (50 mL/Hr) IV Continuous <Continuous>  dextrose 5%. 1000 milliLiter(s) (100 mL/Hr) IV Continuous <Continuous>  dextrose 50% Injectable 25 Gram(s) IV Push once  dextrose 50% Injectable 25 Gram(s) IV Push once  dextrose 50% Injectable 12.5 Gram(s) IV Push once  glucagon  Injectable 1 milliGRAM(s) IntraMuscular once  heparin   Injectable 5000 Unit(s) SubCutaneous every 12 hours  hydrALAZINE 25 milliGRAM(s) Oral every 8 hours  insulin glargine Injectable (LANTUS) 5 Unit(s) SubCutaneous at bedtime  insulin lispro (ADMELOG) corrective regimen sliding scale   SubCutaneous three times a day before meals  pantoprazole    Tablet 40 milliGRAM(s) Oral before breakfast  traZODone 50 milliGRAM(s) Oral at bedtime    MEDICATIONS  (PRN):  acetaminophen     Tablet .. 650 milliGRAM(s) Oral every 8 hours PRN Temp greater or equal to 38C (100.4F), Mild Pain (1 - 3)  dextrose Oral Gel 15 Gram(s) Oral once PRN Blood Glucose LESS THAN 70 milliGRAM(s)/deciliter        CAPILLARY BLOOD GLUCOSE      POCT Blood Glucose.: 208 mg/dL (02 Mar 2024 12:09)  POCT Blood Glucose.: 122 mg/dL (02 Mar 2024 08:27)  POCT Blood Glucose.: 203 mg/dL (01 Mar 2024 21:55)  POCT Blood Glucose.: 225 mg/dL (01 Mar 2024 17:50)  POCT Blood Glucose.: 233 mg/dL (01 Mar 2024 17:33)    I&O's Summary    01 Mar 2024 07:01  -  02 Mar 2024 07:00  --------------------------------------------------------  IN: 0 mL / OUT: 550 mL / NET: -550 mL        Vital Signs Last 24 Hrs  T(C): 36.7 (02 Mar 2024 11:31), Max: 36.8 (02 Mar 2024 00:20)  T(F): 98 (02 Mar 2024 11:31), Max: 98.2 (02 Mar 2024 00:20)  HR: 70 (02 Mar 2024 11:31) (63 - 74)  BP: 159/64 (02 Mar 2024 11:31) (155/62 - 172/62)  BP(mean): --  RR: 18 (02 Mar 2024 11:31) (18 - 18)  SpO2: 95% (02 Mar 2024 11:31) (95% - 98%)    PHYSICAL EXAM:  NECK: Supple, No JVD  CHEST/LUNG: clear  HEART: S1 S2; systolic murmur +   ABDOMEN: Soft, Nontender  EXTREMITIES:   no edema  NEUROLOGY: Alert non-focal  SKIN: No rashes or lesions    LABS:                        8.9    7.62  )-----------( 285      ( 02 Mar 2024 05:58 )             31.0     03-02    145  |  110<H>  |  78<H>  ----------------------------<  117<H>  4.6   |  22  |  1.92<H>    Ca    8.6      02 Mar 2024 05:58  Mg     2.2     03-01    TPro  6.9  /  Alb  3.8  /  TBili  0.2  /  DBili  x   /  AST  17  /  ALT  11  /  AlkPhos  213<H>  03-02    PT/INR - ( 01 Mar 2024 01:56 )   PT: 13.9 sec;   INR: 1.27 ratio         PTT - ( 01 Mar 2024 01:56 )  PTT:39.7 sec      Urinalysis Basic - ( 02 Mar 2024 05:58 )    Color: x / Appearance: x / SG: x / pH: x  Gluc: 117 mg/dL / Ketone: x  / Bili: x / Urobili: x   Blood: x / Protein: x / Nitrite: x   Leuk Esterase: x / RBC: x / WBC x   Sq Epi: x / Non Sq Epi: x / Bacteria: x          All consultant(s) notes reviewed and care discussed with other providers        Contact Number, Dr Huddleston [phone]

## 2024-03-02 NOTE — PROGRESS NOTE ADULT - SUBJECTIVE AND OBJECTIVE BOX
Hector Stein MD  Cardiology Fellow  [phone]  All Cardiology service information can be found 24/7 on [email or web address], password: [email or web address]    Patient seen and examined at bedside.  AF HR 60s-70s, Tele: Documented as junctional, suspect fib. BP 160s-170s/70s, 2L NC 96%, net neg 550cc doubt accurate  7.62 < 8.9 > 285  145/4.6 | 110/22 | 78/1.92 < 117  XR hip r hip osteoarthritis    Review Of Systems: No chest pain, shortness of breath, or palpitations            Current Meds:  acetaminophen     Tablet .. 650 milliGRAM(s) Oral every 8 hours PRN  amLODIPine   Tablet 10 milliGRAM(s) Oral daily  aspirin enteric coated 81 milliGRAM(s) Oral daily  atorvastatin 10 milliGRAM(s) Oral at bedtime  dextrose 5%. 1000 milliLiter(s) IV Continuous <Continuous>  dextrose 5%. 1000 milliLiter(s) IV Continuous <Continuous>  dextrose 50% Injectable 25 Gram(s) IV Push once  dextrose 50% Injectable 25 Gram(s) IV Push once  dextrose 50% Injectable 12.5 Gram(s) IV Push once  dextrose Oral Gel 15 Gram(s) Oral once PRN  glucagon  Injectable 1 milliGRAM(s) IntraMuscular once  heparin   Injectable 5000 Unit(s) SubCutaneous every 12 hours  hydrALAZINE 25 milliGRAM(s) Oral every 8 hours  insulin glargine Injectable (LANTUS) 5 Unit(s) SubCutaneous at bedtime  insulin lispro (ADMELOG) corrective regimen sliding scale   SubCutaneous three times a day before meals  pantoprazole    Tablet 40 milliGRAM(s) Oral before breakfast  traZODone 50 milliGRAM(s) Oral at bedtime    Vitals:  T(F): 98.1 (03-02), Max: 98.2 (03-02)  HR: 74 (03-02) (58 - 74)  BP: 171/75 (03-02) (150/61 - 178/71)  RR: 18 (03-02)  SpO2: 96% (03-02)  I&O's Summary    01 Mar 2024 07:01  -  02 Mar 2024 06:56  --------------------------------------------------------  IN: 0 mL / OUT: 550 mL / NET: -550 mL    Physical Exam:  GENERAL: No acute distress, well-developed  HEAD:  Atraumatic, Normocephalic  ENT: EOMI, PERRLA, conjunctiva and sclera clear, Neck supple, + JVD, moist mucosa  CHEST/LUNG: Clear to auscultation bilaterally; No wheeze, equal breath sounds bilaterally   HEART: Regular rate and rhythm; holosystolic murmur at apex  ABDOMEN: Soft, Nontender, Nondistended; Bowel sounds present  EXTREMITIES:  No clubbing, cyanosis, or edema  PSYCH: Nl behavior, nl affect  NEUROLOGY: AAOx3, non-focal, cranial nerves intact  SKIN: Normal color, No rashes or lesions                          8.9    7.62  )-----------( 285      ( 02 Mar 2024 05:58 )             31.0     03-02    145  |  110<H>  |  78<H>  ----------------------------<  117<H>  4.6   |  22  |  1.92<H>    Ca    8.6      02 Mar 2024 05:58  Mg     2.2     03-01    TPro  6.9  /  Alb  3.8  /  TBili  0.2  /  DBili  x   /  AST  17  /  ALT  11  /  AlkPhos  213<H>  03-02    PT/INR - ( 01 Mar 2024 01:56 )   PT: 13.9 sec;   INR: 1.27 ratio         PTT - ( 01 Mar 2024 01:56 )  PTT:39.7 sec  CARDIAC MARKERS ( 01 Mar 2024 03:53 )  60 ng/L / x     / x     / x     / x     / x      CARDIAC MARKERS ( 01 Mar 2024 01:56 )  60 ng/L / x     / x     / x     / x     / x                  New ECG(s): Personally reviewed    Echo:    Stress Testing:     Cath:    Imaging:    Interpretation of Telemetry:   Hector Stein MD  Cardiology Fellow  [phone]  All Cardiology service information can be found 24/7 on [email or web address], password: [email or web address]    Patient seen and examined at bedside.  AF HR 60s-70s, Tele: occasional beats, fine baseline. p waves, suspect mobitz II. BP 160s-170s/70s, 2L NC 96%, net neg 550cc doubt accurate  7.62 < 8.9 > 285  145/4.6 | 110/22 | 78/1.92 < 117  XR hip r hip osteoarthritis  No further orthopnea. Nasal cannula is outside of her nares    Review Of Systems: No chest pain, shortness of breath, or palpitations            Current Meds:  acetaminophen     Tablet .. 650 milliGRAM(s) Oral every 8 hours PRN  amLODIPine   Tablet 10 milliGRAM(s) Oral daily  aspirin enteric coated 81 milliGRAM(s) Oral daily  atorvastatin 10 milliGRAM(s) Oral at bedtime  dextrose 5%. 1000 milliLiter(s) IV Continuous <Continuous>  dextrose 5%. 1000 milliLiter(s) IV Continuous <Continuous>  dextrose 50% Injectable 25 Gram(s) IV Push once  dextrose 50% Injectable 25 Gram(s) IV Push once  dextrose 50% Injectable 12.5 Gram(s) IV Push once  dextrose Oral Gel 15 Gram(s) Oral once PRN  glucagon  Injectable 1 milliGRAM(s) IntraMuscular once  heparin   Injectable 5000 Unit(s) SubCutaneous every 12 hours  hydrALAZINE 25 milliGRAM(s) Oral every 8 hours  insulin glargine Injectable (LANTUS) 5 Unit(s) SubCutaneous at bedtime  insulin lispro (ADMELOG) corrective regimen sliding scale   SubCutaneous three times a day before meals  pantoprazole    Tablet 40 milliGRAM(s) Oral before breakfast  traZODone 50 milliGRAM(s) Oral at bedtime    Vitals:  T(F): 98.1 (03-02), Max: 98.2 (03-02)  HR: 74 (03-02) (58 - 74)  BP: 171/75 (03-02) (150/61 - 178/71)  RR: 18 (03-02)  SpO2: 96% (03-02)  I&O's Summary    01 Mar 2024 07:01  -  02 Mar 2024 06:56  --------------------------------------------------------  IN: 0 mL / OUT: 550 mL / NET: -550 mL    Physical Exam:  GENERAL: No acute distress, well-developed  HEAD:  Atraumatic, Normocephalic  ENT: EOMI, PERRLA, conjunctiva and sclera clear, Neck supple, + JVD, moist mucosa  CHEST/LUNG: Clear to auscultation bilaterally; No wheeze, equal breath sounds bilaterally   HEART: Regular rate and rhythm; holosystolic murmur at apex  ABDOMEN: Soft, Nontender, Nondistended; Bowel sounds present  EXTREMITIES:  No clubbing, cyanosis, or edema  PSYCH: Nl behavior, nl affect  NEUROLOGY: AAOx3, non-focal, cranial nerves intact  SKIN: Normal color, No rashes or lesions                          8.9    7.62  )-----------( 285      ( 02 Mar 2024 05:58 )             31.0     03-02    145  |  110<H>  |  78<H>  ----------------------------<  117<H>  4.6   |  22  |  1.92<H>    Ca    8.6      02 Mar 2024 05:58  Mg     2.2     03-01    TPro  6.9  /  Alb  3.8  /  TBili  0.2  /  DBili  x   /  AST  17  /  ALT  11  /  AlkPhos  213<H>  03-02    PT/INR - ( 01 Mar 2024 01:56 )   PT: 13.9 sec;   INR: 1.27 ratio         PTT - ( 01 Mar 2024 01:56 )  PTT:39.7 sec  CARDIAC MARKERS ( 01 Mar 2024 03:53 )  60 ng/L / x     / x     / x     / x     / x      CARDIAC MARKERS ( 01 Mar 2024 01:56 )  60 ng/L / x     / x     / x     / x     / x                  New ECG(s): Personally reviewed    Echo:    Stress Testing:     Cath:    Imaging:    Interpretation of Telemetry:   Hector Stein MD  Cardiology Fellow  [phone]  All Cardiology service information can be found 24/7 on [email or web address], password: [email or web address]    Patient seen and examined at bedside.  AF HR 60s-70s, Tele: occasional beats, fine baseline. p waves, suspect mobitz II. BP 160s-170s/70s, 2L NC 96%, net neg 550cc doubt accurate  7.62 < 8.9 > 285  145/4.6 | 110/22 | 78/1.92 < 117  XR hip r hip osteoarthritis  No further orthopnea. Nasal cannula is outside of her nares. Son says she has been making a lot of urine    Review Of Systems: No chest pain, shortness of breath, or palpitations            Current Meds:  acetaminophen     Tablet .. 650 milliGRAM(s) Oral every 8 hours PRN  amLODIPine   Tablet 10 milliGRAM(s) Oral daily  aspirin enteric coated 81 milliGRAM(s) Oral daily  atorvastatin 10 milliGRAM(s) Oral at bedtime  dextrose 5%. 1000 milliLiter(s) IV Continuous <Continuous>  dextrose 5%. 1000 milliLiter(s) IV Continuous <Continuous>  dextrose 50% Injectable 25 Gram(s) IV Push once  dextrose 50% Injectable 25 Gram(s) IV Push once  dextrose 50% Injectable 12.5 Gram(s) IV Push once  dextrose Oral Gel 15 Gram(s) Oral once PRN  glucagon  Injectable 1 milliGRAM(s) IntraMuscular once  heparin   Injectable 5000 Unit(s) SubCutaneous every 12 hours  hydrALAZINE 25 milliGRAM(s) Oral every 8 hours  insulin glargine Injectable (LANTUS) 5 Unit(s) SubCutaneous at bedtime  insulin lispro (ADMELOG) corrective regimen sliding scale   SubCutaneous three times a day before meals  pantoprazole    Tablet 40 milliGRAM(s) Oral before breakfast  traZODone 50 milliGRAM(s) Oral at bedtime    Vitals:  T(F): 98.1 (03-02), Max: 98.2 (03-02)  HR: 74 (03-02) (58 - 74)  BP: 171/75 (03-02) (150/61 - 178/71)  RR: 18 (03-02)  SpO2: 96% (03-02)  I&O's Summary    01 Mar 2024 07:01  -  02 Mar 2024 06:56  --------------------------------------------------------  IN: 0 mL / OUT: 550 mL / NET: -550 mL    Physical Exam:  GENERAL: No acute distress, well-developed  HEAD:  Atraumatic, Normocephalic  ENT: EOMI, PERRLA, conjunctiva and sclera clear, Neck supple, + JVD, moist mucosa  CHEST/LUNG: Clear to auscultation bilaterally; No wheeze, equal breath sounds bilaterally   HEART: Regular rate and rhythm; holosystolic murmur at apex  ABDOMEN: Soft, Nontender, Nondistended; Bowel sounds present  EXTREMITIES:  No clubbing, cyanosis, or edema  PSYCH: Nl behavior, nl affect  NEUROLOGY: AAOx3, non-focal, cranial nerves intact  SKIN: Normal color, No rashes or lesions                          8.9    7.62  )-----------( 285      ( 02 Mar 2024 05:58 )             31.0     03-02    145  |  110<H>  |  78<H>  ----------------------------<  117<H>  4.6   |  22  |  1.92<H>    Ca    8.6      02 Mar 2024 05:58  Mg     2.2     03-01    TPro  6.9  /  Alb  3.8  /  TBili  0.2  /  DBili  x   /  AST  17  /  ALT  11  /  AlkPhos  213<H>  03-02    PT/INR - ( 01 Mar 2024 01:56 )   PT: 13.9 sec;   INR: 1.27 ratio         PTT - ( 01 Mar 2024 01:56 )  PTT:39.7 sec  CARDIAC MARKERS ( 01 Mar 2024 03:53 )  60 ng/L / x     / x     / x     / x     / x      CARDIAC MARKERS ( 01 Mar 2024 01:56 )  60 ng/L / x     / x     / x     / x     / x                  New ECG(s): Personally reviewed    Echo:    Stress Testing:     Cath:    Imaging:    Interpretation of Telemetry:   Hector Stein MD  Cardiology Fellow  [phone]  All Cardiology service information can be found 24/7 on [email or web address], password: [email or web address]    Patient seen and examined at bedside.  AF HR 60s-70s, Tele: occasional beats, fine baseline. p waves, suspect mobitz I. BP 160s-170s/70s, 2L NC 96%, net neg 550cc doubt accurate  7.62 < 8.9 > 285  145/4.6 | 110/22 | 78/1.92 < 117  XR hip r hip osteoarthritis  No further orthopnea. Nasal cannula is outside of her nares. Son says she has been making a lot of urine    Review Of Systems: No chest pain, shortness of breath, or palpitations            Current Meds:  acetaminophen     Tablet .. 650 milliGRAM(s) Oral every 8 hours PRN  amLODIPine   Tablet 10 milliGRAM(s) Oral daily  aspirin enteric coated 81 milliGRAM(s) Oral daily  atorvastatin 10 milliGRAM(s) Oral at bedtime  dextrose 5%. 1000 milliLiter(s) IV Continuous <Continuous>  dextrose 5%. 1000 milliLiter(s) IV Continuous <Continuous>  dextrose 50% Injectable 25 Gram(s) IV Push once  dextrose 50% Injectable 25 Gram(s) IV Push once  dextrose 50% Injectable 12.5 Gram(s) IV Push once  dextrose Oral Gel 15 Gram(s) Oral once PRN  glucagon  Injectable 1 milliGRAM(s) IntraMuscular once  heparin   Injectable 5000 Unit(s) SubCutaneous every 12 hours  hydrALAZINE 25 milliGRAM(s) Oral every 8 hours  insulin glargine Injectable (LANTUS) 5 Unit(s) SubCutaneous at bedtime  insulin lispro (ADMELOG) corrective regimen sliding scale   SubCutaneous three times a day before meals  pantoprazole    Tablet 40 milliGRAM(s) Oral before breakfast  traZODone 50 milliGRAM(s) Oral at bedtime    Vitals:  T(F): 98.1 (03-02), Max: 98.2 (03-02)  HR: 74 (03-02) (58 - 74)  BP: 171/75 (03-02) (150/61 - 178/71)  RR: 18 (03-02)  SpO2: 96% (03-02)  I&O's Summary    01 Mar 2024 07:01  -  02 Mar 2024 06:56  --------------------------------------------------------  IN: 0 mL / OUT: 550 mL / NET: -550 mL    Physical Exam:  GENERAL: No acute distress, well-developed  HEAD:  Atraumatic, Normocephalic  ENT: EOMI, PERRLA, conjunctiva and sclera clear, Neck supple, + JVD, moist mucosa  CHEST/LUNG: Clear to auscultation bilaterally; No wheeze, equal breath sounds bilaterally   HEART: Regular rate and rhythm; holosystolic murmur at apex  ABDOMEN: Soft, Nontender, Nondistended; Bowel sounds present  EXTREMITIES:  No clubbing, cyanosis, or edema  PSYCH: Nl behavior, nl affect  NEUROLOGY: AAOx3, non-focal, cranial nerves intact  SKIN: Normal color, No rashes or lesions                          8.9    7.62  )-----------( 285      ( 02 Mar 2024 05:58 )             31.0     03-02    145  |  110<H>  |  78<H>  ----------------------------<  117<H>  4.6   |  22  |  1.92<H>    Ca    8.6      02 Mar 2024 05:58  Mg     2.2     03-01    TPro  6.9  /  Alb  3.8  /  TBili  0.2  /  DBili  x   /  AST  17  /  ALT  11  /  AlkPhos  213<H>  03-02    PT/INR - ( 01 Mar 2024 01:56 )   PT: 13.9 sec;   INR: 1.27 ratio         PTT - ( 01 Mar 2024 01:56 )  PTT:39.7 sec  CARDIAC MARKERS ( 01 Mar 2024 03:53 )  60 ng/L / x     / x     / x     / x     / x      CARDIAC MARKERS ( 01 Mar 2024 01:56 )  60 ng/L / x     / x     / x     / x     / x                  New ECG(s): Personally reviewed    Echo:    Stress Testing:     Cath:    Imaging:    Interpretation of Telemetry:

## 2024-03-02 NOTE — PROGRESS NOTE ADULT - SUBJECTIVE AND OBJECTIVE BOX
Date of Service: 03-02-24 @ 15:23    Patient is a 89y old  Female who presents with a chief complaint of shortness of breath (02 Mar 2024 13:37)      Any change in ROS: seems OK: no sob    MEDICATIONS  (STANDING):  amLODIPine   Tablet 10 milliGRAM(s) Oral daily  aspirin enteric coated 81 milliGRAM(s) Oral daily  atorvastatin 10 milliGRAM(s) Oral at bedtime  dextrose 5%. 1000 milliLiter(s) (50 mL/Hr) IV Continuous <Continuous>  dextrose 5%. 1000 milliLiter(s) (100 mL/Hr) IV Continuous <Continuous>  dextrose 50% Injectable 25 Gram(s) IV Push once  dextrose 50% Injectable 25 Gram(s) IV Push once  dextrose 50% Injectable 12.5 Gram(s) IV Push once  glucagon  Injectable 1 milliGRAM(s) IntraMuscular once  heparin   Injectable 5000 Unit(s) SubCutaneous every 12 hours  hydrALAZINE 25 milliGRAM(s) Oral every 8 hours  insulin glargine Injectable (LANTUS) 5 Unit(s) SubCutaneous at bedtime  insulin lispro (ADMELOG) corrective regimen sliding scale   SubCutaneous three times a day before meals  pantoprazole    Tablet 40 milliGRAM(s) Oral before breakfast  traZODone 50 milliGRAM(s) Oral at bedtime    MEDICATIONS  (PRN):  acetaminophen     Tablet .. 650 milliGRAM(s) Oral every 8 hours PRN Temp greater or equal to 38C (100.4F), Mild Pain (1 - 3)  dextrose Oral Gel 15 Gram(s) Oral once PRN Blood Glucose LESS THAN 70 milliGRAM(s)/deciliter    Vital Signs Last 24 Hrs  T(C): 36.7 (02 Mar 2024 11:31), Max: 36.8 (02 Mar 2024 00:20)  T(F): 98 (02 Mar 2024 11:31), Max: 98.2 (02 Mar 2024 00:20)  HR: 70 (02 Mar 2024 11:31) (63 - 74)  BP: 159/64 (02 Mar 2024 11:31) (155/62 - 172/62)  BP(mean): --  RR: 18 (02 Mar 2024 11:31) (18 - 18)  SpO2: 95% (02 Mar 2024 11:31) (95% - 98%)    Parameters below as of 02 Mar 2024 11:31  Patient On (Oxygen Delivery Method): nasal cannula        I&O's Summary    01 Mar 2024 07:01  -  02 Mar 2024 07:00  --------------------------------------------------------  IN: 0 mL / OUT: 550 mL / NET: -550 mL    02 Mar 2024 07:01  -  02 Mar 2024 15:23  --------------------------------------------------------  IN: 480 mL / OUT: 400 mL / NET: 80 mL          Physical Exam:   GENERAL: NAD, well-groomed, well-developed  HEENT: CARY/   Atraumatic, Normocephalic  ENMT: No tonsillar erythema, exudates, or enlargement; Moist mucous membranes, Good dentition, No lesions  NECK: Supple, No JVD, Normal thyroid  CHEST/LUNG: Clear to auscultaion  CVS: Regular rate and rhythm; No murmurs, rubs, or gallops  GI: : Soft, Nontender, Nondistended; Bowel sounds present  NERVOUS SYSTEM:  Alert & awake  EXTREMITIES:- edema  LYMPH: No lymphadenopathy noted  SKIN: No rashes or lesions  ENDOCRINOLOGY: No Thyromegaly  PSYCH: calm     Labs:  22                            8.9    7.62  )-----------( 285      ( 02 Mar 2024 05:58 )             31.0                         9.4    7.28  )-----------( 283      ( 01 Mar 2024 01:56 )             32.2     03-02    145  |  110<H>  |  78<H>  ----------------------------<  117<H>  4.6   |  22  |  1.92<H>  03-01    144  |  110<H>  |  74<H>  ----------------------------<  156<H>  4.4   |  21<L>  |  1.57<H>    Ca    8.6      02 Mar 2024 05:58  Ca    9.0      01 Mar 2024 01:56  Mg     2.2     03-01    TPro  6.9  /  Alb  3.8  /  TBili  0.2  /  DBili  x   /  AST  17  /  ALT  11  /  AlkPhos  213<H>  03-02  TPro  7.3  /  Alb  4.1  /  TBili  0.2  /  DBili  x   /  AST  20  /  ALT  11  /  AlkPhos  229<H>  03-01    CAPILLARY BLOOD GLUCOSE      POCT Blood Glucose.: 208 mg/dL (02 Mar 2024 12:09)  POCT Blood Glucose.: 122 mg/dL (02 Mar 2024 08:27)  POCT Blood Glucose.: 203 mg/dL (01 Mar 2024 21:55)  POCT Blood Glucose.: 225 mg/dL (01 Mar 2024 17:50)  POCT Blood Glucose.: 233 mg/dL (01 Mar 2024 17:33)      LIVER FUNCTIONS - ( 02 Mar 2024 05:58 )  Alb: 3.8 g/dL / Pro: 6.9 g/dL / ALK PHOS: 213 U/L / ALT: 11 U/L / AST: 17 U/L / GGT: x           PT/INR - ( 01 Mar 2024 01:56 )   PT: 13.9 sec;   INR: 1.27 ratio         PTT - ( 01 Mar 2024 01:56 )  PTT:39.7 sec  Urinalysis Basic - ( 02 Mar 2024 05:58 )    Color: x / Appearance: x / SG: x / pH: x  Gluc: 117 mg/dL / Ketone: x  / Bili: x / Urobili: x   Blood: x / Protein: x / Nitrite: x   Leuk Esterase: x / RBC: x / WBC x   Sq Epi: x / Non Sq Epi: x / Bacteria: x        rd< from: CT Chest No Cont (03.01.24 @ 22:14) >    PROCEDURE:  CT of the Chest was performed.  Sagittal and coronal reformats were performed.    FINDINGS:    LUNGS, AIRWAYS, AND PLEURA: Patent central airways.  Moderate right and   small left pleural effusions. Associated compressive atelectasis of the   right greater than left lungs, most prominent in the lower lobes. Mosaic   attenuation, likely secondary to air trapping. Few scattered calcified   granulomas.  MEDIASTINUM AND ALFREDO: Few scattered prominent mediastinal lymph nodes,   largest 1.4 x 1.2 cm in the precarinal region (3-56).  VESSELS: Atherosclerotic changes of the aorta and coronary arteries. CABG.  HEART: Cardiomegaly. No pericardial effusion.  CHEST WALL AND LOWER NECK: Left thyroid lobe peripherally calcified   nodule, 1.5 cm, similar to prior.  VISUALIZED UPPER ABDOMEN: Small volume perihepatic ascites. Mild   subcutaneous edema.  BONES: Degenerative changes. Median sternotomy.    IMPRESSION:  Moderate right and small left pleural effusions with associated   compressive atelectasis.    Small volume perihepatic ascites. Mild subcutaneous edema.    --- End of Report ---           MAYUR RAMON MD; Resident Radiologist  This document has been electronically signed.   SEGUN SANTIAGO MD; Attending Radiologist  This document has been electronically signed. Mar  2 2024  2:44PM    < end of copied text >  < from: Transthoracic Echocardiogram (01.30.22 @ 09:52) >  left ventricular hypertrophy. Increased E/e'  is consistent  with elevated left ventricular filling pressure.  Right Heart: Normal right atrium. Right ventricular  enlargement with normal right ventricular systolic  function. Normal tricuspid valve. Mild tricuspid  regurgitation. Normal pulmonic valve.  Pericardium/Pleura: Normal pericardium with no pericardial  effusion.  Hemodynamic: Estimated right atrial pressure is 8 mm Hg.  Estimated right ventricular systolic pressure equals 37 mm  Hg, assuming right atrial pressure equals 8 mm Hg,  consistent with borderline pulmonary hypertension.  ------------------------------------------------------------------------  Conclusions:  1. Mildly dilated left atrium.  LA volume index = 36 cc/m2.  2. Moderate concentric left ventricular hypertrophy.  3. Normal left ventricular systolic function. No segmental  wall motion abnormalities.  4. Increased E/e'  is consistent with elevated left  ventricular filling pressure.  5. Right ventricular enlargement with normal right  ventricular systolic function.  6. Estimated pulmonary artery systolic pressure equals 37  mm Hg, assuming right atrial pressure equals 8 mm Hg,  consistent with borderline pulmonary pressures.  ------------------------------------------------------------------------  Confirmed on  1/30/2022 - 12:10:19 by Kvng Russell M.D.  [facility]  ------------------------------------------------------------------------    < end of copied text >      RECENT CULTURES:        RESPIRATORY CULTURES:          Studies  Chest X-RAY  CT SCAN Chest   Venous Dopplers: LE:   CT Abdomen  Others

## 2024-03-02 NOTE — PROGRESS NOTE ADULT - ASSESSMENT
89 y/o female hx HTN/HLD, DM2, CAD (s/p CABG), PVD (s/p stents in bilateral LE), CKD (Baseline Cr reportedly 1.2), CHF presents with suspected CHF exacerbation. She was sent in by her cardiologist, Ced Rodney for IV diuretics because for the last week she has been having increased dyspnea on exertion. She also c/o left shoulder discomfort at this time and severe chronic right hip pain from arthritis.. She takes 40 mg po furosemide once daily. Otherwise patient feels well, denies headache, shortness of breath at rest, abdominal pain, dysuria/hematuria. (01 Mar 2024 10:54)     at bedside:   he says she was brought in here for chf and sob:   she has no underlying lung disease:   she has no cough or phlegm : never had fever at home:     chf exacerbation/   R/O Pneumonia  HTN  DM  CAD  PVD  CKD    chf exacerbation/   diuretics per primary team/; seen by cards  -recd intimally diuretics  now on hold per cards   -check echo   3/2" echo pending:  diuretics on hold  R/O Pneumonia  -she has more opacity on rigth side:  but clinically she presented with heart failure:   -she has no fever:  check procal : and her wbc count is normal:   -keep off antibiotics  3/2: ct chest   showed: Moderate right and small left pleural effusions with associated compressive atelectasis. Small volume perihepatic ascites. Mild subcutaneous edema.  -she needs diuresis:  defer to cards  HTN  -controlled  DM  -monitor and control   CAD  -cont per cards  PVD  -per PMD  CKD  -monitor:    aleida barcenas

## 2024-03-02 NOTE — PROGRESS NOTE ADULT - ASSESSMENT
89 y/o female hx HTN/HLD, DM2, CAD (s/p CABG), PVD (s/p stents in bilateral LE), CKD (Baseline Cr reportedly 1.2), CHF, admitted for work up and management of shortness of breath clinical presentation consistent with likely acute on chronic diastolic heart failure

## 2024-03-03 LAB
ALBUMIN SERPL ELPH-MCNC: 3.6 G/DL — SIGNIFICANT CHANGE UP (ref 3.3–5)
ALP SERPL-CCNC: 201 U/L — HIGH (ref 40–120)
ALT FLD-CCNC: 7 U/L — LOW (ref 10–45)
ANION GAP SERPL CALC-SCNC: 11 MMOL/L — SIGNIFICANT CHANGE UP (ref 5–17)
AST SERPL-CCNC: 14 U/L — SIGNIFICANT CHANGE UP (ref 10–40)
BILIRUB SERPL-MCNC: 0.3 MG/DL — SIGNIFICANT CHANGE UP (ref 0.2–1.2)
BUN SERPL-MCNC: 72 MG/DL — HIGH (ref 7–23)
CALCIUM SERPL-MCNC: 8.6 MG/DL — SIGNIFICANT CHANGE UP (ref 8.4–10.5)
CHLORIDE SERPL-SCNC: 111 MMOL/L — HIGH (ref 96–108)
CO2 SERPL-SCNC: 21 MMOL/L — LOW (ref 22–31)
CREAT SERPL-MCNC: 1.76 MG/DL — HIGH (ref 0.5–1.3)
EGFR: 26 ML/MIN/1.73M2 — LOW
GLUCOSE BLDC GLUCOMTR-MCNC: 117 MG/DL — HIGH (ref 70–99)
GLUCOSE BLDC GLUCOMTR-MCNC: 140 MG/DL — HIGH (ref 70–99)
GLUCOSE BLDC GLUCOMTR-MCNC: 174 MG/DL — HIGH (ref 70–99)
GLUCOSE BLDC GLUCOMTR-MCNC: 270 MG/DL — HIGH (ref 70–99)
GLUCOSE SERPL-MCNC: 126 MG/DL — HIGH (ref 70–99)
MAGNESIUM SERPL-MCNC: 2 MG/DL — SIGNIFICANT CHANGE UP (ref 1.6–2.6)
PHOSPHATE SERPL-MCNC: 4.8 MG/DL — HIGH (ref 2.5–4.5)
POTASSIUM SERPL-MCNC: 4.4 MMOL/L — SIGNIFICANT CHANGE UP (ref 3.5–5.3)
POTASSIUM SERPL-SCNC: 4.4 MMOL/L — SIGNIFICANT CHANGE UP (ref 3.5–5.3)
PROT SERPL-MCNC: 6.6 G/DL — SIGNIFICANT CHANGE UP (ref 6–8.3)
SODIUM SERPL-SCNC: 143 MMOL/L — SIGNIFICANT CHANGE UP (ref 135–145)

## 2024-03-03 RX ORDER — TRAMADOL HYDROCHLORIDE 50 MG/1
25 TABLET ORAL EVERY 6 HOURS
Refills: 0 | Status: DISCONTINUED | OUTPATIENT
Start: 2024-03-03 | End: 2024-03-05

## 2024-03-03 RX ADMIN — PANTOPRAZOLE SODIUM 40 MILLIGRAM(S): 20 TABLET, DELAYED RELEASE ORAL at 05:35

## 2024-03-03 RX ADMIN — Medication 25 MILLIGRAM(S): at 05:30

## 2024-03-03 RX ADMIN — TRAMADOL HYDROCHLORIDE 25 MILLIGRAM(S): 50 TABLET ORAL at 12:47

## 2024-03-03 RX ADMIN — TRAMADOL HYDROCHLORIDE 25 MILLIGRAM(S): 50 TABLET ORAL at 13:30

## 2024-03-03 RX ADMIN — Medication 25 MILLIGRAM(S): at 21:56

## 2024-03-03 RX ADMIN — HEPARIN SODIUM 5000 UNIT(S): 5000 INJECTION INTRAVENOUS; SUBCUTANEOUS at 05:32

## 2024-03-03 RX ADMIN — Medication 50 MILLIGRAM(S): at 22:01

## 2024-03-03 RX ADMIN — Medication 25 MILLIGRAM(S): at 12:47

## 2024-03-03 RX ADMIN — AMLODIPINE BESYLATE 10 MILLIGRAM(S): 2.5 TABLET ORAL at 05:30

## 2024-03-03 RX ADMIN — TRAMADOL HYDROCHLORIDE 25 MILLIGRAM(S): 50 TABLET ORAL at 22:00

## 2024-03-03 RX ADMIN — TRAMADOL HYDROCHLORIDE 25 MILLIGRAM(S): 50 TABLET ORAL at 21:56

## 2024-03-03 RX ADMIN — ATORVASTATIN CALCIUM 10 MILLIGRAM(S): 80 TABLET, FILM COATED ORAL at 21:57

## 2024-03-03 RX ADMIN — INSULIN GLARGINE 5 UNIT(S): 100 INJECTION, SOLUTION SUBCUTANEOUS at 22:41

## 2024-03-03 RX ADMIN — Medication 81 MILLIGRAM(S): at 12:53

## 2024-03-03 RX ADMIN — Medication 3: at 12:50

## 2024-03-03 NOTE — DIETITIAN INITIAL EVALUATION ADULT - PERTINENT MEDS FT
MEDICATIONS  (STANDING):  amLODIPine   Tablet 10 milliGRAM(s) Oral daily  aspirin enteric coated 81 milliGRAM(s) Oral daily  atorvastatin 10 milliGRAM(s) Oral at bedtime  dextrose 5%. 1000 milliLiter(s) (100 mL/Hr) IV Continuous <Continuous>  dextrose 5%. 1000 milliLiter(s) (50 mL/Hr) IV Continuous <Continuous>  dextrose 50% Injectable 25 Gram(s) IV Push once  dextrose 50% Injectable 25 Gram(s) IV Push once  dextrose 50% Injectable 12.5 Gram(s) IV Push once  glucagon  Injectable 1 milliGRAM(s) IntraMuscular once  heparin   Injectable 5000 Unit(s) SubCutaneous every 12 hours  hydrALAZINE 25 milliGRAM(s) Oral every 8 hours  insulin glargine Injectable (LANTUS) 5 Unit(s) SubCutaneous at bedtime  insulin lispro (ADMELOG) corrective regimen sliding scale   SubCutaneous three times a day before meals  pantoprazole    Tablet 40 milliGRAM(s) Oral before breakfast  traZODone 50 milliGRAM(s) Oral at bedtime    MEDICATIONS  (PRN):  acetaminophen     Tablet .. 650 milliGRAM(s) Oral every 8 hours PRN Temp greater or equal to 38C (100.4F), Mild Pain (1 - 3)  dextrose Oral Gel 15 Gram(s) Oral once PRN Blood Glucose LESS THAN 70 milliGRAM(s)/deciliter

## 2024-03-03 NOTE — DIETITIAN INITIAL EVALUATION ADULT - PERTINENT LABORATORY DATA
03-03    143  |  111<H>  |  72<H>  ----------------------------<  126<H>  4.4   |  21<L>  |  1.76<H>    Ca    8.6      03 Mar 2024 05:52  Phos  4.8     03-03  Mg     2.0     03-03    TPro  6.6  /  Alb  3.6  /  TBili  0.3  /  DBili  x   /  AST  14  /  ALT  7<L>  /  AlkPhos  201<H>  03-03  POCT Blood Glucose.: 140 mg/dL (03-03-24 @ 09:23)  A1C with Estimated Average Glucose Result: 7.2 % (03-02-24 @ 05:58)

## 2024-03-03 NOTE — DIETITIAN INITIAL EVALUATION ADULT - OTHER CALCULATIONS
Fluid needs deferred to team.  Estimated needs using dosing weight with consideration for HF exacerbation, advanced age.

## 2024-03-03 NOTE — DIETITIAN INITIAL EVALUATION ADULT - ETIOLOGY
Increased physiological demand for nutrients  limited prior exposure to nutrition related diet education

## 2024-03-03 NOTE — DIETITIAN INITIAL EVALUATION ADULT - ADD RECOMMEND
1) Continue DASH diet as tolerated; consider liberalizing to Low Sodium diet if PO intake declines. Defer texture/consistency to SLP/team.   2) Recommend Multivitamin daily to prevent micronutrient deficiencies pending no medical contraindications.  3) Continue to monitor PO intake, weight, labs, skin, GI status, and diet.  4) Nutrition care plan to remain consistent with pt GOC.  5) RD remains available for diet education/review PRN.  6) STAR sticker placed in chart.

## 2024-03-03 NOTE — PROGRESS NOTE ADULT - ASSESSMENT
89 y/o female hx HTN/HLD, DM2, CAD (s/p CABG), PVD (s/p stents in bilateral LE), CKD (Baseline Cr reportedly 1.2), CHF presents with suspected CHF exacerbation. She was sent in by her cardiologist, Ced Rodney for IV diuretics because for the last week she has been having increased dyspnea on exertion. She also c/o left shoulder discomfort at this time and severe chronic right hip pain from arthritis.. She takes 40 mg po furosemide once daily. Otherwise patient feels well, denies headache, shortness of breath at rest, abdominal pain, dysuria/hematuria. (01 Mar 2024 10:54)     at bedside:   he says she was brought in here for chf and sob:   she has no underlying lung disease:   she has no cough or phlegm : never had fever at home:     chf exacerbation/   R/O Pneumonia  HTN  DM  CAD  PVD  CKD    chf exacerbation/   diuretics per primary team/; seen by cards  -recd intimally diuretics  now on hold per cards   -check echo   3/2" echo pending:  diuretics on hold  3/3: stil pending:  remains off diuretics   R/O Pneumonia  -she has more opacity on rigth side:  but clinically she presented with heart failure:   -she has no fever:  check procal : and her wbc count is normal:   -keep off antibiotics  3/2: ct chest   showed: Moderate right and small left pleural effusions with associated compressive atelectasis. Small volume perihepatic ascites. Mild subcutaneous edema.  -she needs diuresis:  defer to cards  3/3: cont to monitor clinically:  she isnot sob:  she looks comfortable:  if the effusion increases,  it might need tap  HTN  -controlled  DM  -monitor and control   CAD  -cont per cards  PVD  -per PMD  CKD  -monitor:    dw acp

## 2024-03-03 NOTE — DIETITIAN INITIAL EVALUATION ADULT - OTHER INFO
- UBW: ~138-144 lb range per pt's family; denies any weight loss.   - Dosing wt: 149 pounds (2/29)  - No new wts available per chart at this time. Weight fluctuations possible partially in setting of fluid shifts (pt with HF).   - [facility] (4/11/23), 140 (12/09/22).   - RD to continue to monitor weight trends as able.   - Nutritionally Pertinent Meds in-house: IVF, atorvastatin.   - Nutritionally Pertinent Labs: high Phos; high Cl.  - Endo:  	- Hx T2DM; regimen PTA: Levemir Flextouch (per outpatient medications list).   	- Elevated BG/POCT noted; A1c 7.2% (3/02) - indicates good glycemic control in setting of advanced age.   	- In-house regimen: Lantus, SSI.   - Cardio:  	- HF exacerbation.   	- S/p IV Lasix 80mg 3/01.   - Pulm: following for SOB.   - GI: ordered for PPI.

## 2024-03-03 NOTE — DIETITIAN INITIAL EVALUATION ADULT - PERSON TAUGHT/METHOD
Heart failure education reviewed with the patient's family in detail. Discussed heart failure nutrition therapy, sodium and fluid intake, importance of diet adherence, daily weights monitoring with the patient. Reinforced importance of weight gain parameters and importance of contacting MD’s about weight changes. Provided handouts on heart failure nutrition therapy, reading heart healthy nutrition labels, heart healthy shopping tips and sodium (salt) content of foods. Pt's family made aware RD remains available PRN./verbal instruction/written material/son instructed

## 2024-03-03 NOTE — DIETITIAN INITIAL EVALUATION ADULT - ENERGY INTAKE
In house, pt's family reports pt with good appetite and PO intake, reports pt is consuming 100% of all meals. Flowsheets indicated adequate (%) PO intake.  Adequate (%)

## 2024-03-03 NOTE — PROGRESS NOTE ADULT - SUBJECTIVE AND OBJECTIVE BOX
Date of Service: 03-03-24 @ 14:57    Patient is a 89y old  Female who presents with a chief complaint of shortness of breath (03 Mar 2024 12:15)      Any change in ROS: No SOB: no cough : no phlegm      MEDICATIONS  (STANDING):  amLODIPine   Tablet 10 milliGRAM(s) Oral daily  aspirin enteric coated 81 milliGRAM(s) Oral daily  atorvastatin 10 milliGRAM(s) Oral at bedtime  dextrose 5%. 1000 milliLiter(s) (100 mL/Hr) IV Continuous <Continuous>  dextrose 5%. 1000 milliLiter(s) (50 mL/Hr) IV Continuous <Continuous>  dextrose 50% Injectable 25 Gram(s) IV Push once  dextrose 50% Injectable 12.5 Gram(s) IV Push once  dextrose 50% Injectable 25 Gram(s) IV Push once  glucagon  Injectable 1 milliGRAM(s) IntraMuscular once  heparin   Injectable 5000 Unit(s) SubCutaneous every 12 hours  hydrALAZINE 25 milliGRAM(s) Oral every 8 hours  insulin glargine Injectable (LANTUS) 5 Unit(s) SubCutaneous at bedtime  insulin lispro (ADMELOG) corrective regimen sliding scale   SubCutaneous three times a day before meals  pantoprazole    Tablet 40 milliGRAM(s) Oral before breakfast  traZODone 50 milliGRAM(s) Oral at bedtime    MEDICATIONS  (PRN):  acetaminophen     Tablet .. 650 milliGRAM(s) Oral every 8 hours PRN Temp greater or equal to 38C (100.4F), Mild Pain (1 - 3)  dextrose Oral Gel 15 Gram(s) Oral once PRN Blood Glucose LESS THAN 70 milliGRAM(s)/deciliter  traMADol 25 milliGRAM(s) Oral every 6 hours PRN Moderate Pain (4 - 6)    Vital Signs Last 24 Hrs  T(C): 36.9 (03 Mar 2024 11:52), Max: 37 (02 Mar 2024 20:27)  T(F): 98.4 (03 Mar 2024 11:52), Max: 98.6 (02 Mar 2024 20:27)  HR: 75 (03 Mar 2024 11:52) (72 - 75)  BP: 162/60 (03 Mar 2024 11:52) (157/56 - 185/63)  BP(mean): 104 (02 Mar 2024 20:27) (104 - 104)  RR: 18 (03 Mar 2024 11:52) (18 - 18)  SpO2: 94% (03 Mar 2024 11:52) (94% - 98%)    Parameters below as of 03 Mar 2024 11:52  Patient On (Oxygen Delivery Method): nasal cannula        I&O's Summary    02 Mar 2024 07:01  -  03 Mar 2024 07:00  --------------------------------------------------------  IN: 480 mL / OUT: 1200 mL / NET: -720 mL    03 Mar 2024 07:01  -  03 Mar 2024 14:57  --------------------------------------------------------  IN: 125 mL / OUT: 300 mL / NET: -175 mL          Physical Exam:   GENERAL: NAD, well-groomed, well-developed  HEENT: CARY/   Atraumatic, Normocephalic  ENMT: No tonsillar erythema, exudates, or enlargement; Moist mucous membranes, Good dentition, No lesions  NECK: Supple, No JVD, Normal thyroid  CHEST/LUNG: Clear to auscultaion  CVS: Regular rate and rhythm; No murmurs, rubs, or gallops  GI: : Soft, Nontender, Nondistended; Bowel sounds present  NERVOUS SYSTEM:  Alert & Oriented X3  EXTREMITIES:  - edema  LYMPH: No lymphadenopathy noted  SKIN: No rashes or lesions  ENDOCRINOLOGY: No Thyromegaly  PSYCH: Appropriate    Labs:  22                            8.9    7.62  )-----------( 285      ( 02 Mar 2024 05:58 )             31.0                         9.4    7.28  )-----------( 283      ( 01 Mar 2024 01:56 )             32.2     03-03    143  |  111<H>  |  72<H>  ----------------------------<  126<H>  4.4   |  21<L>  |  1.76<H>  03-02    145  |  110<H>  |  78<H>  ----------------------------<  117<H>  4.6   |  22  |  1.92<H>  03-01    144  |  110<H>  |  74<H>  ----------------------------<  156<H>  4.4   |  21<L>  |  1.57<H>    Ca    8.6      03 Mar 2024 05:52  Ca    8.6      02 Mar 2024 05:58  Phos  4.8     03-03  Mg     2.0     03-03    TPro  6.6  /  Alb  3.6  /  TBili  0.3  /  DBili  x   /  AST  14  /  ALT  7<L>  /  AlkPhos  201<H>  03-03  TPro  6.9  /  Alb  3.8  /  TBili  0.2  /  DBili  x   /  AST  17  /  ALT  11  /  AlkPhos  213<H>  03-02  TPro  7.3  /  Alb  4.1  /  TBili  0.2  /  DBili  x   /  AST  20  /  ALT  11  /  AlkPhos  229<H>  03-01    CAPILLARY BLOOD GLUCOSE      POCT Blood Glucose.: 270 mg/dL (03 Mar 2024 12:46)  POCT Blood Glucose.: 140 mg/dL (03 Mar 2024 09:23)  POCT Blood Glucose.: 243 mg/dL (02 Mar 2024 22:28)  POCT Blood Glucose.: 164 mg/dL (02 Mar 2024 17:20)      LIVER FUNCTIONS - ( 03 Mar 2024 05:52 )  Alb: 3.6 g/dL / Pro: 6.6 g/dL / ALK PHOS: 201 U/L / ALT: 7 U/L / AST: 14 U/L / GGT: x             Urinalysis Basic - ( 03 Mar 2024 05:52 )    Color: x / Appearance: x / SG: x / pH: x  Gluc: 126 mg/dL / Ketone: x  / Bili: x / Urobili: x   Blood: x / Protein: x / Nitrite: x   Leuk Esterase: x / RBC: x / WBC x   Sq Epi: x / Non Sq Epi: x / Bacteria: x    rad< from: CT Chest No Cont (03.01.24 @ 22:14) >    PROCEDURE DATE:  03/01/2024          INTERPRETATION:  CLINICAL INFORMATION: Cough. Assess for pneumonia.    COMPARISON: Chest radiograph 3/1/2024. CT abdomen/pelvis 12/7/2021. CT   chest1/22/2020.    CONTRAST/COMPLICATIONS:  IV Contrast: None.  Oral Contrast: None.  Complications: None reported at the time of study completion.    PROCEDURE:  CT of the Chest was performed.  Sagittal and coronal reformats were performed.    FINDINGS:    LUNGS, AIRWAYS, AND PLEURA: Patent central airways.  Moderate right and   small left pleural effusions. Associated compressive atelectasis of the   right greater than left lungs, most prominent in the lower lobes. Mosaic   attenuation, likely secondary to air trapping. Few scattered calcified   granulomas.  MEDIASTINUM AND ALFREDO: Few scattered prominent mediastinal lymph nodes,   largest 1.4 x 1.2 cm in the precarinal region (3-56).  VESSELS: Atherosclerotic changes of the aorta and coronary arteries. CABG.  HEART: Cardiomegaly. No pericardial effusion.  CHEST WALL AND LOWER NECK: Left thyroid lobe peripherally calcified   nodule, 1.5 cm, similar to prior.  VISUALIZED UPPER ABDOMEN: Small volume perihepatic ascites. Mild   subcutaneous edema.  BONES: Degenerative changes. Median sternotomy.    IMPRESSION:  Moderate right and small left pleural effusions with associated   compressive atelectasis.    Small volume perihepatic ascites. Mild subcutaneous edema.    --- End of Report ---           MAYUR RAMON MD; Resident Radiologist  This document has been electronically signed.   SEGUN SANTIAGO MD; Attending Radiologist  This document has been electronically signed. Mar  2 2024  2:44PM    < end of copied text >          RECENT CULTURES:        RESPIRATORY CULTURES:          Studies  Chest X-RAY  CT SCAN Chest   Venous Dopplers: LE:   CT Abdomen  Others        echo

## 2024-03-03 NOTE — DIETITIAN INITIAL EVALUATION ADULT - NSPROEDAREADYLEARN_GEN_A_NUR
Contact Numbers    [facility] Main Line: [phone]  [facility] Triage and after hours / weekends / holidays:  [phone]      Please call the triage or after hours line if you experience a temperature greater than or equal to 100.5, shaking chills, have uncontrolled nausea, vomiting and/or diarrhea, dizziness, shortness of breath, chest pain, bleeding, unexplained bruising, or if you have any other new/concerning symptoms, questions or concerns.      If you are having any concerning symptoms or wish to speak to a provider before your next infusion visit, please call your care coordinator or triage to notify them so we can adequately serve you.     If you need a refill on a narcotic prescription or other medication, please call before your infusion appointment.                   November 2018 Sunday Monday Tuesday Wednesday Thursday Friday Saturday                       1     2     3       4     5     6     7     8     [facility]    1:15 PM   (15 min.)    [facility]     [facility] RETURN    1:25 PM   (50 min.)   Donna Gonzalez PA-C   [facility]     [facility] ONC INFUSION 120    3:00 PM   (120 min.)    ONCOLOGY INFUSION   [facility] 9     10       11     12     13     14     15     16     17       18     19     20     21     22     23     24       25     26     27     28     29     30 December 2018 Sunday Monday Tuesday Wednesday Thursday Friday Saturday                                 1       2     3     4     5     6     7     8       9     10     11     12     [facility]    1:00 PM   (15 min.)    [facility]   [facility]     P ONC INFUSION 120    1:30 PM   (120 min.)    ONCOLOGY INFUSION   [facility] 13     14     15       16     17     18     19     20     21     22       23     24     25     26     27     28     29       30     31                                            Lab Results:  Recent Results (from the past 12 hour(s))   Ferritin    Collection Time: 11/08/18  1:07 PM   Result Value Ref Range    Ferritin 844 (H) 26 - 388 ng/mL   Hemoglobin    Collection Time: 11/08/18  1:07 PM   Result Value Ref Range    Hemoglobin 15.0 13.3 - 17.7 g/dL        none

## 2024-03-03 NOTE — DIETITIAN INITIAL EVALUATION ADULT - NSFNSGIIOFT_GEN_A_CORE
Denies any current N/V/D/C. Per chart, no BM documented x admission. No current bowel regimen in place.

## 2024-03-03 NOTE — DIETITIAN INITIAL EVALUATION ADULT - ORAL INTAKE PTA/DIET HISTORY
Pt's family reports pt having a good appetite and PO intake at baseline PTA; consuming >75% of most meals. Follows very low/no salt diet. Denies any known food allergies or intolerances. Reports pt taking a Multivitamin and vitamin D supplement at home. Denies pt with any difficulty chewing/swallowing at this time.

## 2024-03-03 NOTE — PROGRESS NOTE ADULT - SUBJECTIVE AND OBJECTIVE BOX
Patient is a 89y old  Female who presents with a chief complaint of Heart failure     (03 Mar 2024 10:16)      DATE OF SERVICE: 03-03-24 @ 12:15    SUBJECTIVE / OVERNIGHT EVENTS: overnight events noted    ROS:  Resp: No cough no sputum production  CVS: No chest pain no palpitations no orthopnea  GI: no N/V/D  right hip pain      MEDICATIONS  (STANDING):  amLODIPine   Tablet 10 milliGRAM(s) Oral daily  aspirin enteric coated 81 milliGRAM(s) Oral daily  atorvastatin 10 milliGRAM(s) Oral at bedtime  dextrose 5%. 1000 milliLiter(s) (50 mL/Hr) IV Continuous <Continuous>  dextrose 5%. 1000 milliLiter(s) (100 mL/Hr) IV Continuous <Continuous>  dextrose 50% Injectable 25 Gram(s) IV Push once  dextrose 50% Injectable 25 Gram(s) IV Push once  dextrose 50% Injectable 12.5 Gram(s) IV Push once  glucagon  Injectable 1 milliGRAM(s) IntraMuscular once  heparin   Injectable 5000 Unit(s) SubCutaneous every 12 hours  hydrALAZINE 25 milliGRAM(s) Oral every 8 hours  insulin glargine Injectable (LANTUS) 5 Unit(s) SubCutaneous at bedtime  insulin lispro (ADMELOG) corrective regimen sliding scale   SubCutaneous three times a day before meals  pantoprazole    Tablet 40 milliGRAM(s) Oral before breakfast  traZODone 50 milliGRAM(s) Oral at bedtime    MEDICATIONS  (PRN):  acetaminophen     Tablet .. 650 milliGRAM(s) Oral every 8 hours PRN Temp greater or equal to 38C (100.4F), Mild Pain (1 - 3)  dextrose Oral Gel 15 Gram(s) Oral once PRN Blood Glucose LESS THAN 70 milliGRAM(s)/deciliter        CAPILLARY BLOOD GLUCOSE      POCT Blood Glucose.: 140 mg/dL (03 Mar 2024 09:23)  POCT Blood Glucose.: 243 mg/dL (02 Mar 2024 22:28)  POCT Blood Glucose.: 164 mg/dL (02 Mar 2024 17:20)    I&O's Summary    02 Mar 2024 07:01  -  03 Mar 2024 07:00  --------------------------------------------------------  IN: 480 mL / OUT: 1200 mL / NET: -720 mL        Vital Signs Last 24 Hrs  T(C): 36.9 (03 Mar 2024 11:52), Max: 37 (02 Mar 2024 20:27)  T(F): 98.4 (03 Mar 2024 11:52), Max: 98.6 (02 Mar 2024 20:27)  HR: 75 (03 Mar 2024 11:52) (72 - 75)  BP: 162/60 (03 Mar 2024 11:52) (157/56 - 185/63)  BP(mean): 104 (02 Mar 2024 20:27) (104 - 104)  RR: 18 (03 Mar 2024 11:52) (18 - 18)  SpO2: 94% (03 Mar 2024 11:52) (94% - 98%)    PHYSICAL EXAM:  CHEST/LUNG: clear  HEART: S1 S2; systolic murmur +   ABDOMEN: Soft, Nontender  EXTREMITIES:   no edema  NEUROLOGY: Alert non-focal  SKIN: No rashes or lesions    LABS:                        8.9    7.62  )-----------( 285      ( 02 Mar 2024 05:58 )             31.0     03-03    143  |  111<H>  |  72<H>  ----------------------------<  126<H>  4.4   |  21<L>  |  1.76<H>    Ca    8.6      03 Mar 2024 05:52  Phos  4.8     03-03  Mg     2.0     03-03    TPro  6.6  /  Alb  3.6  /  TBili  0.3  /  DBili  x   /  AST  14  /  ALT  7<L>  /  AlkPhos  201<H>  03-03          Urinalysis Basic - ( 03 Mar 2024 05:52 )    Color: x / Appearance: x / SG: x / pH: x  Gluc: 126 mg/dL / Ketone: x  / Bili: x / Urobili: x   Blood: x / Protein: x / Nitrite: x   Leuk Esterase: x / RBC: x / WBC x   Sq Epi: x / Non Sq Epi: x / Bacteria: x          All consultant(s) notes reviewed and care discussed with other providers        Contact Number, Dr Huddleston [phone]

## 2024-03-03 NOTE — DIETITIAN INITIAL EVALUATION ADULT - REASON INDICATOR FOR ASSESSMENT
Nutrition Consult for STAR CHF Education.   Source: Pt's family at bedside for Bhutanese translation, Electronic Medical Record.   Chart reviewed, events noted.

## 2024-03-03 NOTE — DIETITIAN INITIAL EVALUATION ADULT - NSICDXPASTMEDICALHX_GEN_ALL_CORE_FT
PAST MEDICAL HISTORY:  CAD (coronary artery disease)     Diabetes     Essential hypertension, benign     History of chronic gastritis     Hypercholesteremia     Peripheral vascular disease

## 2024-03-04 LAB
ANION GAP SERPL CALC-SCNC: 13 MMOL/L — SIGNIFICANT CHANGE UP (ref 5–17)
BUN SERPL-MCNC: 65 MG/DL — HIGH (ref 7–23)
CALCIUM SERPL-MCNC: 8.9 MG/DL — SIGNIFICANT CHANGE UP (ref 8.4–10.5)
CHLORIDE SERPL-SCNC: 109 MMOL/L — HIGH (ref 96–108)
CO2 SERPL-SCNC: 19 MMOL/L — LOW (ref 22–31)
CREAT SERPL-MCNC: 1.71 MG/DL — HIGH (ref 0.5–1.3)
EGFR: 27 ML/MIN/1.73M2 — LOW
GLUCOSE BLDC GLUCOMTR-MCNC: 136 MG/DL — HIGH (ref 70–99)
GLUCOSE BLDC GLUCOMTR-MCNC: 167 MG/DL — HIGH (ref 70–99)
GLUCOSE BLDC GLUCOMTR-MCNC: 255 MG/DL — HIGH (ref 70–99)
GLUCOSE BLDC GLUCOMTR-MCNC: 263 MG/DL — HIGH (ref 70–99)
GLUCOSE SERPL-MCNC: 126 MG/DL — HIGH (ref 70–99)
MAGNESIUM SERPL-MCNC: 2.1 MG/DL — SIGNIFICANT CHANGE UP (ref 1.6–2.6)
PHOSPHATE SERPL-MCNC: 4.6 MG/DL — HIGH (ref 2.5–4.5)
POTASSIUM SERPL-MCNC: 4.6 MMOL/L — SIGNIFICANT CHANGE UP (ref 3.5–5.3)
POTASSIUM SERPL-SCNC: 4.6 MMOL/L — SIGNIFICANT CHANGE UP (ref 3.5–5.3)
SODIUM SERPL-SCNC: 141 MMOL/L — SIGNIFICANT CHANGE UP (ref 135–145)

## 2024-03-04 PROCEDURE — 99233 SBSQ HOSP IP/OBS HIGH 50: CPT | Mod: GC

## 2024-03-04 PROCEDURE — 99223 1ST HOSP IP/OBS HIGH 75: CPT | Mod: GC

## 2024-03-04 RX ORDER — HYDRALAZINE HCL 50 MG
50 TABLET ORAL THREE TIMES A DAY
Refills: 0 | Status: DISCONTINUED | OUTPATIENT
Start: 2024-03-04 | End: 2024-03-05

## 2024-03-04 RX ORDER — FUROSEMIDE 40 MG
20 TABLET ORAL DAILY
Refills: 0 | Status: DISCONTINUED | OUTPATIENT
Start: 2024-03-04 | End: 2024-03-05

## 2024-03-04 RX ORDER — HYDRALAZINE HCL 50 MG
50 TABLET ORAL ONCE
Refills: 0 | Status: COMPLETED | OUTPATIENT
Start: 2024-03-04 | End: 2024-03-04

## 2024-03-04 RX ADMIN — PANTOPRAZOLE SODIUM 40 MILLIGRAM(S): 20 TABLET, DELAYED RELEASE ORAL at 05:13

## 2024-03-04 RX ADMIN — Medication 25 MILLIGRAM(S): at 12:42

## 2024-03-04 RX ADMIN — Medication 50 MILLIGRAM(S): at 21:20

## 2024-03-04 RX ADMIN — Medication 25 MILLIGRAM(S): at 21:20

## 2024-03-04 RX ADMIN — Medication 3: at 12:52

## 2024-03-04 RX ADMIN — ATORVASTATIN CALCIUM 10 MILLIGRAM(S): 80 TABLET, FILM COATED ORAL at 21:20

## 2024-03-04 RX ADMIN — Medication 25 MILLIGRAM(S): at 05:13

## 2024-03-04 RX ADMIN — Medication 1: at 18:34

## 2024-03-04 RX ADMIN — HEPARIN SODIUM 5000 UNIT(S): 5000 INJECTION INTRAVENOUS; SUBCUTANEOUS at 18:39

## 2024-03-04 RX ADMIN — HEPARIN SODIUM 5000 UNIT(S): 5000 INJECTION INTRAVENOUS; SUBCUTANEOUS at 05:13

## 2024-03-04 RX ADMIN — AMLODIPINE BESYLATE 10 MILLIGRAM(S): 2.5 TABLET ORAL at 05:13

## 2024-03-04 RX ADMIN — Medication 81 MILLIGRAM(S): at 12:43

## 2024-03-04 RX ADMIN — INSULIN GLARGINE 5 UNIT(S): 100 INJECTION, SOLUTION SUBCUTANEOUS at 21:20

## 2024-03-04 NOTE — CONSULT NOTE ADULT - ATTENDING COMMENTS
Patient seen at bedside. Son present. 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 Asymptomatic. No indication for PPM. EP will sign off.    HARSH De Luna
The patient was referred for hospital admission to manage acute on chronic heart failure with preserved ejection fraction. She has the above noted history and normally walks using a rolling walker for stability, limited by arthritis. For the past several days, increased shortness of breath at rest prompted her to see her cardiologist. She notes some improvement after IV diuretics were given.    On exam, she appears comfortable and in no distress. Trace edema. Systolic murmur.    To obtain echocardiogram and follow up response to IV diuretics  Continue Furosemide 80 IV daily for now  Monitor SILVIA Machuca MD

## 2024-03-04 NOTE — PROGRESS NOTE ADULT - ASSESSMENT
89 y/o female hx HTN/HLD, DM2, CAD (s/p CABG), PVD (s/p stents in bilateral LE), CKD (Baseline Cr reportedly 1.2), CHF presents with suspected CHF exacerbation. She was sent in by her cardiologist, Ced Rodney for IV diuretics because for the last week she has been having increased dyspnea on exertion. She also c/o left shoulder discomfort at this time and severe chronic right hip pain from arthritis.. She takes 40 mg po furosemide once daily. Otherwise patient feels well, denies headache, shortness of breath at rest, abdominal pain, dysuria/hematuria. (01 Mar 2024 10:54)     at bedside:   he says she was brought in here for chf and sob:   she has no underlying lung disease:   she has no cough or phlegm : never had fever at home:     chf exacerbation/   R/O Pneumonia  HTN  DM  CAD  PVD  CKD    chf exacerbation/   diuretics per primary team/; seen by cards  -recd intimally diuretics  now on hold per cards   -check echo   3/2" echo pending:  diuretics on hold  3/3: stlil pending:  remains off diuretics   3/4 : diuretics remains on hold  R/O Pneumonia  -she has more opacity on rigth side:  but clinically she presented with heart failure:   -she has no fever:  check procal : and her wbc count is normal:   -keep off antibiotics  3/2: ct chest   showed: Moderate right and small left pleural effusions with associated compressive atelectasis. Small volume perihepatic ascites. Mild subcutaneous edema.  -she needs diuresis:  defer to cards  3/3: cont to monitor clinically:  she is not sob:  she looks comfortable:  if the effusion increases,  it might need tap  3/4: comfortable now needs home oxygen    HTN  -controlled  DM  -monitor and control   CAD  -cont per cards  PVD  -per PMD  CKD  -monitor:    dw acp

## 2024-03-04 NOTE — CONSULT NOTE ADULT - SUBJECTIVE AND OBJECTIVE BOX
EP FELLOW CONSULT NOTE    Consulting service: Cardiology   Reason for consult:  PPM Eval    HPI:  89 year old woman with hypertension, DM2, CAD (s/p 3V CABG in 2002 LIMA to LAD, SVG to the OM, and SVG to the PDA), PVD (s/p stents in bilateral LE), CKD (Baseline Cr reportedly 1.2), HFpEF presents with WEEKS, likely HFpEF exacerbation. Mild troponin elevation likely due to heart failure. EP consulted for possible PPM with concern for Mobitz II and RBBB.    Patient seen in tele unit with son at bedside. Notes that she came to the hospital because she was feeling unwell and had SOB and leg swelling. She denies any CP dizziness palpitations or syncopal episodes. She is currently feeling well after diuresis and is hoping to go home soon. She has no history of arrhythmias that she is aware of.    EKG NSR Wenckebach with RBBB, normal axis, no ischemic changes   Similar events on telemetry     PMH:   Essential hypertension, benign  Hypercholesteremia  CAD (coronary artery disease)  Peripheral vascular disease  Diabetes  CAD (coronary artery disease)  History of chronic gastritis    PSH:   S/P carotid endarterectomy  S/P coronary artery by pass  History of cholecystectomy  S/P vascular surgery    FAMILY HISTORY:  No pertinent family history in first degree relatives    Allergies:  No Known Allergies    Home Meds:    Current Medications:   acetaminophen     Tablet .. 650 milliGRAM(s) Oral every 8 hours PRN  amLODIPine   Tablet 10 milliGRAM(s) Oral daily  aspirin enteric coated 81 milliGRAM(s) Oral daily  atorvastatin 10 milliGRAM(s) Oral at bedtime  dextrose 5%. 1000 milliLiter(s) IV Continuous <Continuous>  dextrose 5%. 1000 milliLiter(s) IV Continuous <Continuous>  dextrose 50% Injectable 25 Gram(s) IV Push once  dextrose 50% Injectable 25 Gram(s) IV Push once  dextrose 50% Injectable 12.5 Gram(s) IV Push once  dextrose Oral Gel 15 Gram(s) Oral once PRN  furosemide    Tablet 20 milliGRAM(s) Oral daily  glucagon  Injectable 1 milliGRAM(s) IntraMuscular once  heparin   Injectable 5000 Unit(s) SubCutaneous every 12 hours  hydrALAZINE 25 milliGRAM(s) Oral every 8 hours  hydrALAZINE 50 milliGRAM(s) Oral three times a day  insulin glargine Injectable (LANTUS) 5 Unit(s) SubCutaneous at bedtime  insulin lispro (ADMELOG) corrective regimen sliding scale   SubCutaneous three times a day before meals  pantoprazole    Tablet 40 milliGRAM(s) Oral before breakfast  traMADol 25 milliGRAM(s) Oral every 6 hours PRN  traZODone 50 milliGRAM(s) Oral at bedtime    REVIEW OF SYSTEMS:  CONSTITUTIONAL: No weakness, fevers or chills  EYES/ENT: No visual changes;  No dysphagia  NECK: No pain or stiffness  RESPIRATORY: No cough, wheezing, hemoptysis; No shortness of breath  CARDIOVASCULAR: No chest pain or palpitations; No lower extremity edema  GASTROINTESTINAL: No abdominal or epigastric pain. No nausea, vomiting, or hematemesis; No diarrhea or constipation. No melena or hematochezia.  BACK: No back pain  GENITOURINARY: No dysuria, frequency or hematuria  NEUROLOGICAL: No numbness or weakness  SKIN: No itching, burning, rashes, or lesions   All other review of systems is negative unless indicated above.    Physical Exam:  T(F): 98.3 (03-04), Max: 98.5 (03-04)  HR: 64 (03-04) (64 - 73)  BP: 159/60 (03-04) (159/60 - 188/49)  RR: 18 (03-04)  SpO2: 94% (03-04)  GENERAL: No acute distress, well-developed  HEAD:  Atraumatic, Normocephalic  ENT: EOMI, PERRLA, conjunctiva and sclera clear, Neck supple, No JVD, moist mucosa  CHEST/LUNG: Clear to auscultation bilaterally; No wheeze, equal breath sounds bilaterally   BACK: No spinal tenderness  HEART: Regular rate and rhythm; No murmurs, rubs, or gallops  ABDOMEN: Soft, Nontender, Nondistended; Bowel sounds present  EXTREMITIES:  No clubbing, cyanosis, or edema  PSYCH: Nl behavior, nl affect  NEUROLOGY: AAOx3, non-focal, cranial nerves intact  SKIN: Normal color, No rashes or lesions  LINES:    ECG: NSR Wenckebach with RBBB, normal axis, no ischemic changes     Echo:   Conclusions:  1. Mildly dilated left atrium.  LA volume index = 36 cc/m2.  2. Moderate concentric left ventricular hypertrophy.  3. Normal left ventricular systolic function. No segmental  wall motion abnormalities.  4. Increased E/e'  is consistent with elevated left  ventricular filling pressure.  5. Right ventricular enlargement with normal right  ventricular systolic function.  6. Estimated pulmonary artery systolic pressure equals 37  mm Hg, assuming right atrial pressure equals 8 mm Hg,  consistent with borderline pulmonary pressures.  ------------------------------------------------------------------------  Confirmed on  1/30/2022 - 12:10:19 by SCOT Montgomery  ------------------------------------------------------------------------    Labs: Personally reviewed    03-04    141  |  109<H>  |  65<H>  ----------------------------<  126<H>  4.6   |  19<L>  |  1.71<H>    Ca    8.9      04 Mar 2024 06:09  Phos  4.6     03-04  Mg     2.1     03-04    TPro  6.6  /  Alb  3.6  /  TBili  0.3  /  DBili  x   /  AST  14  /  ALT  7<L>  /  AlkPhos  201<H>  03-03    CARDIAC MARKERS ( 01 Mar 2024 03:53 )  60 ng/L / x     / x     / x     / x     / x      CARDIAC MARKERS ( 01 Mar 2024 01:56 )  60 ng/L / x     / x     / x     / x     / x          A/P  89 year old woman with hypertension, DM2, CAD (s/p 3V CABG in 2002 LIMA to LAD, SVG to the OM, and SVG to the PDA), PVD (s/p stents in bilateral LE), CKD (Baseline Cr reportedly 1.2), HFpEF presents with WEEKS, likely HFpEF exacerbation. Mild troponin elevation likely due to heart failure. EP consulted for possible PPM with concern for Mobitz II and RBBB.    #RBBB  #Mobitz I  - Pt with HFpEF exacerbation and RBBB, EP called for c/f Mobitz II and need for PPM  - Previously seen in 2022 by EP for similar episodes, resolved after stopping toprol 100 mg  - EKG and Tele reviewed - no e/o jaquanitz II, looks like Valery Reyes   - Pt asymptomatic and eager to go home  - No indication for PPM at this time, EP signing off    Signed by:  Everett Broderick PGY5  Cardiology Fellow    ANGEL De Luna

## 2024-03-04 NOTE — PROGRESS NOTE ADULT - SUBJECTIVE AND OBJECTIVE BOX
Patient is a 89y old  Female who presents with a chief complaint of shortness of breath (04 Mar 2024 06:33)      DATE OF SERVICE: 03-04-24 @ 14:46    SUBJECTIVE / OVERNIGHT EVENTS: overnight events noted    ROS:  Resp: No cough no sputum production  CVS: No chest pain no palpitations no orthopnea  GI: no N/V/D  "I feel better"   son at bedside       MEDICATIONS  (STANDING):  amLODIPine   Tablet 10 milliGRAM(s) Oral daily  aspirin enteric coated 81 milliGRAM(s) Oral daily  atorvastatin 10 milliGRAM(s) Oral at bedtime  dextrose 5%. 1000 milliLiter(s) (50 mL/Hr) IV Continuous <Continuous>  dextrose 5%. 1000 milliLiter(s) (100 mL/Hr) IV Continuous <Continuous>  dextrose 50% Injectable 25 Gram(s) IV Push once  dextrose 50% Injectable 25 Gram(s) IV Push once  dextrose 50% Injectable 12.5 Gram(s) IV Push once  glucagon  Injectable 1 milliGRAM(s) IntraMuscular once  heparin   Injectable 5000 Unit(s) SubCutaneous every 12 hours  hydrALAZINE 25 milliGRAM(s) Oral every 8 hours  insulin glargine Injectable (LANTUS) 5 Unit(s) SubCutaneous at bedtime  insulin lispro (ADMELOG) corrective regimen sliding scale   SubCutaneous three times a day before meals  pantoprazole    Tablet 40 milliGRAM(s) Oral before breakfast  traZODone 50 milliGRAM(s) Oral at bedtime    MEDICATIONS  (PRN):  acetaminophen     Tablet .. 650 milliGRAM(s) Oral every 8 hours PRN Temp greater or equal to 38C (100.4F), Mild Pain (1 - 3)  dextrose Oral Gel 15 Gram(s) Oral once PRN Blood Glucose LESS THAN 70 milliGRAM(s)/deciliter  traMADol 25 milliGRAM(s) Oral every 6 hours PRN Moderate Pain (4 - 6)        CAPILLARY BLOOD GLUCOSE      POCT Blood Glucose.: 255 mg/dL (04 Mar 2024 11:44)  POCT Blood Glucose.: 136 mg/dL (04 Mar 2024 07:46)  POCT Blood Glucose.: 174 mg/dL (03 Mar 2024 22:40)  POCT Blood Glucose.: 117 mg/dL (03 Mar 2024 17:13)    I&O's Summary    03 Mar 2024 07:01  -  04 Mar 2024 07:00  --------------------------------------------------------  IN: 245 mL / OUT: 900 mL / NET: -655 mL    04 Mar 2024 07:01  -  04 Mar 2024 14:46  --------------------------------------------------------  IN: 120 mL / OUT: 300 mL / NET: -180 mL        Vital Signs Last 24 Hrs  T(C): 36.8 (04 Mar 2024 11:30), Max: 36.9 (04 Mar 2024 00:04)  T(F): 98.3 (04 Mar 2024 11:30), Max: 98.5 (04 Mar 2024 04:33)  HR: 64 (04 Mar 2024 11:30) (64 - 73)  BP: 159/60 (04 Mar 2024 11:30) (159/60 - 188/49)  BP(mean): --  RR: 18 (04 Mar 2024 11:30) (18 - 18)  SpO2: 94% (04 Mar 2024 04:33) (90% - 94%)    PHYSICAL EXAM:  CHEST/LUNG: clear  HEART: S1 S2; systolic murmur +   ABDOMEN: Soft, Nontender  EXTREMITIES:   no edema  NEUROLOGY: Alert non-focal  SKIN: No rashes or lesions    LABS:    03-04    141  |  109<H>  |  65<H>  ----------------------------<  126<H>  4.6   |  19<L>  |  1.71<H>    Ca    8.9      04 Mar 2024 06:09  Phos  4.6     03-04  Mg     2.1     03-04    TPro  6.6  /  Alb  3.6  /  TBili  0.3  /  DBili  x   /  AST  14  /  ALT  7<L>  /  AlkPhos  201<H>  03-03          Urinalysis Basic - ( 04 Mar 2024 06:09 )    Color: x / Appearance: x / SG: x / pH: x  Gluc: 126 mg/dL / Ketone: x  / Bili: x / Urobili: x   Blood: x / Protein: x / Nitrite: x   Leuk Esterase: x / RBC: x / WBC x   Sq Epi: x / Non Sq Epi: x / Bacteria: x          All consultant(s) notes reviewed and care discussed with other providers        Contact Number, Dr Huddleston [phone]

## 2024-03-04 NOTE — PROGRESS NOTE ADULT - SUBJECTIVE AND OBJECTIVE BOX
Date of Service: 03-04-24 @ 16:21    Patient is a 89y old  Female who presents with a chief complaint of shortness of breath (04 Mar 2024 14:46)      Any change in ROS: seems OK:  no sob  at rest:  but she desaturated on room air on ambulation:  needs ome oxygen      MEDICATIONS  (STANDING):  amLODIPine   Tablet 10 milliGRAM(s) Oral daily  aspirin enteric coated 81 milliGRAM(s) Oral daily  atorvastatin 10 milliGRAM(s) Oral at bedtime  dextrose 5%. 1000 milliLiter(s) (50 mL/Hr) IV Continuous <Continuous>  dextrose 5%. 1000 milliLiter(s) (100 mL/Hr) IV Continuous <Continuous>  dextrose 50% Injectable 25 Gram(s) IV Push once  dextrose 50% Injectable 25 Gram(s) IV Push once  dextrose 50% Injectable 12.5 Gram(s) IV Push once  glucagon  Injectable 1 milliGRAM(s) IntraMuscular once  heparin   Injectable 5000 Unit(s) SubCutaneous every 12 hours  hydrALAZINE 25 milliGRAM(s) Oral every 8 hours  insulin glargine Injectable (LANTUS) 5 Unit(s) SubCutaneous at bedtime  insulin lispro (ADMELOG) corrective regimen sliding scale   SubCutaneous three times a day before meals  pantoprazole    Tablet 40 milliGRAM(s) Oral before breakfast  traZODone 50 milliGRAM(s) Oral at bedtime    MEDICATIONS  (PRN):  acetaminophen     Tablet .. 650 milliGRAM(s) Oral every 8 hours PRN Temp greater or equal to 38C (100.4F), Mild Pain (1 - 3)  dextrose Oral Gel 15 Gram(s) Oral once PRN Blood Glucose LESS THAN 70 milliGRAM(s)/deciliter  traMADol 25 milliGRAM(s) Oral every 6 hours PRN Moderate Pain (4 - 6)    Vital Signs Last 24 Hrs  T(C): 36.8 (04 Mar 2024 11:30), Max: 36.9 (04 Mar 2024 00:04)  T(F): 98.3 (04 Mar 2024 11:30), Max: 98.5 (04 Mar 2024 04:33)  HR: 64 (04 Mar 2024 11:30) (64 - 73)  BP: 159/60 (04 Mar 2024 11:30) (159/60 - 188/49)  BP(mean): --  RR: 18 (04 Mar 2024 11:30) (18 - 18)  SpO2: 94% (04 Mar 2024 04:33) (90% - 94%)    Parameters below as of 04 Mar 2024 04:33  Patient On (Oxygen Delivery Method): nasal cannula  O2 Flow (L/min): 2      I&O's Summary    03 Mar 2024 07:01  -  04 Mar 2024 07:00  --------------------------------------------------------  IN: 245 mL / OUT: 900 mL / NET: -655 mL    04 Mar 2024 07:01  -  04 Mar 2024 16:21  --------------------------------------------------------  IN: 120 mL / OUT: 300 mL / NET: -180 mL          Physical Exam:   GENERAL: NAD, well-groomed, well-developed  HEENT: CARY/   Atraumatic, Normocephalic  ENMT: No tonsillar erythema, exudates, or enlargement; Moist mucous membranes, Good dentition, No lesions  NECK: Supple, No JVD, Normal thyroid  CHEST/LUNG: Clear to auscultaion-  CVS: Regular rate and rhythm; No murmurs, rubs, or gallops  GI: : Soft, Nontender, Nondistended; Bowel sounds present  NERVOUS SYSTEM:  Alert & Oriented X3  EXTREMITIES: Mild edema  LYMPH: No lymphadenopathy noted  SKIN: No rashes or lesions  ENDOCRINOLOGY: No Thyromegaly  PSYCH: Appropriate    Labs:  22                            8.9    7.62  )-----------( 285      ( 02 Mar 2024 05:58 )             31.0                         9.4    7.28  )-----------( 283      ( 01 Mar 2024 01:56 )             32.2     03-04    141  |  109<H>  |  65<H>  ----------------------------<  126<H>  4.6   |  19<L>  |  1.71<H>  03-03    143  |  111<H>  |  72<H>  ----------------------------<  126<H>  4.4   |  21<L>  |  1.76<H>  03-02    145  |  110<H>  |  78<H>  ----------------------------<  117<H>  4.6   |  22  |  1.92<H>  03-01    144  |  110<H>  |  74<H>  ----------------------------<  156<H>  4.4   |  21<L>  |  1.57<H>    Ca    8.9      04 Mar 2024 06:09  Ca    8.6      03 Mar 2024 05:52  Phos  4.6     03-04  Phos  4.8     03-03  Mg     2.1     03-04  Mg     2.0     03-03    TPro  6.6  /  Alb  3.6  /  TBili  0.3  /  DBili  x   /  AST  14  /  ALT  7<L>  /  AlkPhos  201<H>  03-03  TPro  6.9  /  Alb  3.8  /  TBili  0.2  /  DBili  x   /  AST  17  /  ALT  11  /  AlkPhos  213<H>  03-02  TPro  7.3  /  Alb  4.1  /  TBili  0.2  /  DBili  x   /  AST  20  /  ALT  11  /  AlkPhos  229<H>  03-01    CAPILLARY BLOOD GLUCOSE      POCT Blood Glucose.: 255 mg/dL (04 Mar 2024 11:44)  POCT Blood Glucose.: 136 mg/dL (04 Mar 2024 07:46)  POCT Blood Glucose.: 174 mg/dL (03 Mar 2024 22:40)  POCT Blood Glucose.: 117 mg/dL (03 Mar 2024 17:13)      LIVER FUNCTIONS - ( 03 Mar 2024 05:52 )  Alb: 3.6 g/dL / Pro: 6.6 g/dL / ALK PHOS: 201 U/L / ALT: 7 U/L / AST: 14 U/L / GGT: x             Urinalysis Basic - ( 04 Mar 2024 06:09 )    Color: x / Appearance: x / SG: x / pH: x  Gluc: 126 mg/dL / Ketone: x  / Bili: x / Urobili: x   Blood: x / Protein: x / Nitrite: x   Leuk Esterase: x / RBC: x / WBC x   Sq Epi: x / Non Sq Epi: x / Bacteria: x        r< from: CT Chest No Cont (03.01.24 @ 22:14) >  PROCEDURE:  CT of the Chest was performed.  Sagittal and coronal reformats were performed.    FINDINGS:    LUNGS, AIRWAYS, AND PLEURA: Patent central airways.  Moderate right and   small left pleural effusions. Associated compressive atelectasis of the   right greater than left lungs, most prominent in the lower lobes. Mosaic   attenuation, likely secondary to air trapping. Few scattered calcified   granulomas.  MEDIASTINUM AND ALFREDO: Few scattered prominent mediastinal lymph nodes,   largest 1.4 x 1.2 cm in the precarinal region (3-56).  VESSELS: Atherosclerotic changes of the aorta and coronary arteries. CABG.  HEART: Cardiomegaly. No pericardial effusion.  CHEST WALL AND LOWER NECK: Left thyroid lobe peripherally calcified   nodule, 1.5 cm, similar to prior.  VISUALIZED UPPER ABDOMEN: Small volume perihepatic ascites. Mild   subcutaneous edema.  BONES: Degenerative changes. Median sternotomy.    IMPRESSION:  Moderate right and small left pleural effusions with associated   compressive atelectasis.    Small volume perihepatic ascites. Mild subcutaneous edema.    --- End of Report ---           MAYUR RAMON MD; Resident Radiologist  This document has been electronically signed.   SEGUN SANTIAGO MD; Attending Radiologist  This document has been electronically signed. Mar  2 2024  2:44PM    < end of copied text >      RECENT CULTURES:        RESPIRATORY CULTURES:          Studies  Chest X-RAY  CT SCAN Chest   Venous Dopplers: LE:   CT Abdomen  Others        < from: Transthoracic Echocardiogram (01.30.22 @ 09:52) >  Conclusions:  1. Mildly dilated left atrium.  LA volume index = 36 cc/m2.  2. Moderate concentric left ventricular hypertrophy.  3. Normal left ventricular systolic function. No segmental  wall motion abnormalities.  4. Increased E/e'  is consistent with elevated left  ventricular filling pressure.  5. Right ventricular enlargement with normal right  ventricular systolic function.  6. Estimated pulmonary artery systolic pressure equals 37  mm Hg, assuming right atrial pressure equals 8 mm Hg,  consistent with borderline pulmonary pressures.  ------------------------------------------------------------------------  Confirmed on  1/30/2022 - 12:10:19 by SCOT Montgomery  ------------------------------------------------------------------------    < end of copied text >

## 2024-03-04 NOTE — PROGRESS NOTE ADULT - SUBJECTIVE AND OBJECTIVE BOX
CARDIOLOGY CONSULT PROGRESS NOTE  MAGED MUHAMMAD  MRN-[id number]    INTERVAL EVENTS:  - tele: Sinus, episodes of Mobitz 1  - No events ON    ROS:  Negative, except as above.    MEDICATIONS  (STANDING):  amLODIPine   Tablet 10 milliGRAM(s) Oral daily  aspirin enteric coated 81 milliGRAM(s) Oral daily  atorvastatin 10 milliGRAM(s) Oral at bedtime  dextrose 5%. 1000 milliLiter(s) (100 mL/Hr) IV Continuous <Continuous>  dextrose 5%. 1000 milliLiter(s) (50 mL/Hr) IV Continuous <Continuous>  dextrose 50% Injectable 25 Gram(s) IV Push once  dextrose 50% Injectable 25 Gram(s) IV Push once  dextrose 50% Injectable 12.5 Gram(s) IV Push once  glucagon  Injectable 1 milliGRAM(s) IntraMuscular once  heparin   Injectable 5000 Unit(s) SubCutaneous every 12 hours  hydrALAZINE 25 milliGRAM(s) Oral every 8 hours  insulin glargine Injectable (LANTUS) 5 Unit(s) SubCutaneous at bedtime  insulin lispro (ADMELOG) corrective regimen sliding scale   SubCutaneous three times a day before meals  pantoprazole    Tablet 40 milliGRAM(s) Oral before breakfast  traZODone 50 milliGRAM(s) Oral at bedtime    MEDICATIONS  (PRN):  acetaminophen     Tablet .. 650 milliGRAM(s) Oral every 8 hours PRN Temp greater or equal to 38C (100.4F), Mild Pain (1 - 3)  dextrose Oral Gel 15 Gram(s) Oral once PRN Blood Glucose LESS THAN 70 milliGRAM(s)/deciliter  traMADol 25 milliGRAM(s) Oral every 6 hours PRN Moderate Pain (4 - 6)    Allergies    No Known Allergies    Intolerances      P/E:  Vital Signs Last 24 Hrs  T(C): 36.9 (04 Mar 2024 04:33), Max: 36.9 (03 Mar 2024 11:52)  T(F): 98.5 (04 Mar 2024 04:33), Max: 98.5 (04 Mar 2024 04:33)  HR: 65 (04 Mar 2024 04:33) (65 - 75)  BP: 165/74 (04 Mar 2024 04:33) (162/60 - 188/49)  BP(mean): --  RR: 18 (04 Mar 2024 04:33) (18 - 18)  SpO2: 94% (04 Mar 2024 04:33) (90% - 94%)    Parameters below as of 04 Mar 2024 04:33  Patient On (Oxygen Delivery Method): nasal cannula  O2 Flow (L/min): 2    Daily     Daily Weight in k.1 (04 Mar 2024 04:33)  I&O's Detail    02 Mar 2024 07:01  -  03 Mar 2024 07:00  --------------------------------------------------------  IN:    Oral Fluid: 480 mL  Total IN: 480 mL    OUT:    Voided (mL): 1200 mL  Total OUT: 1200 mL    Total NET: -720 mL      03 Mar 2024 07:01  -  04 Mar 2024 06:33  --------------------------------------------------------  IN:    Oral Fluid: 245 mL  Total IN: 245 mL    OUT:    Voided (mL): 900 mL  Total OUT: 900 mL    Total NET: -655 mL        I&O's Summary    02 Mar 2024 07:01  -  03 Mar 2024 07:00  --------------------------------------------------------  IN: 480 mL / OUT: 1200 mL / NET: -720 mL    03 Mar 2024 07:01  -  04 Mar 2024 06:33  --------------------------------------------------------  IN: 245 mL / OUT: 900 mL / NET: -655 mL      - gen: well appearing, laying in hospital bed, NAD  - HEENT: MMM, NCAT  - neck: no JVD, supple  - heart: RRR, nml S1/S2, no RMG  - lungs: CTABL, nml WOB  - abd: soft, NTND, NABS  - ext: WWP, PPP, no ENDY    RELEVANT RECENT LABS/IMAGING/STUDIES:        143  |  111<H>  |  72<H>  ----------------------------<  126<H>  4.4   |  21<L>  |  1.76<H>    Ca    8.6      03 Mar 2024 05:52  Phos  4.8       Mg     2.0         TPro  6.6  /  Alb  3.6  /  TBili  0.3  /  DBili  x   /  AST  14  /  ALT  7<L>  /  AlkPhos  201<H>      LIVER FUNCTIONS - ( 03 Mar 2024 05:52 )  Alb: 3.6 g/dL / Pro: 6.6 g/dL / ALK PHOS: 201 U/L / ALT: 7 U/L / AST: 14 U/L / GGT: x             --------------------------------------        --------------------------------------   CARDIOLOGY CONSULT PROGRESS NOTE  MAGED MUHAMMAD  [id number]    INTERVAL EVENTS:  - tele: Sinus, episodes of Mobitz 1  - No events ON    ROS:  CONSTITUTIONAL: No weakness, fevers or chills  EYES/ENT: No visual changes;  No dysphagia  NECK: No pain or stiffness  RESPIRATORY: No cough, wheezing, hemoptysis; No shortness of breath  CARDIOVASCULAR: No chest pain or palpitations; No lower extremity edema  GASTROINTESTINAL: No abdominal or epigastric pain. No nausea, vomiting, or hematemesis; No diarrhea or constipation. No melena or hematochezia.  BACK: No back pain  GENITOURINARY: No dysuria, frequency or hematuria  NEUROLOGICAL: No numbness or weakness  SKIN: No itching, burning, rashes, or lesions     MEDICATIONS  (STANDING):  amLODIPine   Tablet 10 milliGRAM(s) Oral daily  aspirin enteric coated 81 milliGRAM(s) Oral daily  atorvastatin 10 milliGRAM(s) Oral at bedtime  dextrose 5%. 1000 milliLiter(s) (100 mL/Hr) IV Continuous <Continuous>  dextrose 5%. 1000 milliLiter(s) (50 mL/Hr) IV Continuous <Continuous>  dextrose 50% Injectable 25 Gram(s) IV Push once  dextrose 50% Injectable 25 Gram(s) IV Push once  dextrose 50% Injectable 12.5 Gram(s) IV Push once  glucagon  Injectable 1 milliGRAM(s) IntraMuscular once  heparin   Injectable 5000 Unit(s) SubCutaneous every 12 hours  hydrALAZINE 25 milliGRAM(s) Oral every 8 hours  insulin glargine Injectable (LANTUS) 5 Unit(s) SubCutaneous at bedtime  insulin lispro (ADMELOG) corrective regimen sliding scale   SubCutaneous three times a day before meals  pantoprazole    Tablet 40 milliGRAM(s) Oral before breakfast  traZODone 50 milliGRAM(s) Oral at bedtime    MEDICATIONS  (PRN):  acetaminophen     Tablet .. 650 milliGRAM(s) Oral every 8 hours PRN Temp greater or equal to 38C (100.4F), Mild Pain (1 - 3)  dextrose Oral Gel 15 Gram(s) Oral once PRN Blood Glucose LESS THAN 70 milliGRAM(s)/deciliter  traMADol 25 milliGRAM(s) Oral every 6 hours PRN Moderate Pain (4 - 6)    Allergies    No Known Allergies    Intolerances      P/E:  Vital Signs Last 24 Hrs  T(C): 36.9 (04 Mar 2024 04:33), Max: 36.9 (03 Mar 2024 11:52)  T(F): 98.5 (04 Mar 2024 04:33), Max: 98.5 (04 Mar 2024 04:33)  HR: 65 (04 Mar 2024 04:33) (65 - 75)  BP: 165/74 (04 Mar 2024 04:33) (162/60 - 188/49)  BP(mean): --  RR: 18 (04 Mar 2024 04:33) (18 - 18)  SpO2: 94% (04 Mar 2024 04:33) (90% - 94%)    Parameters below as of 04 Mar 2024 04:33  Patient On (Oxygen Delivery Method): nasal cannula  O2 Flow (L/min): 2    Daily     Daily Weight in k.1 (04 Mar 2024 04:33)  I&O's Detail    02 Mar 2024 07:01  -  03 Mar 2024 07:00  --------------------------------------------------------  IN:    Oral Fluid: 480 mL  Total IN: 480 mL    OUT:    Voided (mL): 1200 mL  Total OUT: 1200 mL    Total NET: -720 mL      03 Mar 2024 07:01  -  04 Mar 2024 06:33  --------------------------------------------------------  IN:    Oral Fluid: 245 mL  Total IN: 245 mL    OUT:    Voided (mL): 900 mL  Total OUT: 900 mL    Total NET: -655 mL        I&O's Summary    02 Mar 2024 07:01  -  03 Mar 2024 07:00  --------------------------------------------------------  IN: 480 mL / OUT: 1200 mL / NET: -720 mL    03 Mar 2024 07:01  -  04 Mar 2024 06:33  --------------------------------------------------------  IN: 245 mL / OUT: 900 mL / NET: -655 mL      - gen: well appearing, laying in hospital bed, NAD  - HEENT: MMM, NCAT  - neck: no JVD, supple  - heart: RRR, nml S1/S2, no RMG  - lungs: CTABL, nml WOB  - abd: soft, NTND, NABS  - ext: WWP, PPP, no ENDY    RELEVANT RECENT LABS/IMAGING/STUDIES:        143  |  111<H>  |  72<H>  ----------------------------<  126<H>  4.4   |  21<L>  |  1.76<H>    Ca    8.6      03 Mar 2024 05:52  Phos  4.8       Mg     2.0         TPro  6.6  /  Alb  3.6  /  TBili  0.3  /  DBili  x   /  AST  14  /  ALT  7<L>  /  AlkPhos  201<H>      LIVER FUNCTIONS - ( 03 Mar 2024 05:52 )  Alb: 3.6 g/dL / Pro: 6.6 g/dL / ALK PHOS: 201 U/L / ALT: 7 U/L / AST: 14 U/L / GGT: x             --------------------------------------        --------------------------------------

## 2024-03-04 NOTE — PROGRESS NOTE ADULT - ASSESSMENT
89 year old woman with hypertension, DM2, CAD (s/p 3V CABG in 2002 [place] to LAD, SVG to the OM, and SVG to the PDA), PVD (s/p stents in bilateral LE), CKD (Baseline Cr reportedly 1.2), HFpEF presents with WEEKS, likely HFpEF exacerbation. Mild troponin elevation likely due to heart failure.    Recommendations:  - On tele occasional dropped beats c/w mobitz II. 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  - Follow up TTE; RA pressure will be particularly helpful in assessing relative volume status.  - Wean O2 as able  - s/p Lasix 80mg IV x1 3/1. Clinically, I suspect she is euvolemic based on her exam, absence of peripheral edema, orthopnea and benign JVD. No further IV diuretics. If creatinine is stable/downtrending, would re-initiate her outpatient maintenance lasix 40mg starting tomorrow.  - Cont home ASA, Statin, Amlodipine 10. Please increase hydralazine to 50mg tid (and further as necessary) to target BP < 130/80 89 year old woman with hypertension, DM2, CAD (s/p 3V CABG in 2002 [place] to LAD, SVG to the OM, and SVG to the PDA), PVD (s/p stents in bilateral LE), CKD (Baseline Cr reportedly 1.2), HFpEF presents with WEEKS, likely HFpEF exacerbation. Mild troponin elevation likely due to heart failure.    Recommendations:  - On tele occasional dropped beats c/w mobitz II. 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 EP at that time did not recommend PPM, will discuss with pt and family and consider EP evaluation if they are amenable  - Follow up TTE; RA pressure will be particularly helpful in assessing relative volume status.  - Wean O2 as able  - s/p Lasix 80mg IV x1 3/1. Clinically, I suspect she is euvolemic based on her exam, absence of peripheral edema, orthopnea and benign JVD. No further IV diuretics. Would re-initiate her outpatient maintenance lasix 40mg starting today.  - Cont home ASA, Statin, Amlodipine 10. Please increase hydralazine to 50mg tid (and further as necessary) to target BP < 130/80

## 2024-03-05 ENCOUNTER — RESULT REVIEW (OUTPATIENT)
Age: 89
End: 2024-03-05

## 2024-03-05 ENCOUNTER — TRANSCRIPTION ENCOUNTER (OUTPATIENT)
Age: 89
End: 2024-03-05

## 2024-03-05 LAB
ANION GAP SERPL CALC-SCNC: 10 MMOL/L — SIGNIFICANT CHANGE UP (ref 5–17)
BUN SERPL-MCNC: 70 MG/DL — HIGH (ref 7–23)
CALCIUM SERPL-MCNC: 9.2 MG/DL — SIGNIFICANT CHANGE UP (ref 8.4–10.5)
CHLORIDE SERPL-SCNC: 109 MMOL/L — HIGH (ref 96–108)
CO2 SERPL-SCNC: 22 MMOL/L — SIGNIFICANT CHANGE UP (ref 22–31)
CREAT SERPL-MCNC: 1.77 MG/DL — HIGH (ref 0.5–1.3)
EGFR: 26 ML/MIN/1.73M2 — LOW
GLUCOSE BLDC GLUCOMTR-MCNC: 138 MG/DL — HIGH (ref 70–99)
GLUCOSE BLDC GLUCOMTR-MCNC: 218 MG/DL — HIGH (ref 70–99)
GLUCOSE BLDC GLUCOMTR-MCNC: 223 MG/DL — HIGH (ref 70–99)
GLUCOSE BLDC GLUCOMTR-MCNC: 242 MG/DL — HIGH (ref 70–99)
GLUCOSE SERPL-MCNC: 124 MG/DL — HIGH (ref 70–99)
MAGNESIUM SERPL-MCNC: 2.2 MG/DL — SIGNIFICANT CHANGE UP (ref 1.6–2.6)
PHOSPHATE SERPL-MCNC: 4.1 MG/DL — SIGNIFICANT CHANGE UP (ref 2.5–4.5)
POTASSIUM SERPL-MCNC: 4.5 MMOL/L — SIGNIFICANT CHANGE UP (ref 3.5–5.3)
POTASSIUM SERPL-SCNC: 4.5 MMOL/L — SIGNIFICANT CHANGE UP (ref 3.5–5.3)
SODIUM SERPL-SCNC: 141 MMOL/L — SIGNIFICANT CHANGE UP (ref 135–145)

## 2024-03-05 PROCEDURE — 78830 RP LOCLZJ TUM SPECT W/CT 1: CPT | Mod: 26

## 2024-03-05 PROCEDURE — 99233 SBSQ HOSP IP/OBS HIGH 50: CPT | Mod: GC

## 2024-03-05 PROCEDURE — 74018 RADEX ABDOMEN 1 VIEW: CPT | Mod: 26

## 2024-03-05 PROCEDURE — 93306 TTE W/DOPPLER COMPLETE: CPT | Mod: 26

## 2024-03-05 RX ORDER — INSULIN GLARGINE 100 [IU]/ML
8 INJECTION, SOLUTION SUBCUTANEOUS AT BEDTIME
Refills: 0 | Status: DISCONTINUED | OUTPATIENT
Start: 2024-03-05 | End: 2024-03-06

## 2024-03-05 RX ORDER — FUROSEMIDE 40 MG
40 TABLET ORAL
Refills: 0 | Status: DISCONTINUED | OUTPATIENT
Start: 2024-03-06 | End: 2024-03-06

## 2024-03-05 RX ORDER — POLYETHYLENE GLYCOL 3350 17 G/17G
17 POWDER, FOR SOLUTION ORAL ONCE
Refills: 0 | Status: COMPLETED | OUTPATIENT
Start: 2024-03-05 | End: 2024-03-05

## 2024-03-05 RX ORDER — SENNA PLUS 8.6 MG/1
2 TABLET ORAL AT BEDTIME
Refills: 0 | Status: DISCONTINUED | OUTPATIENT
Start: 2024-03-05 | End: 2024-03-07

## 2024-03-05 RX ORDER — TRAMADOL HYDROCHLORIDE 50 MG/1
12.5 TABLET ORAL AT BEDTIME
Refills: 0 | Status: DISCONTINUED | OUTPATIENT
Start: 2024-03-05 | End: 2024-03-07

## 2024-03-05 RX ORDER — FUROSEMIDE 40 MG
20 TABLET ORAL ONCE
Refills: 0 | Status: COMPLETED | OUTPATIENT
Start: 2024-03-05 | End: 2024-03-05

## 2024-03-05 RX ORDER — HYDRALAZINE HCL 50 MG
75 TABLET ORAL EVERY 8 HOURS
Refills: 0 | Status: DISCONTINUED | OUTPATIENT
Start: 2024-03-05 | End: 2024-03-07

## 2024-03-05 RX ADMIN — SENNA PLUS 2 TABLET(S): 8.6 TABLET ORAL at 21:27

## 2024-03-05 RX ADMIN — Medication 20 MILLIGRAM(S): at 13:42

## 2024-03-05 RX ADMIN — Medication 2: at 17:43

## 2024-03-05 RX ADMIN — HEPARIN SODIUM 5000 UNIT(S): 5000 INJECTION INTRAVENOUS; SUBCUTANEOUS at 17:45

## 2024-03-05 RX ADMIN — Medication 50 MILLIGRAM(S): at 05:36

## 2024-03-05 RX ADMIN — Medication 75 MILLIGRAM(S): at 17:44

## 2024-03-05 RX ADMIN — Medication 50 MILLIGRAM(S): at 00:08

## 2024-03-05 RX ADMIN — Medication 20 MILLIGRAM(S): at 05:36

## 2024-03-05 RX ADMIN — Medication 25 MILLIGRAM(S): at 05:36

## 2024-03-05 RX ADMIN — Medication 75 MILLIGRAM(S): at 21:26

## 2024-03-05 RX ADMIN — Medication 81 MILLIGRAM(S): at 13:42

## 2024-03-05 RX ADMIN — INSULIN GLARGINE 8 UNIT(S): 100 INJECTION, SOLUTION SUBCUTANEOUS at 21:27

## 2024-03-05 RX ADMIN — HEPARIN SODIUM 5000 UNIT(S): 5000 INJECTION INTRAVENOUS; SUBCUTANEOUS at 05:36

## 2024-03-05 RX ADMIN — PANTOPRAZOLE SODIUM 40 MILLIGRAM(S): 20 TABLET, DELAYED RELEASE ORAL at 05:35

## 2024-03-05 RX ADMIN — ATORVASTATIN CALCIUM 10 MILLIGRAM(S): 80 TABLET, FILM COATED ORAL at 21:27

## 2024-03-05 RX ADMIN — POLYETHYLENE GLYCOL 3350 17 GRAM(S): 17 POWDER, FOR SOLUTION ORAL at 13:42

## 2024-03-05 RX ADMIN — AMLODIPINE BESYLATE 10 MILLIGRAM(S): 2.5 TABLET ORAL at 05:36

## 2024-03-05 RX ADMIN — Medication 50 MILLIGRAM(S): at 21:27

## 2024-03-05 RX ADMIN — Medication 650 MILLIGRAM(S): at 13:43

## 2024-03-05 RX ADMIN — Medication 650 MILLIGRAM(S): at 14:43

## 2024-03-05 NOTE — PROGRESS NOTE ADULT - ASSESSMENT
89 year old woman with hypertension, DM2, CAD (s/p 3V CABG in 2002 [place] to LAD, SVG to the OM, and SVG to the PDA), PVD (s/p stents in bilateral LE), CKD (Baseline Cr reportedly 1.2), HFpEF presents with WEEKS, likely HFpEF exacerbation. Mild troponin elevation likely due to heart failure.    Recommendations:  - On tele occasional dropped beats c/w mobitz II. 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 EP at that time did not recommend PPM, repeat EP eval 3/4, no indication for PPM  - Follow up TTE; RA pressure will be particularly helpful in assessing relative volume status.  - Wean O2 as able  - s/p Lasix 80mg IV x1 3/1. Please give Lasix 40mg PO today (ordered for 20mg PO currently)  - Cont home ASA, Statin, Amlodipine 10. Please increase hydralazine to 75mg TID,  89 year old woman with hypertension, DM2, CAD (s/p 3V CABG in 2002 [place] to LAD, SVG to the OM, and SVG to the PDA), PVD (s/p stents in bilateral LE), CKD (Baseline Cr reportedly 1.2), HFpEF presents with WEEKS, likely HFpEF exacerbation. Mild troponin elevation likely due to heart failure.    Recommendations:  - On tele occasional dropped beats c/w mobitz II. 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 EP at that time did not recommend PPM, repeat EP eval 3/4, no indication for PPM  - Follow up TTE; prelim read with some concern for Amyloid. Recommend PYP scan to eval for TTR amyloid  - Wean O2 as able  - s/p Lasix 80mg IV x1 3/1. Please give Lasix 40mg PO today BID (ordered for 20mg PO currently)  - Cont home ASA, Statin, Amlodipine 10. Please increase hydralazine to 75mg TID,

## 2024-03-05 NOTE — PROGRESS NOTE ADULT - CONVERSATION DETAILS
detailed discussed with above re all of above options  quite clear in and requesting that intubation and/or chest compression be withheld     does want other non-heroic measures including IVF, antibiotics etc    will complete MOLST    DNR ordered in [place]    30 minutes for advanced care planning discussion separate and in addition to the E&M service provided

## 2024-03-05 NOTE — PROGRESS NOTE ADULT - SUBJECTIVE AND OBJECTIVE BOX
Patient is a 89y old  Female who presents with a chief complaint of shortness of breath (05 Mar 2024 07:02)      DATE OF SERVICE: 03-05-24 @ 14:47    SUBJECTIVE / OVERNIGHT EVENTS: overnight events noted    ROS:  Resp: No cough no sputum production  CVS: No chest pain no palpitations no orthopnea  GI: no N/V/D  "I want to go home"   c/o swelling bilateral hands        MEDICATIONS  (STANDING):  amLODIPine   Tablet 10 milliGRAM(s) Oral daily  aspirin enteric coated 81 milliGRAM(s) Oral daily  atorvastatin 10 milliGRAM(s) Oral at bedtime  dextrose 5%. 1000 milliLiter(s) (100 mL/Hr) IV Continuous <Continuous>  dextrose 5%. 1000 milliLiter(s) (50 mL/Hr) IV Continuous <Continuous>  dextrose 50% Injectable 25 Gram(s) IV Push once  dextrose 50% Injectable 25 Gram(s) IV Push once  dextrose 50% Injectable 12.5 Gram(s) IV Push once  glucagon  Injectable 1 milliGRAM(s) IntraMuscular once  heparin   Injectable 5000 Unit(s) SubCutaneous every 12 hours  hydrALAZINE 75 milliGRAM(s) Oral every 8 hours  insulin glargine Injectable (LANTUS) 5 Unit(s) SubCutaneous at bedtime  insulin lispro (ADMELOG) corrective regimen sliding scale   SubCutaneous three times a day before meals  pantoprazole    Tablet 40 milliGRAM(s) Oral before breakfast  traZODone 50 milliGRAM(s) Oral at bedtime    MEDICATIONS  (PRN):  acetaminophen     Tablet .. 650 milliGRAM(s) Oral every 8 hours PRN Temp greater or equal to 38C (100.4F), Mild Pain (1 - 3)  dextrose Oral Gel 15 Gram(s) Oral once PRN Blood Glucose LESS THAN 70 milliGRAM(s)/deciliter  traMADol 25 milliGRAM(s) Oral every 6 hours PRN Moderate Pain (4 - 6)        CAPILLARY BLOOD GLUCOSE      POCT Blood Glucose.: 242 mg/dL (05 Mar 2024 12:00)  POCT Blood Glucose.: 138 mg/dL (05 Mar 2024 09:52)  POCT Blood Glucose.: 263 mg/dL (04 Mar 2024 21:17)  POCT Blood Glucose.: 167 mg/dL (04 Mar 2024 17:15)    I&O's Summary    04 Mar 2024 07:01  -  05 Mar 2024 07:00  --------------------------------------------------------  IN: 480 mL / OUT: 800 mL / NET: -320 mL    05 Mar 2024 07:01  -  05 Mar 2024 14:47  --------------------------------------------------------  IN: 480 mL / OUT: 650 mL / NET: -170 mL        Vital Signs Last 24 Hrs  T(C): 36.7 (05 Mar 2024 12:30), Max: 37.3 (04 Mar 2024 20:28)  T(F): 98 (05 Mar 2024 12:30), Max: 99.2 (04 Mar 2024 20:28)  HR: 71 (05 Mar 2024 12:30) (71 - 82)  BP: 166/57 (05 Mar 2024 12:30) (166/57 - 192/62)  BP(mean): --  RR: 18 (05 Mar 2024 12:30) (18 - 18)  SpO2: 92% (05 Mar 2024 12:30) (92% - 95%)    PHYSICAL EXAM:  CHEST/LUNG: clear  HEART: S1 S2; systolic murmur +   ABDOMEN: Soft, Nontender  EXTREMITIES:   no edema  mild puffiness bilateral hands   NEUROLOGY: Alert non-focal  SKIN: No rashes or lesions    LABS:    03-05    141  |  109<H>  |  70<H>  ----------------------------<  124<H>  4.5   |  22  |  1.77<H>    Ca    9.2      05 Mar 2024 05:48  Phos  4.1     03-05  Mg     2.2     03-05            Urinalysis Basic - ( 05 Mar 2024 05:48 )    Color: x / Appearance: x / SG: x / pH: x  Gluc: 124 mg/dL / Ketone: x  / Bili: x / Urobili: x   Blood: x / Protein: x / Nitrite: x   Leuk Esterase: x / RBC: x / WBC x   Sq Epi: x / Non Sq Epi: x / Bacteria: x          All consultant(s) notes reviewed and care discussed with other providers        Contact Number, Dr Huddleston [phone]

## 2024-03-05 NOTE — PROGRESS NOTE ADULT - ASSESSMENT
89 y/o female hx HTN/HLD, DM2, CAD (s/p CABG), PVD (s/p stents in bilateral LE), CKD (Baseline Cr reportedly 1.2), CHF presents with suspected CHF exacerbation. She was sent in by her cardiologist, Ced Rodney for IV diuretics because for the last week she has been having increased dyspnea on exertion. She also c/o left shoulder discomfort at this time and severe chronic right hip pain from arthritis.. She takes 40 mg po furosemide once daily. Otherwise patient feels well, denies headache, shortness of breath at rest, abdominal pain, dysuria/hematuria. (01 Mar 2024 10:54)     at bedside:   he says she was brought in here for chf and sob:   she has no underlying lung disease:   she has no cough or phlegm : never had fever at home:     chf exacerbation/   R/O Pneumonia  HTN  DM  CAD  PVD  CKD    chf exacerbation/   diuretics per primary team/; seen by cards  -recd intimally diuretics  now on hold per cards   -check echo   3/2" echo pending:  diuretics on hold  3/3: stlil pending:  remains off diuretics   3/4 : diuretics remains on hold  3/5: seems OK: no sob:  nocugh : no phlegm  on 2 L of oxygen    R/O Pneumonia  -she has more opacity on rigth side:  but clinically she presented with heart failure:   -she has no fever:  check procal : and her wbc count is normal:   -keep off antibiotics  3/2: ct chest   showed: Moderate right and small left pleural effusions with associated compressive atelectasis. Small volume perihepatic ascites. Mild subcutaneous edema.  -she needs diuresis:  defer to cards  3/3: cont to monitor clinically:  she is not sob:  she looks comfortable:  if the effusion increases,  it might need tap  3/4: comfortable now needs home oxygen    3/5: There isno increase in her sob:  cont to monitor   HTN  -controlled  DM  -monitor and control   CAD  -cont per cards  PVD  -per PMD  CKD  -monitor:    dw acp

## 2024-03-05 NOTE — DISCHARGE NOTE NURSING/CASE MANAGEMENT/SOCIAL WORK - PATIENT PORTAL LINK FT
You can access the FollowMyHealth Patient Portal offered by [facility] by registering at the following website: [email or web address]. By joining [facility]’s FollowMyHealth portal, you will also be able to view your health information using other applications (apps) compatible with our system.

## 2024-03-05 NOTE — PROGRESS NOTE ADULT - SUBJECTIVE AND OBJECTIVE BOX
CARDIOLOGY CONSULT PROGRESS NOTE  MAGED MUHAMMAD  MRN-[id number]    INTERVAL EVENTS:  - tele: sinus deanne with prolonged MO, occasional episodes of Mobitz 1  - no events ON, elevated BP to SBP 170s without symptoms    ROS:  CONSTITUTIONAL: No weakness, fevers or chills  EYES/ENT: No visual changes;  No dysphagia  NECK: No pain or stiffness  RESPIRATORY: No cough, wheezing, hemoptysis; No shortness of breath  CARDIOVASCULAR: No chest pain or palpitations; No lower extremity edema  GASTROINTESTINAL: No abdominal or epigastric pain. No nausea, vomiting, or hematemesis; No diarrhea or constipation. No melena or hematochezia.  BACK: No back pain  GENITOURINARY: No dysuria, frequency or hematuria  NEUROLOGICAL: No numbness or weakness  SKIN: No itching, burning, rashes, or lesions     MEDICATIONS  (STANDING):  amLODIPine   Tablet 10 milliGRAM(s) Oral daily  aspirin enteric coated 81 milliGRAM(s) Oral daily  atorvastatin 10 milliGRAM(s) Oral at bedtime  dextrose 5%. 1000 milliLiter(s) (100 mL/Hr) IV Continuous <Continuous>  dextrose 5%. 1000 milliLiter(s) (50 mL/Hr) IV Continuous <Continuous>  dextrose 50% Injectable 25 Gram(s) IV Push once  dextrose 50% Injectable 25 Gram(s) IV Push once  dextrose 50% Injectable 12.5 Gram(s) IV Push once  furosemide    Tablet 20 milliGRAM(s) Oral daily  glucagon  Injectable 1 milliGRAM(s) IntraMuscular once  heparin   Injectable 5000 Unit(s) SubCutaneous every 12 hours  hydrALAZINE 25 milliGRAM(s) Oral every 8 hours  hydrALAZINE 50 milliGRAM(s) Oral three times a day  insulin glargine Injectable (LANTUS) 5 Unit(s) SubCutaneous at bedtime  insulin lispro (ADMELOG) corrective regimen sliding scale   SubCutaneous three times a day before meals  pantoprazole    Tablet 40 milliGRAM(s) Oral before breakfast  traZODone 50 milliGRAM(s) Oral at bedtime    MEDICATIONS  (PRN):  acetaminophen     Tablet .. 650 milliGRAM(s) Oral every 8 hours PRN Temp greater or equal to 38C (100.4F), Mild Pain (1 - 3)  dextrose Oral Gel 15 Gram(s) Oral once PRN Blood Glucose LESS THAN 70 milliGRAM(s)/deciliter  traMADol 25 milliGRAM(s) Oral every 6 hours PRN Moderate Pain (4 - 6)    Allergies    No Known Allergies    Intolerances      P/E:  Vital Signs Last 24 Hrs  T(C): 36.9 (05 Mar 2024 05:24), Max: 37.3 (04 Mar 2024 20:28)  T(F): 98.4 (05 Mar 2024 05:24), Max: 99.2 (04 Mar 2024 20:28)  HR: 74 (05 Mar 2024 05:24) (64 - 82)  BP: 170/66 (05 Mar 2024 05:24) (159/60 - 192/62)  BP(mean): --  RR: 18 (05 Mar 2024 05:24) (18 - 18)  SpO2: 95% (05 Mar 2024 05:24) (95% - 95%)    Parameters below as of 05 Mar 2024 05:24  Patient On (Oxygen Delivery Method): nasal cannula      Daily     Daily Weight in k.4 (05 Mar 2024 05:51)  I&O's Detail    04 Mar 2024 07:01  -  05 Mar 2024 07:00  --------------------------------------------------------  IN:    Oral Fluid: 480 mL  Total IN: 480 mL    OUT:    Voided (mL): 800 mL  Total OUT: 800 mL    Total NET: -320 mL        I&O's Summary    04 Mar 2024 07:01  -  05 Mar 2024 07:00  --------------------------------------------------------  IN: 480 mL / OUT: 800 mL / NET: -320 mL      - gen: well appearing, laying in hospital bed, NAD  - HEENT: MMM, NCAT  - neck: no JVD, supple  - heart: RRR, nml S1/S2, no RMG  - lungs: CTABL, nml WOB  - abd: soft, NTND, NABS  - ext: WWP, PPP, no ENDY    RELEVANT RECENT LABS/IMAGING/STUDIES:        141  |  109<H>  |  70<H>  ----------------------------<  124<H>  4.5   |  22  |  1.77<H>    Ca    9.2      05 Mar 2024 05:48  Phos  4.1     03  Mg     2.2               --------------------------------------        --------------------------------------

## 2024-03-05 NOTE — DISCHARGE NOTE NURSING/CASE MANAGEMENT/SOCIAL WORK - NSDCVIVACCINE_GEN_ALL_CORE_FT
COVID-19, mRNA, LNP-S, PF, 100 mcg/ 0.5 mL dose ([employer]a); 13-Dec-2021 18:56; Dara Ny ([facility]); [employer] 850E27Y (Exp. Date: 26-Dec-2021); IntraMuscular; Deltoid Left.; 0.25 milliLiter(s);

## 2024-03-05 NOTE — PROGRESS NOTE ADULT - SUBJECTIVE AND OBJECTIVE BOX
Date of Service: 03-05-24 @ 16:19    Patient is a 89y old  Female who presents with a chief complaint of shortness of breath (05 Mar 2024 14:47)      Any change in ROS:  doing ok : no sob:  on 2 L     MEDICATIONS  (STANDING):  amLODIPine   Tablet 10 milliGRAM(s) Oral daily  aspirin enteric coated 81 milliGRAM(s) Oral daily  atorvastatin 10 milliGRAM(s) Oral at bedtime  dextrose 5%. 1000 milliLiter(s) (100 mL/Hr) IV Continuous <Continuous>  dextrose 5%. 1000 milliLiter(s) (50 mL/Hr) IV Continuous <Continuous>  dextrose 50% Injectable 25 Gram(s) IV Push once  dextrose 50% Injectable 12.5 Gram(s) IV Push once  dextrose 50% Injectable 25 Gram(s) IV Push once  glucagon  Injectable 1 milliGRAM(s) IntraMuscular once  heparin   Injectable 5000 Unit(s) SubCutaneous every 12 hours  hydrALAZINE 75 milliGRAM(s) Oral every 8 hours  insulin glargine Injectable (LANTUS) 8 Unit(s) SubCutaneous at bedtime  insulin lispro (ADMELOG) corrective regimen sliding scale   SubCutaneous three times a day before meals  pantoprazole    Tablet 40 milliGRAM(s) Oral before breakfast  traZODone 50 milliGRAM(s) Oral at bedtime    MEDICATIONS  (PRN):  acetaminophen     Tablet .. 650 milliGRAM(s) Oral every 8 hours PRN Temp greater or equal to 38C (100.4F), Mild Pain (1 - 3)  bisacodyl 5 milliGRAM(s) Oral every 12 hours PRN Constipation  dextrose Oral Gel 15 Gram(s) Oral once PRN Blood Glucose LESS THAN 70 milliGRAM(s)/deciliter  traMADol 12.5 milliGRAM(s) Oral at bedtime PRN pain    Vital Signs Last 24 Hrs  T(C): 36.7 (05 Mar 2024 12:30), Max: 37.3 (04 Mar 2024 20:28)  T(F): 98 (05 Mar 2024 12:30), Max: 99.2 (04 Mar 2024 20:28)  HR: 71 (05 Mar 2024 12:30) (71 - 82)  BP: 166/57 (05 Mar 2024 12:30) (166/57 - 192/62)  BP(mean): --  RR: 18 (05 Mar 2024 12:30) (18 - 18)  SpO2: 92% (05 Mar 2024 12:30) (92% - 95%)    Parameters below as of 05 Mar 2024 12:30  Patient On (Oxygen Delivery Method): nasal cannula  O2 Flow (L/min): 2      I&O's Summary    04 Mar 2024 07:01  -  05 Mar 2024 07:00  --------------------------------------------------------  IN: 480 mL / OUT: 800 mL / NET: -320 mL    05 Mar 2024 07:01  -  05 Mar 2024 16:19  --------------------------------------------------------  IN: 480 mL / OUT: 650 mL / NET: -170 mL          Physical Exam:   GENERAL: NAD, well-groomed, well-developed  HEENT: CARY/   Atraumatic, Normocephalic  ENMT: No tonsillar erythema, exudates, or enlargement; Moist mucous membranes, Good dentition, No lesions  NECK: Supple, No JVD, Normal thyroid  CHEST/LUNG: Clear to auscultaion  CVS: Regular rate and rhythm; No murmurs, rubs, or gallops  GI: : Soft, Nontender, Nondistended; Bowel sounds present  NERVOUS SYSTEM:  Alert & Oriented X3  EXTREMITIES:-mild  edema  LYMPH: No lymphadenopathy noted  SKIN: No rashes or lesions  ENDOCRINOLOGY: No Thyromegaly  PSYCH: Appropriate    Labs:  22                            8.9    7.62  )-----------( 285      ( 02 Mar 2024 05:58 )             31.0     03-05    141  |  109<H>  |  70<H>  ----------------------------<  124<H>  4.5   |  22  |  1.77<H>  03-04    141  |  109<H>  |  65<H>  ----------------------------<  126<H>  4.6   |  19<L>  |  1.71<H>  03-03    143  |  111<H>  |  72<H>  ----------------------------<  126<H>  4.4   |  21<L>  |  1.76<H>  03-02    145  |  110<H>  |  78<H>  ----------------------------<  117<H>  4.6   |  22  |  1.92<H>    Ca    9.2      05 Mar 2024 05:48  Ca    8.9      04 Mar 2024 06:09  Phos  4.1     03-05  Phos  4.6     03-04  Mg     2.2     03-05  Mg     2.1     03-04    TPro  6.6  /  Alb  3.6  /  TBili  0.3  /  DBili  x   /  AST  14  /  ALT  7<L>  /  AlkPhos  201<H>  03-03  TPro  6.9  /  Alb  3.8  /  TBili  0.2  /  DBili  x   /  AST  17  /  ALT  11  /  AlkPhos  213<H>  03-02    CAPILLARY BLOOD GLUCOSE      POCT Blood Glucose.: 242 mg/dL (05 Mar 2024 12:00)  POCT Blood Glucose.: 138 mg/dL (05 Mar 2024 09:52)  POCT Blood Glucose.: 263 mg/dL (04 Mar 2024 21:17)  POCT Blood Glucose.: 167 mg/dL (04 Mar 2024 17:15)          Urinalysis Basic - ( 05 Mar 2024 05:48 )    Color: x / Appearance: x / SG: x / pH: x  Gluc: 124 mg/dL / Ketone: x  / Bili: x / Urobili: x   Blood: x / Protein: x / Nitrite: x   Leuk Esterase: x / RBC: x / WBC x   Sq Epi: x / Non Sq Epi: x / Bacteria: x      rad< from: CT Chest No Cont (03.01.24 @ 22:14) >  HEART: Cardiomegaly. No pericardial effusion.  CHEST WALL AND LOWER NECK: Left thyroid lobe peripherally calcified   nodule, 1.5 cm, similar to prior.  VISUALIZED UPPER ABDOMEN: Small volume perihepatic ascites. Mild   subcutaneous edema.  BONES: Degenerative changes. Median sternotomy.    IMPRESSION:  Moderate right and small left pleural effusions with associated   compressive atelectasis.    Small volume perihepatic ascites. Mild subcutaneous edema.    --- End of Report ---           MAYUR RAMON MD; Resident Radiologist  This document has been electronically signed.   SEGUN SANTIAGO MD; Attending Radiologist  This document has been electronically signed. Mar  2 2024  2:44PM    < end of copied text >        RECENT CULTURES:        RESPIRATORY CULTURES:          Studies  Chest X-RAY  CT SCAN Chest   Venous Dopplers: LE:   CT Abdomen  Others      rad

## 2024-03-06 LAB
ANION GAP SERPL CALC-SCNC: 12 MMOL/L — SIGNIFICANT CHANGE UP (ref 5–17)
BUN SERPL-MCNC: 75 MG/DL — HIGH (ref 7–23)
CALCIUM SERPL-MCNC: 9.2 MG/DL — SIGNIFICANT CHANGE UP (ref 8.4–10.5)
CHLORIDE SERPL-SCNC: 107 MMOL/L — SIGNIFICANT CHANGE UP (ref 96–108)
CO2 SERPL-SCNC: 21 MMOL/L — LOW (ref 22–31)
CREAT SERPL-MCNC: 2.14 MG/DL — HIGH (ref 0.5–1.3)
EGFR: 21 ML/MIN/1.73M2 — LOW
GLUCOSE BLDC GLUCOMTR-MCNC: 129 MG/DL — HIGH (ref 70–99)
GLUCOSE BLDC GLUCOMTR-MCNC: 188 MG/DL — HIGH (ref 70–99)
GLUCOSE BLDC GLUCOMTR-MCNC: 234 MG/DL — HIGH (ref 70–99)
GLUCOSE BLDC GLUCOMTR-MCNC: 265 MG/DL — HIGH (ref 70–99)
GLUCOSE SERPL-MCNC: 142 MG/DL — HIGH (ref 70–99)
HCT VFR BLD CALC: 28.2 % — LOW (ref 34.5–45)
HGB BLD-MCNC: 8.7 G/DL — LOW (ref 11.5–15.5)
MCHC RBC-ENTMCNC: 27 PG — SIGNIFICANT CHANGE UP (ref 27–34)
MCHC RBC-ENTMCNC: 30.9 GM/DL — LOW (ref 32–36)
MCV RBC AUTO: 87.6 FL — SIGNIFICANT CHANGE UP (ref 80–100)
NRBC # BLD: 0 /100 WBCS — SIGNIFICANT CHANGE UP (ref 0–0)
NT-PROBNP SERPL-SCNC: 6665 PG/ML — HIGH (ref 0–300)
PLATELET # BLD AUTO: 280 K/UL — SIGNIFICANT CHANGE UP (ref 150–400)
POTASSIUM SERPL-MCNC: 4.8 MMOL/L — SIGNIFICANT CHANGE UP (ref 3.5–5.3)
POTASSIUM SERPL-SCNC: 4.8 MMOL/L — SIGNIFICANT CHANGE UP (ref 3.5–5.3)
RBC # BLD: 3.22 M/UL — LOW (ref 3.8–5.2)
RBC # FLD: 16.7 % — HIGH (ref 10.3–14.5)
SODIUM SERPL-SCNC: 140 MMOL/L — SIGNIFICANT CHANGE UP (ref 135–145)
WBC # BLD: 8.42 K/UL — SIGNIFICANT CHANGE UP (ref 3.8–10.5)
WBC # FLD AUTO: 8.42 K/UL — SIGNIFICANT CHANGE UP (ref 3.8–10.5)

## 2024-03-06 PROCEDURE — 99233 SBSQ HOSP IP/OBS HIGH 50: CPT | Mod: GC

## 2024-03-06 RX ORDER — INSULIN GLARGINE 100 [IU]/ML
12 INJECTION, SOLUTION SUBCUTANEOUS AT BEDTIME
Refills: 0 | Status: DISCONTINUED | OUTPATIENT
Start: 2024-03-06 | End: 2024-03-07

## 2024-03-06 RX ADMIN — HEPARIN SODIUM 5000 UNIT(S): 5000 INJECTION INTRAVENOUS; SUBCUTANEOUS at 17:12

## 2024-03-06 RX ADMIN — Medication 81 MILLIGRAM(S): at 13:37

## 2024-03-06 RX ADMIN — Medication 650 MILLIGRAM(S): at 10:21

## 2024-03-06 RX ADMIN — INSULIN GLARGINE 12 UNIT(S): 100 INJECTION, SOLUTION SUBCUTANEOUS at 22:06

## 2024-03-06 RX ADMIN — SENNA PLUS 2 TABLET(S): 8.6 TABLET ORAL at 21:37

## 2024-03-06 RX ADMIN — Medication 40 MILLIGRAM(S): at 05:14

## 2024-03-06 RX ADMIN — PANTOPRAZOLE SODIUM 40 MILLIGRAM(S): 20 TABLET, DELAYED RELEASE ORAL at 07:44

## 2024-03-06 RX ADMIN — HEPARIN SODIUM 5000 UNIT(S): 5000 INJECTION INTRAVENOUS; SUBCUTANEOUS at 05:13

## 2024-03-06 RX ADMIN — Medication 75 MILLIGRAM(S): at 05:12

## 2024-03-06 RX ADMIN — Medication 75 MILLIGRAM(S): at 13:38

## 2024-03-06 RX ADMIN — Medication 650 MILLIGRAM(S): at 12:03

## 2024-03-06 RX ADMIN — Medication 5 MILLIGRAM(S): at 12:07

## 2024-03-06 RX ADMIN — AMLODIPINE BESYLATE 10 MILLIGRAM(S): 2.5 TABLET ORAL at 05:12

## 2024-03-06 RX ADMIN — Medication 1: at 17:11

## 2024-03-06 RX ADMIN — Medication 50 MILLIGRAM(S): at 21:37

## 2024-03-06 RX ADMIN — Medication 75 MILLIGRAM(S): at 21:36

## 2024-03-06 RX ADMIN — Medication 2: at 12:06

## 2024-03-06 RX ADMIN — ATORVASTATIN CALCIUM 10 MILLIGRAM(S): 80 TABLET, FILM COATED ORAL at 21:37

## 2024-03-06 NOTE — PROGRESS NOTE ADULT - ASSESSMENT
89 year old woman with hypertension, DM2, CAD (s/p 3V CABG in 2002 [place] to LAD, SVG to the OM, and SVG to the PDA), PVD (s/p stents in bilateral LE), CKD (Baseline Cr reportedly 1.2), HFpEF presents with WEEKS, likely HFpEF exacerbation. Mild troponin elevation likely due to heart failure.    Recommendations:  - On tele occasional dropped beats c/w mobitz II. 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 EP at that time did not recommend PPM, repeat EP eval 3/4, no indication for PPM  - TTE with HFpEF, bi-atrial enlargement, LV wall thickening c/f possible Amyloid. Follow up PYP results  - Wean O2 as able  - Cr rising on Lasix, give ***  - Cont home ASA, Statin, Amlodipine 10. Please increase hydralazine to 75mg TID,  89 year old woman with hypertension, DM2, CAD (s/p 3V CABG in 2002 [place] to LAD, SVG to the OM, and SVG to the PDA), PVD (s/p stents in bilateral LE), CKD (Baseline Cr reportedly 1.2), HFpEF presents with WEEKS, likely HFpEF exacerbation. Mild troponin elevation likely due to heart failure.    Recommendations:  - On tele occasional dropped beats c/w mobitz II. 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 EP at that time did not recommend PPM, repeat EP eval 3/4, no indication for PPM  - TTE with HFpEF, bi-atrial enlargement, LV wall thickening c/f possible Amyloid. Follow up PYP results  - Wean O2 as able  - Cr rising on Lasix, decrease dose to 40mg once daily   - Cont home ASA, Statin, Amlodipine 10. hydralazine 75mg TID,  89 year old woman with hypertension, DM2, CAD (s/p 3V CABG in 2002 [place] to LAD, SVG to the OM, and SVG to the PDA), PVD (s/p stents in bilateral LE), CKD (Baseline Cr reportedly 1.2), HFpEF presents with WEEKS, likely HFpEF exacerbation. Mild troponin elevation likely due to heart failure.    Recommendations:  - On tele occasional dropped beats c/w mobitz I. 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 EP at that time did not recommend PPM, repeat EP eval 3/4, no indication for PPM  - TTE with HFpEF, bi-atrial enlargement, LV wall thickening c/f possible Amyloid. Follow up PYP results  - Wean O2 as able  - Cr rising on Lasix, decrease dose to 40mg once daily   - Cont home ASA, Statin, Amlodipine 10. hydralazine 75mg TID,

## 2024-03-06 NOTE — PROGRESS NOTE ADULT - SUBJECTIVE AND OBJECTIVE BOX
Date of Service: 03-06-24 @ 17:47    Patient is a 89y old  Female who presents with a chief complaint of shortness of breath (06 Mar 2024 15:39)      Any change in ROS: she looks ok: creat increased hence dc held:     MEDICATIONS  (STANDING):  amLODIPine   Tablet 10 milliGRAM(s) Oral daily  aspirin enteric coated 81 milliGRAM(s) Oral daily  atorvastatin 10 milliGRAM(s) Oral at bedtime  dextrose 5%. 1000 milliLiter(s) (50 mL/Hr) IV Continuous <Continuous>  dextrose 5%. 1000 milliLiter(s) (100 mL/Hr) IV Continuous <Continuous>  dextrose 50% Injectable 25 Gram(s) IV Push once  dextrose 50% Injectable 25 Gram(s) IV Push once  dextrose 50% Injectable 12.5 Gram(s) IV Push once  glucagon  Injectable 1 milliGRAM(s) IntraMuscular once  heparin   Injectable 5000 Unit(s) SubCutaneous every 12 hours  hydrALAZINE 75 milliGRAM(s) Oral every 8 hours  insulin glargine Injectable (LANTUS) 12 Unit(s) SubCutaneous at bedtime  insulin lispro (ADMELOG) corrective regimen sliding scale   SubCutaneous three times a day before meals  pantoprazole    Tablet 40 milliGRAM(s) Oral before breakfast  senna 2 Tablet(s) Oral at bedtime  traZODone 50 milliGRAM(s) Oral at bedtime    MEDICATIONS  (PRN):  acetaminophen     Tablet .. 650 milliGRAM(s) Oral every 8 hours PRN Temp greater or equal to 38C (100.4F), Mild Pain (1 - 3)  bisacodyl 5 milliGRAM(s) Oral every 12 hours PRN Constipation  dextrose Oral Gel 15 Gram(s) Oral once PRN Blood Glucose LESS THAN 70 milliGRAM(s)/deciliter  traMADol 12.5 milliGRAM(s) Oral at bedtime PRN pain    Vital Signs Last 24 Hrs  T(C): 36.7 (06 Mar 2024 11:33), Max: 37.1 (06 Mar 2024 04:31)  T(F): 98 (06 Mar 2024 11:33), Max: 98.8 (06 Mar 2024 04:31)  HR: 77 (06 Mar 2024 11:33) (66 - 82)  BP: 158/55 (06 Mar 2024 11:33) (158/55 - 174/67)  BP(mean): --  RR: 18 (06 Mar 2024 12:32) (18 - 18)  SpO2: 84% (06 Mar 2024 12:32) (84% - 95%)    Parameters below as of 06 Mar 2024 12:32  Patient On (Oxygen Delivery Method): room air        I&O's Summary    05 Mar 2024 07:01  -  06 Mar 2024 07:00  --------------------------------------------------------  IN: 900 mL / OUT: 1050 mL / NET: -150 mL    06 Mar 2024 07:01  -  06 Mar 2024 17:47  --------------------------------------------------------  IN: 465 mL / OUT: 850 mL / NET: -385 mL          Physical Exam:   GENERAL: NAD, well-groomed, well-developed  HEENT: CARY/   Atraumatic, Normocephalic  ENMT: No tonsillar erythema, exudates, or enlargement; Moist mucous membranes, Good dentition, No lesions  NECK: Supple, No JVD, Normal thyroid  CHEST/LUNG: Clear to auscultaion  CVS: Regular rate and rhythm; No murmurs, rubs, or gallops  GI: : Soft, Nontender, Nondistended; Bowel sounds present  NERVOUS SYSTEM:  Alert & Oriented X3  EXTREMITIES: -mild edema  LYMPH: No lymphadenopathy noted  SKIN: No rashes or lesions  ENDOCRINOLOGY: No Thyromegaly  PSYCH: Appropriate    Labs:  22                            8.7    8.42  )-----------( 280      ( 06 Mar 2024 06:01 )             28.2     03-06    140  |  107  |  75<H>  ----------------------------<  142<H>  4.8   |  21<L>  |  2.14<H>  03-05    141  |  109<H>  |  70<H>  ----------------------------<  124<H>  4.5   |  22  |  1.77<H>  03-04    141  |  109<H>  |  65<H>  ----------------------------<  126<H>  4.6   |  19<L>  |  1.71<H>  03-03    143  |  111<H>  |  72<H>  ----------------------------<  126<H>  4.4   |  21<L>  |  1.76<H>    Ca    9.2      06 Mar 2024 06:01  Ca    9.2      05 Mar 2024 05:48  Phos  4.1     03-05  Mg     2.2     03-05    TPro  6.6  /  Alb  3.6  /  TBili  0.3  /  DBili  x   /  AST  14  /  ALT  7<L>  /  AlkPhos  201<H>  03-03    CAPILLARY BLOOD GLUCOSE      POCT Blood Glucose.: 188 mg/dL (06 Mar 2024 16:58)  POCT Blood Glucose.: 234 mg/dL (06 Mar 2024 11:42)  POCT Blood Glucose.: 129 mg/dL (06 Mar 2024 08:07)  POCT Blood Glucose.: 218 mg/dL (05 Mar 2024 21:21)          Urinalysis Basic - ( 06 Mar 2024 06:01 )    Color: x / Appearance: x / SG: x / pH: x  Gluc: 142 mg/dL / Ketone: x  / Bili: x / Urobili: x   Blood: x / Protein: x / Nitrite: x   Leuk Esterase: x / RBC: x / WBC x   Sq Epi: x / Non Sq Epi: x / Bacteria: x      rad< from: NM Amyloidosis SPECT/CT, Single Area Single Day (03.05.24 @ 19:28) >  Atherosclerotic changes of the aorta and coronary arteries. Status post   CABG.  Partially calcified left thyroid nodule measuring 1.5 cm unchanged from   prior study.  Status post median sternotomy.  Small amount of perihepatic and perisplenic ascites.  Status post cholecystectomy.  Diffuse fatty infiltration of the pancreas.  Diffuse calcification of the abdominal aorta and its branches.    Left Ventricular Myocardium to Bone (visual at 3 hrs): Grade: 0  (normal:   0)    Right Ventricular Myocardium to Bone (visual at 3 hrs): Grade: 0    (normal: 0)    IMPRESSION:    Cardiac amyloid Imaging study is  not suggestive of transthyretin cardiac   amyloidosis.    Stable bilateral pleural effusions.    --- End of Report ---    < end of copied text >  < from: CT Chest No Cont (03.01.24 @ 22:14) >  MEDIASTINUM AND ALFREDO: Few scattered prominent mediastinal lymph nodes,   largest 1.4 x 1.2 cm in the precarinal region (3-56).  VESSELS: Atherosclerotic changes of the aorta and coronary arteries. CABG.  HEART: Cardiomegaly. No pericardial effusion.  CHEST WALL AND LOWER NECK: Left thyroid lobe peripherally calcified   nodule, 1.5 cm, similar to prior.  VISUALIZED UPPER ABDOMEN: Small volume perihepatic ascites. Mild   subcutaneous edema.  BONES: Degenerative changes. Median sternotomy.    IMPRESSION:  Moderate right and small left pleural effusions with associated   compressive atelectasis.    Small volume perihepatic ascites. Mild subcutaneous edema.    --- End of Report ---           MAYUR RAMON MD; Resident Radiologist  This document has been electronically signed.   SEGUN SANTIAGO MD; Attending Radiologist  This document has been electronically signed. Mar  2 2024  2:44PM    < end of copied text >        RECENT CULTURES:        RESPIRATORY CULTURES:          Studies  Chest X-RAY  CT SCAN Chest   Venous Dopplers: LE:   CT Abdomen  Others    < from: TTE W or WO Ultrasound Enhancing Agent (03.05.24 @ 08:19) >  Indication(s):     Chronic systolic (congestive) heart failure - I50.22  Procedure:Transthoracic echocardiogram with 2-D, M-mode and complete                     spectral and color flow Doppler.  Ordering Location: [facility]  Admission Status:  Inpatient  Study Information: Image quality for this study is adequate.    _______________________________________________________________________________________     CONCLUSIONS:      1. Left ventricular cavity is small. Left ventricular systolic function is normal with an ejection fraction of 66 % by Iglesias's method of disks. There are no regional wall motion abnormalities seen.   2. There is moderate (grade 2) left ventricular diastolic dysfunction, with elevated filling pressure.   3. Mildly enlarged right ventricular cavity size, with wall thickness, and normal systolic function. Tricuspid annular plane systolic excursion (TAPSE) is 1.8 cm (normal >=1.7 cm).   4. The left atrium is severely dilated.   5. The right atrium is mildly dilated.   6. No pericardial effusion seen.   7. Moderate to severe tricuspid regurgitation.   8. Compared to the transthoracic echocardiogram performed on 1/30/2022, PASP is increased. MR and TR increased.   9. Estimated pulmonary artery systolic pressure is 55 mmHg, consistent with moderate pulmonary hypertension.  10. There is increased LV mass and concentric hypertrophy.    ________________________________________________________________________________________    < end of copied text >

## 2024-03-06 NOTE — PROGRESS NOTE ADULT - SUBJECTIVE AND OBJECTIVE BOX
Patient is a 89y old  Female who presents with a chief complaint of shortness of breath (06 Mar 2024 08:17)      DATE OF SERVICE: 03-06-24 @ 15:39    SUBJECTIVE / OVERNIGHT EVENTS: overnight events noted    ROS:  Resp: No cough no sputum production  CVS: No chest pain no palpitations no orthopnea  GI: no N/V/D  : no dysuria, no hematuria  son at bedside  "I feel better"      MEDICATIONS  (STANDING):  amLODIPine   Tablet 10 milliGRAM(s) Oral daily  aspirin enteric coated 81 milliGRAM(s) Oral daily  atorvastatin 10 milliGRAM(s) Oral at bedtime  dextrose 5%. 1000 milliLiter(s) (100 mL/Hr) IV Continuous <Continuous>  dextrose 5%. 1000 milliLiter(s) (50 mL/Hr) IV Continuous <Continuous>  dextrose 50% Injectable 25 Gram(s) IV Push once  dextrose 50% Injectable 12.5 Gram(s) IV Push once  dextrose 50% Injectable 25 Gram(s) IV Push once  glucagon  Injectable 1 milliGRAM(s) IntraMuscular once  heparin   Injectable 5000 Unit(s) SubCutaneous every 12 hours  hydrALAZINE 75 milliGRAM(s) Oral every 8 hours  insulin glargine Injectable (LANTUS) 8 Unit(s) SubCutaneous at bedtime  insulin lispro (ADMELOG) corrective regimen sliding scale   SubCutaneous three times a day before meals  pantoprazole    Tablet 40 milliGRAM(s) Oral before breakfast  senna 2 Tablet(s) Oral at bedtime  traZODone 50 milliGRAM(s) Oral at bedtime    MEDICATIONS  (PRN):  acetaminophen     Tablet .. 650 milliGRAM(s) Oral every 8 hours PRN Temp greater or equal to 38C (100.4F), Mild Pain (1 - 3)  bisacodyl 5 milliGRAM(s) Oral every 12 hours PRN Constipation  dextrose Oral Gel 15 Gram(s) Oral once PRN Blood Glucose LESS THAN 70 milliGRAM(s)/deciliter  traMADol 12.5 milliGRAM(s) Oral at bedtime PRN pain        CAPILLARY BLOOD GLUCOSE      POCT Blood Glucose.: 234 mg/dL (06 Mar 2024 11:42)  POCT Blood Glucose.: 129 mg/dL (06 Mar 2024 08:07)  POCT Blood Glucose.: 218 mg/dL (05 Mar 2024 21:21)  POCT Blood Glucose.: 223 mg/dL (05 Mar 2024 17:15)    I&O's Summary    05 Mar 2024 07:01  -  06 Mar 2024 07:00  --------------------------------------------------------  IN: 900 mL / OUT: 1050 mL / NET: -150 mL    06 Mar 2024 07:01  -  06 Mar 2024 15:39  --------------------------------------------------------  IN: 465 mL / OUT: 850 mL / NET: -385 mL        Vital Signs Last 24 Hrs  T(C): 36.7 (06 Mar 2024 11:33), Max: 37.1 (06 Mar 2024 04:31)  T(F): 98 (06 Mar 2024 11:33), Max: 98.8 (06 Mar 2024 04:31)  HR: 77 (06 Mar 2024 11:33) (66 - 82)  BP: 158/55 (06 Mar 2024 11:33) (158/55 - 174/67)  BP(mean): --  RR: 18 (06 Mar 2024 12:32) (18 - 18)  SpO2: 84% (06 Mar 2024 12:32) (84% - 95%)    PHYSICAL EXAM:  CHEST/LUNG: clear  HEART: S1 S2; systolic murmur +   ABDOMEN: Soft, Nontender  EXTREMITIES:   no edema  mild puffiness bilateral hands   NEUROLOGY: Alert non-focal  SKIN: No rashes or lesions    LABS:                        8.7    8.42  )-----------( 280      ( 06 Mar 2024 06:01 )             28.2     03-06    140  |  107  |  75<H>  ----------------------------<  142<H>  4.8   |  21<L>  |  2.14<H>    Ca    9.2      06 Mar 2024 06:01  Phos  4.1     03-05  Mg     2.2     03-05            Urinalysis Basic - ( 06 Mar 2024 06:01 )    Color: x / Appearance: x / SG: x / pH: x  Gluc: 142 mg/dL / Ketone: x  / Bili: x / Urobili: x   Blood: x / Protein: x / Nitrite: x   Leuk Esterase: x / RBC: x / WBC x   Sq Epi: x / Non Sq Epi: x / Bacteria: x          All consultant(s) notes reviewed and care discussed with other providers        Contact Number, Dr Huddleston [phone]

## 2024-03-06 NOTE — PROGRESS NOTE ADULT - ASSESSMENT
89 y/o female hx HTN/HLD, DM2, CAD (s/p CABG), PVD (s/p stents in bilateral LE), CKD (Baseline Cr reportedly 1.2), CHF presents with suspected CHF exacerbation. She was sent in by her cardiologist, Ced Rodney for IV diuretics because for the last week she has been having increased dyspnea on exertion. She also c/o left shoulder discomfort at this time and severe chronic right hip pain from arthritis.. She takes 40 mg po furosemide once daily. Otherwise patient feels well, denies headache, shortness of breath at rest, abdominal pain, dysuria/hematuria. (01 Mar 2024 10:54)     at bedside:   he says she was brought in here for chf and sob:   she has no underlying lung disease:   she has no cough or phlegm : never had fever at home:     chf exacerbation/   R/O Pneumonia  HTN  DM  CAD  PVD  CKD    chf exacerbation/   diuretics per primary team/; seen by cards  -recd intimally diuretics  now on hold per cards   -check echo   3/2" echo pending:  diuretics on hold  3/3: stlil pending:  remains off diuretics   3/4 : diuretics remains on hold  3/5: seems OK: no sob:  no cough : no phlegm  on 2 L of oxygen    3/6: creat increased : adjustment of diuretics per renal / Shehas diastolic dysfunction and mod pulm htn  : she does ahve mary effusion which is likely cardiac:  and I feels they are small and little bit larger on rigth side    R/O Pneumonia  -she has more opacity on rigth side:  but clinically she presented with heart failure:   -she has no fever:  check procal : and her wbc count is normal:   -keep off antibiotics  3/2: ct chest   showed: Moderate right and small left pleural effusions with associated compressive atelectasis. Small volume perihepatic ascites. Mild subcutaneous edema.  -she needs diuresis:  defer to cards  3/3: cont to monitor clinically:  she is not sob:  she looks comfortable:  if the effusion increases,  it might need tap  3/4: comfortable now needs home oxygen    3/5: There isno increase in her sob:  cont to monitor   3/6: cont same rx:  no pneumonia  HTN  -controlled  DM  -monitor and control   CAD  -cont per cards  PVD  -per PMD  CKD  -monitor:    dw acp

## 2024-03-06 NOTE — CHART NOTE - NSCHARTNOTEFT_GEN_A_CORE
I have examined the patient and patient requires home oxygen due to low saturation and diagnosis of Chronic Heart Failure. Pt on room air at rest, oxygen saturation  87%. Patient's Oxygen saturation at rest on 2L noted to be 93%. Patient will require home oxygen.      Chastity Guillen, PARAS    [id number]
I have examined the patient and patient requires home oxygen due to low saturations and Heart Failure 150.9.  Oxygen saturation at rest on room air is 84%    Cammy Grossman PA-C  Medicine
[facility] CHF STAR education chart note    Patient was visited by [facility] for CHF education. Heart failure education reviewed with the patient's family in detail. Discussed heart failure nutrition therapy, sodium and fluid intake, importance of diet adherence, daily weights monitoring with the patient. Reinforced importance of weight gain parameters and importance of contacting MD’s about weight changes. Provided handouts on heart failure nutrition therapy, reading heart healthy nutrition labels, heart healthy shopping tips and sodium (salt) content of foods. Verbalized understanding demonstrated by teach back method. Pt's family made aware RD remains available PRN.     RD remains available upon request and will follow up per protocol.   Martha Joel RDN, CDN / TEAMS
[facility]    Notified by RN patient with sbp >160's, no c/o cp or sob.       Interventions taken     Additional Hydralazine 50mg po x1  may need cardio consult to keep BP under control  cont assess and monitor  f/u with am team                    Merna Amanda ANP-[facility] #[id number]

## 2024-03-06 NOTE — PROGRESS NOTE ADULT - SUBJECTIVE AND OBJECTIVE BOX
CARDIOLOGY CONSULT PROGRESS NOTE  MAGED MUHAMMAD  MRN-[id number]    INTERVAL EVENTS:  - tele: sinus with 1st degree AV block, occasional Mobitz 1  - PYP scan yesterday, results pending. Patient remains stable on 2L NC, Cr rising today    ROS:  CONSTITUTIONAL: No weakness, fevers or chills  EYES/ENT: No visual changes;  No dysphagia  NECK: No pain or stiffness  RESPIRATORY: No cough, wheezing, hemoptysis; No shortness of breath  CARDIOVASCULAR: No chest pain or palpitations; No lower extremity edema  GASTROINTESTINAL: No abdominal or epigastric pain. No nausea, vomiting, or hematemesis; No diarrhea or constipation. No melena or hematochezia.  BACK: No back pain  GENITOURINARY: No dysuria, frequency or hematuria  NEUROLOGICAL: No numbness or weakness  SKIN: No itching, burning, rashes, or lesions     MEDICATIONS  (STANDING):  amLODIPine   Tablet 10 milliGRAM(s) Oral daily  aspirin enteric coated 81 milliGRAM(s) Oral daily  atorvastatin 10 milliGRAM(s) Oral at bedtime  dextrose 5%. 1000 milliLiter(s) (100 mL/Hr) IV Continuous <Continuous>  dextrose 5%. 1000 milliLiter(s) (50 mL/Hr) IV Continuous <Continuous>  dextrose 50% Injectable 25 Gram(s) IV Push once  dextrose 50% Injectable 25 Gram(s) IV Push once  dextrose 50% Injectable 12.5 Gram(s) IV Push once  furosemide    Tablet 40 milliGRAM(s) Oral two times a day  glucagon  Injectable 1 milliGRAM(s) IntraMuscular once  heparin   Injectable 5000 Unit(s) SubCutaneous every 12 hours  hydrALAZINE 75 milliGRAM(s) Oral every 8 hours  insulin glargine Injectable (LANTUS) 8 Unit(s) SubCutaneous at bedtime  insulin lispro (ADMELOG) corrective regimen sliding scale   SubCutaneous three times a day before meals  pantoprazole    Tablet 40 milliGRAM(s) Oral before breakfast  senna 2 Tablet(s) Oral at bedtime  traZODone 50 milliGRAM(s) Oral at bedtime    MEDICATIONS  (PRN):  acetaminophen     Tablet .. 650 milliGRAM(s) Oral every 8 hours PRN Temp greater or equal to 38C (100.4F), Mild Pain (1 - 3)  bisacodyl 5 milliGRAM(s) Oral every 12 hours PRN Constipation  dextrose Oral Gel 15 Gram(s) Oral once PRN Blood Glucose LESS THAN 70 milliGRAM(s)/deciliter  traMADol 12.5 milliGRAM(s) Oral at bedtime PRN pain    Allergies    No Known Allergies    Intolerances      P/E:  Vital Signs Last 24 Hrs  T(C): 37.1 (06 Mar 2024 04:31), Max: 37.1 (06 Mar 2024 04:)  T(F): 98.8 (06 Mar 2024 04:), Max: 98.8 (06 Mar 2024 04:)  HR: 82 (06 Mar 2024 04:31) (66 - 82)  BP: 174/67 (06 Mar 2024 04:31) (164/61 - 174/67)  BP(mean): --  RR: 18 (06 Mar 2024 04:31) (18 - 18)  SpO2: 93% (06 Mar 2024 04:31) (92% - 94%)    Parameters below as of 06 Mar 2024 04:31  Patient On (Oxygen Delivery Method): nasal cannula      Daily     Daily Weight in k.8 (06 Mar 2024 07:03)  I&O's Detail    05 Mar 2024 07:01  -  06 Mar 2024 07:00  --------------------------------------------------------  IN:    Oral Fluid: 900 mL  Total IN: 900 mL    OUT:    Voided (mL): 1050 mL  Total OUT: 1050 mL    Total NET: -150 mL      06 Mar 2024 07:01  -  06 Mar 2024 08:18  --------------------------------------------------------  IN:  Total IN: 0 mL    OUT:    Voided (mL): 350 mL  Total OUT: 350 mL    Total NET: -350 mL        I&O's Summary    05 Mar 2024 07:01  -  06 Mar 2024 07:00  --------------------------------------------------------  IN: 900 mL / OUT: 1050 mL / NET: -150 mL    06 Mar 2024 07:01  -  06 Mar 2024 08:18  --------------------------------------------------------  IN: 0 mL / OUT: 350 mL / NET: -350 mL      - gen: well appearing, laying in hospital bed, NAD  - HEENT: MMM, NCAT  - neck: no JVD, supple  - heart: RRR, nml S1/S2, no RMG  - lungs: CTABL, nml WOB  - abd: soft, NTND, NABS  - ext: WWP, PPP, no ENDY    RELEVANT RECENT LABS/IMAGING/STUDIES:                        8.7    8.42  )-----------( 280      ( 06 Mar 2024 06:01 )             28.2     03-06    140  |  107  |  75<H>  ----------------------------<  142<H>  4.8   |  21<L>  |  2.14<H>    Ca    9.2      06 Mar 2024 06:01  Phos  4.1     03-05  Mg     2.2     03-05          --------------------------------------  TTE 3/5:     CONCLUSIONS:      1. Left ventricular cavity is small. Left ventricular systolic function is normal with an ejection fraction of 66 % by Iglesias's method of disks. There are no regional wall motion abnormalities seen.   2. There is moderate (grade 2) left ventricular diastolic dysfunction, with elevated filling pressure.   3. Mildly enlarged right ventricular cavity size, with wall thickness, and normal systolic function. Tricuspid annular plane systolic excursion (TAPSE) is 1.8 cm (normal >=1.7 cm).   4. The left atrium is severely dilated.   5. The right atrium is mildly dilated.   6. No pericardial effusion seen.   7. Moderate to severe tricuspid regurgitation.   8. Compared to the transthoracic echocardiogram performed on 2022, PASP is increased. MR and TR increased.   9. Estimated pulmonary artery systolic pressure is 55 mmHg, consistent with moderate pulmonary hypertension.  10. There is increased LV mass and concentric hypertrophy.        --------------------------------------

## 2024-03-07 ENCOUNTER — TRANSCRIPTION ENCOUNTER (OUTPATIENT)
Age: 89
End: 2024-03-07

## 2024-03-07 VITALS
TEMPERATURE: 98 F | HEART RATE: 77 BPM | DIASTOLIC BLOOD PRESSURE: 55 MMHG | OXYGEN SATURATION: 94 % | SYSTOLIC BLOOD PRESSURE: 150 MMHG | RESPIRATION RATE: 17 BRPM

## 2024-03-07 LAB
ANION GAP SERPL CALC-SCNC: 14 MMOL/L — SIGNIFICANT CHANGE UP (ref 5–17)
BUN SERPL-MCNC: 83 MG/DL — HIGH (ref 7–23)
CALCIUM SERPL-MCNC: 9.1 MG/DL — SIGNIFICANT CHANGE UP (ref 8.4–10.5)
CHLORIDE SERPL-SCNC: 106 MMOL/L — SIGNIFICANT CHANGE UP (ref 96–108)
CO2 SERPL-SCNC: 21 MMOL/L — LOW (ref 22–31)
CREAT SERPL-MCNC: 2.14 MG/DL — HIGH (ref 0.5–1.3)
EGFR: 21 ML/MIN/1.73M2 — LOW
GLUCOSE BLDC GLUCOMTR-MCNC: 126 MG/DL — HIGH (ref 70–99)
GLUCOSE BLDC GLUCOMTR-MCNC: 248 MG/DL — HIGH (ref 70–99)
GLUCOSE SERPL-MCNC: 135 MG/DL — HIGH (ref 70–99)
HCT VFR BLD CALC: 29 % — LOW (ref 34.5–45)
HGB BLD-MCNC: 9 G/DL — LOW (ref 11.5–15.5)
MCHC RBC-ENTMCNC: 27.1 PG — SIGNIFICANT CHANGE UP (ref 27–34)
MCHC RBC-ENTMCNC: 31 GM/DL — LOW (ref 32–36)
MCV RBC AUTO: 87.3 FL — SIGNIFICANT CHANGE UP (ref 80–100)
NRBC # BLD: 0 /100 WBCS — SIGNIFICANT CHANGE UP (ref 0–0)
PLATELET # BLD AUTO: 280 K/UL — SIGNIFICANT CHANGE UP (ref 150–400)
POTASSIUM SERPL-MCNC: 4.5 MMOL/L — SIGNIFICANT CHANGE UP (ref 3.5–5.3)
POTASSIUM SERPL-SCNC: 4.5 MMOL/L — SIGNIFICANT CHANGE UP (ref 3.5–5.3)
RBC # BLD: 3.32 M/UL — LOW (ref 3.8–5.2)
RBC # FLD: 16.3 % — HIGH (ref 10.3–14.5)
SODIUM SERPL-SCNC: 141 MMOL/L — SIGNIFICANT CHANGE UP (ref 135–145)
WBC # BLD: 8.22 K/UL — SIGNIFICANT CHANGE UP (ref 3.8–10.5)
WBC # FLD AUTO: 8.22 K/UL — SIGNIFICANT CHANGE UP (ref 3.8–10.5)

## 2024-03-07 PROCEDURE — 80053 COMPREHEN METABOLIC PANEL: CPT

## 2024-03-07 PROCEDURE — 78830 RP LOCLZJ TUM SPECT W/CT 1: CPT | Mod: MC

## 2024-03-07 PROCEDURE — 84484 ASSAY OF TROPONIN QUANT: CPT

## 2024-03-07 PROCEDURE — 85027 COMPLETE CBC AUTOMATED: CPT

## 2024-03-07 PROCEDURE — 97116 GAIT TRAINING THERAPY: CPT

## 2024-03-07 PROCEDURE — 82947 ASSAY GLUCOSE BLOOD QUANT: CPT

## 2024-03-07 PROCEDURE — 82330 ASSAY OF CALCIUM: CPT

## 2024-03-07 PROCEDURE — 83880 ASSAY OF NATRIURETIC PEPTIDE: CPT

## 2024-03-07 PROCEDURE — 82803 BLOOD GASES ANY COMBINATION: CPT

## 2024-03-07 PROCEDURE — 97110 THERAPEUTIC EXERCISES: CPT

## 2024-03-07 PROCEDURE — 83605 ASSAY OF LACTIC ACID: CPT

## 2024-03-07 PROCEDURE — 96374 THER/PROPH/DIAG INJ IV PUSH: CPT

## 2024-03-07 PROCEDURE — 87637 SARSCOV2&INF A&B&RSV AMP PRB: CPT

## 2024-03-07 PROCEDURE — 85014 HEMATOCRIT: CPT

## 2024-03-07 PROCEDURE — A9538: CPT

## 2024-03-07 PROCEDURE — 85610 PROTHROMBIN TIME: CPT

## 2024-03-07 PROCEDURE — 82962 GLUCOSE BLOOD TEST: CPT

## 2024-03-07 PROCEDURE — 84100 ASSAY OF PHOSPHORUS: CPT

## 2024-03-07 PROCEDURE — 84295 ASSAY OF SERUM SODIUM: CPT

## 2024-03-07 PROCEDURE — 97530 THERAPEUTIC ACTIVITIES: CPT

## 2024-03-07 PROCEDURE — 84132 ASSAY OF SERUM POTASSIUM: CPT

## 2024-03-07 PROCEDURE — 93005 ELECTROCARDIOGRAM TRACING: CPT

## 2024-03-07 PROCEDURE — 80048 BASIC METABOLIC PNL TOTAL CA: CPT

## 2024-03-07 PROCEDURE — 82435 ASSAY OF BLOOD CHLORIDE: CPT

## 2024-03-07 PROCEDURE — 71250 CT THORAX DX C-: CPT | Mod: MC

## 2024-03-07 PROCEDURE — 99233 SBSQ HOSP IP/OBS HIGH 50: CPT | Mod: GC

## 2024-03-07 PROCEDURE — 71045 X-RAY EXAM CHEST 1 VIEW: CPT

## 2024-03-07 PROCEDURE — 99285 EMERGENCY DEPT VISIT HI MDM: CPT | Mod: 25

## 2024-03-07 PROCEDURE — 74018 RADEX ABDOMEN 1 VIEW: CPT

## 2024-03-07 PROCEDURE — 85018 HEMOGLOBIN: CPT

## 2024-03-07 PROCEDURE — 85025 COMPLETE CBC W/AUTO DIFF WBC: CPT

## 2024-03-07 PROCEDURE — 73502 X-RAY EXAM HIP UNI 2-3 VIEWS: CPT

## 2024-03-07 PROCEDURE — 97162 PT EVAL MOD COMPLEX 30 MIN: CPT

## 2024-03-07 PROCEDURE — 83735 ASSAY OF MAGNESIUM: CPT

## 2024-03-07 PROCEDURE — 93306 TTE W/DOPPLER COMPLETE: CPT

## 2024-03-07 PROCEDURE — 83036 HEMOGLOBIN GLYCOSYLATED A1C: CPT

## 2024-03-07 PROCEDURE — 85730 THROMBOPLASTIN TIME PARTIAL: CPT

## 2024-03-07 RX ORDER — HYDRALAZINE HCL 50 MG
3 TABLET ORAL
Qty: 270 | Refills: 0
Start: 2024-03-07 | End: 2024-04-05

## 2024-03-07 RX ORDER — INSULIN DETEMIR 100/ML (3)
18 INSULIN PEN (ML) SUBCUTANEOUS
Qty: 0 | Refills: 0 | DISCHARGE

## 2024-03-07 RX ORDER — FUROSEMIDE 40 MG
1 TABLET ORAL
Refills: 0 | DISCHARGE

## 2024-03-07 RX ORDER — FUROSEMIDE 40 MG
1 TABLET ORAL
Qty: 30 | Refills: 0
Start: 2024-03-07 | End: 2024-04-05

## 2024-03-07 RX ADMIN — Medication 75 MILLIGRAM(S): at 05:01

## 2024-03-07 RX ADMIN — HEPARIN SODIUM 5000 UNIT(S): 5000 INJECTION INTRAVENOUS; SUBCUTANEOUS at 05:03

## 2024-03-07 RX ADMIN — Medication 75 MILLIGRAM(S): at 12:34

## 2024-03-07 RX ADMIN — Medication 2: at 12:33

## 2024-03-07 RX ADMIN — Medication 81 MILLIGRAM(S): at 12:33

## 2024-03-07 RX ADMIN — AMLODIPINE BESYLATE 10 MILLIGRAM(S): 2.5 TABLET ORAL at 05:02

## 2024-03-07 RX ADMIN — PANTOPRAZOLE SODIUM 40 MILLIGRAM(S): 20 TABLET, DELAYED RELEASE ORAL at 05:01

## 2024-03-07 NOTE — PROGRESS NOTE ADULT - PROBLEM SELECTOR PLAN 4
creatinine at baseline now  continue to monitor
creatinine at baseline now  continue to monitor
outpatient follow up renal   continue to monitor
creatinine at baseline  mild bump   was 1.7 - 1.8 in 2022  continue to monitor
bump overnight  see above HPI   continue to monitor
creatinine at baseline now  continue to monitor

## 2024-03-07 NOTE — PROGRESS NOTE ADULT - PROVIDER SPECIALTY LIST ADULT
Pulmonology
Cardiology
Cardiology
Pulmonology
Cardiology
Pulmonology
Cardiology
Cardiology
Pulmonology
Internal Medicine

## 2024-03-07 NOTE — PROGRESS NOTE ADULT - PROBLEM SELECTOR PLAN 5
asymptomatic  continue ASA

## 2024-03-07 NOTE — PROGRESS NOTE ADULT - PROBLEM SELECTOR PLAN 6
continue Lipitor

## 2024-03-07 NOTE — DISCHARGE NOTE PROVIDER - CARE PROVIDERS DIRECT ADDRESSES
[email or web address]

## 2024-03-07 NOTE — PROGRESS NOTE ADULT - PROBLEM SELECTOR PROBLEM 7
Osteoarthritis of right hip

## 2024-03-07 NOTE — PROGRESS NOTE ADULT - ASSESSMENT
89 y/o female hx HTN/HLD, DM2, CAD (s/p CABG), PVD (s/p stents in bilateral LE), CKD (Baseline Cr reportedly 1.2), CHF presents with suspected CHF exacerbation. She was sent in by her cardiologist, Ced Rodney for IV diuretics because for the last week she has been having increased dyspnea on exertion. She also c/o left shoulder discomfort at this time and severe chronic right hip pain from arthritis.. She takes 40 mg po furosemide once daily. Otherwise patient feels well, denies headache, shortness of breath at rest, abdominal pain, dysuria/hematuria. (01 Mar 2024 10:54)     at bedside:   he says she was brought in here for chf and sob:   she has no underlying lung disease:   she has no cough or phlegm : never had fever at home:     chf exacerbation/   R/O Pneumonia  HTN  DM  CAD  PVD  CKD    chf exacerbation/   diuretics per primary team/; seen by cards  -recd intimally diuretics  now on hold per cards   -check echo   3/2" echo pending:  diuretics on hold  3/3: stlil pending:  remains off diuretics   3/4 : diuretics remains on hold  3/5: seems OK: no sob:  no cough : no phlegm  on 2 L of oxygen    3/6: creat increased : adjustment of diuretics per renal / Shehas diastolic dysfunction and mod pulm htn  : she does ahve mary effusion which is likely cardiac:  and I feels they are small and little bit larger on rigth side    3/7: she seems comfortable:  for dc today on oxygen   R/O Pneumonia  -she has more opacity on rigth side:  but clinically she presented with heart failure:   -she has no fever:  check procal : and her wbc count is normal:   -keep off antibiotics  3/2: ct chest   showed: Moderate right and small left pleural effusions with associated compressive atelectasis. Small volume perihepatic ascites. Mild subcutaneous edema.  -she needs diuresis:  defer to cards  3/3: cont to monitor clinically:  she is not sob:  she looks comfortable:  if the effusion increases,  it might need tap  3/4: comfortable now needs home oxygen    3/5: There isno increase in her sob:  cont to monitor   3/6: cont same rx:  no pneumonia  HTN  -controlled  DM  -monitor and control   CAD  -cont per cards  PVD  -per PMD  CKD  -monitor:    aleida barcenas

## 2024-03-07 NOTE — DISCHARGE NOTE PROVIDER - CARE PROVIDER_API CALL
Ced Rodney  Cardiovascular Disease  [address] [address] [place] [id number]  Phone: [phone]  Fax: [phone]  Follow Up Time: 1 week

## 2024-03-07 NOTE — PROGRESS NOTE ADULT - ASSESSMENT
89 year old woman with hypertension, DM2, CAD (s/p 3V CABG in 2002 LIMA to LAD, SVG to the OM, and SVG to the PDA), PVD (s/p stents in bilateral LE), CKD (Baseline Cr reportedly 1.2), HFpEF presents with WEEKS, likely HFpEF exacerbation. Mild troponin elevation likely due to heart failure.    Recommendations:  - On tele occasional dropped beats c/w mobitz I. 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 EP at that time did not recommend PPM, repeat EP eval 3/4, no indication for PPM  - TTE with HFpEF, bi-atrial enlargement, LV wall thickening c/f possible Amyloid. PYP without evidence of TTR Amyloid  - Wean O2 as able, to be dc'd on home O2  - Cr rising on Lasix, hold today and can be discharged on 40mg once daily with close Cardiology follow up  - Cont home ASA, Statin, Amlodipine 10. hydralazine 75mg TID

## 2024-03-07 NOTE — PROGRESS NOTE ADULT - PROBLEM SELECTOR PLAN 7
severe  no fracture  tramadol declined by patient and son  Tylenol PRN
severe  X Rays noted  no fracture   continue Tylenol
severe  X Rays noted  no fracture   continue Tylenol  tramadol for pain
severe  X Rays noted  no fracture
severe  X Rays noted  no fracture   continue Tylenol  tramadol 12.5 QHS for pain  son amenable
severe  X Rays noted  no fracture   continue Tylenol  tramadol 12.5 QHS for pain  son amenable

## 2024-03-07 NOTE — PROGRESS NOTE ADULT - PROBLEM SELECTOR PLAN 2
continue home meds with hold parameters  continue amlodipine and hydralazine with hold parameters

## 2024-03-07 NOTE — DISCHARGE NOTE PROVIDER - ATTENDING ATTESTATION STATEMENT
I have personally seen and examined the patient. I have collaborated with and supervised the
HTN (hypertension)

## 2024-03-07 NOTE — PROGRESS NOTE ADULT - NSPROGADDITIONALINFOA_GEN_ALL_CORE
discussed with patient in detail, expresses understanding of treatment plans.  discussed with patient's son at the bedside in detail
discussed with patient in detail, expresses understanding of treatment plans.  discussed with patient's son at the bedside in detail  discussed with covering ACP    discharge via ambulance  son declining Subacute Rehab
discussed with patient's son at the bedside in detail

## 2024-03-07 NOTE — PROGRESS NOTE ADULT - PROBLEM SELECTOR PLAN 1
s/p furosemide 80 mg IV x 1  appears euvolemic  continue to hold furosemide  likely resume once creatinine starts downtrending  cardiology recommendations appreciated
appears euvolemic  continue to hold furosemide  likely resume once creatinine starts downtrending  cardiology recommendations appreciated
creatinine stable from yesterday   will resume furosemide 40 po qd on discharge starting tomorrow    home O2 arranged,   cardiology help appreciated
appears somewhat hypervolemic today   IV furosemide 20 mg x 1 stat in addition to AM furosemide per cardiology recommendations  home O2 on discharge being arranged, however discharge on hold for further work up as per cardiology based on echocardiogram results  amyloid scan ordered  increased furosemide to 40 BID
appears euvolemic  resume furosemide per cardiology recommendations  patient will require home O2 on discharge
euvolemic today  however creatinine bumped   will discontinue furosemide for now  likely resume at lowered dose 40 po qd once creatinine improved   home O2 on discharge arranged,   amyloid scan negative

## 2024-03-07 NOTE — DISCHARGE NOTE PROVIDER - NSDCCPCAREPLAN_GEN_ALL_CORE_FT
PRINCIPAL DISCHARGE DIAGNOSIS  Diagnosis: CHF exacerbation  Assessment and Plan of Treatment: you were given a  dose of IV lasix and then transitioned to 40 mg oral. However, given acute kidney ionjury lasix held for a couple days. you are to restart lasix 40 mg tomorrow. follow up wiht your cardiologist in 1 week.  You have a nuclear medicine scan that ruled out amyloid as cause for cardiomyopathy.      SECONDARY DISCHARGE DIAGNOSES  Diagnosis: HTN (hypertension)  Assessment and Plan of Treatment: Your blood pressure was elevated during this hospitalization as well. Hydralazine increased to 75 mg three times a day. Make sure to follow up with your cardiolgoist outpatient.

## 2024-03-07 NOTE — PROGRESS NOTE ADULT - PROBLEM SELECTOR PLAN 3
HbA1C ~ 7   at goal  finger sticks acceptable  no oral meds  diabetic diet
HbA1C ~ 7   at goal  finger sticks acceptable  no oral meds  diabetic diet  continue finger sticks with short acting insulin sliding scale   continue long acting insulin 5 units while inpatient for now
HbA1C ~ 7   at goal  finger sticks acceptable  no oral meds  diabetic diet  discharge on Lantus 18 units QHS  son educated to increase as needed to keep AM glu ~ 150  risk of hypoglycemia high on prior preadmission dose of 28 units
HbA1C ~ 7   at goal  finger sticks acceptable  no oral meds  diabetic diet  continue finger sticks with short acting insulin sliding scale   continue long acting insulin 5 units while inpatient for now
HbA1C ~ 7   at goal  finger sticks acceptable  no oral meds  diabetic diet  increase Lantus to 12 units HS
HbA1C ~ 7   at goal  finger sticks acceptable  no oral meds  diabetic diet  increase Lantus to 8 units HS

## 2024-03-07 NOTE — PROGRESS NOTE ADULT - SUBJECTIVE AND OBJECTIVE BOX
Date of Service: 03-07-24 @ 17:14    Patient is a 89y old  Female who presents with a chief complaint of shortness of breath (07 Mar 2024 14:55)      Any change in ROS: on 2 L :  seems OK:  no sob:  at rest  :  son at bedside:  for dc today     MEDICATIONS  (STANDING):  amLODIPine   Tablet 10 milliGRAM(s) Oral daily  aspirin enteric coated 81 milliGRAM(s) Oral daily  atorvastatin 10 milliGRAM(s) Oral at bedtime  dextrose 5%. 1000 milliLiter(s) (100 mL/Hr) IV Continuous <Continuous>  dextrose 5%. 1000 milliLiter(s) (50 mL/Hr) IV Continuous <Continuous>  dextrose 50% Injectable 25 Gram(s) IV Push once  dextrose 50% Injectable 12.5 Gram(s) IV Push once  dextrose 50% Injectable 25 Gram(s) IV Push once  glucagon  Injectable 1 milliGRAM(s) IntraMuscular once  heparin   Injectable 5000 Unit(s) SubCutaneous every 12 hours  hydrALAZINE 75 milliGRAM(s) Oral every 8 hours  insulin glargine Injectable (LANTUS) 12 Unit(s) SubCutaneous at bedtime  insulin lispro (ADMELOG) corrective regimen sliding scale   SubCutaneous three times a day before meals  pantoprazole    Tablet 40 milliGRAM(s) Oral before breakfast  senna 2 Tablet(s) Oral at bedtime  traZODone 50 milliGRAM(s) Oral at bedtime    MEDICATIONS  (PRN):  acetaminophen     Tablet .. 650 milliGRAM(s) Oral every 8 hours PRN Temp greater or equal to 38C (100.4F), Mild Pain (1 - 3)  bisacodyl 5 milliGRAM(s) Oral every 12 hours PRN Constipation  dextrose Oral Gel 15 Gram(s) Oral once PRN Blood Glucose LESS THAN 70 milliGRAM(s)/deciliter  traMADol 12.5 milliGRAM(s) Oral at bedtime PRN pain    Vital Signs Last 24 Hrs  T(C): 36.5 (07 Mar 2024 11:36), Max: 36.6 (07 Mar 2024 05:07)  T(F): 97.7 (07 Mar 2024 11:36), Max: 97.9 (07 Mar 2024 05:07)  HR: 77 (07 Mar 2024 11:36) (75 - 78)  BP: 150/55 (07 Mar 2024 11:36) (150/55 - 165/65)  BP(mean): --  RR: 17 (07 Mar 2024 11:36) (17 - 18)  SpO2: 94% (07 Mar 2024 11:36) (92% - 94%)    Parameters below as of 07 Mar 2024 11:36  Patient On (Oxygen Delivery Method): nasal cannula  O2 Flow (L/min): 2      I&O's Summary    06 Mar 2024 07:01  -  07 Mar 2024 07:00  --------------------------------------------------------  IN: 885 mL / OUT: 1350 mL / NET: -465 mL    07 Mar 2024 07:01  -  07 Mar 2024 17:14  --------------------------------------------------------  IN: 365 mL / OUT: 550 mL / NET: -185 mL          Physical Exam:   GENERAL: NAD, well-groomed, well-developed  HEENT: CARY/   Atraumatic, Normocephalic  ENMT: No tonsillar erythema, exudates, or enlargement; Moist mucous membranes, Good dentition, No lesions  NECK: Supple, No JVD, Normal thyroid  CHEST/LUNG: decreased air entry bilaterally:   CVS: Regular rate and rhythm; No murmurs, rubs, or gallops  GI: : Soft, Nontender, Nondistended; Bowel sounds present  NERVOUS SYSTEM:  Alert & Oriented X3  EXTREMITIES: Mild  edema  LYMPH: No lymphadenopathy noted  SKIN: No rashes or lesions  ENDOCRINOLOGY: No Thyromegaly  PSYCH: Appropriate    Labs:  22                            9.0    8.22  )-----------( 280      ( 07 Mar 2024 06:01 )             29.0                         8.7    8.42  )-----------( 280      ( 06 Mar 2024 06:01 )             28.2     03-07    141  |  106  |  83<H>  ----------------------------<  135<H>  4.5   |  21<L>  |  2.14<H>  03-06    140  |  107  |  75<H>  ----------------------------<  142<H>  4.8   |  21<L>  |  2.14<H>  03-05    141  |  109<H>  |  70<H>  ----------------------------<  124<H>  4.5   |  22  |  1.77<H>  03-04    141  |  109<H>  |  65<H>  ----------------------------<  126<H>  4.6   |  19<L>  |  1.71<H>    Ca    9.1      07 Mar 2024 06:01  Ca    9.2      06 Mar 2024 06:01      CAPILLARY BLOOD GLUCOSE      POCT Blood Glucose.: 248 mg/dL (07 Mar 2024 12:24)  POCT Blood Glucose.: 126 mg/dL (07 Mar 2024 08:32)  POCT Blood Glucose.: 265 mg/dL (06 Mar 2024 21:18)          Urinalysis Basic - ( 07 Mar 2024 06:01 )    Color: x / Appearance: x / SG: x / pH: x  Gluc: 135 mg/dL / Ketone: x  / Bili: x / Urobili: x   Blood: x / Protein: x / Nitrite: x   Leuk Esterase: x / RBC: x / WBC x   Sq Epi: x / Non Sq Epi: x / Bacteria: x    ra< from: NM Amyloidosis SPECT/CT, Single Area Single Day (03.05.24 @ 19:28) >    Left Ventricular Myocardium to Bone (visual at 3 hrs): Grade: 0  (normal:   0)    Right Ventricular Myocardium to Bone (visual at 3 hrs): Grade: 0    (normal: 0)    IMPRESSION:    Cardiac amyloid Imaging study is  not suggestive of transthyretin cardiac   amyloidosis.    Stable bilateral pleural effusions.    --- End of Report ---    < end of copied text >  < from: CT Chest No Cont (03.01.24 @ 22:14) >  right greater than left lungs, most prominent in the lower lobes. Mosaic   attenuation, likely secondary to air trapping. Few scattered calcified   granulomas.  MEDIASTINUM AND ALFREDO: Few scattered prominent mediastinal lymph nodes,   largest 1.4 x 1.2 cm in the precarinal region (3-56).  VESSELS: Atherosclerotic changes of the aorta and coronary arteries. CABG.  HEART: Cardiomegaly. No pericardial effusion.  CHEST WALL AND LOWER NECK: Left thyroid lobe peripherally calcified   nodule, 1.5 cm, similar to prior.  VISUALIZED UPPER ABDOMEN: Small volume perihepatic ascites. Mild   subcutaneous edema.  BONES: Degenerative changes. Median sternotomy.    IMPRESSION:  Moderate right and small left pleural effusions with associated   compressive atelectasis.    Small volume perihepatic ascites. Mild subcutaneous edema.    --- End of Report ---           MAYUR RAMON MD; Resident Radiologist  This document has been electronically signed.   SEGUN SANTIAGO MD; Attending Radiologist  This document has been electronically signed. Mar  2 2024  2:44PM    < end of copied text >          RECENT CULTURES:        RESPIRATORY CULTURES:          Studies  Chest X-RAY  CT SCAN Chest   Venous Dopplers: LE:   CT Abdomen  Others

## 2024-03-07 NOTE — DISCHARGE NOTE PROVIDER - NSDCMRMEDTOKEN_GEN_ALL_CORE_FT
amLODIPine 10 mg oral tablet: 1 tab(s) orally once a day  AREDS 2: 1 tablet twice daily  aspirin 81 mg oral delayed release tablet: 1 tab(s) orally once a day  ATORVASTATIN 10 MG TABLET: 1 tab(s) orally once a day  hydrALAZINE 25 mg oral tablet: 3 tab(s) orally every 8 hours  Lasix 40 mg oral tablet: 1 tab(s) orally once a day  LEVEMIR FLEXTOUCH 100 UNIT/ML: 28 unit(s) subcutaneous once a day (at bedtime)  omeprazole 40 mg oral delayed release capsule: 1 cap(s) orally once a day  TRAZODONE 50 MG TABLET: 1 tab(s) orally once a day (at bedtime)  Tylenol 8 HR Arthritis Pain 650 mg oral tablet, extended release: 2 tab(s) orally 3 times a day  Vitamin D: once daily   amLODIPine 10 mg oral tablet: 1 tab(s) orally once a day  AREDS 2: 1 tablet twice daily  aspirin 81 mg oral delayed release tablet: 1 tab(s) orally once a day  ATORVASTATIN 10 MG TABLET: 1 tab(s) orally once a day  hydrALAZINE 25 mg oral tablet: 3 tab(s) orally every 8 hours  Lasix 40 mg oral tablet: 1 tab(s) orally once a day  LEVEMIR FLEXTOUCH 100 UNIT/ML: 18 unit(s) subcutaneous once a day (at bedtime)  omeprazole 40 mg oral delayed release capsule: 1 cap(s) orally once a day  TRAZODONE 50 MG TABLET: 1 tab(s) orally once a day (at bedtime)  Tylenol 8 HR Arthritis Pain 650 mg oral tablet, extended release: 2 tab(s) orally 3 times a day  Vitamin D: once daily

## 2024-03-07 NOTE — PROGRESS NOTE ADULT - SUBJECTIVE AND OBJECTIVE BOX
CARDIOLOGY CONSULT PROGRESS NOTE  MAGED MUHAMMAD  MRN-[id number]    INTERVAL EVENTS:  - tele: Sinus  - PYP resulted without evidence of TTR amyloid. Cr stable this AM    ROS:  CONSTITUTIONAL: No weakness, fevers or chills  EYES/ENT: No visual changes;  No dysphagia  NECK: No pain or stiffness  RESPIRATORY: No cough, wheezing, hemoptysis; No shortness of breath  CARDIOVASCULAR: No chest pain or palpitations; No lower extremity edema  GASTROINTESTINAL: No abdominal or epigastric pain. No nausea, vomiting, or hematemesis; No diarrhea or constipation. No melena or hematochezia.  BACK: No back pain  GENITOURINARY: No dysuria, frequency or hematuria  NEUROLOGICAL: No numbness or weakness  SKIN: No itching, burning, rashes, or lesions     MEDICATIONS  (STANDING):  amLODIPine   Tablet 10 milliGRAM(s) Oral daily  aspirin enteric coated 81 milliGRAM(s) Oral daily  atorvastatin 10 milliGRAM(s) Oral at bedtime  dextrose 5%. 1000 milliLiter(s) (100 mL/Hr) IV Continuous <Continuous>  dextrose 5%. 1000 milliLiter(s) (50 mL/Hr) IV Continuous <Continuous>  dextrose 50% Injectable 25 Gram(s) IV Push once  dextrose 50% Injectable 12.5 Gram(s) IV Push once  dextrose 50% Injectable 25 Gram(s) IV Push once  glucagon  Injectable 1 milliGRAM(s) IntraMuscular once  heparin   Injectable 5000 Unit(s) SubCutaneous every 12 hours  hydrALAZINE 75 milliGRAM(s) Oral every 8 hours  insulin glargine Injectable (LANTUS) 12 Unit(s) SubCutaneous at bedtime  insulin lispro (ADMELOG) corrective regimen sliding scale   SubCutaneous three times a day before meals  pantoprazole    Tablet 40 milliGRAM(s) Oral before breakfast  senna 2 Tablet(s) Oral at bedtime  traZODone 50 milliGRAM(s) Oral at bedtime    MEDICATIONS  (PRN):  acetaminophen     Tablet .. 650 milliGRAM(s) Oral every 8 hours PRN Temp greater or equal to 38C (100.4F), Mild Pain (1 - 3)  bisacodyl 5 milliGRAM(s) Oral every 12 hours PRN Constipation  dextrose Oral Gel 15 Gram(s) Oral once PRN Blood Glucose LESS THAN 70 milliGRAM(s)/deciliter  traMADol 12.5 milliGRAM(s) Oral at bedtime PRN pain    Allergies    No Known Allergies    Intolerances      P/E:  Vital Signs Last 24 Hrs  T(C): 36.6 (07 Mar 2024 05:07), Max: 36.7 (06 Mar 2024 11:33)  T(F): 97.9 (07 Mar 2024 05:07), Max: 98 (06 Mar 2024 11:33)  HR: 75 (07 Mar 2024 05:07) (75 - 78)  BP: 163/72 (07 Mar 2024 05:07) (158/55 - 165/65)  BP(mean): --  RR: 18 (07 Mar 2024 05:07) (18 - 18)  SpO2: 92% (07 Mar 2024 05:07) (84% - 95%)    Parameters below as of 07 Mar 2024 05:07  Patient On (Oxygen Delivery Method): nasal cannula  O2 Flow (L/min): 2    Daily     Daily Weight in k.2 (07 Mar 2024 06:23)  I&O's Detail    06 Mar 2024 07:01  -  07 Mar 2024 07:00  --------------------------------------------------------  IN:    Oral Fluid: 885 mL  Total IN: 885 mL    OUT:    Voided (mL): 1350 mL  Total OUT: 1350 mL    Total NET: -465 mL        I&O's Summary    06 Mar 2024 07:01  -  07 Mar 2024 07:00  --------------------------------------------------------  IN: 885 mL / OUT: 1350 mL / NET: -465 mL      - gen: well appearing, laying in hospital bed, NAD  - HEENT: MMM, NCAT  - neck: no JVD, supple  - heart: RRR, nml S1/S2, no RMG  - lungs: CTABL, nml WOB  - abd: soft, NTND, NABS  - ext: WWP, PPP, no ENDY    RELEVANT RECENT LABS/IMAGING/STUDIES:                        9.0    8.22  )-----------( 280      ( 07 Mar 2024 06:01 )             29.0     03-07    141  |  106  |  83<H>  ----------------------------<  135<H>  4.5   |  21<L>  |  2.14<H>    Ca    9.1      07 Mar 2024 06:01          --------------------------------------  PYP:  FINDINGS:    The technical quality of the images was adequate.    There is :    Moderate bilateral pleural effusions, right greater than left. Mosaic   attenuation of the lungs with scattered calcified granulomas.  Atherosclerotic changes of the aorta and coronary arteries. Status post   CABG.  Partially calcified left thyroid nodule measuring 1.5 cm unchanged from   prior study.  Status post median sternotomy.  Small amount of perihepatic and perisplenic ascites.  Status post cholecystectomy.  Diffuse fatty infiltration of the pancreas.  Diffuse calcification of the abdominal aorta and its branches.    Left Ventricular Myocardium to Bone (visual at 3 hrs): Grade: 0  (normal:   0)    Right Ventricular Myocardium to Bone (visual at 3 hrs): Grade: 0    (normal: 0)    IMPRESSION:    Cardiac amyloid Imaging study is  not suggestive of transthyretin cardiac   amyloidosis.    Stable bilateral pleural effusions.        --------------------------------------

## 2024-03-07 NOTE — DISCHARGE NOTE PROVIDER - NSDCFUADDAPPT_GEN_ALL_CORE_FT
APPTS ARE READY TO BE MADE: [ ] YES    Best Family or Patient Contact (if needed):    Additional Information about above appointments (if needed):    1: f/u with your cardiologist  2:   3:     Other comments or requests:

## 2024-03-07 NOTE — PROGRESS NOTE ADULT - SUBJECTIVE AND OBJECTIVE BOX
Patient is a 89y old  Female who presents with a chief complaint of shortness of breath (07 Mar 2024 12:59)      DATE OF SERVICE: 03-07-24 @ 14:55    SUBJECTIVE / OVERNIGHT EVENTS: overnight events noted  son at bedside  "I want to go home"     ROS:  Resp: No cough no sputum production  CVS: No chest pain no palpitations no orthopnea  GI: no N/V/D            MEDICATIONS  (STANDING):  amLODIPine   Tablet 10 milliGRAM(s) Oral daily  aspirin enteric coated 81 milliGRAM(s) Oral daily  atorvastatin 10 milliGRAM(s) Oral at bedtime  dextrose 5%. 1000 milliLiter(s) (100 mL/Hr) IV Continuous <Continuous>  dextrose 5%. 1000 milliLiter(s) (50 mL/Hr) IV Continuous <Continuous>  dextrose 50% Injectable 25 Gram(s) IV Push once  dextrose 50% Injectable 12.5 Gram(s) IV Push once  dextrose 50% Injectable 25 Gram(s) IV Push once  glucagon  Injectable 1 milliGRAM(s) IntraMuscular once  heparin   Injectable 5000 Unit(s) SubCutaneous every 12 hours  hydrALAZINE 75 milliGRAM(s) Oral every 8 hours  insulin glargine Injectable (LANTUS) 12 Unit(s) SubCutaneous at bedtime  insulin lispro (ADMELOG) corrective regimen sliding scale   SubCutaneous three times a day before meals  pantoprazole    Tablet 40 milliGRAM(s) Oral before breakfast  senna 2 Tablet(s) Oral at bedtime  traZODone 50 milliGRAM(s) Oral at bedtime    MEDICATIONS  (PRN):  acetaminophen     Tablet .. 650 milliGRAM(s) Oral every 8 hours PRN Temp greater or equal to 38C (100.4F), Mild Pain (1 - 3)  bisacodyl 5 milliGRAM(s) Oral every 12 hours PRN Constipation  dextrose Oral Gel 15 Gram(s) Oral once PRN Blood Glucose LESS THAN 70 milliGRAM(s)/deciliter  traMADol 12.5 milliGRAM(s) Oral at bedtime PRN pain        CAPILLARY BLOOD GLUCOSE      POCT Blood Glucose.: 248 mg/dL (07 Mar 2024 12:24)  POCT Blood Glucose.: 126 mg/dL (07 Mar 2024 08:32)  POCT Blood Glucose.: 265 mg/dL (06 Mar 2024 21:18)  POCT Blood Glucose.: 188 mg/dL (06 Mar 2024 16:58)    I&O's Summary    06 Mar 2024 07:01  -  07 Mar 2024 07:00  --------------------------------------------------------  IN: 885 mL / OUT: 1350 mL / NET: -465 mL    07 Mar 2024 07:01  -  07 Mar 2024 14:55  --------------------------------------------------------  IN: 365 mL / OUT: 0 mL / NET: 365 mL        Vital Signs Last 24 Hrs  T(C): 36.5 (07 Mar 2024 11:36), Max: 36.6 (07 Mar 2024 05:07)  T(F): 97.7 (07 Mar 2024 11:36), Max: 97.9 (07 Mar 2024 05:07)  HR: 77 (07 Mar 2024 11:36) (75 - 78)  BP: 150/55 (07 Mar 2024 11:36) (150/55 - 165/65)  BP(mean): --  RR: 17 (07 Mar 2024 11:36) (17 - 18)  SpO2: 94% (07 Mar 2024 11:36) (92% - 94%)    PHYSICAL EXAM:  CHEST/LUNG: clear  HEART: S1 S2; systolic murmur +   ABDOMEN: Soft, Nontender  EXTREMITIES:   no edema  NEUROLOGY: Alert non-focal  SKIN: No rashes or lesions    LABS:                        9.0    8.22  )-----------( 280      ( 07 Mar 2024 06:01 )             29.0     03-07    141  |  106  |  83<H>  ----------------------------<  135<H>  4.5   |  21<L>  |  2.14<H>    Ca    9.1      07 Mar 2024 06:01            Urinalysis Basic - ( 07 Mar 2024 06:01 )    Color: x / Appearance: x / SG: x / pH: x  Gluc: 135 mg/dL / Ketone: x  / Bili: x / Urobili: x   Blood: x / Protein: x / Nitrite: x   Leuk Esterase: x / RBC: x / WBC x   Sq Epi: x / Non Sq Epi: x / Bacteria: x          All consultant(s) notes reviewed and care discussed with other providers        Contact Number, Dr Huddleston [phone]

## 2024-03-07 NOTE — DISCHARGE NOTE PROVIDER - HOSPITAL COURSE
HPI:  89 y/o female hx HTN/HLD, DM2, CAD (s/p CABG), PVD (s/p stents in bilateral LE), CKD (Baseline Cr reportedly 1.2), CHF presents with suspected CHF exacerbation. She was sent in by her cardiologist, Ced Rodney for IV diuretics because for the last week she has been having increased dyspnea on exertion. She also c/o left shoulder discomfort at this time and severe chronic right hip pain from arthritis.. She takes 40 mg po furosemide once daily. Otherwise patient feels well, denies headache, shortness of breath at rest, abdominal pain, dysuria/hematuria. (01 Mar 2024 10:54)    Hospital Course:   Problem/Plan - 1:  ·  Problem: Acute on chronic heart failure with preserved ejection fraction (HFpEF).   ·  Plan: euvolemic today  however creatinine bumped   will discontinue furosemide for now  likely resume at lowered dose 40 po qd once creatinine improved   home O2 on discharge arranged,   amyloid scan negative.     Problem/Plan - 2:  ·  Problem: HTN (hypertension).   ·  Plan: continue home meds with hold parameters  continue amlodipine and hydralazine with hold parameters.     Problem/Plan - 3:  ·  Problem: DM (diabetes mellitus).   ·  Plan: HbA1C ~ 7   at goal  finger sticks acceptable  no oral meds  diabetic diet  increase Lantus to 12 units HS.     Problem/Plan - 4:  ·  Problem: Stage 3 chronic kidney disease.   ·  Plan: bump overnight  see above HPI   continue to monitor.     Problem/Plan - 5:  ·  Problem: PVD (peripheral vascular disease).   ·  Plan: asymptomatic  continue ASA.     Problem/Plan - 6:  ·  Problem: HLD (hyperlipidemia).   ·  Plan: continue Lipitor.     Problem/Plan - 7:  ·  Problem: Osteoarthritis of right hip.   ·  Plan: severe  X Rays noted  no fracture.    Important Medication Changes and Reason:    Active or Pending Issues Requiring Follow-up:    Advanced Directives:   [ ] Full code  [ x] DNR  [ ] Hospice    Discharge Diagnoses:

## 2024-03-07 NOTE — PROGRESS NOTE ADULT - PROBLEM SELECTOR PROBLEM 1
Acute on chronic heart failure with preserved ejection fraction (HFpEF)

## 2024-03-07 NOTE — PROGRESS NOTE ADULT - ATTENDING COMMENTS
89 year old woman with remote coronary revascularization surgery, HFpEF presents with exacerbated heart failure. Has RBBB, long 1st degree heart block, Mobitz type I heart block which has no related symptoms, no syncope or near syncope and previously evaluated by Electrophysiology and deemed not to require pacemaker. Repeat Electrophysiology evaluation continues to conclude has asymptomatic Mobitz type 1 heart block and no indication for pacemaker. Symptomatically improving. Cardiac echo done, observe preserved systolic function, small chamber and thick LV walls, possibly consistent with TTR amyloid. Obtained [id number] scan and there is no indication of cardiac amyloid. Remains with bilateral pleural effusions, volume excess, responding to diuresis, but probably narrow window and unable to tolerate aggressive diuresis to dry status. Discharge dose of diuretic probably furosemide 40 mg PO once daily with close outpatient monitoring. Creatinine kalie yesterday, today appears plateaued and from cardiology perspective can be discharged with follow up by Dr. Rodney.    To contact call Cardiology Fellow or Attending as listed on [email or web address] password: [email or web address].
89 year old woman with remote coronary revascularization surgery, HFpEF presents with exacerbated heart failure. Has RBBB, long 1st degree heart block, Mobitz type I heart block which has no related symptoms, no syncope or near syncope and previously evaluated by Electrophysiology and deemed not to require pacemaker at that time. She is responding to diuresis.    To contact call Cardiology Fellow or Attending as listed on [email or web address] password: [email or web address].
89 year old woman with remote coronary revascularization surgery, HFpEF presents with exacerbated heart failure. Has RBBB, long 1st degree heart block, Mobitz type I heart block which has no related symptoms, no syncope or near syncope and previously evaluated by Electrophysiology and deemed not to require pacemaker at that time. She is responding to diuresis. Repeat Electrophysiology evaluation continues to conclude has asymptomatic Mobitz type 1 heart block and no indication for pacemaker. Symptomatically improving. Cardiac echo done, official report pending, but on preliminary review observe preserved systolic function, small chamber and thick LV walls. Should consider TTR amyloid, obtain [id number] scan. Remains with bilateral pleural effusions, volume excess and should treat with adequate dose of diuretic, probably furosemide 40 mg PO BID.    To contact call Cardiology Fellow or Attending as listed on [email or web address] password: [email or web address].
89 year old woman with remote coronary revascularization surgery, HFpEF presents with exacerbated heart failure. Has RBBB, long 1st degree heart block, Mobitz type I heart block which has no related symptoms, no syncope or near syncope and previously evaluated by Electrophysiology and deemed not to require pacemaker at that time. Repeat Electrophysiology evaluation continues to conclude has asymptomatic Mobitz type 1 heart block and no indication for pacemaker. Symptomatically improving. Cardiac echo done, official report pending, but on preliminary review observe preserved systolic function, small chamber and thick LV walls, possibly consistent with TTR amyloid. Obtained [id number] scan, result pending. Remains with bilateral pleural effusions, volume excess, responding to diuresis, but probably narrow window and unable to tolerate aggressive diuresis to dry status. Discharge dose of diuretic probably furosemide 40 mg PO once daily with close outpatient monitoring.    To contact call Cardiology Fellow or Attending as listed on [email or web address] password: [email or web address].
89 y/o woman with HTN, DM, CAD (s/p 3V CABG in 2002 LIMA to LAD, SVG to the OM, and SVG to the PDA), PVD (s/p stents in bilateral LE), CKD, admitted with dyspnea on exertion and volume overload.  --Patient appears relatively euvolemic, resting comfortably with son at bedside.  --S/P IV lasix yesterday; BNP around 3200.  --Cr uptrended to 1.92--monitor closely with early switch to PO diuretic.  --Check TTE.  --Keep O>I and ween O2 as tolerated.  --Monitor closely on telemetry: very long first degree AV block with RBBB and dropped beats--monitor closely on telemetry.  --Will follow.

## 2024-03-13 ENCOUNTER — APPOINTMENT (OUTPATIENT)
Dept: CARDIOLOGY | Facility: CLINIC | Age: 89
End: 2024-03-13
Payer: MEDICARE

## 2024-03-13 DIAGNOSIS — I25.10 ATHEROSCLEROTIC HEART DISEASE OF NATIVE CORONARY ARTERY W/OUT ANGINA PECTORIS: ICD-10-CM

## 2024-03-13 LAB
25(OH)D3 SERPL-MCNC: 49.8 NG/ML
ALBUMIN SERPL ELPH-MCNC: 4.2 G/DL
ALP BLD-CCNC: 194 U/L
ALT SERPL-CCNC: 6 U/L
ANION GAP SERPL CALC-SCNC: 17 MMOL/L
APPEARANCE: CLEAR
AST SERPL-CCNC: 19 U/L
BASOPHILS # BLD AUTO: 0.05 K/UL
BASOPHILS NFR BLD AUTO: 0.6 %
BILIRUB SERPL-MCNC: 0.2 MG/DL
BILIRUBIN URINE: NEGATIVE
BLOOD URINE: NEGATIVE
BUN SERPL-MCNC: 49 MG/DL
CALCIUM SERPL-MCNC: 10.2 MG/DL
CHLORIDE SERPL-SCNC: 98 MMOL/L
CHOLEST SERPL-MCNC: 119 MG/DL
CO2 SERPL-SCNC: 20 MMOL/L
COLOR: NORMAL
CREAT SERPL-MCNC: 1.3 MG/DL
EGFR: 38 ML/MIN/1.73M2
EOSINOPHIL # BLD AUTO: 0.21 K/UL
EOSINOPHIL NFR BLD AUTO: 2.5 %
ESTIMATED AVERAGE GLUCOSE: 169 MG/DL
GLUCOSE QUALITATIVE U: NEGATIVE
GLUCOSE SERPL-MCNC: 118 MG/DL
HBA1C MFR BLD HPLC: 7.5 %
HCT VFR BLD CALC: 32.8 %
HDLC SERPL-MCNC: 40 MG/DL
HGB BLD-MCNC: 9.8 G/DL
IMM GRANULOCYTES NFR BLD AUTO: 0.6 %
KETONES URINE: NEGATIVE
LDLC SERPL CALC-MCNC: 47 MG/DL
LEUKOCYTE ESTERASE URINE: NEGATIVE
LYMPHOCYTES # BLD AUTO: 0.88 K/UL
LYMPHOCYTES NFR BLD AUTO: 10.3 %
MAN DIFF?: NORMAL
MCHC RBC-ENTMCNC: 26.6 PG
MCHC RBC-ENTMCNC: 29.9 GM/DL
MCV RBC AUTO: 89.1 FL
MONOCYTES # BLD AUTO: 0.83 K/UL
MONOCYTES NFR BLD AUTO: 9.8 %
NEUTROPHILS # BLD AUTO: 6.49 K/UL
NEUTROPHILS NFR BLD AUTO: 76.2 %
NITRITE URINE: NEGATIVE
NONHDLC SERPL-MCNC: 79 MG/DL
NT-PROBNP SERPL-MCNC: 2197 PG/ML
PH URINE: 6
PLATELET # BLD AUTO: 328 K/UL
POTASSIUM SERPL-SCNC: 4.4 MMOL/L
PROT SERPL-MCNC: 7.9 G/DL
PROTEIN URINE: ABNORMAL
RBC # BLD: 3.68 M/UL
RBC # FLD: 18 %
SODIUM SERPL-SCNC: 134 MMOL/L
SPECIFIC GRAVITY URINE: 1.02
TRIGL SERPL-MCNC: 162 MG/DL
TSH SERPL-ACNC: 3.19 UIU/ML
UROBILINOGEN URINE: NORMAL
WBC # FLD AUTO: 8.51 K/UL

## 2024-03-13 PROCEDURE — 99215 OFFICE O/P EST HI 40 MIN: CPT | Mod: 25

## 2024-03-15 PROBLEM — I25.10 CORONARY ARTERY DISEASE: Status: ACTIVE | Noted: 2020-08-20

## 2024-03-19 ENCOUNTER — LABORATORY RESULT (OUTPATIENT)
Age: 89
End: 2024-03-19

## 2024-06-12 NOTE — ED ADULT NURSE NOTE - TEMPLATE LIST FOR HEAD TO TOE ASSESSMENT
[TextEntry] : 80-year-old female who presents for abnormal urinalysis  Patient reports debris in her urine but otherwise denies bothersome urinary symptoms.  She reports occasional UTIs but no issues with infections.  She denies any prior urologic history.  She denies issues with emptying or straining to empty.  She denies any gross hematuria.  She is a G3, P2 via vaginal delivery.  She denies any vaginal bulge.  She denies any pelvic surgeries.  She has constipation at baseline as well as black tinge stool which may be secondary to iron.  Her history is remarkable for an MI x 2 2-1/2 years ago.  She also has diabetes  PVR 0 cc  Urinalysis 2/5 with 44 WBCs and significant glucose Urine culture negative Urinalysis with 10 WBCs and significant glucose on 1/29  Creatinine 1.7 on 1/29 Recommended a renal bladder ultrasound and cystoscopy.  Patient reported that she had an ultrasound with her nephrologist and intended to send us the results.  5/24 creatinine 1.5 A1c 6.7%  Today on 5/28 patient reports that she has been having hematuria with associated urinary frequency urgency and dysuria.  She was seen by her outside PCP who diagnosed her with an infection.  She was treated with 2 courses of antibiotics.  However she continues to have dysuria and possible hematuria.  She was sent for a CT abdomen pelvis which was unremarkable.  Because of her kidney function, she is unable to get IV contrast. Counseled about recurrent UTI prevention.  Sending patient for urine studies as well as CT urogram.  Plan for cystoscopy.  Patient had a negative cytology and negative culture, urinalysis showed significant glucose, white blood cells, red blood cells.  The urinalysis also showed yeastlike cells.  Advised that we will send this for fungal infection at cystoscopy appointment.
Abdominal Pain, N/V/D

## 2024-06-21 NOTE — DISCUSSION/SUMMARY
[FreeTextEntry1] : IMPRESSION: Ms. MUHAMMAD is a 89 year old woman with a history of coronary artery disease status post CABG 5/9/2002 (LIMA to LAD, SVG to the OM, and SVG to the PDA, peripheral vascular disease status post stents to the left leg complicated by compartment syndrome in 2010, carotid artery stenosis status post left carotid endarterectomy, HTN, chronic renal insufficiency, diabetes mellitus, anemia, prior COVID infection, and dyslipidemia who is evaluated today via Telehealth for follow up of hospitalization for decompensated heart failure.   PLAN: 1. She has lower extremity edema based on what I could assess via Telehealth and what she and her son describe to me. She had an echocardiogram during her hospitalization that revealed moderate pulmonary HTN, Moderate mitral regurgitation, and moderate to severe tricuspid regurgitation with a preserved left ventricular ejection fraction. I will make arrangements for a home draw that will include a CMP and pro-BNP. She is currently taking Lasix 40 mg daily. If her blood pressure can tolerate, she can take an additional 40 mg of Lasix for the next 3 days.   2. She will continue on ASA 81 mg daily given her coronary artery disease, peripheral vascular disease and carotid disease. She never had a nuclear stress test given her rhythm disorder. Her Plavix was stopped when she was hospitalized in 2021 with a GI bleed.  3. She had episodes of second degree AV block and her Metoprolol was stopped previously. She is not on systemic anticoagulation given her anemia and history of  bleed.  4. Her blood pressure has been fine at home. She will continue on Amlodipine 10 mg daily, Amlodipine 10 mg daily, Hydralazine 75 mg three times a day and Lasix 40 mg daily.  5. I will be checking a CBC with her blood work given her anemia.  6. I will arrange for a follow up Telehealth in 2 weeks to see if her lower extremity edema is improving. Her son will continue to follow her blood pressure at home.   Time started: 6:28 PM Singh ended: 7:03 PM  I spent an additional 8 minutes on documentation for a total time of 43 minutes soent during this encounter.

## 2024-06-21 NOTE — HISTORY OF PRESENT ILLNESS
[Home] : at home, [unfilled] , at the time of the visit. [Medical Office: ([facility])___] : at the medical office located in  [Other:____] : [unfilled] [Verbal consent obtained from patient] : the patient, [unfilled] [FreeTextEntry1] : Patient is a 89 year old woman with a history of coronary artery disease status post CABG 5/9/2002 (LIMA to LAD, SVG to the OM, and SVG to the PDA, peripheral vascular disease status post stents to the left leg complicated by compartment syndrome in 2010, carotid artery stenosis status post left carotid endarterectomy, HTN, chronic renal insufficiency, diabetes mellitus, anemia, prior COVID infection, and dyslipidemia who Is evaluated today via telehealth following her recent hospitalization for heart failure.  She was discharged from the hospital 6 days ago where she was diuresed for decompensated heart failure.  She states that she continues to have edema of her legs bilaterally as well as edema of her right hand which is where she had an IV placed.  She has been on continuous nasal cannula oxygen since being discharged with oxygen saturations that have been greater than or equal to 93%.  She has had a visiting nurse on an every other day basis and her son has also been following her vitals.  Her blood pressure yesterday manually was 110/70.  Her weight this morning was 152 pounds, which is up from 150 pounds yesterday, which is down from 154.8 upon coming home. She mentions some chest pressure which resolves after she "belches".  She states that she has been feeling tired and has not been doing much since being at home.  She denies any dyspnea at rest, palpitations, or headaches.   Discharge medications: Hydralazine 75 mg 3 times a day. Amlodipine 10 mg daily Aspirin 81 mg daily Atorvastatin 10 mg at bedtime Lasix 40 mg once a day Levemir Omeprazole 40 mg daily Trazodone 50 mg at bedtime Tylenol every 8 hours Vitamin D

## 2024-06-21 NOTE — PHYSICAL EXAM
[Well Developed] : well developed [Well Nourished] : well nourished [No Acute Distress] : no acute distress [Normal Conjunctiva] : normal conjunctiva [Normal Rate] : normal [Irregularly Irregular] : irregularly irregular [Normal S1] : normal S1 [Normal S2] : normal S2 [___ +] : bilateral [unfilled]U+ pretibial pitting edema [II] : a grade 2 [Clear Lung Fields] : clear lung fields [Good Air Entry] : good air entry [No Respiratory Distress] : no respiratory distress  [Soft] : abdomen soft [Normal Bowel Sounds] : normal bowel sounds [Non Tender] : non-tender [Moves all extremities] : moves all extremities [Normal Speech] : normal speech [Normal memory] : normal memory [Alert and Oriented] : alert and oriented [de-identified] : Extraocular muscles intact. Anicteric sclerae. [de-identified] : She was wearing a face mask during the examination.  [de-identified] : mild JVD [de-identified] : No visible ulcers. No significant rash was appreciated on exam.  [Bruit] : no bruit heard [de-identified] : 2+ pittiing edema of the legs bilaterally. 1+ edema of her right hand. No obvious erythema.

## 2024-06-21 NOTE — CARDIOLOGY SUMMARY
[LVEF ___%] : LVEF [unfilled]% [___] : [unfilled] [None] : no pulmonary hypertension [Moderate] : moderate mitral regurgitation [de-identified] : 4/11/2023: Junctional Rhythm at 75 bpm with a right bundle branch block and ST-T wave abnormality, consider inferior ischemia\par   [de-identified] : 3/5/2024: Left ventricular cavity is small. Left ventricular systolic function is normal with an ejection fraction of 66% by Iglesias's method of disks. There are no regional wall motion abnormalities seen. There is moderate (grade 2) left ventricular diastolic dysfunction, with elevated filling pressure. Mildly enlarged right ventricular cavity size, with wall thickness, and normal systolic function. Tricuspid annular plane systolic excursion (TAPSE) is 1.8 cm (normal >=1.7 cm). The left atrium is severely dilated. The right atrium is mildly dilated. No pericardial effusion seen. Moderate to severe tricuspid regurgitation. Compared to the transthoracic echocardiogram performed on 1/30/2022, PASP is increased. MR and TR increased. PASP= 55 mmHg, consistent with moderate pulmonary hypertension. Severe left ventricular hypertrophy. There is moderate mitral  regurgitation.   1/30/2022: Mild mitral regurgitation. Mildly dilated left atrium. Normal left ventricular systolic function. No segmental wall motion abnormalities. EF= 65%. Moderate concentric left ventricular hypertrophy. Right ventricular enlargement with normal right ventricular systolic function. Mild tricuspid regurgitation. PASP= 37 mmHg, consistent with borderline pulmonary hypertension.

## 2025-02-17 NOTE — DISCHARGE NOTE PROVIDER - REASON FOR ADMISSION
Refill Request:    metoPROLOL succinate (TOPROL-XL) 25 MG 24 hr tablet     Last Refill: 12/2/24    Last Office Visit: 1/23/25    Last Labs:     CMP 1/23/25    Last BP:    1/23/25 122/70    Protocol failed:    Beta Blocker Refill Protocol - 12 Month Protocol Trwcor02/15/2025 09:06 PM   Protocol Details Medication (including dose and sig) on current meds list             GI bleeding x 1 day
